# Patient Record
Sex: FEMALE | Race: WHITE | NOT HISPANIC OR LATINO | Employment: UNEMPLOYED | URBAN - METROPOLITAN AREA
[De-identification: names, ages, dates, MRNs, and addresses within clinical notes are randomized per-mention and may not be internally consistent; named-entity substitution may affect disease eponyms.]

---

## 2023-04-05 LAB
ALBUMIN (MG/L) IN URINE: <7 MG/L
ALBUMIN/CREATININE (UG/MG) IN URINE: ABNORMAL UG/MG CRT (ref 0–30)
ANION GAP IN SER/PLAS: 12 MMOL/L (ref 10–20)
CALCIUM (MG/DL) IN SER/PLAS: 9.5 MG/DL (ref 8.6–10.6)
CARBON DIOXIDE, TOTAL (MMOL/L) IN SER/PLAS: 26 MMOL/L (ref 21–32)
CHLORIDE (MMOL/L) IN SER/PLAS: 96 MMOL/L (ref 98–107)
CHOLESTEROL (MG/DL) IN SER/PLAS: 123 MG/DL (ref 0–199)
CHOLESTEROL IN HDL (MG/DL) IN SER/PLAS: 66 MG/DL
CHOLESTEROL/HDL RATIO: 1.9
CREATININE (MG/DL) IN SER/PLAS: 0.85 MG/DL (ref 0.5–1.05)
CREATININE (MG/DL) IN URINE: 15 MG/DL (ref 20–320)
ESTIMATED AVERAGE GLUCOSE FOR HBA1C: 341 MG/DL
GFR FEMALE: 81 ML/MIN/1.73M2
GLUCOSE (MG/DL) IN SER/PLAS: 768 MG/DL (ref 74–99)
HEMOGLOBIN A1C/HEMOGLOBIN TOTAL IN BLOOD: 13.5 %
LDL: 37 MG/DL (ref 0–99)
POTASSIUM (MMOL/L) IN SER/PLAS: 4.4 MMOL/L (ref 3.5–5.3)
SODIUM (MMOL/L) IN SER/PLAS: 130 MMOL/L (ref 136–145)
TRIGLYCERIDE (MG/DL) IN SER/PLAS: 100 MG/DL (ref 0–149)
UREA NITROGEN (MG/DL) IN SER/PLAS: 14 MG/DL (ref 6–23)
VLDL: 20 MG/DL (ref 0–40)

## 2023-09-15 LAB
ANION GAP IN SER/PLAS: 10 MMOL/L (ref 10–20)
CALCIUM (MG/DL) IN SER/PLAS: 9 MG/DL (ref 8.6–10.3)
CARBON DIOXIDE, TOTAL (MMOL/L) IN SER/PLAS: 34 MMOL/L (ref 21–32)
CHLORIDE (MMOL/L) IN SER/PLAS: 98 MMOL/L (ref 98–107)
CREATININE (MG/DL) IN SER/PLAS: 0.92 MG/DL (ref 0.5–1.05)
GFR FEMALE: 73 ML/MIN/1.73M2
GLUCOSE (MG/DL) IN SER/PLAS: 160 MG/DL (ref 74–99)
NATRIURETIC PEPTIDE B (PG/ML) IN SER/PLAS: 111 PG/ML (ref 0–99)
POTASSIUM (MMOL/L) IN SER/PLAS: 4 MMOL/L (ref 3.5–5.3)
SODIUM (MMOL/L) IN SER/PLAS: 138 MMOL/L (ref 136–145)
UREA NITROGEN (MG/DL) IN SER/PLAS: 15 MG/DL (ref 6–23)

## 2023-10-02 ENCOUNTER — TELEPHONE (OUTPATIENT)
Dept: ENDOCRINOLOGY | Facility: CLINIC | Age: 53
End: 2023-10-02
Payer: COMMERCIAL

## 2023-10-03 NOTE — TELEPHONE ENCOUNTER
Patient called.  She never made the pump changes at her last telephone call.  Her sugars have been in the 400mg/dL range.  Yesterday it ranged from 180-250mg/dL.  She changed her pump site this morning.    Recommendations  1. Change pump site  2.  Change correction factor to 85mg/dL  3.  Continue boluses

## 2023-10-11 ENCOUNTER — PHARMACY VISIT (OUTPATIENT)
Dept: PHARMACY | Facility: CLINIC | Age: 53
End: 2023-10-11
Payer: MEDICAID

## 2023-10-11 PROCEDURE — RXMED WILLOW AMBULATORY MEDICATION CHARGE

## 2023-10-11 RX ORDER — MORPHINE SULFATE 15 MG/1
15 TABLET, FILM COATED, EXTENDED RELEASE ORAL EVERY 12 HOURS
Qty: 60 TABLET | Refills: 0 | OUTPATIENT
Start: 2023-10-11 | End: 2024-05-10 | Stop reason: ALTCHOICE

## 2023-10-11 RX ORDER — OXYCODONE AND ACETAMINOPHEN 5; 325 MG/1; MG/1
TABLET ORAL
Qty: 90 TABLET | Refills: 0 | OUTPATIENT
Start: 2023-10-11 | End: 2024-05-10 | Stop reason: WASHOUT

## 2023-10-17 ENCOUNTER — HOSPITAL ENCOUNTER (EMERGENCY)
Facility: HOSPITAL | Age: 53
Discharge: LEFT BEFORE DISCHARGE | End: 2023-10-17
Attending: EMERGENCY MEDICINE
Payer: COMMERCIAL

## 2023-10-17 VITALS
BODY MASS INDEX: 17.07 KG/M2 | TEMPERATURE: 98 F | RESPIRATION RATE: 24 BRPM | SYSTOLIC BLOOD PRESSURE: 134 MMHG | HEIGHT: 64 IN | HEART RATE: 101 BPM | WEIGHT: 100 LBS | DIASTOLIC BLOOD PRESSURE: 79 MMHG | OXYGEN SATURATION: 95 %

## 2023-10-17 DIAGNOSIS — R07.9 CHEST PAIN, UNSPECIFIED TYPE: Primary | ICD-10-CM

## 2023-10-17 LAB
ALBUMIN SERPL BCP-MCNC: 3.6 G/DL (ref 3.4–5)
ALP SERPL-CCNC: 448 U/L (ref 33–110)
ALT SERPL W P-5'-P-CCNC: 27 U/L (ref 7–45)
ANION GAP SERPL CALC-SCNC: 13 MMOL/L (ref 10–20)
AST SERPL W P-5'-P-CCNC: 42 U/L (ref 9–39)
BASOPHILS # BLD AUTO: 0.05 X10*3/UL (ref 0–0.1)
BASOPHILS NFR BLD AUTO: 0.9 %
BILIRUB SERPL-MCNC: 0.5 MG/DL (ref 0–1.2)
BNP SERPL-MCNC: 178 PG/ML (ref 0–99)
BUN SERPL-MCNC: 8 MG/DL (ref 6–23)
CALCIUM SERPL-MCNC: 9.6 MG/DL (ref 8.6–10.3)
CARDIAC TROPONIN I PNL SERPL HS: 13 NG/L (ref 0–13)
CHLORIDE SERPL-SCNC: 104 MMOL/L (ref 98–107)
CO2 SERPL-SCNC: 24 MMOL/L (ref 21–32)
CREAT SERPL-MCNC: 0.69 MG/DL (ref 0.5–1.05)
EOSINOPHIL # BLD AUTO: 0.13 X10*3/UL (ref 0–0.7)
EOSINOPHIL NFR BLD AUTO: 2.4 %
ERYTHROCYTE [DISTWIDTH] IN BLOOD BY AUTOMATED COUNT: 16.6 % (ref 11.5–14.5)
GFR SERPL CREATININE-BSD FRML MDRD: >90 ML/MIN/1.73M*2
GLUCOSE SERPL-MCNC: 178 MG/DL (ref 74–99)
HCT VFR BLD AUTO: 40.5 % (ref 36–46)
HGB BLD-MCNC: 13.9 G/DL (ref 12–16)
IMM GRANULOCYTES # BLD AUTO: 0.03 X10*3/UL (ref 0–0.7)
IMM GRANULOCYTES NFR BLD AUTO: 0.6 % (ref 0–0.9)
LACTATE SERPL-SCNC: 0.8 MMOL/L (ref 0.4–2)
LIPASE SERPL-CCNC: 3 U/L (ref 9–82)
LYMPHOCYTES # BLD AUTO: 0.6 X10*3/UL (ref 1.2–4.8)
LYMPHOCYTES NFR BLD AUTO: 11.1 %
MCH RBC QN AUTO: 30.2 PG (ref 26–34)
MCHC RBC AUTO-ENTMCNC: 34.3 G/DL (ref 32–36)
MCV RBC AUTO: 88 FL (ref 80–100)
MONOCYTES # BLD AUTO: 0.42 X10*3/UL (ref 0.1–1)
MONOCYTES NFR BLD AUTO: 7.7 %
NEUTROPHILS # BLD AUTO: 4.19 X10*3/UL (ref 1.2–7.7)
NEUTROPHILS NFR BLD AUTO: 77.3 %
NRBC BLD-RTO: 0 /100 WBCS (ref 0–0)
PLATELET # BLD AUTO: 288 X10*3/UL (ref 150–450)
PMV BLD AUTO: 9.1 FL (ref 7.5–11.5)
POTASSIUM SERPL-SCNC: 3.7 MMOL/L (ref 3.5–5.3)
PROT SERPL-MCNC: 7.5 G/DL (ref 6.4–8.2)
RBC # BLD AUTO: 4.6 X10*6/UL (ref 4–5.2)
SODIUM SERPL-SCNC: 137 MMOL/L (ref 136–145)
WBC # BLD AUTO: 5.4 X10*3/UL (ref 4.4–11.3)

## 2023-10-17 PROCEDURE — 36415 COLL VENOUS BLD VENIPUNCTURE: CPT | Performed by: NURSE PRACTITIONER

## 2023-10-17 PROCEDURE — 93308 TTE F-UP OR LMTD: CPT | Performed by: EMERGENCY MEDICINE

## 2023-10-17 PROCEDURE — 96374 THER/PROPH/DIAG INJ IV PUSH: CPT

## 2023-10-17 PROCEDURE — 2500000001 HC RX 250 WO HCPCS SELF ADMINISTERED DRUGS (ALT 637 FOR MEDICARE OP): Performed by: EMERGENCY MEDICINE

## 2023-10-17 PROCEDURE — 83605 ASSAY OF LACTIC ACID: CPT | Performed by: NURSE PRACTITIONER

## 2023-10-17 PROCEDURE — 84484 ASSAY OF TROPONIN QUANT: CPT | Performed by: NURSE PRACTITIONER

## 2023-10-17 PROCEDURE — 80053 COMPREHEN METABOLIC PANEL: CPT | Performed by: NURSE PRACTITIONER

## 2023-10-17 PROCEDURE — 83880 ASSAY OF NATRIURETIC PEPTIDE: CPT | Performed by: EMERGENCY MEDICINE

## 2023-10-17 PROCEDURE — 36415 COLL VENOUS BLD VENIPUNCTURE: CPT | Performed by: EMERGENCY MEDICINE

## 2023-10-17 PROCEDURE — 83690 ASSAY OF LIPASE: CPT | Performed by: NURSE PRACTITIONER

## 2023-10-17 PROCEDURE — 2500000004 HC RX 250 GENERAL PHARMACY W/ HCPCS (ALT 636 FOR OP/ED): Performed by: EMERGENCY MEDICINE

## 2023-10-17 PROCEDURE — 85025 COMPLETE CBC W/AUTO DIFF WBC: CPT | Performed by: NURSE PRACTITIONER

## 2023-10-17 PROCEDURE — 99284 EMERGENCY DEPT VISIT MOD MDM: CPT | Mod: 25 | Performed by: EMERGENCY MEDICINE

## 2023-10-17 RX ORDER — FAMOTIDINE 10 MG/ML
20 INJECTION INTRAVENOUS ONCE
Status: DISCONTINUED | OUTPATIENT
Start: 2023-10-17 | End: 2023-10-17 | Stop reason: HOSPADM

## 2023-10-17 RX ORDER — DIPHENHYDRAMINE HYDROCHLORIDE 50 MG/ML
50 INJECTION INTRAMUSCULAR; INTRAVENOUS ONCE
Status: DISCONTINUED | OUTPATIENT
Start: 2023-10-17 | End: 2023-10-17 | Stop reason: HOSPADM

## 2023-10-17 RX ORDER — OXYCODONE AND ACETAMINOPHEN 5; 325 MG/1; MG/1
1 TABLET ORAL ONCE
Status: COMPLETED | OUTPATIENT
Start: 2023-10-17 | End: 2023-10-17

## 2023-10-17 RX ADMIN — OXYCODONE HYDROCHLORIDE AND ACETAMINOPHEN 1 TABLET: 5; 325 TABLET ORAL at 10:46

## 2023-10-17 RX ADMIN — METHYLPREDNISOLONE SODIUM SUCCINATE 125 MG: 125 INJECTION, POWDER, FOR SOLUTION INTRAMUSCULAR; INTRAVENOUS at 10:46

## 2023-10-17 ASSESSMENT — PAIN DESCRIPTION - PAIN TYPE: TYPE: ACUTE PAIN

## 2023-10-17 ASSESSMENT — HEART SCORE
HEART SCORE: 3
HISTORY: SLIGHTLY SUSPICIOUS
ECG: NORMAL
RISK FACTORS: >2 RISK FACTORS OR HX OF ATHEROSCLEROTIC DISEASE
AGE: 45-64
TROPONIN: LESS THAN OR EQUAL TO NORMAL LIMIT

## 2023-10-17 ASSESSMENT — LIFESTYLE VARIABLES
EVER FELT BAD OR GUILTY ABOUT YOUR DRINKING: NO
HAVE PEOPLE ANNOYED YOU BY CRITICIZING YOUR DRINKING: NO
EVER HAD A DRINK FIRST THING IN THE MORNING TO STEADY YOUR NERVES TO GET RID OF A HANGOVER: NO
HAVE YOU EVER FELT YOU SHOULD CUT DOWN ON YOUR DRINKING: NO

## 2023-10-17 ASSESSMENT — COLUMBIA-SUICIDE SEVERITY RATING SCALE - C-SSRS
6. HAVE YOU EVER DONE ANYTHING, STARTED TO DO ANYTHING, OR PREPARED TO DO ANYTHING TO END YOUR LIFE?: NO
1. IN THE PAST MONTH, HAVE YOU WISHED YOU WERE DEAD OR WISHED YOU COULD GO TO SLEEP AND NOT WAKE UP?: NO
2. HAVE YOU ACTUALLY HAD ANY THOUGHTS OF KILLING YOURSELF?: NO

## 2023-10-17 ASSESSMENT — PAIN SCALES - GENERAL
PAINLEVEL_OUTOF10: 10 - WORST POSSIBLE PAIN
PAINLEVEL_OUTOF10: 8

## 2023-10-17 ASSESSMENT — PAIN - FUNCTIONAL ASSESSMENT: PAIN_FUNCTIONAL_ASSESSMENT: 0-10

## 2023-10-17 ASSESSMENT — PAIN DESCRIPTION - LOCATION: LOCATION: CHEST

## 2023-10-17 ASSESSMENT — ENCOUNTER SYMPTOMS: TACHYCARDIA: 1

## 2023-10-17 NOTE — ED PROCEDURE NOTE
Procedure    Performed by: Hiro Barney DO  Authorized by: Hiro Barney DO  Cardiac Indications: tachycardia                Procedure: Cardiac Ultrasound    Findings:   Views: parasternal long, parasternal short, apical four and subxiphoid  The pericardial space was visualized and was NEGATIVE for a significant pericardial effusion.  Activity: Ventricular contractions were visualized.  LV: LV systolic function was NORMAL.  RV: RV size was NORMAL.    Impression:  Cardiac: The focused cardiac ultrasound exam was NORMAL.    Comments: Mitral valve replacement and aortic vavle replacement visualized               Hiro Barney DO  10/17/23 2813

## 2023-10-17 NOTE — ED PROVIDER NOTES
Chief Complaint   Patient presents with   • Chest Pain     Midsternal chest pain since Saturday. HX of CHF and DM2       HPI       53 year old female presents to the Emergency Department today complaining of a 1 week history of nasal congestion, postnasal drip, and nonproductive cough. Notes that she has had a gradual onset of shortness of breath that has gotten progressively worse. Notes that she has had midsternal chest pain that she describes as dull in nature, constant, non-radiating, and varies in intensity. Reports that she was walking on several occasions over the weekend and fell. Notes that she has had right rib pain since the fall. She is not clear as to what cause her to fall. Denies hitting their head, loss of consciousness, or seizure-like activity. Denies any other injuries. Denies her heart beating fast, slow, or irregular. Denies any associated fever, chills, headache, neck pain, abdominal pain, nausea, vomiting, diarrhea, constipation, or urinary symptoms.       History provided by:  Patient             Patient History   History reviewed. No pertinent past medical history.  Past Surgical History:   Procedure Laterality Date   • OTHER SURGICAL HISTORY  04/14/2020    Cholecystectomy   • OTHER SURGICAL HISTORY  04/14/2020    Exploratory laparoscopy   • OTHER SURGICAL HISTORY  04/14/2020    Heart surgery     No family history on file.  Social History     Tobacco Use   • Smoking status: Former     Types: Cigarettes   • Smokeless tobacco: Never   Vaping Use   • Vaping Use: Every day   Substance Use Topics   • Alcohol use: Never   • Drug use: Never           Physical Exam  Constitutional:       Appearance: Normal appearance.   HENT:      Head: Normocephalic.      Right Ear: Tympanic membrane, ear canal and external ear normal.      Left Ear: Tympanic membrane, ear canal and external ear normal.      Nose: Nose normal.      Mouth/Throat:      Mouth: Mucous membranes are moist.      Pharynx: Oropharynx is  clear. No oropharyngeal exudate or posterior oropharyngeal erythema.   Eyes:      Conjunctiva/sclera: Conjunctivae normal.      Pupils: Pupils are equal, round, and reactive to light.   Cardiovascular:      Rate and Rhythm: Normal rate and regular rhythm.      Pulses:           Radial pulses are 3+ on the right side and 3+ on the left side.        Dorsalis pedis pulses are 3+ on the right side and 3+ on the left side.      Heart sounds: Normal heart sounds. No murmur heard.     No friction rub. No gallop.   Pulmonary:      Effort: Pulmonary effort is normal. No respiratory distress.      Breath sounds: Normal breath sounds. No wheezing, rhonchi or rales.   Abdominal:      General: Abdomen is flat. Bowel sounds are normal.      Palpations: Abdomen is soft.      Tenderness: There is no abdominal tenderness. There is no right CVA tenderness, left CVA tenderness, guarding or rebound. Negative signs include Prado's sign and McBurney's sign.   Musculoskeletal:         General: No swelling or deformity.      Cervical back: Full passive range of motion without pain.      Right lower leg: No edema.      Left lower leg: No edema.   Lymphadenopathy:      Cervical: No cervical adenopathy.   Skin:     Capillary Refill: Capillary refill takes less than 2 seconds.      Coloration: Skin is not jaundiced.      Findings: No rash.   Neurological:      General: No focal deficit present.      Mental Status: She is alert and oriented to person, place, and time. Mental status is at baseline.      Gait: Gait is intact.   Psychiatric:         Mood and Affect: Mood normal.         Behavior: Behavior is cooperative.         Labs Reviewed   CBC WITH AUTO DIFFERENTIAL - Abnormal       Result Value    WBC 5.4      nRBC 0.0      RBC 4.60      Hemoglobin 13.9      Hematocrit 40.5      MCV 88      MCH 30.2      MCHC 34.3      RDW 16.6 (*)     Platelets 288      MPV 9.1      Neutrophils % 77.3      Immature Granulocytes %, Automated 0.6       Lymphocytes % 11.1      Monocytes % 7.7      Eosinophils % 2.4      Basophils % 0.9      Neutrophils Absolute 4.19      Immature Granulocytes Absolute, Automated 0.03      Lymphocytes Absolute 0.60 (*)     Monocytes Absolute 0.42      Eosinophils Absolute 0.13      Basophils Absolute 0.05     COMPREHENSIVE METABOLIC PANEL - Abnormal    Glucose 178 (*)     Sodium 137      Potassium 3.7      Chloride 104      Bicarbonate 24      Anion Gap 13      Urea Nitrogen 8      Creatinine 0.69      eGFR >90      Calcium 9.6      Albumin 3.6      Alkaline Phosphatase 448 (*)     Total Protein 7.5      AST 42 (*)     Bilirubin, Total 0.5      ALT 27     LIPASE - Abnormal    Lipase 3 (*)     Narrative:     Venipuncture immediately after or during the administration of Metamizole may lead to falsely low results. Testing should be performed immediately prior to Metamizole dosing.   B-TYPE NATRIURETIC PEPTIDE - Abnormal     (*)     Narrative:        <100 pg/mL - Heart failure unlikely  100-299 pg/mL - Intermediate probability of acute heart                  failure exacerbation. Correlate with clinical                  context and patient history.    >=300 pg/mL - Heart Failure likely. Correlate with clinical                  context and patient history.    BNP testing is performed using different testing methodology at [facility] than at other [facility]. Direct result comparisons should only be made within the same method.      LACTATE - Normal    Lactate 0.8      Narrative:     Venipuncture immediately after or during the administration of Metamizole may lead to falsely low results. Testing should be performed immediately  prior to Metamizole dosing.   TROPONIN I, HIGH SENSITIVITY - Normal    Troponin I, High Sensitivity 13      Narrative:     Less than 99th percentile of normal range cutoff-  Female and children under 18 years old <14 ng/L; Male <21 ng/L: Negative  Repeat testing should be performed if  clinically indicated.     Female and children under 18 years old 14-50 ng/L; Male 21-50 ng/L:  Consistent with possible cardiac damage and possible increased clinical   risk. Serial measurements may help to assess extent of myocardial damage.     >50 ng/L: Consistent with cardiac damage, increased clinical risk and  myocardial infarction. Serial measurements may help assess extent of   myocardial damage.      NOTE: Children less than 1 year old may have higher baseline troponin   levels and results should be interpreted in conjunction with the overall   clinical context.     NOTE: Troponin I testing is performed using a different   testing methodology at [facility] than at other   [facility]. Direct result comparisons should only   be made within the same method.   URINALYSIS WITH REFLEX MICROSCOPIC AND CULTURE    Narrative:     The following orders were created for panel order Urinalysis with Reflex Microscopic and Culture.  Procedure                               Abnormality         Status                     ---------                               -----------         ------                     Urinalysis with Reflex M...[[id number]]                                                 Extra Urine Gray Tube[[id number]]                                                         Please view results for these tests on the individual orders.   URINALYSIS WITH REFLEX MICROSCOPIC AND CULTURE   EXTRA URINE GRAY TUBE       Point of Care Ultrasound    (Results Pending)   CT head wo IV contrast    (Results Pending)   CT cervical spine wo IV contrast    (Results Pending)   CT angio chest for pulmonary embolism    (Results Pending)   CT abdomen pelvis w IV contrast    (Results Pending)            ED Course & MDM   ED Course as of 10/17/23 1215   Tue Oct 17, 2023   1152 Nursing staff alerted that patient was leaving against medical advice. I was unable to see the patient before she left due to trauma patient and  other patient care issues [WJ]      ED Course User Index  [WJ] Hiro Barney,          Diagnoses as of 10/17/23 1215   Chest pain, unspecified type           Medical Decision Making  Patient was seen and evaluated by Dr. Barney. Placed on a cardiac monitor which showed normal sinus rhythm without ectopy throughout ED stay. Rhythm strip obtained showed normal sinus rhythm. Impression: No acute pathology. Continuous pulse oximetry monitoring showed no signs of hypoxia. Saline lock was established with labs drawn and results as above. Blood counts, electrolytes, kidney function, and lactate were unremarkable. Heart Score- 3 with normal EKG and troponin. Minimal elevation in AST 42. Cardiac BNP was minimally elevated as well. CT scans of her head, cervical spine, chest, abdomen, and pelvis were all ordered to evaluate her symptoms further. She was premedicated for her dye allergy. Prior to receiving the above, she left prior to treatment being complete.     Diagnostic Impression:    1. Acute chest pain    2. IV meds in ED           Your medication list        ASK your doctor about these medications        Instructions Last Dose Given Next Dose Due   fluconazole 150 mg tablet  Commonly known as: Diflucan      TAKE ONE TABLET BY MOUTH ONCE       morphine CR 15 mg 12 hr tablet  Commonly known as: MS Contin      Take 1 tablet (15 mg) by mouth every 12 hours.       oxyCODONE-acetaminophen 5-325 mg tablet  Commonly known as: Percocet      TAKE 1 TABLET BY MOUTH EVERY 6 HOURS FOR 30 DAYS       oxyCODONE-acetaminophen 5-325 mg tablet  Commonly known as: Percocet      TAKE 1 TABLET BY MOUTH EVERY 6 HOURS FOR 30 DS.DO NOT FILL EARLY       oxyCODONE-acetaminophen 5-325 mg tablet  Commonly known as: Percocet      TAKE 1 TABLET BY MOUTH EVERY 8 HOURS AS NEEDED FOR PAIN       oxyCODONE-acetaminophen 5-325 mg tablet  Commonly known as: Percocet      TAKE 1 TABLET BY MOUTH EVERY 8 HOURS AS NEEDED FOR 30 DAYS       OxyCONTIN 10 mg 12  hr tablet  Generic drug: oxyCODONE ER      TAKE 1 TABLET BY MOUTH EVERY 12 HOURS       tinidazole 500 mg tablet  Commonly known as: Tindamax      TAKE 4 TABLETS BY MOUTH AT ONCE       tinidazole 500 mg tablet  Commonly known as: Tindamax      TAKE 4 TABLETS BY MOUTH ONCE DAILY                  Procedure  Procedures     Mikel Anderson, RALPH-CNP  10/17/23 1027

## 2023-10-23 ENCOUNTER — HOSPITAL ENCOUNTER (OUTPATIENT)
Dept: RADIOLOGY | Facility: HOSPITAL | Age: 53
Discharge: HOME | End: 2023-10-23
Payer: COMMERCIAL

## 2023-10-23 DIAGNOSIS — R59.0 LOCALIZED ENLARGED LYMPH NODES: ICD-10-CM

## 2023-10-23 PROCEDURE — 76536 US EXAM OF HEAD AND NECK: CPT

## 2023-10-23 PROCEDURE — 76536 US EXAM OF HEAD AND NECK: CPT | Performed by: RADIOLOGY

## 2023-10-26 ENCOUNTER — PHARMACY VISIT (OUTPATIENT)
Dept: PHARMACY | Facility: CLINIC | Age: 53
End: 2023-10-26
Payer: MEDICAID

## 2023-10-26 PROCEDURE — RXMED WILLOW AMBULATORY MEDICATION CHARGE

## 2023-11-25 ENCOUNTER — PHARMACY VISIT (OUTPATIENT)
Dept: PHARMACY | Facility: CLINIC | Age: 53
End: 2023-11-25
Payer: MEDICAID

## 2023-11-25 PROCEDURE — RXMED WILLOW AMBULATORY MEDICATION CHARGE

## 2023-12-06 ENCOUNTER — APPOINTMENT (OUTPATIENT)
Dept: OTOLARYNGOLOGY | Facility: CLINIC | Age: 53
End: 2023-12-06
Payer: COMMERCIAL

## 2023-12-08 ENCOUNTER — PHARMACY VISIT (OUTPATIENT)
Dept: PHARMACY | Facility: CLINIC | Age: 53
End: 2023-12-08
Payer: MEDICAID

## 2023-12-08 PROCEDURE — RXMED WILLOW AMBULATORY MEDICATION CHARGE

## 2023-12-13 ENCOUNTER — OFFICE VISIT (OUTPATIENT)
Dept: ENDOCRINOLOGY | Facility: CLINIC | Age: 53
End: 2023-12-13
Payer: COMMERCIAL

## 2023-12-13 VITALS
BODY MASS INDEX: 19.45 KG/M2 | HEART RATE: 97 BPM | DIASTOLIC BLOOD PRESSURE: 60 MMHG | HEIGHT: 63 IN | WEIGHT: 109.8 LBS | SYSTOLIC BLOOD PRESSURE: 106 MMHG

## 2023-12-13 DIAGNOSIS — E10.9 TYPE 1 DIABETES MELLITUS WITHOUT COMPLICATION (MULTI): Primary | ICD-10-CM

## 2023-12-13 LAB — POC HEMOGLOBIN A1C: 8.9 % (ref 4.2–6.5)

## 2023-12-13 PROCEDURE — 83036 HEMOGLOBIN GLYCOSYLATED A1C: CPT | Performed by: INTERNAL MEDICINE

## 2023-12-13 PROCEDURE — 3046F HEMOGLOBIN A1C LEVEL >9.0%: CPT | Performed by: INTERNAL MEDICINE

## 2023-12-13 PROCEDURE — 95251 CONT GLUC MNTR ANALYSIS I&R: CPT | Performed by: INTERNAL MEDICINE

## 2023-12-13 PROCEDURE — 3074F SYST BP LT 130 MM HG: CPT | Performed by: INTERNAL MEDICINE

## 2023-12-13 PROCEDURE — 1036F TOBACCO NON-USER: CPT | Performed by: INTERNAL MEDICINE

## 2023-12-13 PROCEDURE — 3008F BODY MASS INDEX DOCD: CPT | Performed by: INTERNAL MEDICINE

## 2023-12-13 PROCEDURE — 99214 OFFICE O/P EST MOD 30 MIN: CPT | Performed by: INTERNAL MEDICINE

## 2023-12-13 PROCEDURE — 3078F DIAST BP <80 MM HG: CPT | Performed by: INTERNAL MEDICINE

## 2023-12-13 RX ORDER — INSULIN ASPART 100 [IU]/ML
INJECTION, SOLUTION INTRAVENOUS; SUBCUTANEOUS
COMMUNITY
End: 2024-03-21 | Stop reason: SDUPTHER

## 2023-12-13 RX ORDER — ONDANSETRON 4 MG/1
4 TABLET, FILM COATED ORAL
COMMUNITY
Start: 2018-10-25 | End: 2024-05-10 | Stop reason: WASHOUT

## 2023-12-13 RX ORDER — NALOXONE HYDROCHLORIDE 4 MG/.1ML
SPRAY NASAL
COMMUNITY
Start: 2023-11-10

## 2023-12-13 RX ORDER — OMEPRAZOLE 40 MG/1
40 CAPSULE, DELAYED RELEASE ORAL
COMMUNITY
Start: 2021-09-28

## 2023-12-13 RX ORDER — ATORVASTATIN CALCIUM 40 MG/1
40 TABLET, FILM COATED ORAL DAILY
COMMUNITY

## 2023-12-13 RX ORDER — ESOMEPRAZOLE MAGNESIUM 40 MG/1
40 CAPSULE, DELAYED RELEASE ORAL DAILY PRN
COMMUNITY

## 2023-12-13 RX ORDER — SODIUM CHLORIDE 0.9 % (FLUSH) 0.9 %
10 SYRINGE (ML) INJECTION
COMMUNITY
Start: 2023-06-05 | End: 2024-05-24 | Stop reason: WASHOUT

## 2023-12-13 RX ORDER — HYDROCHLOROTHIAZIDE 25 MG/1
TABLET ORAL
COMMUNITY
Start: 2015-09-06 | End: 2024-05-24 | Stop reason: WASHOUT

## 2023-12-13 RX ORDER — ISOPROPYL ALCOHOL 0.75 G/1
SWAB TOPICAL
COMMUNITY
Start: 2023-11-07

## 2023-12-13 RX ORDER — PANCRELIPASE 36000; 180000; 114000 [USP'U]/1; [USP'U]/1; [USP'U]/1
CAPSULE, DELAYED RELEASE PELLETS ORAL
COMMUNITY
Start: 2022-05-20 | End: 2024-05-24 | Stop reason: WASHOUT

## 2023-12-13 RX ORDER — OLANZAPINE 10 MG/1
TABLET ORAL 2 TIMES DAILY PRN
COMMUNITY

## 2023-12-13 RX ORDER — ASPIRIN 81 MG/1
81 TABLET ORAL DAILY
COMMUNITY

## 2023-12-13 RX ORDER — TALC
2 POWDER (GRAM) TOPICAL NIGHTLY
COMMUNITY
Start: 2022-08-25

## 2023-12-13 RX ORDER — DULOXETIN HYDROCHLORIDE 60 MG/1
80 CAPSULE, DELAYED RELEASE ORAL DAILY
COMMUNITY

## 2023-12-13 RX ORDER — CYCLOBENZAPRINE HCL 10 MG
10 TABLET ORAL 3 TIMES DAILY PRN
COMMUNITY

## 2023-12-13 RX ORDER — SPIRONOLACTONE 25 MG/1
TABLET ORAL
COMMUNITY
Start: 2021-05-19

## 2023-12-13 RX ORDER — TIOTROPIUM BROMIDE 18 UG/1
1 CAPSULE ORAL; RESPIRATORY (INHALATION)
COMMUNITY
End: 2024-05-24 | Stop reason: WASHOUT

## 2023-12-13 RX ORDER — FLASH GLUCOSE SENSOR
KIT MISCELLANEOUS
COMMUNITY
Start: 2023-11-22 | End: 2024-01-31 | Stop reason: SDUPTHER

## 2023-12-13 RX ORDER — ALBUTEROL SULFATE 90 UG/1
2 AEROSOL, METERED RESPIRATORY (INHALATION) EVERY 6 HOURS
COMMUNITY
Start: 2015-08-16

## 2023-12-13 RX ORDER — ONDANSETRON 4 MG/1
4 TABLET, ORALLY DISINTEGRATING ORAL EVERY 8 HOURS PRN
COMMUNITY
Start: 2015-09-05 | End: 2024-05-10 | Stop reason: WASHOUT

## 2023-12-13 RX ORDER — METOPROLOL SUCCINATE 50 MG/1
TABLET, EXTENDED RELEASE ORAL
COMMUNITY
Start: 2020-08-20

## 2023-12-13 RX ORDER — MULTIVIT-MIN/IRON FUM/FOLIC AC 7.5 MG-4
1 TABLET ORAL
COMMUNITY
End: 2024-05-24 | Stop reason: WASHOUT

## 2023-12-13 RX ORDER — TORSEMIDE 20 MG/1
20 TABLET ORAL
COMMUNITY
Start: 2021-05-19

## 2023-12-13 RX ORDER — LEVOTHYROXINE SODIUM 125 UG/1
TABLET ORAL
COMMUNITY

## 2023-12-13 RX ORDER — INSULIN PUMP CONTROLLER
EACH MISCELLANEOUS
COMMUNITY
Start: 2023-11-27

## 2023-12-13 RX ORDER — INSULIN GLARGINE 100 [IU]/ML
INJECTION, SOLUTION SUBCUTANEOUS
COMMUNITY
Start: 2015-08-16 | End: 2024-01-09 | Stop reason: ALTCHOICE

## 2023-12-13 RX ORDER — POTASSIUM CHLORIDE 20 MEQ/1
1 TABLET, EXTENDED RELEASE ORAL
COMMUNITY
Start: 2023-06-05 | End: 2024-06-04

## 2023-12-13 RX ORDER — TRIAMCINOLONE ACETONIDE 5 MG/G
CREAM TOPICAL
COMMUNITY
Start: 2023-10-11 | End: 2024-05-24 | Stop reason: WASHOUT

## 2023-12-13 RX ORDER — PROMETHAZINE HYDROCHLORIDE 25 MG/1
TABLET ORAL
COMMUNITY
Start: 2015-12-14

## 2023-12-13 NOTE — PROGRESS NOTES
"Subjective   April D Abran is a 53 y.o. female who presents for a follow-up evaluation of Type 1 diabetes mellitus. The initial diagnosis of diabetes was made in 2006 .  Diabetes was diagnosed after having recurrent bouts of pancreatitis.    The patient has had no major changes to her health since her last appointment.  Her frequency of lows has improved.  She has only been putting glucose values in the pump.  She does not report any carb data.    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 She has a left upper left lymph node which has gotten bigger.  She has also been having night sweats.      Known complications due to diabetes included CAD s/p MI in 2018    Cardiovascular risk factors include diabetes mellitus. The patient is not on an ACE inhibitor or angiotensin II receptor blocker.  The patient has not been previously hospitalized due to diabetic ketoacidosis.     Current symptoms/problems include none. Her clinical course has been stable.     The patient is currently checking the blood glucose multiple times per day.    Patient is using: continuous glucose monitor  [employer] CGM DATA:  Average 243 mg/dL  1% of values below target range  29% of values within target range  70% of values above target range      Hypoglycemia frequency: 1%  Hypoglycemia awareness: Yes       Omnipod Pump Settings  Basal - 12 units/day   0.5 units/hr     I:C 18 grams     Sensitivity 85mg/dL      Target 150mg/dL     Correct above 180mg/dL      AIT 4 hours      58% of insulin is via basal at 11.5 units/day  42% of insulin is via bolus at 8.4 units/day     Tobacco use - 1 ppd     MEALS: does not put carbs in the       Objective   /60 (BP Location: Right arm, Patient Position: Sitting, BP Cuff Size: Child)   Pulse 97   Ht 1.6 m (5' 3\")   Wt 49.8 kg (109 lb 12.8 oz)   BMI 19.45 kg/m²   Physical Exam  Vitals and nursing note reviewed.   Constitutional:       General: She is not in acute " "distress.     Appearance: Normal appearance. She is normal weight.   HENT:      Head: Normocephalic and atraumatic.      Nose: Nose normal.      Mouth/Throat:      Mouth: Mucous membranes are moist.   Eyes:      Extraocular Movements: Extraocular movements intact.   Cardiovascular:      Rate and Rhythm: Normal rate and regular rhythm.   Pulmonary:      Effort: Pulmonary effort is normal.      Breath sounds: Normal breath sounds.   Musculoskeletal:         General: Normal range of motion.   Lymphadenopathy:      Cervical: Cervical adenopathy (Level IIIb; 1.5cm; firm but mobile) present.      Left cervical: Deep cervical adenopathy present.   Skin:     General: Skin is warm.   Neurological:      Mental Status: She is alert and oriented to person, place, and time.   Psychiatric:         Mood and Affect: Mood normal.         Lab Review  Lab Results   Component Value Date    HGBA1C 8.9 (A) 12/13/2023     Lab Results   Component Value Date    GLUCOSE 178 (H) 10/17/2023    CALCIUM 9.6 10/17/2023     10/17/2023    K 3.7 10/17/2023    CO2 24 10/17/2023     10/17/2023    BUN 8 10/17/2023    CREATININE 0.69 10/17/2023     Lab Results   Component Value Date    CHOL 123 04/04/2023    CHOL 84 08/05/2022    CHOL 106 04/06/2022     Lab Results   Component Value Date    HDL 66.0 04/04/2023    HDL 44.7 08/05/2022    HDL 62.1 04/06/2022     No results found for: \"LDLCALC\"  Lab Results   Component Value Date    TRIG 100 04/04/2023    TRIG 71 08/05/2022    TRIG 57 04/06/2022         Health Maintenance:   Foot Exam: July 2021  Eye Exam:  Urine Albumin: April 4, 2023 stool    Assessment/Plan   53-year-old female presents for follow up for type 3c diabetes mellitus. Her blood pressure is at goal today. Her A1c has improved to 8.9% as of today.    Type 1 diabetes mellitus without complication (CMS/[facility])  To use fixed carbs - 60g for average meal, 30 grams for small meal and 10g with coffee  All other pump settings remain the same "   Counseled that the goal A1C should be 7% or less  Counseled glycemic control is warranted to prevent microvascular complications  Counseled that the time in target should be at least 70%      Insulin pump in place  Change insulin pump sites every 3 days    Follow up in 3 months

## 2023-12-13 NOTE — PATIENT INSTRUCTIONS
Thank you for choosing [facility]  for your health care needs.  If you have any questions, concerns or medical needs, please feel free to contact our office at [phone].    Please ensure you complete your blood work one week before the next scheduled appointment.    To use fixed carbs - 60g for average meal, 30 grams to small meal and 10g with coffee  All other pump settings remain the same   Follow up in 3 months

## 2023-12-17 PROBLEM — Z96.41 INSULIN PUMP IN PLACE: Status: ACTIVE | Noted: 2023-12-17

## 2023-12-17 PROBLEM — E10.9 TYPE 1 DIABETES MELLITUS WITHOUT COMPLICATION (MULTI): Status: ACTIVE | Noted: 2023-12-17

## 2023-12-18 RX ORDER — TINIDAZOLE 500 MG/1
2000 TABLET ORAL DAILY
Qty: 4 TABLET | Refills: 0 | Status: CANCELLED | OUTPATIENT
Start: 2023-12-18 | End: 2024-12-17

## 2023-12-18 NOTE — TELEPHONE ENCOUNTER
Patient state she is still taking the inidazole 500 mg 4 times daily, notified patient she will have to call  for refills, but will have you replace medication back into patient chart, I was unable to cx discontinued med

## 2023-12-18 NOTE — TELEPHONE ENCOUNTER
Patient state she is still taking the tinidazole 500 mg 4 times daily, notified patient she will have to call  for refills, but will have you replace medication back into patient chart, I was unable to cx discontinued med.

## 2023-12-18 NOTE — TELEPHONE ENCOUNTER
Result Communication    No results found from the In Basket message.    1:30 PM      Results were not successfully communicated with the patient and they did not acknowledge their understanding.

## 2023-12-18 NOTE — ASSESSMENT & PLAN NOTE
To use fixed carbs - 60g for average meal, 30 grams for small meal and 10g with coffee  All other pump settings remain the same   Counseled that the goal A1C should be 7% or less  Counseled glycemic control is warranted to prevent microvascular complications  Counseled that the time in target should be at least 70%

## 2023-12-19 DIAGNOSIS — Z79.899 ENCOUNTER FOR LONG-TERM (CURRENT) USE OF MEDICATIONS: Primary | ICD-10-CM

## 2023-12-19 RX ORDER — TINIDAZOLE 500 MG/1
TABLET ORAL
Qty: 4 TABLET | Refills: 0 | OUTPATIENT
Start: 2023-12-19 | End: 2024-12-18

## 2023-12-19 RX ORDER — TINIDAZOLE 500 MG/1
2000 TABLET ORAL DAILY
Qty: 4 TABLET | Refills: 0 | Status: SHIPPED
Start: 2023-12-19 | End: 2024-05-24 | Stop reason: WASHOUT

## 2023-12-24 ENCOUNTER — PHARMACY VISIT (OUTPATIENT)
Dept: PHARMACY | Facility: CLINIC | Age: 53
End: 2023-12-24
Payer: MEDICAID

## 2023-12-24 PROCEDURE — RXMED WILLOW AMBULATORY MEDICATION CHARGE

## 2024-01-03 PROBLEM — I49.8 JUNCTIONAL RHYTHM: Status: ACTIVE | Noted: 2020-02-29

## 2024-01-03 PROBLEM — M54.2 CHRONIC NECK PAIN: Status: ACTIVE | Noted: 2024-01-03

## 2024-01-03 PROBLEM — R31.9 HEMATURIA: Status: ACTIVE | Noted: 2024-01-03

## 2024-01-03 PROBLEM — R10.9 ABDOMINAL PAIN: Status: ACTIVE | Noted: 2024-01-03

## 2024-01-03 PROBLEM — I35.1 AORTIC REGURGITATION: Status: ACTIVE | Noted: 2020-02-28

## 2024-01-03 PROBLEM — K92.2 UPPER GI BLEEDING: Status: ACTIVE | Noted: 2024-01-03

## 2024-01-03 PROBLEM — E43 SEVERE PROTEIN-CALORIE MALNUTRITION (MULTI): Status: ACTIVE | Noted: 2020-04-20

## 2024-01-03 PROBLEM — E11.69 DIABETES MELLITUS ASSOCIATED WITH PANCREATIC DISEASE (MULTI): Status: ACTIVE | Noted: 2024-01-03

## 2024-01-03 PROBLEM — I07.1 TRICUSPID INSUFFICIENCY: Status: ACTIVE | Noted: 2020-02-28

## 2024-01-03 PROBLEM — A59.9 TRICHOMONIASIS: Status: ACTIVE | Noted: 2024-01-03

## 2024-01-03 PROBLEM — K83.8 PNEUMOBILIA: Status: ACTIVE | Noted: 2020-04-22

## 2024-01-03 PROBLEM — G89.29 ENCOUNTER FOR CHRONIC PAIN MANAGEMENT: Status: ACTIVE | Noted: 2024-01-03

## 2024-01-03 PROBLEM — Z98.890 HISTORY OF ONE INDUCED TERMINATION OF PREGNANCY: Status: ACTIVE | Noted: 2024-01-03

## 2024-01-03 PROBLEM — K86.9 DIABETES MELLITUS ASSOCIATED WITH PANCREATIC DISEASE (MULTI): Status: ACTIVE | Noted: 2024-01-03

## 2024-01-03 PROBLEM — I31.39 PERICARDIAL EFFUSION (HHS-HCC): Status: ACTIVE | Noted: 2020-03-20

## 2024-01-03 PROBLEM — I34.0 MITRAL REGURGITATION: Status: ACTIVE | Noted: 2020-02-28

## 2024-01-03 PROBLEM — I50.42 CHRONIC COMBINED SYSTOLIC AND DIASTOLIC CONGESTIVE HEART FAILURE (MULTI): Status: ACTIVE | Noted: 2018-12-14

## 2024-01-03 PROBLEM — R87.629 ABNORMAL VAGINAL PAP SMEAR: Status: ACTIVE | Noted: 2024-01-03

## 2024-01-03 PROBLEM — N39.0 UTI (URINARY TRACT INFECTION): Status: ACTIVE | Noted: 2024-01-03

## 2024-01-03 PROBLEM — Z95.9 S/P LEFT PULMONARY ARTERY PRESSURE SENSOR IMPLANT PLACEMENT: Chronic | Status: ACTIVE | Noted: 2021-04-02

## 2024-01-03 PROBLEM — R10.2 SUPRAPUBIC PAIN: Status: ACTIVE | Noted: 2024-01-03

## 2024-01-03 PROBLEM — R10.2 PELVIC PAIN: Status: ACTIVE | Noted: 2024-01-03

## 2024-01-03 PROBLEM — K59.09 CHRONIC CONSTIPATION: Status: ACTIVE | Noted: 2024-01-03

## 2024-01-03 PROBLEM — J44.9 COPD (CHRONIC OBSTRUCTIVE PULMONARY DISEASE) (MULTI): Status: ACTIVE | Noted: 2020-02-28

## 2024-01-03 PROBLEM — G89.29 CHRONIC NECK PAIN: Status: ACTIVE | Noted: 2024-01-03

## 2024-01-03 PROBLEM — I42.8 VALVULAR CARDIOMYOPATHY (MULTI): Status: ACTIVE | Noted: 2019-01-18

## 2024-01-03 PROBLEM — R06.02 SHORTNESS OF BREATH: Status: ACTIVE | Noted: 2018-12-14

## 2024-01-03 PROBLEM — K86.89 SECONDARY PANCREATIC INSUFFICIENCY (HHS-HCC): Status: ACTIVE | Noted: 2024-01-03

## 2024-01-03 PROBLEM — R74.8 ABNORMAL LIVER ENZYMES: Status: ACTIVE | Noted: 2024-01-03

## 2024-01-03 PROBLEM — I25.10 CORONARY ARTERY DISEASE DUE TO CALCIFIED CORONARY LESION: Status: ACTIVE | Noted: 2018-12-14

## 2024-01-03 PROBLEM — E78.5 DYSLIPIDEMIA: Status: ACTIVE | Noted: 2018-12-14

## 2024-01-03 PROBLEM — R30.0 DYSURIA: Status: ACTIVE | Noted: 2024-01-03

## 2024-01-03 PROBLEM — I25.84 CORONARY ARTERY DISEASE DUE TO CALCIFIED CORONARY LESION: Status: ACTIVE | Noted: 2018-12-14

## 2024-01-03 PROBLEM — F32.9 MAJOR DEPRESSIVE DISORDER: Status: ACTIVE | Noted: 2020-02-28

## 2024-01-03 PROBLEM — G43.909 MIGRAINES: Status: ACTIVE | Noted: 2024-01-03

## 2024-01-03 PROBLEM — J90 PLEURAL EFFUSION: Status: ACTIVE | Noted: 2020-03-20

## 2024-01-03 PROBLEM — M54.50 LUMBAR PAIN: Status: ACTIVE | Noted: 2024-01-03

## 2024-01-03 PROBLEM — E03.9 ACQUIRED HYPOTHYROIDISM: Status: ACTIVE | Noted: 2020-02-28

## 2024-01-03 PROBLEM — G43.719 INTRACTABLE CHRONIC MIGRAINE WITHOUT AURA AND WITHOUT STATUS MIGRAINOSUS: Status: ACTIVE | Noted: 2017-06-07

## 2024-01-03 PROBLEM — N93.9 VAGINAL BLEEDING: Status: ACTIVE | Noted: 2024-01-03

## 2024-01-03 PROBLEM — R10.2 VULVAR PAIN: Status: ACTIVE | Noted: 2024-01-03

## 2024-01-03 PROBLEM — N90.3 VULVAR DYSPLASIA: Status: ACTIVE | Noted: 2024-01-03

## 2024-01-03 RX ORDER — BREXPIPRAZOLE 2 MG/1
2 TABLET ORAL DAILY
COMMUNITY
Start: 2023-01-11 | End: 2024-01-07 | Stop reason: WASHOUT

## 2024-01-03 RX ORDER — IBUPROFEN 200 MG
CAPSULE ORAL
COMMUNITY
Start: 2021-09-23 | End: 2024-01-09 | Stop reason: ALTCHOICE

## 2024-01-03 RX ORDER — DIPHENHYDRAMINE HCL 25 MG
4 CAPSULE ORAL AS NEEDED
COMMUNITY
Start: 2023-03-15 | End: 2024-01-07 | Stop reason: WASHOUT

## 2024-01-03 RX ORDER — MIRTAZAPINE 7.5 MG/1
7.5 TABLET, FILM COATED ORAL NIGHTLY
COMMUNITY
Start: 2023-11-21

## 2024-01-03 RX ORDER — PHENAZOPYRIDINE HYDROCHLORIDE 200 MG/1
1 TABLET, FILM COATED ORAL 3 TIMES DAILY
COMMUNITY
Start: 2017-11-13

## 2024-01-03 RX ORDER — ESTRADIOL 2 MG/1
2 TABLET ORAL DAILY
COMMUNITY
Start: 2016-08-31

## 2024-01-03 RX ORDER — LOPERAMIDE HCL 2 MG
2 TABLET ORAL AS NEEDED
COMMUNITY
End: 2024-05-24 | Stop reason: WASHOUT

## 2024-01-03 RX ORDER — ACETAMINOPHEN 325 MG/1
325 TABLET ORAL EVERY 6 HOURS PRN
COMMUNITY
End: 2024-05-10 | Stop reason: WASHOUT

## 2024-01-03 RX ORDER — PREDNISONE 50 MG/1
50 TABLET ORAL ONCE AS NEEDED
COMMUNITY
Start: 2021-03-08 | End: 2024-01-07 | Stop reason: WASHOUT

## 2024-01-03 RX ORDER — FERROUS SULFATE 325(65) MG
1 TABLET ORAL
COMMUNITY
Start: 2023-02-27 | End: 2024-01-09 | Stop reason: ALTCHOICE

## 2024-01-03 RX ORDER — NARATRIPTAN 2.5 MG/1
2.5 TABLET ORAL ONCE AS NEEDED
COMMUNITY
Start: 2015-10-06 | End: 2024-05-24 | Stop reason: WASHOUT

## 2024-01-03 RX ORDER — PEDIATRIC MULTIVITAMIN NO.238
1 TABLET,CHEWABLE ORAL DAILY
COMMUNITY
Start: 2019-02-04 | End: 2024-01-07 | Stop reason: WASHOUT

## 2024-01-03 RX ORDER — NEOMYCIN/POLYMYXIN B/PRAMOXINE 3.5-10K-1
CREAM (GRAM) TOPICAL DAILY
COMMUNITY
Start: 2016-10-06 | End: 2024-05-24 | Stop reason: WASHOUT

## 2024-01-03 RX ORDER — PROCHLORPERAZINE MALEATE 10 MG
10 TABLET ORAL EVERY 6 HOURS PRN
COMMUNITY
Start: 2015-10-02 | End: 2024-05-10 | Stop reason: WASHOUT

## 2024-01-03 RX ORDER — LORAZEPAM 0.5 MG/1
0.5 TABLET ORAL EVERY 6 HOURS PRN
COMMUNITY
Start: 2015-09-24 | End: 2024-01-07 | Stop reason: WASHOUT

## 2024-01-03 RX ORDER — BUTYROSPERMUM PARKII(SHEA BUTTER), SIMMONDSIA CHINENSIS (JOJOBA) SEED OIL, ALOE BARBADENSIS LEAF EXTRACT .01; 1; 3.5 G/100G; G/100G; G/100G
250 LIQUID TOPICAL 2 TIMES DAILY
COMMUNITY
Start: 2021-06-23 | End: 2024-05-24 | Stop reason: WASHOUT

## 2024-01-03 RX ORDER — BLOOD-GLUCOSE METER
KIT MISCELLANEOUS
COMMUNITY
Start: 2016-04-14 | End: 2024-01-09 | Stop reason: ALTCHOICE

## 2024-01-03 RX ORDER — LACTOSE-REDUCED FOOD 0.04G-1.05
LIQUID (ML) ORAL
COMMUNITY
Start: 2021-09-23 | End: 2024-01-07 | Stop reason: WASHOUT

## 2024-01-03 RX ORDER — POLYETHYLENE GLYCOL 3350 17 G/17G
17 POWDER, FOR SOLUTION ORAL
COMMUNITY
Start: 2020-05-07 | End: 2024-05-24 | Stop reason: WASHOUT

## 2024-01-03 RX ORDER — CETIRIZINE HYDROCHLORIDE 10 MG/1
10 TABLET ORAL DAILY
COMMUNITY
Start: 2023-08-03

## 2024-01-07 ENCOUNTER — PHARMACY VISIT (OUTPATIENT)
Dept: PHARMACY | Facility: CLINIC | Age: 54
End: 2024-01-07
Payer: MEDICAID

## 2024-01-07 PROCEDURE — RXMED WILLOW AMBULATORY MEDICATION CHARGE

## 2024-01-09 ENCOUNTER — LAB (OUTPATIENT)
Dept: LAB | Facility: LAB | Age: 54
End: 2024-01-09
Payer: COMMERCIAL

## 2024-01-09 ENCOUNTER — OFFICE VISIT (OUTPATIENT)
Dept: OTOLARYNGOLOGY | Facility: CLINIC | Age: 54
End: 2024-01-09
Payer: COMMERCIAL

## 2024-01-09 VITALS — WEIGHT: 114 LBS | BODY MASS INDEX: 19.46 KG/M2 | HEIGHT: 64 IN

## 2024-01-09 DIAGNOSIS — R59.0 CERVICAL LYMPHADENOPATHY: ICD-10-CM

## 2024-01-09 DIAGNOSIS — Z85.72 HISTORY OF LYMPHOMA: ICD-10-CM

## 2024-01-09 DIAGNOSIS — R59.0 CERVICAL LYMPHADENOPATHY: Primary | ICD-10-CM

## 2024-01-09 DIAGNOSIS — E04.1 THYROID NODULE: ICD-10-CM

## 2024-01-09 PROBLEM — K52.9 NONSPECIFIC COLITIS: Status: ACTIVE | Noted: 2024-01-09

## 2024-01-09 PROBLEM — U07.1 DISEASE DUE TO SEVERE ACUTE RESPIRATORY SYNDROME CORONAVIRUS 2 (SARS-COV-2): Status: ACTIVE | Noted: 2022-12-14

## 2024-01-09 PROBLEM — I38 HEART VALVE DISEASE: Status: ACTIVE | Noted: 2024-01-09

## 2024-01-09 PROBLEM — I10 ESSENTIAL (PRIMARY) HYPERTENSION: Status: ACTIVE | Noted: 2022-12-14

## 2024-01-09 PROBLEM — R05.9 COUGH, UNSPECIFIED: Status: ACTIVE | Noted: 2022-12-14

## 2024-01-09 PROBLEM — E11.9 TYPE 2 DIABETES MELLITUS (MULTI): Status: ACTIVE | Noted: 2022-08-05

## 2024-01-09 PROBLEM — R64 CACHEXIA (MULTI): Status: ACTIVE | Noted: 2023-04-05

## 2024-01-09 PROBLEM — Z20.822 CONTACT WITH AND (SUSPECTED) EXPOSURE TO COVID-19: Status: ACTIVE | Noted: 2023-03-28

## 2024-01-09 PROBLEM — A04.72 COLITIS DUE TO CLOSTRIDIOIDES DIFFICILE: Status: ACTIVE | Noted: 2023-03-26

## 2024-01-09 PROBLEM — R54 AGE-RELATED PHYSICAL DEBILITY: Status: ACTIVE | Noted: 2023-03-28

## 2024-01-09 PROBLEM — E83.42 HYPOMAGNESEMIA: Status: ACTIVE | Noted: 2024-01-09

## 2024-01-09 PROBLEM — R42 DIZZINESS: Status: ACTIVE | Noted: 2022-07-23

## 2024-01-09 PROBLEM — Z86.16 PERSONAL HISTORY OF COVID-19: Status: ACTIVE | Noted: 2022-09-21

## 2024-01-09 PROBLEM — J18.9 PNEUMONIA: Status: ACTIVE | Noted: 2024-01-09

## 2024-01-09 PROBLEM — R55 SYNCOPE: Status: ACTIVE | Noted: 2024-01-09

## 2024-01-09 PROBLEM — R07.89 CHEST WALL PAIN: Status: ACTIVE | Noted: 2022-09-30

## 2024-01-09 LAB
ALBUMIN SERPL BCP-MCNC: 3.7 G/DL (ref 3.4–5)
ANION GAP SERPL CALC-SCNC: 15 MMOL/L (ref 10–20)
BASOPHILS # BLD AUTO: 0.05 X10*3/UL (ref 0–0.1)
BASOPHILS NFR BLD AUTO: 0.8 %
BUN SERPL-MCNC: 14 MG/DL (ref 6–23)
CALCIUM SERPL-MCNC: 9 MG/DL (ref 8.6–10.3)
CHLORIDE SERPL-SCNC: 102 MMOL/L (ref 98–107)
CO2 SERPL-SCNC: 23 MMOL/L (ref 21–32)
CREAT SERPL-MCNC: 0.69 MG/DL (ref 0.5–1.05)
EGFRCR SERPLBLD CKD-EPI 2021: >90 ML/MIN/1.73M*2
EOSINOPHIL # BLD AUTO: 0.07 X10*3/UL (ref 0–0.7)
EOSINOPHIL NFR BLD AUTO: 1.2 %
ERYTHROCYTE [DISTWIDTH] IN BLOOD BY AUTOMATED COUNT: 17.2 % (ref 11.5–14.5)
GLUCOSE SERPL-MCNC: 148 MG/DL (ref 74–99)
HCT VFR BLD AUTO: 38.3 % (ref 36–46)
HGB BLD-MCNC: 12.3 G/DL (ref 12–16)
IMM GRANULOCYTES # BLD AUTO: 0.05 X10*3/UL (ref 0–0.7)
IMM GRANULOCYTES NFR BLD AUTO: 0.8 % (ref 0–0.9)
LYMPHOCYTES # BLD AUTO: 0.94 X10*3/UL (ref 1.2–4.8)
LYMPHOCYTES NFR BLD AUTO: 15.8 %
MCH RBC QN AUTO: 29.7 PG (ref 26–34)
MCHC RBC AUTO-ENTMCNC: 32.1 G/DL (ref 32–36)
MCV RBC AUTO: 93 FL (ref 80–100)
MONOCYTES # BLD AUTO: 0.64 X10*3/UL (ref 0.1–1)
MONOCYTES NFR BLD AUTO: 10.8 %
NEUTROPHILS # BLD AUTO: 4.19 X10*3/UL (ref 1.2–7.7)
NEUTROPHILS NFR BLD AUTO: 70.6 %
NRBC BLD-RTO: 0 /100 WBCS (ref 0–0)
PHOSPHATE SERPL-MCNC: 3.8 MG/DL (ref 2.5–4.9)
PLATELET # BLD AUTO: 264 X10*3/UL (ref 150–450)
POTASSIUM SERPL-SCNC: 4.7 MMOL/L (ref 3.5–5.3)
RBC # BLD AUTO: 4.14 X10*6/UL (ref 4–5.2)
SODIUM SERPL-SCNC: 135 MMOL/L (ref 136–145)
WBC # BLD AUTO: 5.9 X10*3/UL (ref 4.4–11.3)

## 2024-01-09 PROCEDURE — 31575 DIAGNOSTIC LARYNGOSCOPY: CPT | Performed by: STUDENT IN AN ORGANIZED HEALTH CARE EDUCATION/TRAINING PROGRAM

## 2024-01-09 PROCEDURE — 1036F TOBACCO NON-USER: CPT | Performed by: STUDENT IN AN ORGANIZED HEALTH CARE EDUCATION/TRAINING PROGRAM

## 2024-01-09 PROCEDURE — 85025 COMPLETE CBC W/AUTO DIFF WBC: CPT

## 2024-01-09 PROCEDURE — 80069 RENAL FUNCTION PANEL: CPT

## 2024-01-09 PROCEDURE — 99204 OFFICE O/P NEW MOD 45 MIN: CPT | Performed by: STUDENT IN AN ORGANIZED HEALTH CARE EDUCATION/TRAINING PROGRAM

## 2024-01-09 PROCEDURE — 36415 COLL VENOUS BLD VENIPUNCTURE: CPT

## 2024-01-09 PROCEDURE — 3008F BODY MASS INDEX DOCD: CPT | Performed by: STUDENT IN AN ORGANIZED HEALTH CARE EDUCATION/TRAINING PROGRAM

## 2024-01-09 RX ORDER — BISACODYL 5 MG
5 TABLET, DELAYED RELEASE (ENTERIC COATED) ORAL DAILY PRN
COMMUNITY
Start: 2020-10-16

## 2024-01-09 RX ORDER — CARVEDILOL 12.5 MG/1
12.5 TABLET ORAL EVERY 12 HOURS
COMMUNITY

## 2024-01-09 RX ORDER — PREDNISONE 50 MG/1
TABLET ORAL
Qty: 3 TABLET | Refills: 0 | Status: SHIPPED | OUTPATIENT
Start: 2024-01-09 | End: 2024-05-24 | Stop reason: WASHOUT

## 2024-01-09 RX ORDER — CLOPIDOGREL BISULFATE 75 MG/1
75 TABLET ORAL DAILY
COMMUNITY
End: 2024-05-24 | Stop reason: WASHOUT

## 2024-01-09 RX ORDER — METOLAZONE 2.5 MG/1
2.5 TABLET ORAL
COMMUNITY
Start: 2020-10-05 | End: 2024-05-24 | Stop reason: WASHOUT

## 2024-01-09 RX ORDER — IBUPROFEN 800 MG/1
800 TABLET ORAL
COMMUNITY
Start: 2016-02-22 | End: 2024-05-24 | Stop reason: WASHOUT

## 2024-01-09 RX ORDER — FUROSEMIDE 40 MG/1
40 TABLET ORAL DAILY
COMMUNITY
Start: 2016-03-17 | End: 2024-05-24 | Stop reason: WASHOUT

## 2024-01-09 NOTE — PROGRESS NOTES
HPI  Nelsy Osullivan is a 53 y.o. female presenting for initial evaluation of left neck lump.  The patient reports first noting a lump along her left neck ~9 months ago.  Denies growth, or fluctuation in size.  Has a history of smoking 1PPD > 25y.  Quit 1 year ago, currently vapes.  Has a history of non-Hodgkin's lymphoma > 30y ago treated with chemo and radiation therapy.    Ultrasound 10/12/2023 notable for abnormal left level II lymph node measuring 12 x 11 x 16 mm    Denies dysphagia, odynophagia, fevers/chills, oral bleeding/hemoptysis, voice changes, SOB.  Endorses weight fluctuation for the past year.     History reviewed. No pertinent past medical history.    Past Surgical History:   Procedure Laterality Date    OTHER SURGICAL HISTORY  04/14/2020    Cholecystectomy    OTHER SURGICAL HISTORY  04/14/2020    Exploratory laparoscopy    OTHER SURGICAL HISTORY  04/14/2020    Heart surgery         Current Outpatient Medications on File Prior to Visit   Medication Sig Dispense Refill    acetaminophen (Tylenol) 325 mg tablet Take 1 tablet (325 mg) by mouth every 6 hours if needed for moderate pain (4 - 6).      albuterol 90 mcg/actuation inhaler Inhale 2 puffs every 6 hours.      aspirin 81 mg EC tablet Take 1 tablet (81 mg) by mouth once daily.      atorvastatin (Lipitor) 40 mg tablet Take 1 tablet (40 mg) by mouth once daily.      BD Alcohol Swabs pads, medicated USE SIX TIMES DAILY      bisacodyl (Dulcolax, bisacodyl,) 5 mg EC tablet Take 1 tablet (5 mg) by mouth once daily as needed for constipation.      carvedilol (Coreg) 12.5 mg tablet Take 1 tablet (12.5 mg) by mouth every 12 hours.      cetirizine (ZyrTEC) 10 mg tablet Take 1 tablet (10 mg) by mouth once daily.      clopidogrel (Plavix) 75 mg tablet Take 1 tablet (75 mg) by mouth once daily.      Creon 36,000-114,000- 180,000 unit capsule,delayed release(DR/EC) capsule TAKE 2 CAPSULE BEFORE MEALS TAKE 1 CAPSULE WITH A SNACK      cyclobenzaprine (Flexeril) 10  mg tablet Take 1 tablet (10 mg) by mouth 3 times a day as needed.      DULoxetine (Cymbalta) 60 mg DR capsule Take 1 capsule (60 mg) by mouth once daily.      esomeprazole (NexIUM) 40 mg DR capsule Take 1 capsule (40 mg) by mouth once daily as needed.      estradiol (Estrace) 2 mg tablet Take 1 tablet (2 mg) by mouth once daily.      fluconazole (Diflucan) 150 mg tablet TAKE ONE TABLET BY MOUTH ONCE 1 tablet 0    FreeStyle Ashley 2 Sensor kit APPLY TO ARM AS DIRECTED EVERY 14 DAYS      furosemide (Lasix) 40 mg tablet Take 1 tablet (40 mg) by mouth once daily.      hydroCHLOROthiazide (HYDRODiuril) 25 mg tablet       ibuprofen 800 mg tablet Take 1 tablet (800 mg) by mouth once daily.      insulin aspart (NovoLOG) 100 unit/mL injection FOR USE WITH INSULIN PUMP MAX DAILY DOSE  UNITS      levothyroxine (Synthroid, Levoxyl) 125 mcg tablet TAKE 1 TABLET BY MOUTH IN THE MORNING ON EMPTY STOMACH      loperamide (Imodium A-D) 2 mg tablet Take 1 tablet (2 mg) by mouth if needed for diarrhea.      magnesium oxide capsule capsule 1 capsule (140 mg) once daily.      melatonin 3 mg tablet Take 2 tablets (6 mg) by mouth once daily at bedtime.      metOLazone (Zaroxolyn) 2.5 mg tablet Take 1 tablet (2.5 mg) by mouth once daily.      metoprolol succinate XL (Toprol-XL) 50 mg 24 hr tablet Take by mouth.      mirtazapine (Remeron) 7.5 mg tablet Take 1 tablet (7.5 mg) by mouth once daily at bedtime.      morphine CR (MS Contin) 15 mg 12 hr tablet Take 1 tablet (15 mg) by mouth every 12 hours. 60 tablet 0    morphine CR (MS Contin) 15 mg 12 hr tablet Take 1 tablet by mouth two times a day for 30 days. Do not start before December 8, 2023. 60 tablet 0    morphine CR (MS Contin) 15 mg 12 hr tablet Take 1 tablet by mouth two times a day for 30 days. Do not start before January 7, 2024. 60 tablet 0    morphine CR (MS Contin) 15 mg 12 hr tablet Take 1 tablet by mouth two times a day for 30 days. Do not start before January 7, 2024. 60  tablet 0    multivitamin with minerals tablet Take 1 tablet by mouth once daily.      mv-min-folic acid-lutein 200-137.5 mcg tablet,chewable Chew once daily.      naloxone (Narcan) 4 mg/0.1 mL nasal spray Use 1 spray in one nostril as needed for overdose. May repeat every 2 to 3 min in alternating nostrils until medical assistance is available      naratriptan (Amerge) 2.5 mg tablet Take 1 tablet (2.5 mg) by mouth 1 time if needed for migraine.      OLANZapine (ZyPREXA) 10 mg tablet Take by mouth 2 times a day as needed.      omeprazole (PriLOSEC) 40 mg DR capsule 1 capsule (40 mg).      Omnipod Dash Pods, Gen 4, cartridge       ondansetron (Zofran) 4 mg tablet 1 tablet (4 mg).      ondansetron ODT (Zofran-ODT) 4 mg disintegrating tablet Take 1 tablet (4 mg) by mouth every 8 hours if needed.      oxyCODONE-acetaminophen (Percocet)  mg tablet Take 1 tablet by mouth every 6 hours as needed for pain for up to 30 days. 120 tablet 0    oxyCODONE-acetaminophen (Percocet)  mg tablet Take 1 tablet by mouth every 6 hours as needed for pain for up to 30 days. Do not start before December 24, 2023. 120 tablet 0    oxyCODONE-acetaminophen (Percocet) 5-325 mg tablet TAKE 1 TABLET BY MOUTH EVERY 8 HOURS AS NEEDED FOR PAIN 90 tablet 0    oxyCODONE-acetaminophen (Percocet) 5-325 mg tablet TAKE 1 TABLET BY MOUTH EVERY 6 HOURS FOR 30 DS.DO NOT FILL EARLY 120 tablet 0    oxyCODONE-acetaminophen (Percocet) 5-325 mg tablet TAKE 1 TABLET BY MOUTH EVERY 6 HOURS FOR 30 DAYS 120 tablet 0    oxyCODONE-acetaminophen (Percocet) 5-325 mg tablet TAKE 1 TABLET BY MOUTH EVERY 8 HOURS AS NEEDED FOR 30 DAYS 90 tablet 0    phenazopyridine (Pyridium) 200 mg tablet Take 1 tablet (200 mg) by mouth 3 times a day.      polyethylene glycol (Glycolax, Miralax) 17 gram/dose powder Take 17 g by mouth once daily.      potassium chloride CR 20 mEq ER tablet Take 1 tablet (20 mEq) by mouth.      prochlorperazine (Compazine) 10 mg tablet Take 1 tablet  (10 mg) by mouth every 6 hours if needed for nausea.      promethazine (Phenergan) 25 mg tablet Take by mouth.      saccharomyces boulardii (Florastor) 250 mg capsule Take 1 capsule (250 mg) by mouth 2 times a day.      sodium chloride 0.9% (sodium chloride) flush Infuse 10 mL into a venous catheter.      spironolactone (Aldactone) 25 mg tablet Take by mouth once daily.      tinidazole (Tindamax) 500 mg tablet TAKE 4 TABLETS BY MOUTH ONCE DAILY 4 tablet 0    tiotropium (Spiriva) 18 mcg inhalation capsule Place 1 capsule (18 mcg) into inhaler and inhale once daily.      torsemide (Demadex) 20 mg tablet Take 1 tablet (20 mg) by mouth once daily.      triamcinolone (Kenalog) 0.5 % cream APPLY TOPICALLY TO THE AFFECTED AREA TWICE DAILY FOR 7 DAYS      oxyCODONE ER (OxyCONTIN) 10 mg 12 hr tablet TAKE 1 TABLET BY MOUTH EVERY 12 HOURS (Patient not taking: Reported on 1/9/2024) 60 tablet 0    [DISCONTINUED] blood sugar diagnostic (Blood Glucose Test) strip TEST 3 TIMES DAILY.      [DISCONTINUED] brexpiprazole 3 mg tablet Take 3 mg by mouth once daily.      [DISCONTINUED] ferrous sulfate, 325 mg ferrous sulfate, tablet Take 1 tablet by mouth once daily.      [DISCONTINUED] food supplemt, lactose-reduced (Boost Breeze NutritionaL) 0.04-1.05 gram-kcal/mL liquid Take by mouth.      [DISCONTINUED] FreeStyle Lite Strips strip check sugars 3-4 times daily      [DISCONTINUED] insulin glargine (Lantus Solostar U-100 Insulin) 100 unit/mL (3 mL) pen       [DISCONTINUED] L. acidophilus/Bifid. animalis 32 billion cell capsule Take by mouth once daily.      [DISCONTINUED] LORazepam (Ativan) 0.5 mg tablet Take 1 tablet (0.5 mg) by mouth every 6 hours if needed for anxiety.      [DISCONTINUED] multivit with min-folic acid 200 mcg tablet,chewable Chew 1 tablet once daily.      [DISCONTINUED] nicotine polacrilex (Nicorette) 4 mg gum Chew 1 each (4 mg) if needed for smoking cessation.      [DISCONTINUED] predniSONE (Deltasone) 50 mg tablet  Take 1 tablet (50 mg) by mouth 1 time if needed (Take one tablet by mouth13 hrs prior to catheterization, then 7 hrs prior to catheterization and then 1 hr prior to catheterization).      [DISCONTINUED] Rexulti 2 mg tablet Take 1 tablet (2 mg) by mouth once daily.       No current facility-administered medications on file prior to visit.        Allergies   Allergen Reactions    Acetaminophen-Codeine Unknown    Codeine Unknown    Fentanyl Unknown     Reaction from Community: Other(See Desc) Comments: MIGRAINES IF USED FOR PAIN, BUT OK FOR ANESTHESIA    Other reaction(s): Intolerance    Ketorolac Itching    Meperidine Unknown     Reaction from Community: Other(See Desc)    Metformin Unknown    Silver Hives and Other     Tegaderm    Adhesive Tape-Silicones Rash    Iodinated Contrast Media Rash    Latex Rash    Penicillins Rash    Wound Dressings Rash     tagaderm        Review of Systems  A detailed 12 point ROS was performed and is negative except as noted in the intake form, HPI and/or Past Medical History        Physical Exam   CONSTITUTIONAL: Well developed, well nourished.  VOICE: Normal voice quality  RESPIRATION: Breathing comfortably, no stridor.  CV: No clubbing/cyanosis/edema in hands.  EYES: EOM Intact, sclera normal.  NEURO: Alert and oriented times 3, Cranial nerves V,VII intact and symmetric bilaterally.  HEAD AND FACE: Symmetric facial features, no masses or lesions, sinuses nontender to palpation.  SALIVARY GLANDS: Parotid and submandibular glands normal bilaterally.  EARS: Normal external ears, external auditory canals, and TMs to otoscopy  NOSE: External nose midline, anterior rhinoscopy is normal with limited visualization to the anterior aspect of the interior turbinates. No lesions noted.  ORAL CAVITY/OROPHARYNX/LIPS: Normal mucous membranes, normal floor of mouth/tongue/OP, no masses or lesions are noted.  PHARYNGEAL WALLS AND NASOPHARYNX: No masses noted. Mucosa appears clean and  moist  NECK/LYMPH: No thyroid masses. Trachea palpably midline  + 2cm Left level II LAD  SKIN: Neck skin is without injury  PSYCH: Alert and oriented with appropriate mood and affect     PROCEDURE NOTE:  FLEXIBLE NASOLARYNGOSCOPY     The risks, benefits, and alternatives were explained.  The patient wished to proceed and provided verbal consent.       INDICATIONS: To visualize anatomy in specific detail; evaluation of symptomatic disorder involving the voice, swallowing, or upper aerodigestive tract, including CARLOS.      DESCRIPTION OF PROCEDURE: After adequate afrin and lidocaine spray, I advanced the endoscope into the nasal cavity.  The nasal cavity, nasopharynx, tongue base, vallecula, posterior/lateral pharyngeal walls, pyriform, epiglottis, post cricoid area, and larynx were within normal limits.  The following specific findings should be noted:  No lesions/masses  Mobile TVCs bilaterally  No pooling of secretions     The patient tolerated the procedure without difficulty.     Assessment  Left cervical lymphadenopathy  History of non-Hodgkin's lymphoma  History of radiation-induced heart disease  History of chronic pain, sees palliative medicine.  On anticoagulation: Plavix    Plan  53-year-old female presenting for evaluation of left cervical lymphadenopathy present for over 9 months.  Left level 2 LAD noted on PE.  Multiple treatment alternatives risk and benefits discussed.  The patient, given her history of lymphoma she would like to proceed with excisional biopsy of left cervical lymph node.  Discussed risks benefits and alternatives to surgery with the patient.  Bleeding, infection, cosmetic changes, scarring, anesthesia/medication complications (cva, mi, death), heart/lung/brain dysfunction, scaring formation, poor cosmetic result possibly requiring reconstructive surgery, nerve injury resulting in numbness, weakness or paralysis were discussed as possible risks.  Observation or alternative therapies  were discussed. No guarantees were implied and the possibility of recurrence was discussed and understood. The nature of the procedure and the pre and postoperative processes were discussed as well.     The patient understands, asked appropriate questions, and wishes to proceed with surgery.     CT scan ordered to further evaluate the region.  Has a history of rash associated with IV contrast, prednisone and Benadryl premedication prescribed.    Currently on Plavix, we will discuss with PCP preoperative anticoagulation management.    Addendum 1/24/24  CT neck notable for cervical lymphadenopathy.  In addition 1.6 cm right thyroid nodule noted, dedicated thyroid ultrasound ordered for further evaluation.

## 2024-01-11 DIAGNOSIS — R59.0 CERVICAL LYMPHADENOPATHY: ICD-10-CM

## 2024-01-12 RX ORDER — DIPHENHYDRAMINE HCL 50 MG
CAPSULE ORAL
Qty: 1 CAPSULE | Refills: 0 | Status: SHIPPED | OUTPATIENT
Start: 2024-01-12 | End: 2024-05-24 | Stop reason: WASHOUT

## 2024-01-23 ENCOUNTER — PHARMACY VISIT (OUTPATIENT)
Dept: PHARMACY | Facility: CLINIC | Age: 54
End: 2024-01-23
Payer: MEDICAID

## 2024-01-23 ENCOUNTER — HOSPITAL ENCOUNTER (OUTPATIENT)
Dept: RADIOLOGY | Facility: HOSPITAL | Age: 54
Discharge: HOME | End: 2024-01-23
Payer: COMMERCIAL

## 2024-01-23 DIAGNOSIS — R59.0 CERVICAL LYMPHADENOPATHY: ICD-10-CM

## 2024-01-23 PROCEDURE — 70491 CT SOFT TISSUE NECK W/DYE: CPT | Performed by: RADIOLOGY

## 2024-01-23 PROCEDURE — 2550000001 HC RX 255 CONTRASTS: Performed by: STUDENT IN AN ORGANIZED HEALTH CARE EDUCATION/TRAINING PROGRAM

## 2024-01-23 PROCEDURE — RXMED WILLOW AMBULATORY MEDICATION CHARGE

## 2024-01-23 PROCEDURE — 70491 CT SOFT TISSUE NECK W/DYE: CPT

## 2024-01-23 RX ADMIN — IOHEXOL 50 ML: 350 INJECTION, SOLUTION INTRAVENOUS at 12:30

## 2024-01-26 ENCOUNTER — OFFICE VISIT (OUTPATIENT)
Dept: OTOLARYNGOLOGY | Facility: CLINIC | Age: 54
End: 2024-01-26
Payer: COMMERCIAL

## 2024-01-26 ENCOUNTER — LAB (OUTPATIENT)
Dept: LAB | Facility: LAB | Age: 54
End: 2024-01-26
Payer: COMMERCIAL

## 2024-01-26 VITALS — WEIGHT: 120 LBS | HEIGHT: 64 IN | BODY MASS INDEX: 20.49 KG/M2

## 2024-01-26 DIAGNOSIS — C82.41 GRADE 3B FOLLICULAR LYMPHOMA OF LYMPH NODES OF NECK (MULTI): ICD-10-CM

## 2024-01-26 DIAGNOSIS — E04.1 THYROID NODULE: Primary | ICD-10-CM

## 2024-01-26 DIAGNOSIS — E04.1 THYROID NODULE: ICD-10-CM

## 2024-01-26 PROBLEM — Z85.72 HISTORY OF LYMPHOMA: Status: ACTIVE | Noted: 2023-04-04

## 2024-01-26 LAB — TSH SERPL-ACNC: 1.77 MIU/L (ref 0.44–3.98)

## 2024-01-26 PROCEDURE — 36415 COLL VENOUS BLD VENIPUNCTURE: CPT

## 2024-01-26 PROCEDURE — 3008F BODY MASS INDEX DOCD: CPT | Performed by: STUDENT IN AN ORGANIZED HEALTH CARE EDUCATION/TRAINING PROGRAM

## 2024-01-26 PROCEDURE — 84443 ASSAY THYROID STIM HORMONE: CPT

## 2024-01-26 PROCEDURE — 99214 OFFICE O/P EST MOD 30 MIN: CPT | Performed by: STUDENT IN AN ORGANIZED HEALTH CARE EDUCATION/TRAINING PROGRAM

## 2024-01-26 PROCEDURE — 1036F TOBACCO NON-USER: CPT | Performed by: STUDENT IN AN ORGANIZED HEALTH CARE EDUCATION/TRAINING PROGRAM

## 2024-01-26 NOTE — PROGRESS NOTES
HPI  1/26/24  The patient present for follow-up, she is here to discuss her upcoming surgery clarify questions.  CT neck reviewed and discussed with the patient notable for cervical lymphadenopathy.  Incidental 1.6 cm right thyroid nodule noted.    Recall   Nelsy Osullivan is a 53 y.o. female presenting for initial evaluation of left neck lump.  The patient reports first noting a lump along her left neck ~9 months ago.  Denies growth, or fluctuation in size.  Has a history of smoking 1PPD > 25y.  Quit 1 year ago, currently vapes.  Has a history of non-Hodgkin's lymphoma > 30y ago treated with chemo and radiation therapy.    Ultrasound 10/12/2023 notable for abnormal left level II lymph node measuring 12 x 11 x 16 mm    Denies dysphagia, odynophagia, fevers/chills, oral bleeding/hemoptysis, voice changes, SOB.  Endorses weight fluctuation for the past year.     History reviewed. No pertinent past medical history.    Past Surgical History:   Procedure Laterality Date    OTHER SURGICAL HISTORY  04/14/2020    Cholecystectomy    OTHER SURGICAL HISTORY  04/14/2020    Exploratory laparoscopy    OTHER SURGICAL HISTORY  04/14/2020    Heart surgery         Current Outpatient Medications on File Prior to Visit   Medication Sig Dispense Refill    acetaminophen (Tylenol) 325 mg tablet Take 1 tablet (325 mg) by mouth every 6 hours if needed for moderate pain (4 - 6).      albuterol 90 mcg/actuation inhaler Inhale 2 puffs every 6 hours.      aspirin 81 mg EC tablet Take 1 tablet (81 mg) by mouth once daily.      atorvastatin (Lipitor) 40 mg tablet Take 1 tablet (40 mg) by mouth once daily.      BD Alcohol Swabs pads, medicated USE SIX TIMES DAILY      bisacodyl (Dulcolax, bisacodyl,) 5 mg EC tablet Take 1 tablet (5 mg) by mouth once daily as needed for constipation.      carvedilol (Coreg) 12.5 mg tablet Take 1 tablet (12.5 mg) by mouth every 12 hours.      cetirizine (ZyrTEC) 10 mg tablet Take 1 tablet (10 mg) by mouth once daily.       clopidogrel (Plavix) 75 mg tablet Take 1 tablet (75 mg) by mouth once daily.      Creon 36,000-114,000- 180,000 unit capsule,delayed release(DR/EC) capsule TAKE 2 CAPSULE BEFORE MEALS TAKE 1 CAPSULE WITH A SNACK      cyclobenzaprine (Flexeril) 10 mg tablet Take 1 tablet (10 mg) by mouth 3 times a day as needed.      diphenhydrAMINE (Banophen) 50 mg capsule TAKE ONE CAPSULE BY MOUTH 1 HOUR BEFORE CONTRAST MEDIUM ADMINISTRATION 1 capsule 0    DULoxetine (Cymbalta) 60 mg DR capsule Take 1 capsule (60 mg) by mouth once daily.      esomeprazole (NexIUM) 40 mg DR capsule Take 1 capsule (40 mg) by mouth once daily as needed.      estradiol (Estrace) 2 mg tablet Take 1 tablet (2 mg) by mouth once daily.      fluconazole (Diflucan) 150 mg tablet TAKE ONE TABLET BY MOUTH ONCE 1 tablet 0    FreeStyle Ashley 2 Sensor kit APPLY TO ARM AS DIRECTED EVERY 14 DAYS      furosemide (Lasix) 40 mg tablet Take 1 tablet (40 mg) by mouth once daily.      hydroCHLOROthiazide (HYDRODiuril) 25 mg tablet       ibuprofen 800 mg tablet Take 1 tablet (800 mg) by mouth once daily.      insulin aspart (NovoLOG) 100 unit/mL injection FOR USE WITH INSULIN PUMP MAX DAILY DOSE  UNITS      levothyroxine (Synthroid, Levoxyl) 125 mcg tablet TAKE 1 TABLET BY MOUTH IN THE MORNING ON EMPTY STOMACH      loperamide (Imodium A-D) 2 mg tablet Take 1 tablet (2 mg) by mouth if needed for diarrhea.      magnesium oxide capsule capsule 1 capsule (140 mg) once daily.      melatonin 3 mg tablet Take 2 tablets (6 mg) by mouth once daily at bedtime.      metOLazone (Zaroxolyn) 2.5 mg tablet Take 1 tablet (2.5 mg) by mouth once daily.      metoprolol succinate XL (Toprol-XL) 50 mg 24 hr tablet Take by mouth.      mirtazapine (Remeron) 7.5 mg tablet Take 1 tablet (7.5 mg) by mouth once daily at bedtime.      morphine CR (MS Contin) 15 mg 12 hr tablet Take 1 tablet by mouth two times a day for 30 days. Do not start before January 7, 2024. 60 tablet 0     multivitamin with minerals tablet Take 1 tablet by mouth once daily.      mv-min-folic acid-lutein 200-137.5 mcg tablet,chewable Chew once daily.      naloxone (Narcan) 4 mg/0.1 mL nasal spray Use 1 spray in one nostril as needed for overdose. May repeat every 2 to 3 min in alternating nostrils until medical assistance is available      naratriptan (Amerge) 2.5 mg tablet Take 1 tablet (2.5 mg) by mouth 1 time if needed for migraine.      OLANZapine (ZyPREXA) 10 mg tablet Take by mouth 2 times a day as needed.      omeprazole (PriLOSEC) 40 mg DR capsule 1 capsule (40 mg).      Omnipod Dash Pods, Gen 4, cartridge       ondansetron (Zofran) 4 mg tablet 1 tablet (4 mg).      ondansetron ODT (Zofran-ODT) 4 mg disintegrating tablet Take 1 tablet (4 mg) by mouth every 8 hours if needed.      oxyCODONE-acetaminophen (Percocet)  mg tablet Take 1 tablet by mouth every 6 hours as needed for pain for up to 30 days. 120 tablet 0    oxyCODONE-acetaminophen (Percocet)  mg tablet Take 1 tablet by mouth every 6 hours as needed for pain for up to 30 days. Do not start before January 23, 2024. 120 tablet 0    phenazopyridine (Pyridium) 200 mg tablet Take 1 tablet (200 mg) by mouth 3 times a day.      polyethylene glycol (Glycolax, Miralax) 17 gram/dose powder Take 17 g by mouth once daily.      potassium chloride CR 20 mEq ER tablet Take 1 tablet (20 mEq) by mouth.      predniSONE (Deltasone) 50 mg tablet Take 13 hours, 7 hours and 1 hour before contrast medium administration 3 tablet 0    prochlorperazine (Compazine) 10 mg tablet Take 1 tablet (10 mg) by mouth every 6 hours if needed for nausea.      promethazine (Phenergan) 25 mg tablet Take by mouth.      saccharomyces boulardii (Florastor) 250 mg capsule Take 1 capsule (250 mg) by mouth 2 times a day.      sodium chloride 0.9% (sodium chloride) flush Infuse 10 mL into a venous catheter.      spironolactone (Aldactone) 25 mg tablet Take by mouth once daily.       tinidazole (Tindamax) 500 mg tablet TAKE 4 TABLETS BY MOUTH ONCE DAILY 4 tablet 0    tiotropium (Spiriva) 18 mcg inhalation capsule Place 1 capsule (18 mcg) into inhaler and inhale once daily.      torsemide (Demadex) 20 mg tablet Take 1 tablet (20 mg) by mouth once daily.      triamcinolone (Kenalog) 0.5 % cream APPLY TOPICALLY TO THE AFFECTED AREA TWICE DAILY FOR 7 DAYS      morphine CR (MS Contin) 15 mg 12 hr tablet Take 1 tablet (15 mg) by mouth every 12 hours. 60 tablet 0    morphine CR (MS Contin) 15 mg 12 hr tablet Take 1 tablet by mouth two times a day for 30 days. Do not start before December 8, 2023. 60 tablet 0    morphine CR (MS Contin) 15 mg 12 hr tablet Take 1 tablet by mouth two times a day for 30 days. Do not start before January 7, 2024. 60 tablet 0    oxyCODONE ER (OxyCONTIN) 10 mg 12 hr tablet TAKE 1 TABLET BY MOUTH EVERY 12 HOURS (Patient not taking: Reported on 1/9/2024) 60 tablet 0    oxyCODONE-acetaminophen (Percocet)  mg tablet Take 1 tablet by mouth every 6 hours as needed for pain for up to 30 days. Do not start before December 24, 2023. (Patient not taking: Reported on 1/9/2024) 120 tablet 0    oxyCODONE-acetaminophen (Percocet) 5-325 mg tablet TAKE 1 TABLET BY MOUTH EVERY 8 HOURS AS NEEDED FOR PAIN 90 tablet 0    oxyCODONE-acetaminophen (Percocet) 5-325 mg tablet TAKE 1 TABLET BY MOUTH EVERY 6 HOURS FOR 30 DS.DO NOT FILL EARLY (Patient not taking: Reported on 1/9/2024) 120 tablet 0    oxyCODONE-acetaminophen (Percocet) 5-325 mg tablet TAKE 1 TABLET BY MOUTH EVERY 8 HOURS AS NEEDED FOR 30 DAYS (Patient not taking: Reported on 1/9/2024) 90 tablet 0     No current facility-administered medications on file prior to visit.        Allergies   Allergen Reactions    Acetaminophen-Codeine Unknown    Codeine Unknown    Fentanyl Unknown     Reaction from Community: Other(See Desc) Comments: MIGRAINES IF USED FOR PAIN, BUT OK FOR ANESTHESIA    Other reaction(s): Intolerance    Ketorolac Itching     Meperidine Unknown     Reaction from Community: Other(See Desc)    Metformin Unknown    Silver Hives and Other     Tegaderm    Adhesive Tape-Silicones Rash    Iodinated Contrast Media Rash     Does well with 13hour premedication    Latex Rash    Penicillins Rash    Wound Dressings Rash     tagaderm        Review of Systems  A detailed 12 point ROS was performed and is negative except as noted in the intake form, HPI and/or Past Medical History        Physical Exam   CONSTITUTIONAL: Well developed, well nourished.  VOICE: Normal voice quality  RESPIRATION: Breathing comfortably, no stridor.  CV: No clubbing/cyanosis/edema in hands.  EYES: EOM Intact, sclera normal.  NEURO: Alert and oriented times 3, Cranial nerves V,VII intact and symmetric bilaterally.  HEAD AND FACE: Symmetric facial features, no masses or lesions, sinuses nontender to palpation.  SALIVARY GLANDS: Parotid and submandibular glands normal bilaterally.  EARS: Normal external ears, external auditory canals, and TMs to otoscopy  NOSE: External nose midline, anterior rhinoscopy is normal with limited visualization to the anterior aspect of the interior turbinates. No lesions noted.  ORAL CAVITY/OROPHARYNX/LIPS: Normal mucous membranes, normal floor of mouth/tongue/OP, no masses or lesions are noted.  PHARYNGEAL WALLS AND NASOPHARYNX: No masses noted. Mucosa appears clean and moist  NECK/LYMPH: No thyroid masses. Trachea palpably midline  + 2cm Left level II LAD  SKIN: Neck skin is without injury  PSYCH: Alert and oriented with appropriate mood and affect     PROCEDURE NOTE:  FLEXIBLE NASOLARYNGOSCOPY     The risks, benefits, and alternatives were explained.  The patient wished to proceed and provided verbal consent.       INDICATIONS: To visualize anatomy in specific detail; evaluation of symptomatic disorder involving the voice, swallowing, or upper aerodigestive tract, including CARLOS.      DESCRIPTION OF PROCEDURE: After adequate afrin and lidocaine  spray, I advanced the endoscope into the nasal cavity.  The nasal cavity, nasopharynx, tongue base, vallecula, posterior/lateral pharyngeal walls, pyriform, epiglottis, post cricoid area, and larynx were within normal limits.  The following specific findings should be noted:  No lesions/masses  Mobile TVCs bilaterally  No pooling of secretions     The patient tolerated the procedure without difficulty.     Assessment  Left cervical lymphadenopathy  History of non-Hodgkin's lymphoma  History of radiation-induced heart disease  History of chronic pain, sees palliative medicine.  On anticoagulation: Plavix    Plan  53-year-old female presenting for evaluation of left cervical lymphadenopathy present for over 9 months.  Left level 2 LAD noted on PE.  Multiple treatment alternatives risk and benefits discussed.  The patient, given her history of lymphoma she would like to proceed with excisional biopsy of left cervical lymph node.  Discussed risks benefits and alternatives to surgery with the patient.  Bleeding, infection, cosmetic changes, scarring, anesthesia/medication complications (cva, mi, death), heart/lung/brain dysfunction, scaring formation, poor cosmetic result possibly requiring reconstructive surgery, nerve injury resulting in numbness, weakness or paralysis were discussed as possible risks.  Observation or alternative therapies were discussed. No guarantees were implied and the possibility of recurrence was discussed and understood. The nature of the procedure and the pre and postoperative processes were discussed as well.     The patient understands, asked appropriate questions, and wishes to proceed with surgery.     Reports she is currently not taking Plavix.  Only on ASA 81 mg.      CT neck notable for cervical lymphadenopathy.  In addition 1.6 cm right thyroid nodule noted, TSH and dedicated thyroid ultrasound ordered for further evaluation.    Addendum 2/16/24  Pathology results and ultrasound thyroid  reviewed/discussed with the patient over the phone.  S/p excisional biopsy of left cervical lymph node.  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  She was referred to medical oncology for further evaluation.    Ultrasound thyroid notable for multiple TR3 nodules largest of which measures 18 x 15 x 15 mm on the right interpolar region,  diagnostic alternatives (FNA vs observation) discussed, she will like to proceed with FNA.  Normal TSH.

## 2024-01-29 ENCOUNTER — HOSPITAL ENCOUNTER (OUTPATIENT)
Dept: RADIOLOGY | Facility: HOSPITAL | Age: 54
Discharge: HOME | End: 2024-01-29
Payer: COMMERCIAL

## 2024-01-29 DIAGNOSIS — E04.1 THYROID NODULE: ICD-10-CM

## 2024-01-29 PROCEDURE — 76536 US EXAM OF HEAD AND NECK: CPT | Performed by: STUDENT IN AN ORGANIZED HEALTH CARE EDUCATION/TRAINING PROGRAM

## 2024-01-29 PROCEDURE — 76536 US EXAM OF HEAD AND NECK: CPT

## 2024-01-31 DIAGNOSIS — E10.9 TYPE 1 DIABETES MELLITUS WITHOUT COMPLICATION (MULTI): Primary | ICD-10-CM

## 2024-01-31 RX ORDER — FLASH GLUCOSE SENSOR
1 KIT MISCELLANEOUS
Qty: 2 EACH | Refills: 11 | Status: SHIPPED | OUTPATIENT
Start: 2024-01-31 | End: 2025-01-30

## 2024-02-07 ENCOUNTER — PHARMACY VISIT (OUTPATIENT)
Dept: PHARMACY | Facility: CLINIC | Age: 54
End: 2024-02-07
Payer: MEDICAID

## 2024-02-07 PROCEDURE — RXMED WILLOW AMBULATORY MEDICATION CHARGE

## 2024-02-07 RX ORDER — MORPHINE SULFATE 15 MG/1
15 TABLET, FILM COATED, EXTENDED RELEASE ORAL 2 TIMES DAILY
Qty: 60 TABLET | Refills: 0 | OUTPATIENT
Start: 2024-02-07 | End: 2024-05-10 | Stop reason: WASHOUT

## 2024-02-08 ENCOUNTER — LAB REQUISITION (OUTPATIENT)
Dept: LAB | Facility: HOSPITAL | Age: 54
End: 2024-02-08
Payer: COMMERCIAL

## 2024-02-08 DIAGNOSIS — R59.0 LOCALIZED ENLARGED LYMPH NODES: ICD-10-CM

## 2024-02-08 PROCEDURE — 88342 IMHCHEM/IMCYTCHM 1ST ANTB: CPT

## 2024-02-08 PROCEDURE — 88275 CYTOGENETICS 100-300: CPT

## 2024-02-08 PROCEDURE — 88271 CYTOGENETICS DNA PROBE: CPT

## 2024-02-08 PROCEDURE — 88305 TISSUE EXAM BY PATHOLOGIST: CPT | Performed by: PATHOLOGY

## 2024-02-08 PROCEDURE — 88341 IMHCHEM/IMCYTCHM EA ADD ANTB: CPT | Performed by: PATHOLOGY

## 2024-02-08 PROCEDURE — 88291 CYTO/MOLECULAR REPORT: CPT | Performed by: STUDENT IN AN ORGANIZED HEALTH CARE EDUCATION/TRAINING PROGRAM

## 2024-02-08 PROCEDURE — 88365 INSITU HYBRIDIZATION (FISH): CPT | Performed by: PATHOLOGY

## 2024-02-08 PROCEDURE — 88365 INSITU HYBRIDIZATION (FISH): CPT

## 2024-02-08 PROCEDURE — 88341 IMHCHEM/IMCYTCHM EA ADD ANTB: CPT

## 2024-02-08 PROCEDURE — 88305 TISSUE EXAM BY PATHOLOGIST: CPT

## 2024-02-08 PROCEDURE — 88342 IMHCHEM/IMCYTCHM 1ST ANTB: CPT | Performed by: PATHOLOGY

## 2024-02-09 ENCOUNTER — LAB REQUISITION (OUTPATIENT)
Dept: LAB | Facility: HOSPITAL | Age: 54
End: 2024-02-09
Payer: COMMERCIAL

## 2024-02-09 PROCEDURE — 88189 FLOWCYTOMETRY/READ 16 & >: CPT | Performed by: STUDENT IN AN ORGANIZED HEALTH CARE EDUCATION/TRAINING PROGRAM

## 2024-02-09 PROCEDURE — 88184 FLOWCYTOMETRY/ TC 1 MARKER: CPT

## 2024-02-09 PROCEDURE — 88185 FLOWCYTOMETRY/TC ADD-ON: CPT

## 2024-02-16 ENCOUNTER — TELEPHONE (OUTPATIENT)
Dept: OTOLARYNGOLOGY | Facility: CLINIC | Age: 54
End: 2024-02-16
Payer: COMMERCIAL

## 2024-02-16 NOTE — TELEPHONE ENCOUNTER
----- Message from Kenton Barroso MD sent at 2/16/2024  9:11 AM EST -----  Reza Santacruz, this patient underwent an excisional biopsy of a cervical lymph node, pathology consistent with lymphoma.  Please help her schedule a follow-up with hematology/oncology within the next 2 weeks.  In addition she has multiple thyroid nodules, she will like to proceed with FNA.  Thanks  ----- Message -----  From: Neeta Vernon  Sent: 2/15/2024  10:02 AM EST  To: Kenton Barroso MD

## 2024-02-18 LAB
CELL POPULATIONS: NORMAL
DIAGNOSIS: NORMAL
FLOW DIFFERENTIAL: NORMAL
FLOW TEST ORDERED: NORMAL
LAB TEST METHOD: NORMAL
NUMBER OF CELLS COLLECTED: NORMAL
PATH REPORT.TOTAL CANCER: NORMAL
SIGNATURE COMMENT: NORMAL
SPECIMEN VIABILITY: NORMAL

## 2024-02-19 ENCOUNTER — HOSPITAL ENCOUNTER (OUTPATIENT)
Dept: RADIOLOGY | Facility: HOSPITAL | Age: 54
Discharge: HOME | End: 2024-02-19
Payer: COMMERCIAL

## 2024-02-19 DIAGNOSIS — E04.1 THYROID NODULE: ICD-10-CM

## 2024-02-19 PROCEDURE — 10005 FNA BX W/US GDN 1ST LES: CPT

## 2024-02-19 PROCEDURE — 88173 CYTOPATH EVAL FNA REPORT: CPT | Performed by: PATHOLOGY

## 2024-02-19 PROCEDURE — 10005 FNA BX W/US GDN 1ST LES: CPT | Performed by: RADIOLOGY

## 2024-02-19 PROCEDURE — 88112 CYTOPATH CELL ENHANCE TECH: CPT | Mod: TC | Performed by: STUDENT IN AN ORGANIZED HEALTH CARE EDUCATION/TRAINING PROGRAM

## 2024-02-19 PROCEDURE — 76942 ECHO GUIDE FOR BIOPSY: CPT

## 2024-02-19 NOTE — POST-PROCEDURE NOTE
Interventional Radiology Brief Postprocedure Note    Attending: Aurelio Walker MD      Assistant: none    Diagnosis: thyroid nodule    Description of procedure: fna thyroid     Anesthesia:  Local    Complications: None    Estimated Blood Loss: minimal      specimens collected by on site cytology      See detailed result report with images in PACS.    The patient tolerated the procedure well without incident or complication and is in stable condition.

## 2024-02-20 LAB
LABORATORY COMMENT REPORT: NORMAL
LABORATORY COMMENT REPORT: NORMAL
PATH REPORT.FINAL DX SPEC: NORMAL
PATH REPORT.GROSS SPEC: NORMAL
PATH REPORT.RELEVANT HX SPEC: NORMAL
PATH REPORT.TOTAL CANCER: NORMAL

## 2024-02-22 ENCOUNTER — PHARMACY VISIT (OUTPATIENT)
Dept: PHARMACY | Facility: CLINIC | Age: 54
End: 2024-02-22
Payer: MEDICAID

## 2024-02-22 PROCEDURE — RXMED WILLOW AMBULATORY MEDICATION CHARGE

## 2024-02-23 ENCOUNTER — OFFICE VISIT (OUTPATIENT)
Dept: OTOLARYNGOLOGY | Facility: CLINIC | Age: 54
End: 2024-02-23
Payer: COMMERCIAL

## 2024-02-23 VITALS — BODY MASS INDEX: 20.66 KG/M2 | HEIGHT: 64 IN | WEIGHT: 121 LBS

## 2024-02-23 DIAGNOSIS — R59.0 CERVICAL LYMPHADENOPATHY: ICD-10-CM

## 2024-02-23 DIAGNOSIS — E04.1 THYROID NODULE: ICD-10-CM

## 2024-02-23 DIAGNOSIS — C82.41 GRADE 3B FOLLICULAR LYMPHOMA OF LYMPH NODES OF NECK (MULTI): Primary | ICD-10-CM

## 2024-02-23 PROBLEM — I47.10 SUPRAVENTRICULAR TACHYCARDIA, UNSPECIFIED (CMS-HCC): Status: ACTIVE | Noted: 2024-02-15

## 2024-02-23 PROBLEM — C85.90 NON-HODGKIN'S LYMPHOMA (MULTI): Status: ACTIVE | Noted: 2024-02-23

## 2024-02-23 PROCEDURE — 3008F BODY MASS INDEX DOCD: CPT | Performed by: STUDENT IN AN ORGANIZED HEALTH CARE EDUCATION/TRAINING PROGRAM

## 2024-02-23 PROCEDURE — 1036F TOBACCO NON-USER: CPT | Performed by: STUDENT IN AN ORGANIZED HEALTH CARE EDUCATION/TRAINING PROGRAM

## 2024-02-23 PROCEDURE — 99215 OFFICE O/P EST HI 40 MIN: CPT | Performed by: STUDENT IN AN ORGANIZED HEALTH CARE EDUCATION/TRAINING PROGRAM

## 2024-02-23 RX ORDER — CLINDAMYCIN HYDROCHLORIDE 300 MG/1
300 CAPSULE ORAL 3 TIMES DAILY
COMMUNITY
Start: 2024-02-08 | End: 2024-05-24 | Stop reason: WASHOUT

## 2024-02-23 RX ORDER — MULTIVITAMIN WITH FOLIC ACID 400 MCG
1 TABLET ORAL DAILY
COMMUNITY
End: 2024-05-24 | Stop reason: WASHOUT

## 2024-02-23 NOTE — PROGRESS NOTES
HPI  2/23/24  The patient present for follow-up, healing well from surgery.  Pathology results from excisional biopsy of left cervical lymph node reviewed and discussed with the patient, consistent with large B-cell lymphoma.   Thyroid FNA reviewed showing benign follicular nodule.  1/26/24  The patient present for follow-up, she is here to discuss her upcoming surgery clarify questions.  CT neck reviewed and discussed with the patient notable for cervical lymphadenopathy.  Incidental 1.6 cm right thyroid nodule noted.    Recall   Nelsy Osullivan is a 53 y.o. female presenting for initial evaluation of left neck lump.  The patient reports first noting a lump along her left neck ~9 months ago.  Denies growth, or fluctuation in size.  Has a history of smoking 1PPD > 25y.  Quit 1 year ago, currently vapes.  Has a history of non-Hodgkin's lymphoma > 30y ago treated with chemo and radiation therapy.    Ultrasound 10/12/2023 notable for abnormal left level II lymph node measuring 12 x 11 x 16 mm    Denies dysphagia, odynophagia, fevers/chills, oral bleeding/hemoptysis, voice changes, SOB.  Endorses weight fluctuation for the past year.     History reviewed. No pertinent past medical history.    Past Surgical History:   Procedure Laterality Date    OTHER SURGICAL HISTORY  04/14/2020    Cholecystectomy    OTHER SURGICAL HISTORY  04/14/2020    Exploratory laparoscopy    OTHER SURGICAL HISTORY  04/14/2020    Heart surgery         Current Outpatient Medications on File Prior to Visit   Medication Sig Dispense Refill    clindamycin (Cleocin) 300 mg capsule Take 1 capsule (300 mg) by mouth 3 times a day.      acetaminophen (Tylenol) 325 mg tablet Take 1 tablet (325 mg) by mouth every 6 hours if needed for moderate pain (4 - 6).      albuterol 90 mcg/actuation inhaler Inhale 2 puffs every 6 hours.      aspirin 81 mg EC tablet Take 1 tablet (81 mg) by mouth once daily.      atorvastatin (Lipitor) 40 mg tablet Take 1 tablet (40 mg)  by mouth once daily.      BD Alcohol Swabs pads, medicated USE SIX TIMES DAILY      bisacodyl (Dulcolax, bisacodyl,) 5 mg EC tablet Take 1 tablet (5 mg) by mouth once daily as needed for constipation.      carvedilol (Coreg) 12.5 mg tablet Take 1 tablet (12.5 mg) by mouth every 12 hours.      cetirizine (ZyrTEC) 10 mg tablet Take 1 tablet (10 mg) by mouth once daily.      clopidogrel (Plavix) 75 mg tablet Take 1 tablet (75 mg) by mouth once daily.      Creon 36,000-114,000- 180,000 unit capsule,delayed release(DR/EC) capsule TAKE 2 CAPSULE BEFORE MEALS TAKE 1 CAPSULE WITH A SNACK      cyclobenzaprine (Flexeril) 10 mg tablet Take 1 tablet (10 mg) by mouth 3 times a day as needed.      diphenhydrAMINE (Banophen) 50 mg capsule TAKE ONE CAPSULE BY MOUTH 1 HOUR BEFORE CONTRAST MEDIUM ADMINISTRATION 1 capsule 0    DULoxetine (Cymbalta) 60 mg DR capsule Take 1 capsule (60 mg) by mouth once daily.      esomeprazole (NexIUM) 40 mg DR capsule Take 1 capsule (40 mg) by mouth once daily as needed.      estradiol (Estrace) 2 mg tablet Take 1 tablet (2 mg) by mouth once daily.      fluconazole (Diflucan) 150 mg tablet TAKE ONE TABLET BY MOUTH ONCE 1 tablet 0    FreeStyle Ashley 2 Sensor kit Inject 1 each under the skin every 14 (fourteen) days. 2 each 11    furosemide (Lasix) 40 mg tablet Take 1 tablet (40 mg) by mouth once daily.      hydroCHLOROthiazide (HYDRODiuril) 25 mg tablet       ibuprofen 800 mg tablet Take 1 tablet (800 mg) by mouth once daily.      insulin aspart (NovoLOG) 100 unit/mL injection FOR USE WITH INSULIN PUMP MAX DAILY DOSE  UNITS      levothyroxine (Synthroid, Levoxyl) 125 mcg tablet TAKE 1 TABLET BY MOUTH IN THE MORNING ON EMPTY STOMACH      loperamide (Imodium A-D) 2 mg tablet Take 1 tablet (2 mg) by mouth if needed for diarrhea.      magnesium oxide capsule capsule 1 capsule (140 mg) once daily.      melatonin 3 mg tablet Take 2 tablets (6 mg) by mouth once daily at bedtime.      metOLazone  (Zaroxolyn) 2.5 mg tablet Take 1 tablet (2.5 mg) by mouth once daily.      metoprolol succinate XL (Toprol-XL) 50 mg 24 hr tablet Take by mouth.      mirtazapine (Remeron) 7.5 mg tablet Take 1 tablet (7.5 mg) by mouth once daily at bedtime.      morphine CR (MS Contin) 15 mg 12 hr tablet Take 1 tablet (15 mg) by mouth every 12 hours. 60 tablet 0    morphine CR (MS Contin) 15 mg 12 hr tablet Take 1 tablet by mouth two times a day for 30 days. Do not start before December 8, 2023. 60 tablet 0    morphine CR (MS Contin) 15 mg 12 hr tablet Take 1 tablet by mouth two times a day for 30 days. Do not start before January 7, 2024. 60 tablet 0    morphine CR (MS Contin) 15 mg 12 hr tablet Take 1 tablet by mouth two times a day for 30 days. Do not start before January 7, 2024. 60 tablet 0    morphine CR (MS Contin) 15 mg 12 hr tablet Take 1 tablet by mouth two times a day for 30 days. 60 tablet 0    multivitamin (Daily-Enrique, with folic acid,) tablet Take 1 tablet by mouth once daily.      multivitamin with minerals tablet Take 1 tablet by mouth once daily.      mv-min-folic acid-lutein 200-137.5 mcg tablet,chewable Chew once daily.      naloxone (Narcan) 4 mg/0.1 mL nasal spray Use 1 spray in one nostril as needed for overdose. May repeat every 2 to 3 min in alternating nostrils until medical assistance is available      naratriptan (Amerge) 2.5 mg tablet Take 1 tablet (2.5 mg) by mouth 1 time if needed for migraine.      OLANZapine (ZyPREXA) 10 mg tablet Take by mouth 2 times a day as needed.      omeprazole (PriLOSEC) 40 mg DR capsule 1 capsule (40 mg).      Omnipod Dash Pods, Gen 4, cartridge       ondansetron (Zofran) 4 mg tablet 1 tablet (4 mg).      ondansetron ODT (Zofran-ODT) 4 mg disintegrating tablet Take 1 tablet (4 mg) by mouth every 8 hours if needed.      oxyCODONE ER (OxyCONTIN) 10 mg 12 hr tablet TAKE 1 TABLET BY MOUTH EVERY 12 HOURS (Patient not taking: Reported on 1/9/2024) 60 tablet 0     oxyCODONE-acetaminophen (Percocet)  mg tablet Take 1 tablet by mouth every 6 hours as needed for pain for up to 30 days. 120 tablet 0    oxyCODONE-acetaminophen (Percocet)  mg tablet Take 1 tablet by mouth every 6 hours as needed for pain for up to 30 days. Do not start before 2023. (Patient not taking: Reported on 2024) 120 tablet 0    oxyCODONE-acetaminophen (Percocet)  mg tablet Take 1 tablet by mouth every 6 hours as needed for pain for up to 30 days. Do not start before 2024. 120 tablet 0    oxyCODONE-acetaminophen (Percocet)  mg tablet Take 1 tablet by mouth every 6 hours as needed for pain for up to 30 days. Do not start before 2024. 120 tablet 0    oxyCODONE-acetaminophen (Percocet) 5-325 mg tablet TAKE 1 TABLET BY MOUTH EVERY 8 HOURS AS NEEDED FOR PAIN 90 tablet 0    [] oxyCODONE-acetaminophen (Percocet) 5-325 mg tablet TAKE 1 TABLET BY MOUTH EVERY 6 HOURS FOR 30 DS.DO NOT FILL EARLY (Patient not taking: Reported on 2024) 120 tablet 0    oxyCODONE-acetaminophen (Percocet) 5-325 mg tablet TAKE 1 TABLET BY MOUTH EVERY 8 HOURS AS NEEDED FOR 30 DAYS (Patient not taking: Reported on 2024) 90 tablet 0    phenazopyridine (Pyridium) 200 mg tablet Take 1 tablet (200 mg) by mouth 3 times a day.      polyethylene glycol (Glycolax, Miralax) 17 gram/dose powder Take 17 g by mouth once daily.      potassium chloride CR 20 mEq ER tablet Take 1 tablet (20 mEq) by mouth.      predniSONE (Deltasone) 50 mg tablet Take 13 hours, 7 hours and 1 hour before contrast medium administration 3 tablet 0    prochlorperazine (Compazine) 10 mg tablet Take 1 tablet (10 mg) by mouth every 6 hours if needed for nausea.      promethazine (Phenergan) 25 mg tablet Take by mouth.      saccharomyces boulardii (Florastor) 250 mg capsule Take 1 capsule (250 mg) by mouth 2 times a day.      sodium chloride 0.9% (sodium chloride) flush Infuse 10 mL into a venous  catheter.      spironolactone (Aldactone) 25 mg tablet Take by mouth once daily.      tinidazole (Tindamax) 500 mg tablet TAKE 4 TABLETS BY MOUTH ONCE DAILY 4 tablet 0    tiotropium (Spiriva) 18 mcg inhalation capsule Place 1 capsule (18 mcg) into inhaler and inhale once daily.      torsemide (Demadex) 20 mg tablet Take 1 tablet (20 mg) by mouth once daily.      triamcinolone (Kenalog) 0.5 % cream APPLY TOPICALLY TO THE AFFECTED AREA TWICE DAILY FOR 7 DAYS       No current facility-administered medications on file prior to visit.        Allergies   Allergen Reactions    Acetaminophen-Codeine Unknown    Adhesive Unknown    Codeine Unknown    Fentanyl Unknown     Reaction from Community: Other(See Desc) Comments: MIGRAINES IF USED FOR PAIN, BUT OK FOR ANESTHESIA    Other reaction(s): Intolerance    Fetrin Unknown    Ketorolac Itching    Meperidine Unknown     Reaction from Community: Other(See Desc)    Metformin Unknown    Propoxyphene N-Acetaminophen GI Upset    Silver Hives and Other     Tegaderm    Adhesive Tape-Silicones Rash    Iodinated Contrast Media Rash     Does well with 13hour premedication    Latex Rash    Penicillins Rash    Wound Dressings Rash     tagaderm        Review of Systems  A detailed 12 point ROS was performed and is negative except as noted in the intake form, HPI and/or Past Medical History        Physical Exam   CONSTITUTIONAL: Well developed, well nourished.  VOICE: Normal voice quality  RESPIRATION: Breathing comfortably, no stridor.  CV: No clubbing/cyanosis/edema in hands.  EYES: EOM Intact, sclera normal.  NEURO: Alert and oriented times 3, Cranial nerves V,VII intact and symmetric bilaterally.  HEAD AND FACE: Symmetric facial features, no masses or lesions, sinuses nontender to palpation.  SALIVARY GLANDS: Parotid and submandibular glands normal bilaterally.  EARS: Normal external ears, external auditory canals, and TMs to otoscopy  NOSE: External nose midline, anterior rhinoscopy is  normal with limited visualization to the anterior aspect of the interior turbinates. No lesions noted.  ORAL CAVITY/OROPHARYNX/LIPS: Normal mucous membranes, normal floor of mouth/tongue/OP, no masses or lesions are noted.  PHARYNGEAL WALLS AND NASOPHARYNX: No masses noted. Mucosa appears clean and moist  NECK/LYMPH: No thyroid masses. Trachea palpably midline  + incisional site along the left level 2 healing well.  SKIN: Neck skin is without injury  PSYCH: Alert and oriented with appropriate mood and affect       Assessment  Follicular large B cell lymphoma.  Left cervical lymphadenopathy  Thyroid nodules  History of non-Hodgkin's lymphoma  History of radiation-induced heart disease  History of chronic pain, sees palliative medicine.      Plan  - Cervical LAD   53-year-old female presenting for evaluation of left cervical lymphadenopathy present for over 9 months.    Underwent excisional biopsy of left level 2 lymph node, pathology consistent with follicular large B cell lymphoma.  Scheduled for medical oncology evaluation.  Incisional site healing, the patient will start massaging the area with Vaseline.    -Thyroid nodules  TSH 1/26/24: Normal  Ultrasound thyroid 1/30/24 notable for multiple TR3 nodules largest of which measures 18 x 15 x 15 mm on the right interpolar region.  FNA thyroid 2/19/2024: Benign follicular nodule.  We will repeat ultrasound in 1 year     RTC 2m

## 2024-03-01 LAB
CHROM ANALY OVERALL INTERP-IMP: NORMAL
ELECTRONICALLY COSIGNED BY CYTOGENETICS: NORMAL
ELECTRONICALLY SIGNED BY CYTOGENETICS: NORMAL
STRUCT VAR ISCN NAME: NORMAL

## 2024-03-03 LAB
LAB AP ASR DISCLAIMER: NORMAL
LABORATORY COMMENT REPORT: NORMAL
PATH REPORT.ADDENDUM SPEC: NORMAL
PATH REPORT.FINAL DX SPEC: NORMAL
PATH REPORT.GROSS SPEC: NORMAL
PATH REPORT.TOTAL CANCER: NORMAL
RESIDENT REVIEW: NORMAL

## 2024-03-07 ENCOUNTER — PHARMACY VISIT (OUTPATIENT)
Dept: PHARMACY | Facility: CLINIC | Age: 54
End: 2024-03-07
Payer: MEDICAID

## 2024-03-07 PROCEDURE — RXMED WILLOW AMBULATORY MEDICATION CHARGE

## 2024-03-08 ENCOUNTER — TELEPHONE (OUTPATIENT)
Dept: GASTROENTEROLOGY | Facility: CLINIC | Age: 54
End: 2024-03-08
Payer: COMMERCIAL

## 2024-03-14 ENCOUNTER — SOCIAL WORK (OUTPATIENT)
Dept: HEMATOLOGY/ONCOLOGY | Facility: CLINIC | Age: 54
End: 2024-03-14

## 2024-03-14 ENCOUNTER — OFFICE VISIT (OUTPATIENT)
Dept: HEMATOLOGY/ONCOLOGY | Facility: CLINIC | Age: 54
End: 2024-03-14
Payer: COMMERCIAL

## 2024-03-14 ENCOUNTER — SOCIAL WORK (OUTPATIENT)
Dept: CASE MANAGEMENT | Facility: HOSPITAL | Age: 54
End: 2024-03-14

## 2024-03-14 VITALS
TEMPERATURE: 97.9 F | RESPIRATION RATE: 16 BRPM | OXYGEN SATURATION: 99 % | WEIGHT: 115.08 LBS | DIASTOLIC BLOOD PRESSURE: 67 MMHG | BODY MASS INDEX: 21.18 KG/M2 | SYSTOLIC BLOOD PRESSURE: 96 MMHG | HEART RATE: 115 BPM | HEIGHT: 62 IN

## 2024-03-14 DIAGNOSIS — C82.40 FOLLICULAR LYMPHOMA GRADE IIIB, UNSPECIFIED BODY REGION (MULTI): Primary | ICD-10-CM

## 2024-03-14 LAB
ALBUMIN SERPL BCP-MCNC: 3.5 G/DL (ref 3.4–5)
ALP SERPL-CCNC: 266 U/L (ref 33–110)
ALT SERPL W P-5'-P-CCNC: 38 U/L (ref 7–45)
ANION GAP SERPL CALC-SCNC: 10 MMOL/L (ref 10–20)
AST SERPL W P-5'-P-CCNC: 33 U/L (ref 9–39)
BASOPHILS # BLD AUTO: 0.04 X10*3/UL (ref 0–0.1)
BASOPHILS NFR BLD AUTO: 0.4 %
BILIRUB SERPL-MCNC: 0.3 MG/DL (ref 0–1.2)
BUN SERPL-MCNC: 12 MG/DL (ref 6–23)
CALCIUM SERPL-MCNC: 8.7 MG/DL (ref 8.6–10.3)
CHLORIDE SERPL-SCNC: 106 MMOL/L (ref 98–107)
CO2 SERPL-SCNC: 24 MMOL/L (ref 21–32)
CREAT SERPL-MCNC: 0.78 MG/DL (ref 0.5–1.05)
EGFRCR SERPLBLD CKD-EPI 2021: 90 ML/MIN/1.73M*2
EOSINOPHIL # BLD AUTO: 0.15 X10*3/UL (ref 0–0.7)
EOSINOPHIL NFR BLD AUTO: 1.5 %
ERYTHROCYTE [DISTWIDTH] IN BLOOD BY AUTOMATED COUNT: 15.8 % (ref 11.5–14.5)
GLUCOSE SERPL-MCNC: 111 MG/DL (ref 74–99)
HCT VFR BLD AUTO: 37.3 % (ref 36–46)
HGB BLD-MCNC: 12.3 G/DL (ref 12–16)
IMM GRANULOCYTES # BLD AUTO: 0.03 X10*3/UL (ref 0–0.7)
IMM GRANULOCYTES NFR BLD AUTO: 0.3 % (ref 0–0.9)
LDH SERPL L TO P-CCNC: 278 U/L (ref 84–246)
LYMPHOCYTES # BLD AUTO: 1.12 X10*3/UL (ref 1.2–4.8)
LYMPHOCYTES NFR BLD AUTO: 11.2 %
MCH RBC QN AUTO: 29.6 PG (ref 26–34)
MCHC RBC AUTO-ENTMCNC: 33 G/DL (ref 32–36)
MCV RBC AUTO: 90 FL (ref 80–100)
MONOCYTES # BLD AUTO: 0.75 X10*3/UL (ref 0.1–1)
MONOCYTES NFR BLD AUTO: 7.5 %
NEUTROPHILS # BLD AUTO: 7.88 X10*3/UL (ref 1.2–7.7)
NEUTROPHILS NFR BLD AUTO: 79.1 %
PLATELET # BLD AUTO: 253 X10*3/UL (ref 150–450)
POTASSIUM SERPL-SCNC: 4.2 MMOL/L (ref 3.5–5.3)
PROT SERPL-MCNC: 6.7 G/DL (ref 6.4–8.2)
RBC # BLD AUTO: 4.16 X10*6/UL (ref 4–5.2)
SODIUM SERPL-SCNC: 136 MMOL/L (ref 136–145)
WBC # BLD AUTO: 10 X10*3/UL (ref 4.4–11.3)

## 2024-03-14 PROCEDURE — 83615 LACTATE (LD) (LDH) ENZYME: CPT | Performed by: INTERNAL MEDICINE

## 2024-03-14 PROCEDURE — 36415 COLL VENOUS BLD VENIPUNCTURE: CPT | Performed by: INTERNAL MEDICINE

## 2024-03-14 PROCEDURE — 99215 OFFICE O/P EST HI 40 MIN: CPT | Performed by: INTERNAL MEDICINE

## 2024-03-14 PROCEDURE — 85025 COMPLETE CBC W/AUTO DIFF WBC: CPT | Performed by: INTERNAL MEDICINE

## 2024-03-14 PROCEDURE — 80053 COMPREHEN METABOLIC PANEL: CPT | Performed by: INTERNAL MEDICINE

## 2024-03-14 ASSESSMENT — ENCOUNTER SYMPTOMS
PSYCHIATRIC NEGATIVE: 1
RESPIRATORY NEGATIVE: 1
FATIGUE: 1
NEUROLOGICAL NEGATIVE: 1
CARDIOVASCULAR NEGATIVE: 1
ADENOPATHY: 1
EYES NEGATIVE: 1
ABDOMINAL PAIN: 1
UNEXPECTED WEIGHT CHANGE: 1

## 2024-03-14 ASSESSMENT — PAIN SCALES - GENERAL: PAINLEVEL: 8

## 2024-03-14 ASSESSMENT — NCCN CANCER DISTRESS MANAGEMENT
NCCN PHYSICAL CONCERNS: 2
NCCN EMOTIONAL CONCERNS: 5
NCCN EMOTIONAL CONCERNS: 8
NCCN PHYSICAL CONCERNS: 3
NCCN EMOTIONAL CONCERNS: 1
NCCN PHYSICAL CONCERNS: 8
NCCN SOCIAL CONCERNS: 2
NCCN PRACTICAL CONCERNS: 10
NCCN SOCIAL CONCERNS: 3
NCCN EMOTIONAL CONCERNS: 2
NCCN EMOTIONAL CONCERNS: 6
NCCN PHYSICAL CONCERNS: 6
NCCN PRACTICAL CONCERNS: 5
NCCN PHYSICAL CONCERNS: 1
NCCN PRACTICAL CONCERNS: 1

## 2024-03-14 ASSESSMENT — PATIENT HEALTH QUESTIONNAIRE - PHQ9
1. LITTLE INTEREST OR PLEASURE IN DOING THINGS: SEVERAL DAYS
SUM OF ALL RESPONSES TO PHQ9 QUESTIONS 1 AND 2: 2
2. FEELING DOWN, DEPRESSED OR HOPELESS: SEVERAL DAYS
10. IF YOU CHECKED OFF ANY PROBLEMS, HOW DIFFICULT HAVE THESE PROBLEMS MADE IT FOR YOU TO DO YOUR WORK, TAKE CARE OF THINGS AT HOME, OR GET ALONG WITH OTHER PEOPLE: SOMEWHAT DIFFICULT

## 2024-03-14 NOTE — PROGRESS NOTES
Patient had an appointment with Dr. Pickering for her Lymphoma.   (CASIMIRO) reviewed chart and met with patient, daughter and her grand daughter in the library today for introduction, to assess needs, and offer support.  Patient was A&Ox3 with appropriate and congruent mood and affect.  Patient was pleasant and easily engaged.  Patient stated that she was very scared for today's appointment.  Patient talked about her relapse and now going through this again.  Patient discussed having her blood work today and getting a PET Scan.  SW received permission to review her distress screen in front of her daughter and 4 year old granddaughter.  Patient received a 7 on her distress screen.  Patient identified physical concerns- pain, sleep, fatigue, memory or concentration and changes in eating.  Emotional concerns-worry or anxiety, sadness or depression, fear, loneliness and feelings of worthlessness or being a burden, social concerns-relationship with her children and family members.  Practical concerns-taking care of myself, housing, and having enough food.  Patient stated that she is diagnosed with depression and anxiety.  Patient stated that she has a psychiatrist with [facility] and is on medication.  Patient stated that she used to be in counseling but is no longer.  Patient reported that she does not smoke but vapes.  Patient stated that she does not drink or use any drugs.  Patient has [facility] for insurance.  CASIMIRO asked patient if she receives food stamps.  Patient stated that she does but the money only covers about 2 weeks of food.  Patient is aware of the local food elizabeth and will go to them if needed.  Patient resides by herself and did not say too much about her relationship with her children and family.  Patient's daughter stated that she is raising two children and helps out as much as she can.  Patient has transportation.  Patient does not have her Advanced Directives and was interested in them.  CASIMIRO  provided a packet and encouraged SW to reach out if she needed help completing them.  CASIMIRO is going to apply for $100.00 patient aid through [facility].  Patient thanked CASIMIRO.  CASIMIRO provided a list of SW services and business card.  CASIMIRO provided articles on distress and cancer along with depression and anxiety.  CASIMIRO will continue to follow patient.      CASIMIRO applied for the Patient Aid Fund with [facility].  It is under review at this time.       Louis Velazquez MSW, LSW

## 2024-03-14 NOTE — PATIENT INSTRUCTIONS
Npv with Dr. Pickering for  Large Bcell Lymphoma. April will have lab work today and a PET/CT to be scheduled for 1-2 weeks. April's return to office will depend on when PET is scheduled.  April to return the week after PET to see dr. Pickering and review results of lab and PET scan  Please schedule for 4/11

## 2024-03-14 NOTE — PROGRESS NOTES
Patient had an appointment with Dr. Pickering for her Lymphoma.   (CASIMIRO) reviewed chart and met with patient, daughter and her grand daughter in the library today for introduction, to assess needs, and offer support.  Patient was A&Ox3 with appropriate and congruent mood and affect.  Patient was pleasant and easily engaged.  Patient stated that she was very scared for today's appointment.  Patient talked about her relapse and now going through this again.  Patient discussed having her blood work today and getting a PET Scan.  SW received permission to review her distress screen in front of her daughter and 4 year old granddaughter.  Patient received a 7 on her distress screen.  Patient identified physical concerns- pain, sleep, fatigue, memory or concentration and changes in eating.  Emotional concerns-worry or anxiety, sadness or depression, fear, loneliness and feelings of worthlessness or being a burden, social concerns-relationship with her children and family members.  Practical concerns-taking care of myself, housing, and having enough food.  Patient stated that she is diagnosed with depression and anxiety.  Patient stated that she has a psychiatrist with [facility] and is on medication.  Patient stated that she used to be in counseling but is no longer.  Patient reported that she does not smoke but vapes.  Patient stated that she does not drink or use any drugs.  Patient has [facility] for insurance.  CASIMIRO asked patient if she receives food stamps.  Patient stated that she does but the money only covers about 2 weeks of food.  Patient is aware of the local food elizabeth and will go to them if needed.  Patient resides by herself and did not say too much about her relationship with her children and family.  Patient's daughter stated that she is raising two children and helps out as much as she can.  Patient has transportation.  Patient does not have her Advanced Directives and was interested in them.  CASIMIRO  provided a packet and encouraged SW to reach out if she needed help completing them.  CASIMIRO is going to apply for $100.00 patient aid through [facility].  Patient thanked CASIMIRO.  CASIMIRO provided a list of SW services and business card.  SW provided articles on distress and cancer along with depression and anxiety.  CASIMIRO will continue to follow patient.      Louis Velazquez MSW, LSW

## 2024-03-21 ENCOUNTER — APPOINTMENT (OUTPATIENT)
Dept: ENDOCRINOLOGY | Facility: CLINIC | Age: 54
End: 2024-03-21
Payer: COMMERCIAL

## 2024-03-21 ENCOUNTER — HOSPITAL ENCOUNTER (EMERGENCY)
Facility: HOSPITAL | Age: 54
Discharge: HOME | End: 2024-03-21
Attending: EMERGENCY MEDICINE
Payer: COMMERCIAL

## 2024-03-21 ENCOUNTER — APPOINTMENT (OUTPATIENT)
Dept: RADIOLOGY | Facility: HOSPITAL | Age: 54
End: 2024-03-21
Payer: COMMERCIAL

## 2024-03-21 ENCOUNTER — APPOINTMENT (OUTPATIENT)
Dept: CARDIOLOGY | Facility: HOSPITAL | Age: 54
End: 2024-03-21
Payer: COMMERCIAL

## 2024-03-21 VITALS
WEIGHT: 123.9 LBS | DIASTOLIC BLOOD PRESSURE: 70 MMHG | TEMPERATURE: 98.6 F | BODY MASS INDEX: 21.15 KG/M2 | OXYGEN SATURATION: 100 % | RESPIRATION RATE: 20 BRPM | SYSTOLIC BLOOD PRESSURE: 128 MMHG | HEART RATE: 90 BPM | HEIGHT: 64 IN

## 2024-03-21 DIAGNOSIS — S81.812A LACERATION OF LEFT LOWER EXTREMITY, INITIAL ENCOUNTER: ICD-10-CM

## 2024-03-21 DIAGNOSIS — R42 DIZZINESS: Primary | ICD-10-CM

## 2024-03-21 DIAGNOSIS — E10.9 TYPE 1 DIABETES MELLITUS WITHOUT COMPLICATION (MULTI): Primary | ICD-10-CM

## 2024-03-21 LAB
ALBUMIN SERPL BCP-MCNC: 3.6 G/DL (ref 3.4–5)
ALP SERPL-CCNC: 290 U/L (ref 33–110)
ALT SERPL W P-5'-P-CCNC: 23 U/L (ref 7–45)
ANION GAP SERPL CALC-SCNC: 10 MMOL/L (ref 10–20)
APPEARANCE UR: ABNORMAL
AST SERPL W P-5'-P-CCNC: 29 U/L (ref 9–39)
ATRIAL RATE: 113 BPM
BASOPHILS # BLD AUTO: 0.06 X10*3/UL (ref 0–0.1)
BASOPHILS NFR BLD AUTO: 0.9 %
BILIRUB SERPL-MCNC: 0.3 MG/DL (ref 0–1.2)
BILIRUB UR STRIP.AUTO-MCNC: NEGATIVE MG/DL
BUN SERPL-MCNC: 10 MG/DL (ref 6–23)
CALCIUM SERPL-MCNC: 9.3 MG/DL (ref 8.6–10.3)
CARDIAC TROPONIN I PNL SERPL HS: 5 NG/L (ref 0–13)
CARDIAC TROPONIN I PNL SERPL HS: 6 NG/L (ref 0–13)
CHLORIDE SERPL-SCNC: 102 MMOL/L (ref 98–107)
CO2 SERPL-SCNC: 28 MMOL/L (ref 21–32)
COLOR UR: ABNORMAL
CREAT SERPL-MCNC: 0.94 MG/DL (ref 0.5–1.05)
D DIMER PPP FEU-MCNC: 1217 NG/ML FEU
EGFRCR SERPLBLD CKD-EPI 2021: 72 ML/MIN/1.73M*2
EOSINOPHIL # BLD AUTO: 0.06 X10*3/UL (ref 0–0.7)
EOSINOPHIL NFR BLD AUTO: 0.9 %
ERYTHROCYTE [DISTWIDTH] IN BLOOD BY AUTOMATED COUNT: 16.3 % (ref 11.5–14.5)
GLUCOSE BLD MANUAL STRIP-MCNC: 211 MG/DL (ref 74–99)
GLUCOSE SERPL-MCNC: 253 MG/DL (ref 74–99)
GLUCOSE UR STRIP.AUTO-MCNC: NEGATIVE MG/DL
HCT VFR BLD AUTO: 35.8 % (ref 36–46)
HGB BLD-MCNC: 11.9 G/DL (ref 12–16)
HOLD SPECIMEN: 293
IMM GRANULOCYTES # BLD AUTO: 0.04 X10*3/UL (ref 0–0.7)
IMM GRANULOCYTES NFR BLD AUTO: 0.6 % (ref 0–0.9)
KETONES UR STRIP.AUTO-MCNC: ABNORMAL MG/DL
LEUKOCYTE ESTERASE UR QL STRIP.AUTO: NEGATIVE
LYMPHOCYTES # BLD AUTO: 0.78 X10*3/UL (ref 1.2–4.8)
LYMPHOCYTES NFR BLD AUTO: 11.6 %
MCH RBC QN AUTO: 29.5 PG (ref 26–34)
MCHC RBC AUTO-ENTMCNC: 33.2 G/DL (ref 32–36)
MCV RBC AUTO: 89 FL (ref 80–100)
MONOCYTES # BLD AUTO: 0.46 X10*3/UL (ref 0.1–1)
MONOCYTES NFR BLD AUTO: 6.8 %
NEUTROPHILS # BLD AUTO: 5.32 X10*3/UL (ref 1.2–7.7)
NEUTROPHILS NFR BLD AUTO: 79.2 %
NITRITE UR QL STRIP.AUTO: NEGATIVE
NRBC BLD-RTO: 0 /100 WBCS (ref 0–0)
P AXIS: 43 DEGREES
PH UR STRIP.AUTO: 5 [PH]
PLATELET # BLD AUTO: 298 X10*3/UL (ref 150–450)
POTASSIUM SERPL-SCNC: 4.7 MMOL/L (ref 3.5–5.3)
PR INTERVAL: 138 MS
PROT SERPL-MCNC: 7.1 G/DL (ref 6.4–8.2)
PROT UR STRIP.AUTO-MCNC: NEGATIVE MG/DL
Q ONSET: 249 MS
QRS COUNT: 18 BEATS
QRS DURATION: 147 MS
QT INTERVAL: 384 MS
QTC CALCULATION(BAZETT): 527 MS
QTC FREDERICIA: 474 MS
R AXIS: 254 DEGREES
RBC # BLD AUTO: 4.04 X10*6/UL (ref 4–5.2)
RBC # UR STRIP.AUTO: NEGATIVE /UL
SODIUM SERPL-SCNC: 135 MMOL/L (ref 136–145)
SP GR UR STRIP.AUTO: 1.02
T AXIS: 29 DEGREES
T OFFSET: 441 MS
UROBILINOGEN UR STRIP.AUTO-MCNC: 2 MG/DL
VENTRICULAR RATE: 113 BPM
WBC # BLD AUTO: 6.7 X10*3/UL (ref 4.4–11.3)

## 2024-03-21 PROCEDURE — 2500000005 HC RX 250 GENERAL PHARMACY W/O HCPCS: Performed by: NURSE PRACTITIONER

## 2024-03-21 PROCEDURE — 85379 FIBRIN DEGRADATION QUANT: CPT | Performed by: NURSE PRACTITIONER

## 2024-03-21 PROCEDURE — 78803 RP LOCLZJ TUM SPECT 1 AREA: CPT | Performed by: NUCLEAR MEDICINE

## 2024-03-21 PROCEDURE — 96374 THER/PROPH/DIAG INJ IV PUSH: CPT | Mod: 59

## 2024-03-21 PROCEDURE — 81003 URINALYSIS AUTO W/O SCOPE: CPT | Performed by: NURSE PRACTITIONER

## 2024-03-21 PROCEDURE — 78803 RP LOCLZJ TUM SPECT 1 AREA: CPT

## 2024-03-21 PROCEDURE — 84484 ASSAY OF TROPONIN QUANT: CPT | Performed by: NURSE PRACTITIONER

## 2024-03-21 PROCEDURE — 36415 COLL VENOUS BLD VENIPUNCTURE: CPT | Performed by: NURSE PRACTITIONER

## 2024-03-21 PROCEDURE — 2500000001 HC RX 250 WO HCPCS SELF ADMINISTERED DRUGS (ALT 637 FOR MEDICARE OP): Performed by: NURSE PRACTITIONER

## 2024-03-21 PROCEDURE — 80053 COMPREHEN METABOLIC PANEL: CPT | Performed by: NURSE PRACTITIONER

## 2024-03-21 PROCEDURE — 96361 HYDRATE IV INFUSION ADD-ON: CPT

## 2024-03-21 PROCEDURE — 2500000004 HC RX 250 GENERAL PHARMACY W/ HCPCS (ALT 636 FOR OP/ED): Performed by: NURSE PRACTITIONER

## 2024-03-21 PROCEDURE — 12002 RPR S/N/AX/GEN/TRNK2.6-7.5CM: CPT

## 2024-03-21 PROCEDURE — 82947 ASSAY GLUCOSE BLOOD QUANT: CPT

## 2024-03-21 PROCEDURE — 82947 ASSAY GLUCOSE BLOOD QUANT: CPT | Mod: 59

## 2024-03-21 PROCEDURE — 12002 RPR S/N/AX/GEN/TRNK2.6-7.5CM: CPT | Performed by: EMERGENCY MEDICINE

## 2024-03-21 PROCEDURE — 2500000004 HC RX 250 GENERAL PHARMACY W/ HCPCS (ALT 636 FOR OP/ED)

## 2024-03-21 PROCEDURE — A9540 TC99M MAA: HCPCS | Performed by: EMERGENCY MEDICINE

## 2024-03-21 PROCEDURE — 85025 COMPLETE CBC W/AUTO DIFF WBC: CPT | Performed by: NURSE PRACTITIONER

## 2024-03-21 PROCEDURE — 93005 ELECTROCARDIOGRAM TRACING: CPT | Mod: 59

## 2024-03-21 PROCEDURE — 99285 EMERGENCY DEPT VISIT HI MDM: CPT | Mod: 25

## 2024-03-21 PROCEDURE — 71046 X-RAY EXAM CHEST 2 VIEWS: CPT

## 2024-03-21 PROCEDURE — 71046 X-RAY EXAM CHEST 2 VIEWS: CPT | Performed by: RADIOLOGY

## 2024-03-21 PROCEDURE — 3430000001 HC RX 343 DIAGNOSTIC RADIOPHARMACEUTICALS: Performed by: EMERGENCY MEDICINE

## 2024-03-21 RX ORDER — LIDOCAINE HYDROCHLORIDE 10 MG/ML
5 INJECTION INFILTRATION; PERINEURAL ONCE
Status: COMPLETED | OUTPATIENT
Start: 2024-03-21 | End: 2024-03-21

## 2024-03-21 RX ORDER — BACITRACIN ZINC 500 UNIT/G
OINTMENT IN PACKET (EA) TOPICAL 3 TIMES DAILY
Status: DISCONTINUED | OUTPATIENT
Start: 2024-03-21 | End: 2024-03-21 | Stop reason: HOSPADM

## 2024-03-21 RX ORDER — OXYCODONE AND ACETAMINOPHEN 10; 325 MG/1; MG/1
1 TABLET ORAL EVERY 6 HOURS PRN
Status: DISCONTINUED | OUTPATIENT
Start: 2024-03-21 | End: 2024-03-21 | Stop reason: HOSPADM

## 2024-03-21 RX ORDER — ONDANSETRON HYDROCHLORIDE 2 MG/ML
4 INJECTION, SOLUTION INTRAVENOUS ONCE
Status: COMPLETED | OUTPATIENT
Start: 2024-03-21 | End: 2024-03-21

## 2024-03-21 RX ORDER — OXYCODONE HYDROCHLORIDE 5 MG/1
5 TABLET ORAL ONCE
Status: COMPLETED | OUTPATIENT
Start: 2024-03-21 | End: 2024-03-21

## 2024-03-21 RX ORDER — ONDANSETRON HYDROCHLORIDE 2 MG/ML
INJECTION, SOLUTION INTRAVENOUS
Status: COMPLETED
Start: 2024-03-21 | End: 2024-03-21

## 2024-03-21 RX ORDER — MORPHINE SULFATE 4 MG/ML
4 INJECTION, SOLUTION INTRAMUSCULAR; INTRAVENOUS ONCE
Status: DISCONTINUED | OUTPATIENT
Start: 2024-03-21 | End: 2024-03-21 | Stop reason: HOSPADM

## 2024-03-21 RX ORDER — BLOOD-GLUCOSE,RECEIVER,CONT
EACH MISCELLANEOUS
Qty: 1 EACH | Refills: 0 | Status: SHIPPED | OUTPATIENT
Start: 2024-03-21

## 2024-03-21 RX ORDER — BLOOD-GLUCOSE SENSOR
1 EACH MISCELLANEOUS CONTINUOUS
Qty: 2 EACH | Refills: 11 | Status: SHIPPED | OUTPATIENT
Start: 2024-03-21 | End: 2025-03-21

## 2024-03-21 RX ORDER — OXYCODONE AND ACETAMINOPHEN 5; 325 MG/1; MG/1
1 TABLET ORAL ONCE
Status: COMPLETED | OUTPATIENT
Start: 2024-03-21 | End: 2024-03-21

## 2024-03-21 RX ORDER — INSULIN ASPART 100 [IU]/ML
INJECTION, SOLUTION INTRAVENOUS; SUBCUTANEOUS
Qty: 30 ML | Refills: 5 | Status: SHIPPED | OUTPATIENT
Start: 2024-03-21

## 2024-03-21 RX ORDER — MORPHINE SULFATE 4 MG/ML
INJECTION INTRAVENOUS
Status: COMPLETED
Start: 2024-03-21 | End: 2024-03-21

## 2024-03-21 RX ADMIN — ONDANSETRON 4 MG: 2 INJECTION INTRAMUSCULAR; INTRAVENOUS at 14:54

## 2024-03-21 RX ADMIN — OXYCODONE HYDROCHLORIDE AND ACETAMINOPHEN 1 TABLET: 5; 325 TABLET ORAL at 12:52

## 2024-03-21 RX ADMIN — OXYCODONE HYDROCHLORIDE 5 MG: 5 TABLET ORAL at 12:52

## 2024-03-21 RX ADMIN — MORPHINE SULFATE 4 MG: 4 INJECTION INTRAVENOUS at 14:55

## 2024-03-21 RX ADMIN — LIDOCAINE HYDROCHLORIDE 50 MG: 10 INJECTION, SOLUTION INFILTRATION; PERINEURAL at 11:21

## 2024-03-21 RX ADMIN — SODIUM CHLORIDE 500 ML: 9 INJECTION, SOLUTION INTRAVENOUS at 11:28

## 2024-03-21 RX ADMIN — BACITRACIN 1 APPLICATION: 500 OINTMENT TOPICAL at 11:22

## 2024-03-21 RX ADMIN — ONDANSETRON HYDROCHLORIDE 4 MG: 2 INJECTION, SOLUTION INTRAVENOUS at 14:54

## 2024-03-21 RX ADMIN — KIT FOR THE PREPARATION OF TECHNETIUM TC 99M ALBUMIN AGGREGATED 4 MILLICURIE: 2.5 INJECTION, POWDER, FOR SOLUTION INTRAVENOUS at 15:52

## 2024-03-21 ASSESSMENT — LIFESTYLE VARIABLES
EVER FELT BAD OR GUILTY ABOUT YOUR DRINKING: NO
EVER HAD A DRINK FIRST THING IN THE MORNING TO STEADY YOUR NERVES TO GET RID OF A HANGOVER: NO
HAVE YOU EVER FELT YOU SHOULD CUT DOWN ON YOUR DRINKING: NO
HAVE PEOPLE ANNOYED YOU BY CRITICIZING YOUR DRINKING: NO

## 2024-03-21 ASSESSMENT — PAIN SCALES - GENERAL
PAINLEVEL_OUTOF10: 3
PAINLEVEL_OUTOF10: 8
PAINLEVEL_OUTOF10: 7
PAINLEVEL_OUTOF10: 4

## 2024-03-21 ASSESSMENT — PAIN DESCRIPTION - ORIENTATION
ORIENTATION: LEFT
ORIENTATION: LEFT

## 2024-03-21 ASSESSMENT — PAIN DESCRIPTION - PAIN TYPE
TYPE: ACUTE PAIN
TYPE: ACUTE PAIN

## 2024-03-21 ASSESSMENT — PAIN DESCRIPTION - LOCATION
LOCATION: LEG
LOCATION: LEG
LOCATION: ABDOMEN

## 2024-03-21 ASSESSMENT — PAIN - FUNCTIONAL ASSESSMENT
PAIN_FUNCTIONAL_ASSESSMENT: 0-10

## 2024-03-21 ASSESSMENT — PAIN DESCRIPTION - DESCRIPTORS: DESCRIPTORS: ACHING

## 2024-03-21 NOTE — TELEPHONE ENCOUNTER
Patient was scheduled today but had a fall at home and is at ED. She is rescheduled 5/8/24. She request renewal for novolog. Patient also needs a renewal for jairon but has requested to switch from the 2 to jairon 3. [facility]

## 2024-03-21 NOTE — ED TRIAGE NOTES
"Pt to ER via ambulance after becoming dizzy and falling in the shower. Pt has large laceration to left leg after cutting it on shower. Pt denies neck and head pain. Pt also c/o abdominal pain, \"I have chronic pancreatitis\". Pt recently diagnosed with Non Hodgkins lymphoma last month.  Pt alert and oriented x 4, follows commands appropriately.   "

## 2024-03-21 NOTE — ED PROVIDER NOTES
HPI   Chief Complaint   Patient presents with    Fall     Fall at home in the shower       This is a 53-year-old  female, with a past medical history of CAD status post MI in 2018, diabetes, chronic pancreatitis, and newly diagnosed non-Hodgkin's lymphoma, that is presenting to the emergency room status post fall.  The patient reports that she was getting out of the shower and she felt suddenly dizzy.  The patient lost her balance and hit her left lower extremity on the edge of the tub.  She did not hit her head.  She is only having pain where she lacerated her left shin.  She was able to get up without assistance.  She denied any chest pain, shortness of breath, palpitations, paresthesias, focal weakness.  Reports that she has chronic abdominal pain, that is not new or worse.  She denies any alteration in her urine or bowel function.  She is not having any fevers or cold-like symptoms.  Denies any history of PE, DVT, recent travel, recent hospitalization,      History provided by:  Patient   used: No                        Yulia Coma Scale Score: 15         NIH Stroke Scale: 0             Patient History   No past medical history on file.  Past Surgical History:   Procedure Laterality Date    OTHER SURGICAL HISTORY  2020    Cholecystectomy    OTHER SURGICAL HISTORY  2020    Exploratory laparoscopy    OTHER SURGICAL HISTORY  2020    Heart surgery     No family history on file.  Social History     Tobacco Use    Smoking status: Former     Types: Cigarettes     Quit date:      Years since quittin.2     Passive exposure: Past    Smokeless tobacco: Never   Vaping Use    Vaping Use: Every day   Substance Use Topics    Alcohol use: Not Currently    Drug use: Never       Physical Exam   ED Triage Vitals [24 1056]   Temperature Heart Rate Respirations BP   36.7 °C (98.1 °F) (!) 116 20 117/75      Pulse Ox Temp Source Heart Rate Source Patient Position   100 %  Tympanic -- Sitting      BP Location FiO2 (%)     Right arm --       Physical Exam  Constitutional:       Appearance: She is underweight.   HENT:      Head: Normocephalic.      Right Ear: Tympanic membrane and external ear normal.      Left Ear: Tympanic membrane and external ear normal.      Nose: Nose normal.      Mouth/Throat:      Mouth: Mucous membranes are moist.   Eyes:      Conjunctiva/sclera: Conjunctivae normal.      Pupils: Pupils are equal, round, and reactive to light.   Cardiovascular:      Rate and Rhythm: Tachycardia present.      Pulses: Normal pulses.      Heart sounds: Normal heart sounds.   Pulmonary:      Effort: Pulmonary effort is normal.      Breath sounds: Normal breath sounds.   Abdominal:      Palpations: Abdomen is soft.      Tenderness: There is abdominal tenderness in the left upper quadrant.   Musculoskeletal:         General: Normal range of motion.      Cervical back: Normal range of motion.   Skin:     Comments: The patient has a 3 cm partial-thickness vertical laceration noted on the anterior surface of the left shin.  The wound is superficial in nature.  Mild persistent bleeding.  No obvious foreign bodies.  No bony involvement.   Neurological:      General: No focal deficit present.      Mental Status: She is alert.   Psychiatric:         Mood and Affect: Mood normal.         ED Course & MDM   ED Course as of 03/21/24 1946   Thu Mar 21, 2024   1115 ECG 12 lead  Twelve-lead EKG interpreted by me.  Sinus tachycardia at a rate of 113.  SC interval is 138, QRS is 147, QT is 384, QTc is 527.  Right bundle branch block with right axis deviation.  Prolonged QTc interval.  No acute ischemia or injury pattern. [CT]      ED Course User Index  [CT] RALPH Zavala-CNP         Diagnoses as of 03/21/24 1946   Dizziness   Laceration of left lower extremity, initial encounter       Medical Decision Making  Patient was seen and evaluated with the attending physician, Dr. Lejeune.  Patient  is presenting to the emergency room with complaints of a leg injury status post fall.  The patient experienced dizziness.  She states that she experiences dizziness frequently.  Differential diagnosis includes dehydration, arrhythmia, viral illness, pulmonary embolism, ACS, or other acute process.  The patient was placed on cardiac monitor and continuous pulse oximetry.  Orthostatic blood pressures were obtained and unremarkable.  A saline lock was established and laboratory studies were drawn with results as noted.  The patient's D-dimer was elevated.  The patient has a elevated risk for pulmonary embolism due to her history of non-Hodgkin's lymphoma.  The patient was given a 500 mL normal saline bolus.  The patient reports that her tetanus was updated.  The patient's leg laceration was prepared for wound closure.  The patient was covered in sterile drape.  The wound was cleaned 3 times with Shur-Clens.  Local anesthesia was achieved using 10 mL of 1% lidocaine without epinephrine.  The wound was irrigated with copious amounts of sterile saline and explored.  The wound base was visualized.  No foreign bodies noted.  No tendon injury noted with full range of motion of the extremity.  No bony involvement.  The wound edges were approximated and secured with seven 4-0 interrupted Ethilon sutures .  Bacitracin and a dry sterile dressing were applied.  The patient tolerated the procedure well.  The patient was educated on wound care and signs and symptoms of infection.  The patient is to follow-up with their primary care physician in 10-14 days for wound evaluation and suture removal.  The patient was medicated with 1 Percocet tab p.o. and morphine 4 mg IVP with Zofran 4 mg IVP for pain.  She takes Percocet 10's and morphine at home chronically for her chronic pancreatitis.  Due to the patient's IV dye allergy, a VQ scan was obtained which was negative for pulmonary embolism.  Twelve-lead EKG shows sinus tachycardia with  no acute ischemia or injury pattern noted initial and delta troponins were negative.  Patient does not have any acute leukocytosis or any signs of viral illness.  The patient is requesting discharge home.  The patient was advised to increase oral fluid intake.  She is to follow-up with her primary care physician next 2 to 3 days.  She is to return if she has any worsening symptomatology.  The patient was discharged in stable condition with computer discharge instructions given.        Procedure  Procedures     SISI Zavala  03/21/24 1951       SISI Zavala  03/21/24 1951

## 2024-03-23 ENCOUNTER — PHARMACY VISIT (OUTPATIENT)
Dept: PHARMACY | Facility: CLINIC | Age: 54
End: 2024-03-23
Payer: MEDICAID

## 2024-03-23 PROCEDURE — RXMED WILLOW AMBULATORY MEDICATION CHARGE

## 2024-04-03 ENCOUNTER — OFFICE VISIT (OUTPATIENT)
Dept: OTOLARYNGOLOGY | Facility: CLINIC | Age: 54
End: 2024-04-03
Payer: COMMERCIAL

## 2024-04-03 VITALS — WEIGHT: 115 LBS | HEIGHT: 64 IN | BODY MASS INDEX: 19.63 KG/M2

## 2024-04-03 DIAGNOSIS — C82.41 GRADE 3B FOLLICULAR LYMPHOMA OF LYMPH NODES OF NECK (MULTI): Primary | ICD-10-CM

## 2024-04-03 DIAGNOSIS — E04.1 THYROID NODULE: ICD-10-CM

## 2024-04-03 DIAGNOSIS — R59.0 CERVICAL LYMPHADENOPATHY: ICD-10-CM

## 2024-04-03 PROBLEM — S81.819A LACERATION OF LOWER EXTREMITY: Status: ACTIVE | Noted: 2024-04-03

## 2024-04-03 PROBLEM — C82.40: Status: ACTIVE | Noted: 2022-06-07

## 2024-04-03 PROCEDURE — 99213 OFFICE O/P EST LOW 20 MIN: CPT | Performed by: STUDENT IN AN ORGANIZED HEALTH CARE EDUCATION/TRAINING PROGRAM

## 2024-04-03 PROCEDURE — 3008F BODY MASS INDEX DOCD: CPT | Performed by: STUDENT IN AN ORGANIZED HEALTH CARE EDUCATION/TRAINING PROGRAM

## 2024-04-03 PROCEDURE — 1036F TOBACCO NON-USER: CPT | Performed by: STUDENT IN AN ORGANIZED HEALTH CARE EDUCATION/TRAINING PROGRAM

## 2024-04-03 NOTE — PROGRESS NOTES
HPI  4/3/24  The patient present for follow-up, states her incisional site is healing well.  No complaints.  Undergoing evaluation by hematology & oncology.   2/23/24  The patient present for follow-up, healing well from surgery.  Pathology results from excisional biopsy of left cervical lymph node reviewed and discussed with the patient, consistent with large B-cell lymphoma.   Thyroid FNA reviewed showing benign follicular nodule.  1/26/24  The patient present for follow-up, she is here to discuss her upcoming surgery clarify questions.  CT neck reviewed and discussed with the patient notable for cervical lymphadenopathy.  Incidental 1.6 cm right thyroid nodule noted.    Recall   Nelsy Osullivan is a 54 y.o. female presenting for initial evaluation of left neck lump.  The patient reports first noting a lump along her left neck ~9 months ago.  Denies growth, or fluctuation in size.  Has a history of smoking 1PPD > 25y.  Quit 1 year ago, currently vapes.  Has a history of non-Hodgkin's lymphoma > 30y ago treated with chemo and radiation therapy.    Ultrasound 10/12/2023 notable for abnormal left level II lymph node measuring 12 x 11 x 16 mm    Denies dysphagia, odynophagia, fevers/chills, oral bleeding/hemoptysis, voice changes, SOB.  Endorses weight fluctuation for the past year.     History reviewed. No pertinent past medical history.    Past Surgical History:   Procedure Laterality Date    OTHER SURGICAL HISTORY  04/14/2020    Cholecystectomy    OTHER SURGICAL HISTORY  04/14/2020    Exploratory laparoscopy    OTHER SURGICAL HISTORY  04/14/2020    Heart surgery         Current Outpatient Medications on File Prior to Visit   Medication Sig Dispense Refill    acetaminophen (Tylenol) 325 mg tablet Take 1 tablet (325 mg) by mouth every 6 hours if needed for moderate pain (4 - 6).      albuterol 90 mcg/actuation inhaler Inhale 2 puffs every 6 hours.      aspirin 81 mg EC tablet Take 1 tablet (81 mg) by mouth once daily.       atorvastatin (Lipitor) 40 mg tablet Take 1 tablet (40 mg) by mouth once daily.      BD Alcohol Swabs pads, medicated USE SIX TIMES DAILY      bisacodyl (Dulcolax, bisacodyl,) 5 mg EC tablet Take 1 tablet (5 mg) by mouth once daily as needed for constipation.      blood-glucose sensor (FreeStyle Ashley 3 Sensor) device 1 each continuously. Use to check glucose, change every 14 days 2 each 11    carvedilol (Coreg) 12.5 mg tablet Take 1 tablet (12.5 mg) by mouth every 12 hours.      cetirizine (ZyrTEC) 10 mg tablet Take 1 tablet (10 mg) by mouth once daily.      clindamycin (Cleocin) 300 mg capsule Take 1 capsule (300 mg) by mouth 3 times a day.      clopidogrel (Plavix) 75 mg tablet Take 1 tablet (75 mg) by mouth once daily.      Creon 36,000-114,000- 180,000 unit capsule,delayed release(DR/EC) capsule TAKE 2 CAPSULE BEFORE MEALS TAKE 1 CAPSULE WITH A SNACK      cyclobenzaprine (Flexeril) 10 mg tablet Take 1 tablet (10 mg) by mouth 3 times a day as needed.      diphenhydrAMINE (Banophen) 50 mg capsule TAKE ONE CAPSULE BY MOUTH 1 HOUR BEFORE CONTRAST MEDIUM ADMINISTRATION 1 capsule 0    DULoxetine (Cymbalta) 60 mg DR capsule Take 1 capsule (60 mg) by mouth once daily.      esomeprazole (NexIUM) 40 mg DR capsule Take 1 capsule (40 mg) by mouth once daily as needed.      estradiol (Estrace) 2 mg tablet Take 1 tablet (2 mg) by mouth once daily.      fluconazole (Diflucan) 150 mg tablet TAKE ONE TABLET BY MOUTH ONCE 1 tablet 0    FreeStyle Ashley 2 Sensor kit Inject 1 each under the skin every 14 (fourteen) days. 2 each 11    FreeStyle Ashley 3 [place] misc Use as instructed 1 each 0    furosemide (Lasix) 40 mg tablet Take 1 tablet (40 mg) by mouth once daily.      hydroCHLOROthiazide (HYDRODiuril) 25 mg tablet       ibuprofen 800 mg tablet Take 1 tablet (800 mg) by mouth once daily.      insulin aspart (NovoLOG) 100 unit/mL injection FOR USE WITH INSULIN PUMP MAX DAILY DOSE  UNITS 30 mL 5    levothyroxine  (Synthroid, Levoxyl) 125 mcg tablet TAKE 1 TABLET BY MOUTH IN THE MORNING ON EMPTY STOMACH      loperamide (Imodium A-D) 2 mg tablet Take 1 tablet (2 mg) by mouth if needed for diarrhea.      magnesium oxide capsule capsule 1 capsule (140 mg) once daily.      melatonin 3 mg tablet Take 2 tablets (6 mg) by mouth once daily at bedtime.      metOLazone (Zaroxolyn) 2.5 mg tablet Take 1 tablet (2.5 mg) by mouth once daily.      metoprolol succinate XL (Toprol-XL) 50 mg 24 hr tablet Take by mouth.      mirtazapine (Remeron) 7.5 mg tablet Take 1 tablet (7.5 mg) by mouth once daily at bedtime.      morphine CR (MS Contin) 15 mg 12 hr tablet Take 1 tablet (15 mg) by mouth every 12 hours. 60 tablet 0    morphine CR (MS Contin) 15 mg 12 hr tablet Take 1 tablet by mouth two times a day for 30 days. Do not start before December 8, 2023. 60 tablet 0    morphine CR (MS Contin) 15 mg 12 hr tablet Take 1 tablet by mouth two times a day for 30 days. Do not start before January 7, 2024. 60 tablet 0    morphine CR (MS Contin) 15 mg 12 hr tablet Take 1 tablet by mouth two times a day for 30 days. Do not start before January 7, 2024. 60 tablet 0    morphine CR (MS Contin) 15 mg 12 hr tablet Take 1 tablet by mouth two times a day for 30 days. 60 tablet 0    morphine CR (MS Contin) 15 mg 12 hr tablet Take 1 tablet by mouth two times a day for 30 days. Do not start before March 8, 2024. 60 tablet 0    multivitamin (Daily-Enrique, with folic acid,) tablet Take 1 tablet by mouth once daily.      multivitamin with minerals tablet Take 1 tablet by mouth once daily.      mv-min-folic acid-lutein 200-137.5 mcg tablet,chewable Chew once daily.      naloxone (Narcan) 4 mg/0.1 mL nasal spray Use 1 spray in one nostril as needed for overdose. May repeat every 2 to 3 min in alternating nostrils until medical assistance is available      naratriptan (Amerge) 2.5 mg tablet Take 1 tablet (2.5 mg) by mouth 1 time if needed for migraine.      OLANZapine  (ZyPREXA) 10 mg tablet Take by mouth 2 times a day as needed.      omeprazole (PriLOSEC) 40 mg DR capsule 1 capsule (40 mg).      Omnipod Dash Pods, Gen 4, cartridge       ondansetron (Zofran) 4 mg tablet 1 tablet (4 mg).      ondansetron ODT (Zofran-ODT) 4 mg disintegrating tablet Take 1 tablet (4 mg) by mouth every 8 hours if needed.      oxyCODONE-acetaminophen (Percocet)  mg tablet Take 1 tablet by mouth every 6 hours as needed for pain for up to 30 days. 120 tablet 0    oxyCODONE-acetaminophen (Percocet)  mg tablet Take 1 tablet by mouth every 6 hours as needed for pain for up to 30 days. Do not start before December 24, 2023. (Patient not taking: Reported on 1/9/2024) 120 tablet 0    oxyCODONE-acetaminophen (Percocet)  mg tablet Take 1 tablet by mouth every 6 hours as needed for pain for up to 30 days. Do not start before January 23, 2024. 120 tablet 0    oxyCODONE-acetaminophen (Percocet)  mg tablet Take 1 tablet by mouth every 6 hours as needed for pain for up to 30 days. Do not start before March 23, 2024. 120 tablet 0    oxyCODONE-acetaminophen (Percocet) 5-325 mg tablet TAKE 1 TABLET BY MOUTH EVERY 8 HOURS AS NEEDED FOR 30 DAYS (Patient not taking: Reported on 1/9/2024) 90 tablet 0    phenazopyridine (Pyridium) 200 mg tablet Take 1 tablet (200 mg) by mouth 3 times a day.      polyethylene glycol (Glycolax, Miralax) 17 gram/dose powder Take 17 g by mouth once daily.      potassium chloride CR 20 mEq ER tablet Take 1 tablet (20 mEq) by mouth.      predniSONE (Deltasone) 50 mg tablet Take 13 hours, 7 hours and 1 hour before contrast medium administration 3 tablet 0    prochlorperazine (Compazine) 10 mg tablet Take 1 tablet (10 mg) by mouth every 6 hours if needed for nausea.      promethazine (Phenergan) 25 mg tablet Take by mouth.      saccharomyces boulardii (Florastor) 250 mg capsule Take 1 capsule (250 mg) by mouth 2 times a day.      sodium chloride 0.9% (sodium chloride) flush  Infuse 10 mL into a venous catheter.      spironolactone (Aldactone) 25 mg tablet Take by mouth once daily.      tinidazole (Tindamax) 500 mg tablet TAKE 4 TABLETS BY MOUTH ONCE DAILY 4 tablet 0    tiotropium (Spiriva) 18 mcg inhalation capsule Place 1 capsule (18 mcg) into inhaler and inhale once daily.      torsemide (Demadex) 20 mg tablet Take 1 tablet (20 mg) by mouth once daily.      triamcinolone (Kenalog) 0.5 % cream APPLY TOPICALLY TO THE AFFECTED AREA TWICE DAILY FOR 7 DAYS       No current facility-administered medications on file prior to visit.        Allergies   Allergen Reactions    Acetaminophen-Codeine Unknown    Adhesive Unknown    Codeine Unknown    Fentanyl Unknown     Reaction from Community: Other(See Desc) Comments: MIGRAINES IF USED FOR PAIN, BUT OK FOR ANESTHESIA    Other reaction(s): Intolerance    Fetrin Unknown    Ketorolac Itching    Meperidine Unknown     Reaction from Community: Other(See Desc)    Metformin Unknown    Propoxyphene N-Acetaminophen GI Upset    Silver Hives and Other     Tegaderm    Adhesive Tape-Silicones Rash    Iodinated Contrast Media Rash     Does well with 13hour premedication    Latex Rash    Penicillins Rash    Wound Dressings Rash     tagaderm        Review of Systems  A detailed 12 point ROS was performed and is negative except as noted in the intake form, HPI and/or Past Medical History        Physical Exam   CONSTITUTIONAL: Well developed, NAD  VOICE: Normal voice quality  RESPIRATION: Breathing comfortably, no stridor.  CV: No clubbing/cyanosis/edema in hands.  EYES: EOM Intact, sclera normal.  NEURO: Alert and oriented times 3, Cranial nerves V,VII intact and symmetric bilaterally.  HEAD AND FACE: Symmetric facial features, no masses or lesions, sinuses nontender to palpation.  SALIVARY GLANDS: Parotid and submandibular glands normal bilaterally.  EARS: Normal external ears, external auditory canals, and TMs to otoscopy  NOSE: External nose midline, anterior  rhinoscopy is normal with limited visualization to the anterior aspect of the interior turbinates. No lesions noted.  ORAL CAVITY/OROPHARYNX/LIPS: Normal mucous membranes, normal floor of mouth/tongue/OP, no masses or lesions are noted.  PHARYNGEAL WALLS AND NASOPHARYNX: No masses noted. Mucosa appears clean and moist  NECK/LYMPH: No thyroid masses. Trachea palpably midline  + incisional site along left level 2 well-healed.  SKIN: Neck skin is without injury  PSYCH: Alert and oriented with appropriate mood and affect       Assessment  Follicular large B cell lymphoma.  Left cervical lymphadenopathy  Thyroid nodules  History of non-Hodgkin's lymphoma  History of radiation-induced heart disease  History of chronic pain, sees palliative medicine.      Plan  - Cervical LAD   53-year-old female presenting for evaluation of left cervical lymphadenopathy present for over 9 months.    Underwent excisional biopsy of left level 2 lymph node, pathology consistent with follicular large B cell lymphoma.  Undergoing evaluation by hematology/oncology.  Incisional site well-healed.     -Thyroid nodules  TSH 1/26/24: Normal  Ultrasound thyroid 1/30/24 notable for multiple TR3 nodules largest of which measures 18 x 15 x 15 mm on the right interpolar region.  FNA thyroid 2/19/2024: Benign follicular nodule.  We will repeat the ultrasound in 1 year     RTC 1y

## 2024-04-03 NOTE — H&P (VIEW-ONLY)
HPI  4/3/24  The patient present for follow-up, states her incisional site is healing well.  No complaints.  Undergoing evaluation by hematology & oncology.   2/23/24  The patient present for follow-up, healing well from surgery.  Pathology results from excisional biopsy of left cervical lymph node reviewed and discussed with the patient, consistent with large B-cell lymphoma.   Thyroid FNA reviewed showing benign follicular nodule.  1/26/24  The patient present for follow-up, she is here to discuss her upcoming surgery clarify questions.  CT neck reviewed and discussed with the patient notable for cervical lymphadenopathy.  Incidental 1.6 cm right thyroid nodule noted.    Recall   Nelsy Osullivan is a 54 y.o. female presenting for initial evaluation of left neck lump.  The patient reports first noting a lump along her left neck ~9 months ago.  Denies growth, or fluctuation in size.  Has a history of smoking 1PPD > 25y.  Quit 1 year ago, currently vapes.  Has a history of non-Hodgkin's lymphoma > 30y ago treated with chemo and radiation therapy.    Ultrasound 10/12/2023 notable for abnormal left level II lymph node measuring 12 x 11 x 16 mm    Denies dysphagia, odynophagia, fevers/chills, oral bleeding/hemoptysis, voice changes, SOB.  Endorses weight fluctuation for the past year.     History reviewed. No pertinent past medical history.    Past Surgical History:   Procedure Laterality Date    OTHER SURGICAL HISTORY  04/14/2020    Cholecystectomy    OTHER SURGICAL HISTORY  04/14/2020    Exploratory laparoscopy    OTHER SURGICAL HISTORY  04/14/2020    Heart surgery         Current Outpatient Medications on File Prior to Visit   Medication Sig Dispense Refill    acetaminophen (Tylenol) 325 mg tablet Take 1 tablet (325 mg) by mouth every 6 hours if needed for moderate pain (4 - 6).      albuterol 90 mcg/actuation inhaler Inhale 2 puffs every 6 hours.      aspirin 81 mg EC tablet Take 1 tablet (81 mg) by mouth once daily.       atorvastatin (Lipitor) 40 mg tablet Take 1 tablet (40 mg) by mouth once daily.      BD Alcohol Swabs pads, medicated USE SIX TIMES DAILY      bisacodyl (Dulcolax, bisacodyl,) 5 mg EC tablet Take 1 tablet (5 mg) by mouth once daily as needed for constipation.      blood-glucose sensor (FreeStyle Ashley 3 Sensor) device 1 each continuously. Use to check glucose, change every 14 days 2 each 11    carvedilol (Coreg) 12.5 mg tablet Take 1 tablet (12.5 mg) by mouth every 12 hours.      cetirizine (ZyrTEC) 10 mg tablet Take 1 tablet (10 mg) by mouth once daily.      clindamycin (Cleocin) 300 mg capsule Take 1 capsule (300 mg) by mouth 3 times a day.      clopidogrel (Plavix) 75 mg tablet Take 1 tablet (75 mg) by mouth once daily.      Creon 36,000-114,000- 180,000 unit capsule,delayed release(DR/EC) capsule TAKE 2 CAPSULE BEFORE MEALS TAKE 1 CAPSULE WITH A SNACK      cyclobenzaprine (Flexeril) 10 mg tablet Take 1 tablet (10 mg) by mouth 3 times a day as needed.      diphenhydrAMINE (Banophen) 50 mg capsule TAKE ONE CAPSULE BY MOUTH 1 HOUR BEFORE CONTRAST MEDIUM ADMINISTRATION 1 capsule 0    DULoxetine (Cymbalta) 60 mg DR capsule Take 1 capsule (60 mg) by mouth once daily.      esomeprazole (NexIUM) 40 mg DR capsule Take 1 capsule (40 mg) by mouth once daily as needed.      estradiol (Estrace) 2 mg tablet Take 1 tablet (2 mg) by mouth once daily.      fluconazole (Diflucan) 150 mg tablet TAKE ONE TABLET BY MOUTH ONCE 1 tablet 0    FreeStyle Ashley 2 Sensor kit Inject 1 each under the skin every 14 (fourteen) days. 2 each 11    FreeStyle Ashley 3 [place] misc Use as instructed 1 each 0    furosemide (Lasix) 40 mg tablet Take 1 tablet (40 mg) by mouth once daily.      hydroCHLOROthiazide (HYDRODiuril) 25 mg tablet       ibuprofen 800 mg tablet Take 1 tablet (800 mg) by mouth once daily.      insulin aspart (NovoLOG) 100 unit/mL injection FOR USE WITH INSULIN PUMP MAX DAILY DOSE  UNITS 30 mL 5    levothyroxine  (Synthroid, Levoxyl) 125 mcg tablet TAKE 1 TABLET BY MOUTH IN THE MORNING ON EMPTY STOMACH      loperamide (Imodium A-D) 2 mg tablet Take 1 tablet (2 mg) by mouth if needed for diarrhea.      magnesium oxide capsule capsule 1 capsule (140 mg) once daily.      melatonin 3 mg tablet Take 2 tablets (6 mg) by mouth once daily at bedtime.      metOLazone (Zaroxolyn) 2.5 mg tablet Take 1 tablet (2.5 mg) by mouth once daily.      metoprolol succinate XL (Toprol-XL) 50 mg 24 hr tablet Take by mouth.      mirtazapine (Remeron) 7.5 mg tablet Take 1 tablet (7.5 mg) by mouth once daily at bedtime.      morphine CR (MS Contin) 15 mg 12 hr tablet Take 1 tablet (15 mg) by mouth every 12 hours. 60 tablet 0    morphine CR (MS Contin) 15 mg 12 hr tablet Take 1 tablet by mouth two times a day for 30 days. Do not start before December 8, 2023. 60 tablet 0    morphine CR (MS Contin) 15 mg 12 hr tablet Take 1 tablet by mouth two times a day for 30 days. Do not start before January 7, 2024. 60 tablet 0    morphine CR (MS Contin) 15 mg 12 hr tablet Take 1 tablet by mouth two times a day for 30 days. Do not start before January 7, 2024. 60 tablet 0    morphine CR (MS Contin) 15 mg 12 hr tablet Take 1 tablet by mouth two times a day for 30 days. 60 tablet 0    morphine CR (MS Contin) 15 mg 12 hr tablet Take 1 tablet by mouth two times a day for 30 days. Do not start before March 8, 2024. 60 tablet 0    multivitamin (Daily-Enrique, with folic acid,) tablet Take 1 tablet by mouth once daily.      multivitamin with minerals tablet Take 1 tablet by mouth once daily.      mv-min-folic acid-lutein 200-137.5 mcg tablet,chewable Chew once daily.      naloxone (Narcan) 4 mg/0.1 mL nasal spray Use 1 spray in one nostril as needed for overdose. May repeat every 2 to 3 min in alternating nostrils until medical assistance is available      naratriptan (Amerge) 2.5 mg tablet Take 1 tablet (2.5 mg) by mouth 1 time if needed for migraine.      OLANZapine  (ZyPREXA) 10 mg tablet Take by mouth 2 times a day as needed.      omeprazole (PriLOSEC) 40 mg DR capsule 1 capsule (40 mg).      Omnipod Dash Pods, Gen 4, cartridge       ondansetron (Zofran) 4 mg tablet 1 tablet (4 mg).      ondansetron ODT (Zofran-ODT) 4 mg disintegrating tablet Take 1 tablet (4 mg) by mouth every 8 hours if needed.      oxyCODONE-acetaminophen (Percocet)  mg tablet Take 1 tablet by mouth every 6 hours as needed for pain for up to 30 days. 120 tablet 0    oxyCODONE-acetaminophen (Percocet)  mg tablet Take 1 tablet by mouth every 6 hours as needed for pain for up to 30 days. Do not start before December 24, 2023. (Patient not taking: Reported on 1/9/2024) 120 tablet 0    oxyCODONE-acetaminophen (Percocet)  mg tablet Take 1 tablet by mouth every 6 hours as needed for pain for up to 30 days. Do not start before January 23, 2024. 120 tablet 0    oxyCODONE-acetaminophen (Percocet)  mg tablet Take 1 tablet by mouth every 6 hours as needed for pain for up to 30 days. Do not start before March 23, 2024. 120 tablet 0    oxyCODONE-acetaminophen (Percocet) 5-325 mg tablet TAKE 1 TABLET BY MOUTH EVERY 8 HOURS AS NEEDED FOR 30 DAYS (Patient not taking: Reported on 1/9/2024) 90 tablet 0    phenazopyridine (Pyridium) 200 mg tablet Take 1 tablet (200 mg) by mouth 3 times a day.      polyethylene glycol (Glycolax, Miralax) 17 gram/dose powder Take 17 g by mouth once daily.      potassium chloride CR 20 mEq ER tablet Take 1 tablet (20 mEq) by mouth.      predniSONE (Deltasone) 50 mg tablet Take 13 hours, 7 hours and 1 hour before contrast medium administration 3 tablet 0    prochlorperazine (Compazine) 10 mg tablet Take 1 tablet (10 mg) by mouth every 6 hours if needed for nausea.      promethazine (Phenergan) 25 mg tablet Take by mouth.      saccharomyces boulardii (Florastor) 250 mg capsule Take 1 capsule (250 mg) by mouth 2 times a day.      sodium chloride 0.9% (sodium chloride) flush  Infuse 10 mL into a venous catheter.      spironolactone (Aldactone) 25 mg tablet Take by mouth once daily.      tinidazole (Tindamax) 500 mg tablet TAKE 4 TABLETS BY MOUTH ONCE DAILY 4 tablet 0    tiotropium (Spiriva) 18 mcg inhalation capsule Place 1 capsule (18 mcg) into inhaler and inhale once daily.      torsemide (Demadex) 20 mg tablet Take 1 tablet (20 mg) by mouth once daily.      triamcinolone (Kenalog) 0.5 % cream APPLY TOPICALLY TO THE AFFECTED AREA TWICE DAILY FOR 7 DAYS       No current facility-administered medications on file prior to visit.        Allergies   Allergen Reactions    Acetaminophen-Codeine Unknown    Adhesive Unknown    Codeine Unknown    Fentanyl Unknown     Reaction from Community: Other(See Desc) Comments: MIGRAINES IF USED FOR PAIN, BUT OK FOR ANESTHESIA    Other reaction(s): Intolerance    Fetrin Unknown    Ketorolac Itching    Meperidine Unknown     Reaction from Community: Other(See Desc)    Metformin Unknown    Propoxyphene N-Acetaminophen GI Upset    Silver Hives and Other     Tegaderm    Adhesive Tape-Silicones Rash    Iodinated Contrast Media Rash     Does well with 13hour premedication    Latex Rash    Penicillins Rash    Wound Dressings Rash     tagaderm        Review of Systems  A detailed 12 point ROS was performed and is negative except as noted in the intake form, HPI and/or Past Medical History        Physical Exam   CONSTITUTIONAL: Well developed, NAD  VOICE: Normal voice quality  RESPIRATION: Breathing comfortably, no stridor.  CV: No clubbing/cyanosis/edema in hands.  EYES: EOM Intact, sclera normal.  NEURO: Alert and oriented times 3, Cranial nerves V,VII intact and symmetric bilaterally.  HEAD AND FACE: Symmetric facial features, no masses or lesions, sinuses nontender to palpation.  SALIVARY GLANDS: Parotid and submandibular glands normal bilaterally.  EARS: Normal external ears, external auditory canals, and TMs to otoscopy  NOSE: External nose midline, anterior  rhinoscopy is normal with limited visualization to the anterior aspect of the interior turbinates. No lesions noted.  ORAL CAVITY/OROPHARYNX/LIPS: Normal mucous membranes, normal floor of mouth/tongue/OP, no masses or lesions are noted.  PHARYNGEAL WALLS AND NASOPHARYNX: No masses noted. Mucosa appears clean and moist  NECK/LYMPH: No thyroid masses. Trachea palpably midline  + incisional site along left level 2 well-healed.  SKIN: Neck skin is without injury  PSYCH: Alert and oriented with appropriate mood and affect       Assessment  Follicular large B cell lymphoma.  Left cervical lymphadenopathy  Thyroid nodules  History of non-Hodgkin's lymphoma  History of radiation-induced heart disease  History of chronic pain, sees palliative medicine.      Plan  - Cervical LAD   53-year-old female presenting for evaluation of left cervical lymphadenopathy present for over 9 months.    Underwent excisional biopsy of left level 2 lymph node, pathology consistent with follicular large B cell lymphoma.  Undergoing evaluation by hematology/oncology.  Incisional site well-healed.     -Thyroid nodules  TSH 1/26/24: Normal  Ultrasound thyroid 1/30/24 notable for multiple TR3 nodules largest of which measures 18 x 15 x 15 mm on the right interpolar region.  FNA thyroid 2/19/2024: Benign follicular nodule.  We will repeat the ultrasound in 1 year     RTC 1y

## 2024-04-04 ENCOUNTER — HOSPITAL ENCOUNTER (OUTPATIENT)
Dept: RADIOLOGY | Facility: HOSPITAL | Age: 54
End: 2024-04-04
Payer: COMMERCIAL

## 2024-04-04 ENCOUNTER — APPOINTMENT (OUTPATIENT)
Dept: RADIOLOGY | Facility: HOSPITAL | Age: 54
End: 2024-04-04
Payer: COMMERCIAL

## 2024-04-05 ENCOUNTER — HOSPITAL ENCOUNTER (OUTPATIENT)
Dept: RADIOLOGY | Facility: HOSPITAL | Age: 54
Discharge: HOME | End: 2024-04-05
Payer: COMMERCIAL

## 2024-04-05 DIAGNOSIS — C82.40 FOLLICULAR LYMPHOMA GRADE IIIB, UNSPECIFIED BODY REGION (MULTI): ICD-10-CM

## 2024-04-05 PROCEDURE — 78816 PET IMAGE W/CT FULL BODY: CPT | Mod: PET TUMOR SUBSQ TX STRATEGY | Performed by: NUCLEAR MEDICINE

## 2024-04-05 PROCEDURE — 3430000001 HC RX 343 DIAGNOSTIC RADIOPHARMACEUTICALS: Performed by: INTERNAL MEDICINE

## 2024-04-05 PROCEDURE — A9552 F18 FDG: HCPCS | Performed by: INTERNAL MEDICINE

## 2024-04-05 PROCEDURE — 78815 PET IMAGE W/CT SKULL-THIGH: CPT | Mod: PS

## 2024-04-05 RX ORDER — FLUDEOXYGLUCOSE F 18 200 MCI/ML
11 INJECTION, SOLUTION INTRAVENOUS
Status: COMPLETED | OUTPATIENT
Start: 2024-04-05 | End: 2024-04-05

## 2024-04-05 RX ADMIN — FLUDEOXYGLUCOSE F 18 11 MILLICURIE: 200 INJECTION, SOLUTION INTRAVENOUS at 15:17

## 2024-04-10 ENCOUNTER — PHARMACY VISIT (OUTPATIENT)
Dept: PHARMACY | Facility: CLINIC | Age: 54
End: 2024-04-10
Payer: MEDICAID

## 2024-04-10 PROCEDURE — RXMED WILLOW AMBULATORY MEDICATION CHARGE

## 2024-04-10 RX ORDER — MORPHINE SULFATE 15 MG/1
15 TABLET, FILM COATED, EXTENDED RELEASE ORAL 2 TIMES DAILY
Qty: 60 TABLET | Refills: 0 | OUTPATIENT
Start: 2024-04-10 | End: 2024-05-03 | Stop reason: SDUPTHER

## 2024-04-11 ENCOUNTER — ONCOLOGY MEDICATION OUTREACH (OUTPATIENT)
Dept: HEMATOLOGY/ONCOLOGY | Facility: CLINIC | Age: 54
End: 2024-04-11
Payer: COMMERCIAL

## 2024-04-11 ENCOUNTER — OFFICE VISIT (OUTPATIENT)
Dept: HEMATOLOGY/ONCOLOGY | Facility: CLINIC | Age: 54
End: 2024-04-11
Payer: COMMERCIAL

## 2024-04-11 VITALS
RESPIRATION RATE: 16 BRPM | DIASTOLIC BLOOD PRESSURE: 70 MMHG | SYSTOLIC BLOOD PRESSURE: 115 MMHG | HEART RATE: 103 BPM | TEMPERATURE: 97.9 F | HEIGHT: 62 IN | OXYGEN SATURATION: 98 % | WEIGHT: 118.17 LBS | BODY MASS INDEX: 21.75 KG/M2

## 2024-04-11 DIAGNOSIS — C82.41 GRADE 3B FOLLICULAR LYMPHOMA OF LYMPH NODES OF NECK (MULTI): ICD-10-CM

## 2024-04-11 DIAGNOSIS — K86.1 CHRONIC PANCREATITIS, UNSPECIFIED PANCREATITIS TYPE (MULTI): ICD-10-CM

## 2024-04-11 DIAGNOSIS — C82.40 FOLLICULAR LYMPHOMA GRADE IIIB, UNSPECIFIED BODY REGION (MULTI): Primary | ICD-10-CM

## 2024-04-11 DIAGNOSIS — I87.8 POOR VENOUS ACCESS: ICD-10-CM

## 2024-04-11 PROCEDURE — 99215 OFFICE O/P EST HI 40 MIN: CPT | Performed by: INTERNAL MEDICINE

## 2024-04-11 ASSESSMENT — PAIN SCALES - GENERAL: PAINLEVEL: 0-NO PAIN

## 2024-04-11 NOTE — PROGRESS NOTES
"Patient ID: Nelsy Osullivan is a 54 y.o. female.    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  FISH is negative for Bcl-2, BCL6 and C-MYC.  PET/CT 4/4/24 showed malignant uptake in a left supraclavicular LN only.  Baseline LDH was elevated at 238 but the patient has chronically elevated LDH. She has a prior history of lymphoma (question Hodgkin's versus non-Hodgkin's) at age 16 with relapse at age 19.  When she was 16, she was treated with radiation therapy alone.  When she relapsed at age 19, she had disease on both sides of the diaphragm.  Her spleen was removed.  She was treated with several cycles of chemotherapy including Adriamycin and mustard gas underwent a second course of radiation therapy to her neck and chest.  She was treated by Dr. Jean Bapitste and Dr. Acosta at that time.  Mitral valve and aortic valve stenosis secondary to prior radiation therapy.  She underwent animal aortic valve and mitral valve replacements in 2019.  Chronic pancreatitis (question etiology).  She is unable to tolerate pancreatic enzyme replacement therapy.     18 mm right interpolar thyroid nodule with February 19, 2024 pathology showing 9 follicular cells.  DM  The patient presents today for follow-up and to review the results of her PET/CT.  Unfortunately, she is having increased abdominal pain today related to her pancreatitis.  She just got her pain medication refilled from her palliative care physician in [place] she has been without it for 3 days.  She is requesting referral to [facility] palliative care for improved pain management.  Her diarrhea is stable (up to 20 loose stools per day).  She has not noticed any new adenopathy.  Objective    BSA: 1.54 meters squared  /70   Pulse 103   Temp 36.6 °C (97.9 °F)   Resp 16   Ht 1.586 m (5' 2.44\")   Wt 53.6 kg (118 lb 2.7 oz)   SpO2 98%   BMI 21.31 kg/m²    "   Physical Exam  Constitutional:       Comments: The patient is frail and chronically ill-appearing   HENT:      Head: Normocephalic.      Mouth/Throat:      Mouth: Mucous membranes are dry.   Eyes:      Conjunctiva/sclera: Conjunctivae normal.      Pupils: Pupils are equal, round, and reactive to light.   Cardiovascular:      Rate and Rhythm: Normal rate and regular rhythm.      Pulses: Normal pulses.   Pulmonary:      Effort: Pulmonary effort is normal.   Abdominal:      Tenderness: There is abdominal tenderness.   Musculoskeletal:         General: Normal range of motion.      Cervical back: Normal range of motion.   Skin:     General: Skin is warm and dry.   Neurological:      General: No focal deficit present.      Mental Status: She is alert.   Psychiatric:         Mood and Affect: Mood normal.         Behavior: Behavior normal.         Thought Content: Thought content normal.       PET/CT April 5, 2024: Hypermetabolic left lateral supraclavicular lymph node ballotable with viable disease (SUV 3.1).  Nonenlarged right inguinal lymph node demonstrates mild hypermetabolic activity which is nonspecific.  Performance Status:      ECOG 1  Assessment/Plan   54-year-old female with stage II grade 3B follicular lymphoma of a left cervical and left supraclavicular lymph node.  The left cervical lymph node has been excised.  She has minimal disease burden yet is high risk given stage II disease and the grade of the tumor.  Her LDH is elevated and is actually lower than it was 3 years ago.  I suspect her LDH is chronically elevated due to her chronic pancreatitis and other health issues.  May not be a true indication of disease burden for her.  She is medically frail and has been treated with Adriamycin based chemotherapy in the past has had extensive radiation therapy.  Ideally, R-CHOP would be first-line treatment recommendation; however, the patient could have substantial toxicity from this therapy based on her  treatment from several years ago.  I have recommended a course of Bendamustine and Rituxan therapy instead.  Though she has low volume tumor burden, I would recommend 6 cycles of treatment in an attempt to avoid radiation therapy.  We did review that Bendamustine is administered intravenously every 28 days Bendamustine being delivered on days 1 and day 2 and Rituxan on day 1.  Would recommend Neulasta support given her prior radiation therapy and aggressive chemotherapy years ago.  Is unclear how well her bone marrow will recover in between cycles.  It is possible that the patient may have significant toxicity from the Bendamustine given her frail status overall.  However, she is willing to try a course of it as she knows that there is not very many other treatment options for her high-grade follicular lymphoma.  We did review potential toxicities of Bendamustine and Rituxan including myelosuppression with increased risk of infection, worsening nausea, vomiting or abdominal pain, rash, infusion related reactions, etc.  The patient will require placement of a Mediport due to poor venous access.She understood all of the information presented and was agreeable to proceed.  Plan:   Mediport placement  Bendamustine 90 mg /m2 IV d1,2 and rituxan 375 mg /m2 IV d1 q28 days with neulasta subcutaneous day 3 every 28 days x 6 cycles  Refer to palliative care  Refer to nutrionist/dietician  Reassess after first cycle of chemotherapy    Cancer Staging   No matching staging information was found for the patient.      Oncology History   Follicular lymphoma grade IIIb (CMS/HCC)   6/7/2022 Initial Diagnosis    Follicular lymphoma grade IIIb (CMS/HCC)     4/30/2024 -  Chemotherapy    Bendamustine + RiTUXimab, 28 Day Cycles                   Candida Pickering MD

## 2024-04-11 NOTE — PATIENT INSTRUCTIONS
Office visit with DR. Pickering to discuss PET scan results and treatment options. Dr. Pickering offered treatment with Bendamustine and Rituxan. This treatment is 6 cycles, each are over 2 day and repeat every 28 days.  Lexicomp education sheets given to patient and education completed by maite Moreno.  April will be scheduled for Mediport placement done by Interventional Radiologist in Radiology Dept  Treatment to start on 4/30, time to be given by

## 2024-04-11 NOTE — PROGRESS NOTES
Reviewed bendamustine and rituxinab, dose, frequency, administration, treatment cycle, duration of therapy, and missed doses. Counseled on potential side effects including but not limited to chemotherapy side effects: neutropenia, infection risk, anemia, fatigue, weakness, low energy, thrombocytopenia, bleeding/bruising, n/v, diarrhea, muscle or joint pain, mucositis, and skin rash. Discussed techniques to mitigate severity of side effects such as blood count checks, temperature checks, electrolyte monitoring, antiemetic use, loperamide use with max dose of 8 tabs per 24 hours, and staying hydrated if having diarrhea.  Provided medication/regimen handout. PaAll questions answered and contact information was given to patient.

## 2024-04-16 DIAGNOSIS — C82.41 GRADE 3B FOLLICULAR LYMPHOMA OF LYMPH NODES OF NECK (MULTI): Primary | ICD-10-CM

## 2024-04-16 RX ORDER — DIPHENHYDRAMINE HYDROCHLORIDE 50 MG/ML
50 INJECTION INTRAMUSCULAR; INTRAVENOUS AS NEEDED
Status: CANCELLED | OUTPATIENT
Start: 2024-05-06

## 2024-04-16 RX ORDER — ALBUTEROL SULFATE 0.83 MG/ML
3 SOLUTION RESPIRATORY (INHALATION) AS NEEDED
Status: CANCELLED | OUTPATIENT
Start: 2024-05-02

## 2024-04-16 RX ORDER — DIPHENHYDRAMINE HYDROCHLORIDE 50 MG/ML
50 INJECTION INTRAMUSCULAR; INTRAVENOUS AS NEEDED
Status: CANCELLED | OUTPATIENT
Start: 2024-05-03

## 2024-04-16 RX ORDER — DIPHENHYDRAMINE HCL 50 MG
50 CAPSULE ORAL ONCE
Status: CANCELLED | OUTPATIENT
Start: 2024-05-02

## 2024-04-16 RX ORDER — EPINEPHRINE 0.3 MG/.3ML
0.3 INJECTION SUBCUTANEOUS EVERY 5 MIN PRN
Status: CANCELLED | OUTPATIENT
Start: 2024-05-06

## 2024-04-16 RX ORDER — FAMOTIDINE 10 MG/ML
20 INJECTION INTRAVENOUS ONCE AS NEEDED
Status: CANCELLED | OUTPATIENT
Start: 2024-05-03

## 2024-04-16 RX ORDER — ACETAMINOPHEN 325 MG/1
650 TABLET ORAL ONCE
Status: CANCELLED | OUTPATIENT
Start: 2024-05-02

## 2024-04-16 RX ORDER — DIPHENHYDRAMINE HYDROCHLORIDE 50 MG/ML
50 INJECTION INTRAMUSCULAR; INTRAVENOUS AS NEEDED
Status: CANCELLED | OUTPATIENT
Start: 2024-05-02

## 2024-04-16 RX ORDER — PROCHLORPERAZINE MALEATE 10 MG
10 TABLET ORAL EVERY 6 HOURS PRN
Qty: 30 TABLET | Refills: 5 | Status: SHIPPED | OUTPATIENT
Start: 2024-04-16

## 2024-04-16 RX ORDER — PROCHLORPERAZINE EDISYLATE 5 MG/ML
10 INJECTION INTRAMUSCULAR; INTRAVENOUS EVERY 6 HOURS PRN
Status: CANCELLED | OUTPATIENT
Start: 2024-05-02

## 2024-04-16 RX ORDER — ALBUTEROL SULFATE 0.83 MG/ML
3 SOLUTION RESPIRATORY (INHALATION) AS NEEDED
Status: CANCELLED | OUTPATIENT
Start: 2024-05-06

## 2024-04-16 RX ORDER — PROCHLORPERAZINE MALEATE 10 MG
10 TABLET ORAL EVERY 6 HOURS PRN
Status: CANCELLED | OUTPATIENT
Start: 2024-05-02

## 2024-04-16 RX ORDER — EPINEPHRINE 0.3 MG/.3ML
0.3 INJECTION SUBCUTANEOUS EVERY 5 MIN PRN
Status: CANCELLED | OUTPATIENT
Start: 2024-05-03

## 2024-04-16 RX ORDER — PALONOSETRON 0.05 MG/ML
0.25 INJECTION, SOLUTION INTRAVENOUS ONCE
Status: CANCELLED | OUTPATIENT
Start: 2024-05-02

## 2024-04-16 RX ORDER — ALBUTEROL SULFATE 0.83 MG/ML
3 SOLUTION RESPIRATORY (INHALATION) AS NEEDED
Status: CANCELLED | OUTPATIENT
Start: 2024-05-03

## 2024-04-16 RX ORDER — FAMOTIDINE 10 MG/ML
20 INJECTION INTRAVENOUS ONCE AS NEEDED
Status: CANCELLED | OUTPATIENT
Start: 2024-05-06

## 2024-04-16 RX ORDER — EPINEPHRINE 0.3 MG/.3ML
0.3 INJECTION SUBCUTANEOUS EVERY 5 MIN PRN
Status: CANCELLED | OUTPATIENT
Start: 2024-05-02

## 2024-04-16 RX ORDER — PROCHLORPERAZINE EDISYLATE 5 MG/ML
10 INJECTION INTRAMUSCULAR; INTRAVENOUS EVERY 6 HOURS PRN
Status: CANCELLED | OUTPATIENT
Start: 2024-05-03

## 2024-04-16 RX ORDER — ONDANSETRON HYDROCHLORIDE 8 MG/1
8 TABLET, FILM COATED ORAL EVERY 8 HOURS PRN
Qty: 30 TABLET | Refills: 5 | Status: SHIPPED | OUTPATIENT
Start: 2024-04-16

## 2024-04-16 RX ORDER — PROCHLORPERAZINE MALEATE 10 MG
10 TABLET ORAL EVERY 6 HOURS PRN
Status: CANCELLED | OUTPATIENT
Start: 2024-05-03

## 2024-04-16 RX ORDER — FAMOTIDINE 10 MG/ML
20 INJECTION INTRAVENOUS ONCE AS NEEDED
Status: CANCELLED | OUTPATIENT
Start: 2024-05-02

## 2024-04-18 RX ORDER — FAMOTIDINE 10 MG/ML
20 INJECTION INTRAVENOUS ONCE AS NEEDED
Status: CANCELLED | OUTPATIENT
Start: 2024-06-01

## 2024-04-18 RX ORDER — FAMOTIDINE 10 MG/ML
20 INJECTION INTRAVENOUS ONCE AS NEEDED
OUTPATIENT
Start: 2024-06-27

## 2024-04-18 RX ORDER — PALONOSETRON 0.05 MG/ML
0.25 INJECTION, SOLUTION INTRAVENOUS ONCE
Status: CANCELLED | OUTPATIENT
Start: 2024-05-30

## 2024-04-18 RX ORDER — DIPHENHYDRAMINE HYDROCHLORIDE 50 MG/ML
50 INJECTION INTRAMUSCULAR; INTRAVENOUS AS NEEDED
OUTPATIENT
Start: 2024-07-01

## 2024-04-18 RX ORDER — PALONOSETRON 0.05 MG/ML
0.25 INJECTION, SOLUTION INTRAVENOUS ONCE
OUTPATIENT
Start: 2024-06-27

## 2024-04-18 RX ORDER — ACETAMINOPHEN 325 MG/1
650 TABLET ORAL ONCE
OUTPATIENT
Start: 2024-06-27

## 2024-04-18 RX ORDER — EPINEPHRINE 0.3 MG/.3ML
0.3 INJECTION SUBCUTANEOUS EVERY 5 MIN PRN
Status: CANCELLED | OUTPATIENT
Start: 2024-06-01

## 2024-04-18 RX ORDER — DIPHENHYDRAMINE HYDROCHLORIDE 50 MG/ML
50 INJECTION INTRAMUSCULAR; INTRAVENOUS AS NEEDED
Status: CANCELLED | OUTPATIENT
Start: 2024-05-30

## 2024-04-18 RX ORDER — PROCHLORPERAZINE EDISYLATE 5 MG/ML
10 INJECTION INTRAMUSCULAR; INTRAVENOUS EVERY 6 HOURS PRN
Status: CANCELLED | OUTPATIENT
Start: 2024-05-31

## 2024-04-18 RX ORDER — FAMOTIDINE 10 MG/ML
20 INJECTION INTRAVENOUS ONCE AS NEEDED
OUTPATIENT
Start: 2024-06-28

## 2024-04-18 RX ORDER — PROCHLORPERAZINE MALEATE 10 MG
10 TABLET ORAL EVERY 6 HOURS PRN
Status: CANCELLED | OUTPATIENT
Start: 2024-05-31

## 2024-04-18 RX ORDER — DIPHENHYDRAMINE HCL 25 MG
50 CAPSULE ORAL ONCE
Status: CANCELLED | OUTPATIENT
Start: 2024-05-30

## 2024-04-18 RX ORDER — PROCHLORPERAZINE EDISYLATE 5 MG/ML
10 INJECTION INTRAMUSCULAR; INTRAVENOUS EVERY 6 HOURS PRN
Status: CANCELLED | OUTPATIENT
Start: 2024-05-30

## 2024-04-18 RX ORDER — ALBUTEROL SULFATE 0.83 MG/ML
3 SOLUTION RESPIRATORY (INHALATION) AS NEEDED
OUTPATIENT
Start: 2024-06-28

## 2024-04-18 RX ORDER — EPINEPHRINE 0.3 MG/.3ML
0.3 INJECTION SUBCUTANEOUS EVERY 5 MIN PRN
OUTPATIENT
Start: 2024-06-27

## 2024-04-18 RX ORDER — FAMOTIDINE 10 MG/ML
20 INJECTION INTRAVENOUS ONCE AS NEEDED
Status: CANCELLED | OUTPATIENT
Start: 2024-05-31

## 2024-04-18 RX ORDER — PROCHLORPERAZINE MALEATE 10 MG
10 TABLET ORAL EVERY 6 HOURS PRN
OUTPATIENT
Start: 2024-06-27

## 2024-04-18 RX ORDER — ALBUTEROL SULFATE 0.83 MG/ML
3 SOLUTION RESPIRATORY (INHALATION) AS NEEDED
Status: CANCELLED | OUTPATIENT
Start: 2024-05-31

## 2024-04-18 RX ORDER — ALBUTEROL SULFATE 0.83 MG/ML
3 SOLUTION RESPIRATORY (INHALATION) AS NEEDED
OUTPATIENT
Start: 2024-06-27

## 2024-04-18 RX ORDER — DIPHENHYDRAMINE HYDROCHLORIDE 50 MG/ML
50 INJECTION INTRAMUSCULAR; INTRAVENOUS AS NEEDED
Status: CANCELLED | OUTPATIENT
Start: 2024-06-01

## 2024-04-18 RX ORDER — DIPHENHYDRAMINE HYDROCHLORIDE 50 MG/ML
50 INJECTION INTRAMUSCULAR; INTRAVENOUS AS NEEDED
Status: CANCELLED | OUTPATIENT
Start: 2024-05-31

## 2024-04-18 RX ORDER — PROCHLORPERAZINE EDISYLATE 5 MG/ML
10 INJECTION INTRAMUSCULAR; INTRAVENOUS EVERY 6 HOURS PRN
OUTPATIENT
Start: 2024-06-28

## 2024-04-18 RX ORDER — FAMOTIDINE 10 MG/ML
20 INJECTION INTRAVENOUS ONCE AS NEEDED
Status: CANCELLED | OUTPATIENT
Start: 2024-05-30

## 2024-04-18 RX ORDER — ACETAMINOPHEN 325 MG/1
650 TABLET ORAL ONCE
Status: CANCELLED | OUTPATIENT
Start: 2024-05-30

## 2024-04-18 RX ORDER — DIPHENHYDRAMINE HYDROCHLORIDE 50 MG/ML
50 INJECTION INTRAMUSCULAR; INTRAVENOUS AS NEEDED
OUTPATIENT
Start: 2024-06-27

## 2024-04-18 RX ORDER — DIPHENHYDRAMINE HYDROCHLORIDE 50 MG/ML
50 INJECTION INTRAMUSCULAR; INTRAVENOUS AS NEEDED
OUTPATIENT
Start: 2024-06-28

## 2024-04-18 RX ORDER — EPINEPHRINE 0.3 MG/.3ML
0.3 INJECTION SUBCUTANEOUS EVERY 5 MIN PRN
OUTPATIENT
Start: 2024-06-28

## 2024-04-18 RX ORDER — DIPHENHYDRAMINE HCL 25 MG
50 CAPSULE ORAL ONCE
OUTPATIENT
Start: 2024-06-27

## 2024-04-18 RX ORDER — EPINEPHRINE 0.3 MG/.3ML
0.3 INJECTION SUBCUTANEOUS EVERY 5 MIN PRN
Status: CANCELLED | OUTPATIENT
Start: 2024-05-30

## 2024-04-18 RX ORDER — PROCHLORPERAZINE EDISYLATE 5 MG/ML
10 INJECTION INTRAMUSCULAR; INTRAVENOUS EVERY 6 HOURS PRN
OUTPATIENT
Start: 2024-06-27

## 2024-04-18 RX ORDER — EPINEPHRINE 0.3 MG/.3ML
0.3 INJECTION SUBCUTANEOUS EVERY 5 MIN PRN
Status: CANCELLED | OUTPATIENT
Start: 2024-05-31

## 2024-04-18 RX ORDER — ALBUTEROL SULFATE 0.83 MG/ML
3 SOLUTION RESPIRATORY (INHALATION) AS NEEDED
OUTPATIENT
Start: 2024-07-01

## 2024-04-18 RX ORDER — PROCHLORPERAZINE MALEATE 10 MG
10 TABLET ORAL EVERY 6 HOURS PRN
OUTPATIENT
Start: 2024-06-28

## 2024-04-18 RX ORDER — ALBUTEROL SULFATE 0.83 MG/ML
3 SOLUTION RESPIRATORY (INHALATION) AS NEEDED
Status: CANCELLED | OUTPATIENT
Start: 2024-06-01

## 2024-04-18 RX ORDER — EPINEPHRINE 0.3 MG/.3ML
0.3 INJECTION SUBCUTANEOUS EVERY 5 MIN PRN
OUTPATIENT
Start: 2024-07-01

## 2024-04-18 RX ORDER — FAMOTIDINE 10 MG/ML
20 INJECTION INTRAVENOUS ONCE AS NEEDED
OUTPATIENT
Start: 2024-07-01

## 2024-04-18 RX ORDER — ALBUTEROL SULFATE 0.83 MG/ML
3 SOLUTION RESPIRATORY (INHALATION) AS NEEDED
Status: CANCELLED | OUTPATIENT
Start: 2024-05-30

## 2024-04-18 RX ORDER — PROCHLORPERAZINE MALEATE 10 MG
10 TABLET ORAL EVERY 6 HOURS PRN
Status: CANCELLED | OUTPATIENT
Start: 2024-05-30

## 2024-04-22 ENCOUNTER — HOSPITAL ENCOUNTER (OUTPATIENT)
Dept: CARDIOLOGY | Facility: HOSPITAL | Age: 54
Discharge: HOME | End: 2024-04-22
Payer: COMMERCIAL

## 2024-04-22 ENCOUNTER — PHARMACY VISIT (OUTPATIENT)
Dept: PHARMACY | Facility: CLINIC | Age: 54
End: 2024-04-22
Payer: MEDICAID

## 2024-04-22 VITALS
SYSTOLIC BLOOD PRESSURE: 128 MMHG | WEIGHT: 118.17 LBS | TEMPERATURE: 99.7 F | OXYGEN SATURATION: 99 % | HEIGHT: 62 IN | BODY MASS INDEX: 21.75 KG/M2 | HEART RATE: 110 BPM | RESPIRATION RATE: 18 BRPM | DIASTOLIC BLOOD PRESSURE: 82 MMHG

## 2024-04-22 DIAGNOSIS — C82.40 FOLLICULAR LYMPHOMA GRADE IIIB, UNSPECIFIED BODY REGION (MULTI): ICD-10-CM

## 2024-04-22 LAB
GLUCOSE BLD MANUAL STRIP-MCNC: 132 MG/DL (ref 74–99)
GLUCOSE BLD MANUAL STRIP-MCNC: 171 MG/DL (ref 74–99)
GLUCOSE BLD MANUAL STRIP-MCNC: 47 MG/DL (ref 74–99)

## 2024-04-22 PROCEDURE — 36561 INSERT TUNNELED CV CATH: CPT | Performed by: RADIOLOGY

## 2024-04-22 PROCEDURE — 2500000005 HC RX 250 GENERAL PHARMACY W/O HCPCS: Performed by: RADIOLOGY

## 2024-04-22 PROCEDURE — 2500000004 HC RX 250 GENERAL PHARMACY W/ HCPCS (ALT 636 FOR OP/ED): Performed by: RADIOLOGY

## 2024-04-22 PROCEDURE — 77001 FLUOROGUIDE FOR VEIN DEVICE: CPT | Performed by: RADIOLOGY

## 2024-04-22 PROCEDURE — 82947 ASSAY GLUCOSE BLOOD QUANT: CPT

## 2024-04-22 PROCEDURE — 99152 MOD SED SAME PHYS/QHP 5/>YRS: CPT

## 2024-04-22 PROCEDURE — 76937 US GUIDE VASCULAR ACCESS: CPT | Performed by: RADIOLOGY

## 2024-04-22 PROCEDURE — 7100000010 HC PHASE TWO TIME - EACH INCREMENTAL 1 MINUTE

## 2024-04-22 PROCEDURE — C1894 INTRO/SHEATH, NON-LASER: HCPCS

## 2024-04-22 PROCEDURE — 2500000005 HC RX 250 GENERAL PHARMACY W/O HCPCS: Performed by: NURSE PRACTITIONER

## 2024-04-22 PROCEDURE — 99152 MOD SED SAME PHYS/QHP 5/>YRS: CPT | Performed by: RADIOLOGY

## 2024-04-22 PROCEDURE — 2780000003 HC OR 278 NO HCPCS

## 2024-04-22 PROCEDURE — 7100000009 HC PHASE TWO TIME - INITIAL BASE CHARGE

## 2024-04-22 PROCEDURE — 2720000007 HC OR 272 NO HCPCS

## 2024-04-22 PROCEDURE — C1788 PORT, INDWELLING, IMP: HCPCS

## 2024-04-22 PROCEDURE — RXMED WILLOW AMBULATORY MEDICATION CHARGE

## 2024-04-22 PROCEDURE — 2500000004 HC RX 250 GENERAL PHARMACY W/ HCPCS (ALT 636 FOR OP/ED): Performed by: NURSE PRACTITIONER

## 2024-04-22 PROCEDURE — C1769 GUIDE WIRE: HCPCS

## 2024-04-22 RX ORDER — MIDAZOLAM HYDROCHLORIDE 1 MG/ML
INJECTION, SOLUTION INTRAMUSCULAR; INTRAVENOUS
Status: COMPLETED | OUTPATIENT
Start: 2024-04-22 | End: 2024-04-22

## 2024-04-22 RX ORDER — DEXTROSE MONOHYDRATE 50 MG/ML
75 INJECTION, SOLUTION INTRAVENOUS CONTINUOUS
Status: DISCONTINUED | OUTPATIENT
Start: 2024-04-22 | End: 2024-04-23 | Stop reason: HOSPADM

## 2024-04-22 RX ORDER — LIDOCAINE HYDROCHLORIDE 20 MG/ML
INJECTION, SOLUTION EPIDURAL; INFILTRATION; INTRACAUDAL; PERINEURAL
Status: COMPLETED | OUTPATIENT
Start: 2024-04-22 | End: 2024-04-22

## 2024-04-22 RX ORDER — DEXTROSE 50 % IN WATER (D50W) INTRAVENOUS SYRINGE
12.5
Status: DISCONTINUED | OUTPATIENT
Start: 2024-04-22 | End: 2024-04-23 | Stop reason: HOSPADM

## 2024-04-22 RX ORDER — DEXTROSE 50 % IN WATER (D50W) INTRAVENOUS SYRINGE
25
Status: DISCONTINUED | OUTPATIENT
Start: 2024-04-22 | End: 2024-04-23 | Stop reason: HOSPADM

## 2024-04-22 RX ORDER — HYDROMORPHONE HYDROCHLORIDE 2 MG/ML
INJECTION, SOLUTION INTRAMUSCULAR; INTRAVENOUS; SUBCUTANEOUS
Status: COMPLETED | OUTPATIENT
Start: 2024-04-22 | End: 2024-04-22

## 2024-04-22 RX ADMIN — DEXTROSE MONOHYDRATE 25 G: 25 INJECTION, SOLUTION INTRAVENOUS at 09:38

## 2024-04-22 RX ADMIN — HYDROMORPHONE HYDROCHLORIDE 1 MG: 2 INJECTION, SOLUTION INTRAMUSCULAR; INTRAVENOUS; SUBCUTANEOUS at 10:12

## 2024-04-22 RX ADMIN — LIDOCAINE HYDROCHLORIDE 5 ML: 20 INJECTION, SOLUTION EPIDURAL; INFILTRATION; INTRACAUDAL; PERINEURAL at 10:12

## 2024-04-22 RX ADMIN — MIDAZOLAM 1 MG: 1 INJECTION INTRAMUSCULAR; INTRAVENOUS at 10:12

## 2024-04-22 RX ADMIN — DEXTROSE MONOHYDRATE 75 ML/HR: 50 INJECTION, SOLUTION INTRAVENOUS at 10:01

## 2024-04-22 ASSESSMENT — PAIN SCALES - GENERAL
PAINLEVEL_OUTOF10: 0 - NO PAIN

## 2024-04-22 ASSESSMENT — PAIN - FUNCTIONAL ASSESSMENT
PAIN_FUNCTIONAL_ASSESSMENT: 0-10

## 2024-04-22 NOTE — DISCHARGE INSTRUCTIONS
Instructions after Your Port Placement   Today, you had your port placed in Interventional Radiology/Specials department. Your port will be used for blood draws, imaging studies like CT-scans or MRIs and used for infusions and/ or chemotherapy etc.     Activity  For the first day, rest with light walking as tolerated. You might feel more tired than usual.   Do not lift anything heavier than 10 lbs. (around the weight of 1 gallon milk jug) for 24 hours. You can lift weights heavier than 10 lbs. on _4/23/24______      .  Do not drive for the first 24 hours. You can drive on ___4/23//24_____________.   The following day after the procedure, you can get back to your normal activities.    Port incision and neck area care   Keep your dressing over your chest area clean, dry and in place for 5-7 days. If you have a scheduled infusion before your dressing removal date, it is okay to let the staff remove your dressing and use your port.   You may remove chest dressing on ____4/26/24_________ and leave the area uncovered.   You may remove your clear neck dressing 2 days after your procedure.   You may remove neck dressing on ______4/24/24______ and leave the area uncovered.   Do not remove the strips of tape on your chest or neck area. Let theses fall off on their own. Do not peel off any glue that might be over your incision.  The stitches used to close the skin around the port will dissolve on their own.   You may shower 3 days after your procedure. Continue to protect the dressing from direct water.   Do not submerge your port area in bath tub, swimming pool or hot tub until it is fully healed.   Once you remove a dressing, gently clean the area with soap and water and pat it dry with clean towel.   Check your port area every day for any signs of infection   Redness   Drainage  Increased warmth around the port site   Pus or foul odor   Fever or chills or increased pain at the port site  If you have any of these signs  or symptoms of infection: Please call and leave a message for our IR/Specials Nurse Practitioner Mariposa Nicolas at [phone]. She will return your call the same business day. If unable to reach Mariposa, please call [phone] and ask to speak with a specials nurse. We are here M-F 7-4 PM.    Pain  After your procedure, when the numbing medicine wears off you might experience mild to moderate pain in the neck and chest area.   To help with pain you may apply ice pack 3 to 4 times a day for 20 minutes at a time to the chest and neck area. Be careful to not get the dressing area wet.      Medications for pain  Please refer to the medications on your discharge paperwork   If you are having pain, please take what you normally would to help relieve your pain.   If you have a prescription medication for chronic pain, continue taking your current regiment and that will help reduce or alleviate pain at the site of the incision as well.   Pain at the incision typically lasts 3-7 days and continues to lessen each day      Bleeding  If you have oozing from the port site that extends to the edges of the dressing. Hold pressure over the neck and chest area. If oozing does not stop in 5 minutes, call 911 or come straight to the emergency room to be evaluated.

## 2024-04-22 NOTE — PRE-SEDATION DOCUMENTATION
"Interventional Radiology Preprocedure Note    Nelsy Osullivan   Indication for procedure: The encounter diagnosis was Follicular lymphoma grade IIIb, unspecified body region (Multi). Patient here and glucose level is low at 43. Patient has an insulin pump present. IV Dextrose 50% given to patient. We will start D5W at 75 mL/hr to maintain glucose level during procedure.     Relevant review of systems:  tired, low energy      /88   Pulse (!) 117   Temp 37.6 °C (99.7 °F) (Temporal)   Resp 18   Ht 1.586 m (5' 2.44\")   Wt 53.6 kg (118 lb 2.7 oz)   SpO2 99%   BMI 21.31 kg/m²    Relevant Labs:   Lab Results   Component Value Date    CREATININE 0.94 03/21/2024    EGFR 72 03/21/2024    INR 1.0 06/05/2022    PROTIME 11.2 06/05/2022       Planned Sedation/Anesthesia: Moderate    Airway assessment: normal    Directed physical examination:    Lungs: CTAB, Heart: RRR    Mallampati: III (soft and hard palate and base of uvula visible)    ASA Score: ASA 3 - Patient with moderate systemic disease with functional limitations    Benefits, risks and alternatives of procedure and planned sedation have been discussed with the patient and/or their representative. All questions answered and they agree to proceed.     Samina Valenzuela, APRN-CNP   "

## 2024-04-22 NOTE — POST-PROCEDURE NOTE
Interventional Radiology Brief Postprocedure Note    Attending: Aurelio Walker MD      Assistant: none    Diagnosis: lymphoma    Description of procedure: port placement     Anesthesia:  Local, mod sed    Complications: None    Estimated Blood Loss: none          See detailed result report with images in PACS.    The patient tolerated the procedure well without incident or complication and is in stable condition.

## 2024-04-24 DIAGNOSIS — C82.40 FOLLICULAR LYMPHOMA GRADE IIIB, UNSPECIFIED BODY REGION (MULTI): ICD-10-CM

## 2024-04-25 RX ORDER — HEPARIN 100 UNIT/ML
500 SYRINGE INTRAVENOUS AS NEEDED
Status: CANCELLED | OUTPATIENT
Start: 2024-04-30

## 2024-04-25 RX ORDER — HEPARIN SODIUM,PORCINE/PF 10 UNIT/ML
50 SYRINGE (ML) INTRAVENOUS AS NEEDED
Status: CANCELLED | OUTPATIENT
Start: 2024-04-30

## 2024-04-30 ENCOUNTER — INFUSION (OUTPATIENT)
Dept: HEMATOLOGY/ONCOLOGY | Facility: CLINIC | Age: 54
End: 2024-04-30
Payer: COMMERCIAL

## 2024-04-30 ENCOUNTER — SOCIAL WORK (OUTPATIENT)
Dept: CASE MANAGEMENT | Facility: HOSPITAL | Age: 54
End: 2024-04-30
Payer: COMMERCIAL

## 2024-04-30 ENCOUNTER — APPOINTMENT (OUTPATIENT)
Dept: HEMATOLOGY/ONCOLOGY | Facility: CLINIC | Age: 54
End: 2024-04-30
Payer: COMMERCIAL

## 2024-04-30 VITALS
TEMPERATURE: 98.1 F | HEIGHT: 62 IN | HEART RATE: 115 BPM | DIASTOLIC BLOOD PRESSURE: 83 MMHG | WEIGHT: 120.5 LBS | RESPIRATION RATE: 16 BRPM | BODY MASS INDEX: 22.18 KG/M2 | SYSTOLIC BLOOD PRESSURE: 134 MMHG | OXYGEN SATURATION: 96 %

## 2024-04-30 DIAGNOSIS — C82.41 GRADE 3B FOLLICULAR LYMPHOMA OF LYMPH NODES OF NECK (MULTI): ICD-10-CM

## 2024-04-30 LAB
ALBUMIN SERPL BCP-MCNC: 3.1 G/DL (ref 3.4–5)
ALP SERPL-CCNC: 304 U/L (ref 33–110)
ALT SERPL W P-5'-P-CCNC: 37 U/L (ref 7–45)
ANION GAP SERPL CALC-SCNC: 8 MMOL/L (ref 10–20)
AST SERPL W P-5'-P-CCNC: 56 U/L (ref 9–39)
BASOPHILS # BLD AUTO: 0.03 X10*3/UL (ref 0–0.1)
BASOPHILS NFR BLD AUTO: 0.5 %
BILIRUB SERPL-MCNC: 0.3 MG/DL (ref 0–1.2)
BUN SERPL-MCNC: 9 MG/DL (ref 6–23)
CALCIUM SERPL-MCNC: 8.6 MG/DL (ref 8.6–10.3)
CHLORIDE SERPL-SCNC: 103 MMOL/L (ref 98–107)
CO2 SERPL-SCNC: 27 MMOL/L (ref 21–32)
CREAT SERPL-MCNC: 0.84 MG/DL (ref 0.5–1.05)
EGFRCR SERPLBLD CKD-EPI 2021: 82 ML/MIN/1.73M*2
EOSINOPHIL # BLD AUTO: 0.11 X10*3/UL (ref 0–0.7)
EOSINOPHIL NFR BLD AUTO: 1.7 %
ERYTHROCYTE [DISTWIDTH] IN BLOOD BY AUTOMATED COUNT: 16.6 % (ref 11.5–14.5)
GLUCOSE SERPL-MCNC: 289 MG/DL (ref 74–99)
HBV CORE AB SER QL: NONREACTIVE
HBV SURFACE AB SER-ACNC: 38.1 MIU/ML
HBV SURFACE AG SERPL QL IA: NONREACTIVE
HCT VFR BLD AUTO: 33.5 % (ref 36–46)
HGB BLD-MCNC: 11.2 G/DL (ref 12–16)
IMM GRANULOCYTES # BLD AUTO: 0.02 X10*3/UL (ref 0–0.7)
IMM GRANULOCYTES NFR BLD AUTO: 0.3 % (ref 0–0.9)
LDH SERPL L TO P-CCNC: 276 U/L (ref 84–246)
LYMPHOCYTES # BLD AUTO: 0.93 X10*3/UL (ref 1.2–4.8)
LYMPHOCYTES NFR BLD AUTO: 14 %
MCH RBC QN AUTO: 29.6 PG (ref 26–34)
MCHC RBC AUTO-ENTMCNC: 33.4 G/DL (ref 32–36)
MCV RBC AUTO: 89 FL (ref 80–100)
MONOCYTES # BLD AUTO: 0.65 X10*3/UL (ref 0.1–1)
MONOCYTES NFR BLD AUTO: 9.8 %
NEUTROPHILS # BLD AUTO: 4.92 X10*3/UL (ref 1.2–7.7)
NEUTROPHILS NFR BLD AUTO: 73.7 %
PLATELET # BLD AUTO: 247 X10*3/UL (ref 150–450)
POTASSIUM SERPL-SCNC: 4.3 MMOL/L (ref 3.5–5.3)
PROT SERPL-MCNC: 6.3 G/DL (ref 6.4–8.2)
RBC # BLD AUTO: 3.78 X10*6/UL (ref 4–5.2)
SODIUM SERPL-SCNC: 134 MMOL/L (ref 136–145)
URATE SERPL-MCNC: 4.5 MG/DL (ref 2.3–6.7)
WBC # BLD AUTO: 6.7 X10*3/UL (ref 4.4–11.3)

## 2024-04-30 PROCEDURE — 87340 HEPATITIS B SURFACE AG IA: CPT | Mod: PORLAB

## 2024-04-30 PROCEDURE — 85025 COMPLETE CBC W/AUTO DIFF WBC: CPT

## 2024-04-30 PROCEDURE — 83615 LACTATE (LD) (LDH) ENZYME: CPT

## 2024-04-30 PROCEDURE — 86706 HEP B SURFACE ANTIBODY: CPT | Mod: PORLAB

## 2024-04-30 PROCEDURE — 36591 DRAW BLOOD OFF VENOUS DEVICE: CPT

## 2024-04-30 PROCEDURE — 86704 HEP B CORE ANTIBODY TOTAL: CPT | Mod: PORLAB

## 2024-04-30 PROCEDURE — 80053 COMPREHEN METABOLIC PANEL: CPT

## 2024-04-30 PROCEDURE — 2500000004 HC RX 250 GENERAL PHARMACY W/ HCPCS (ALT 636 FOR OP/ED): Mod: SE | Performed by: INTERNAL MEDICINE

## 2024-04-30 PROCEDURE — 84550 ASSAY OF BLOOD/URIC ACID: CPT

## 2024-04-30 RX ORDER — HEPARIN 100 UNIT/ML
500 SYRINGE INTRAVENOUS AS NEEDED
Status: CANCELLED | OUTPATIENT
Start: 2024-04-30

## 2024-04-30 RX ORDER — HEPARIN SODIUM,PORCINE/PF 10 UNIT/ML
50 SYRINGE (ML) INTRAVENOUS AS NEEDED
Status: CANCELLED | OUTPATIENT
Start: 2024-04-30

## 2024-04-30 RX ORDER — HEPARIN 100 UNIT/ML
500 SYRINGE INTRAVENOUS AS NEEDED
Status: DISCONTINUED | OUTPATIENT
Start: 2024-04-30 | End: 2024-04-30 | Stop reason: HOSPADM

## 2024-04-30 RX ADMIN — Medication 500 UNITS: at 10:31

## 2024-04-30 ASSESSMENT — PAIN SCALES - GENERAL: PAINLEVEL: 8

## 2024-04-30 NOTE — PROGRESS NOTES
Pt here for port labs for labs prior to treatment. Tolerated procedure well. Pt rescheduled to start treatment 5/2 for Day1, day 2 5/3, confirmed with estela Davis for patient to have growth factor the following Monday 5/6. Pt and sister aware of plan of care, will call with further questions or concerns. Pt signed declination of pregnancy.

## 2024-04-30 NOTE — PROGRESS NOTES
Patient was supposed to have treatment today but she did not get her lab work completed.  SW was notified that she was upset.   (SW) met with patient and her sister today to assess needs and offer support.  Patient was A&Ox3 with appropriate and congruent mood and affect with SW.   Patient stated that she is upset because her sister took a week off from work and was from [place] to be with her this week.  Patient's sister stated that they were praying that something could be worked out. Patient stated that she really does not want to use her insurance for transportation.  SW discussed other options if needed.  Patient's sister stated that she and her other sister are both going to be available to help out when patient gets her treatment so this will not be a worry for patient.  nino Henry nurse came in and discussed that they are working out other options for patient.  Patient and sister were happy to hear that patient would be able to get treatment.  Patient stated that she now understands that she has to come in for lab work before her treatment.  Patient asked SW for another business card and stated that she was going to call SW.  SW will wait for her call.  SW asked if she needed anything else.  Patient stated that she did not.  SW will continue to follow patient.       Louis Velazquez MSW, LSW

## 2024-05-01 ENCOUNTER — APPOINTMENT (OUTPATIENT)
Dept: HEMATOLOGY/ONCOLOGY | Facility: CLINIC | Age: 54
End: 2024-05-01
Payer: COMMERCIAL

## 2024-05-02 ENCOUNTER — APPOINTMENT (OUTPATIENT)
Dept: HEMATOLOGY/ONCOLOGY | Facility: CLINIC | Age: 54
End: 2024-05-02
Payer: COMMERCIAL

## 2024-05-02 ENCOUNTER — INFUSION (OUTPATIENT)
Dept: HEMATOLOGY/ONCOLOGY | Facility: CLINIC | Age: 54
End: 2024-05-02
Payer: COMMERCIAL

## 2024-05-02 VITALS
TEMPERATURE: 98.1 F | HEIGHT: 62 IN | SYSTOLIC BLOOD PRESSURE: 125 MMHG | RESPIRATION RATE: 16 BRPM | DIASTOLIC BLOOD PRESSURE: 79 MMHG | WEIGHT: 119.5 LBS | HEART RATE: 97 BPM | BODY MASS INDEX: 21.99 KG/M2 | OXYGEN SATURATION: 97 %

## 2024-05-02 DIAGNOSIS — C82.40 FOLLICULAR LYMPHOMA GRADE IIIB, UNSPECIFIED BODY REGION (MULTI): ICD-10-CM

## 2024-05-02 DIAGNOSIS — C82.41 GRADE 3B FOLLICULAR LYMPHOMA OF LYMPH NODES OF NECK (MULTI): ICD-10-CM

## 2024-05-02 PROCEDURE — 2500000004 HC RX 250 GENERAL PHARMACY W/ HCPCS (ALT 636 FOR OP/ED): Mod: SE | Performed by: INTERNAL MEDICINE

## 2024-05-02 PROCEDURE — 96375 TX/PRO/DX INJ NEW DRUG ADDON: CPT | Mod: INF

## 2024-05-02 PROCEDURE — 96415 CHEMO IV INFUSION ADDL HR: CPT

## 2024-05-02 PROCEDURE — 2500000001 HC RX 250 WO HCPCS SELF ADMINISTERED DRUGS (ALT 637 FOR MEDICARE OP): Mod: SE | Performed by: INTERNAL MEDICINE

## 2024-05-02 PROCEDURE — 96413 CHEMO IV INFUSION 1 HR: CPT

## 2024-05-02 PROCEDURE — 96411 CHEMO IV PUSH ADDL DRUG: CPT

## 2024-05-02 RX ORDER — ACETAMINOPHEN 325 MG/1
650 TABLET ORAL ONCE
Status: COMPLETED | OUTPATIENT
Start: 2024-05-02 | End: 2024-05-02

## 2024-05-02 RX ORDER — DIPHENHYDRAMINE HYDROCHLORIDE 50 MG/ML
50 INJECTION INTRAMUSCULAR; INTRAVENOUS AS NEEDED
Status: DISCONTINUED | OUTPATIENT
Start: 2024-05-02 | End: 2024-05-02 | Stop reason: HOSPADM

## 2024-05-02 RX ORDER — FAMOTIDINE 10 MG/ML
20 INJECTION INTRAVENOUS ONCE AS NEEDED
Status: DISCONTINUED | OUTPATIENT
Start: 2024-05-02 | End: 2024-05-02 | Stop reason: HOSPADM

## 2024-05-02 RX ORDER — PALONOSETRON 0.05 MG/ML
0.25 INJECTION, SOLUTION INTRAVENOUS ONCE
Status: COMPLETED | OUTPATIENT
Start: 2024-05-02 | End: 2024-05-02

## 2024-05-02 RX ORDER — ALBUTEROL SULFATE 0.83 MG/ML
3 SOLUTION RESPIRATORY (INHALATION) AS NEEDED
Status: DISCONTINUED | OUTPATIENT
Start: 2024-05-02 | End: 2024-05-02 | Stop reason: HOSPADM

## 2024-05-02 RX ORDER — DIPHENHYDRAMINE HCL 25 MG
50 CAPSULE ORAL ONCE
Status: COMPLETED | OUTPATIENT
Start: 2024-05-02 | End: 2024-05-02

## 2024-05-02 RX ORDER — PROCHLORPERAZINE MALEATE 10 MG
10 TABLET ORAL EVERY 6 HOURS PRN
Status: DISCONTINUED | OUTPATIENT
Start: 2024-05-02 | End: 2024-05-02 | Stop reason: HOSPADM

## 2024-05-02 RX ORDER — HEPARIN SODIUM,PORCINE/PF 10 UNIT/ML
50 SYRINGE (ML) INTRAVENOUS AS NEEDED
Status: CANCELLED | OUTPATIENT
Start: 2024-05-02

## 2024-05-02 RX ORDER — HEPARIN 100 UNIT/ML
500 SYRINGE INTRAVENOUS AS NEEDED
Status: CANCELLED | OUTPATIENT
Start: 2024-05-02

## 2024-05-02 RX ORDER — HEPARIN 100 UNIT/ML
500 SYRINGE INTRAVENOUS AS NEEDED
Status: DISCONTINUED | OUTPATIENT
Start: 2024-05-02 | End: 2024-05-02 | Stop reason: HOSPADM

## 2024-05-02 RX ORDER — PROCHLORPERAZINE EDISYLATE 5 MG/ML
10 INJECTION INTRAMUSCULAR; INTRAVENOUS EVERY 6 HOURS PRN
Status: DISCONTINUED | OUTPATIENT
Start: 2024-05-02 | End: 2024-05-02 | Stop reason: HOSPADM

## 2024-05-02 RX ORDER — EPINEPHRINE 0.3 MG/.3ML
0.3 INJECTION SUBCUTANEOUS EVERY 5 MIN PRN
Status: DISCONTINUED | OUTPATIENT
Start: 2024-05-02 | End: 2024-05-02 | Stop reason: HOSPADM

## 2024-05-02 RX ADMIN — DEXAMETHASONE SODIUM PHOSPHATE 12 MG: 4 INJECTION, SOLUTION INTRA-ARTICULAR; INTRALESIONAL; INTRAMUSCULAR; INTRAVENOUS; SOFT TISSUE at 09:24

## 2024-05-02 RX ADMIN — SODIUM CHLORIDE 600 MG: 0.9 INJECTION, SOLUTION INTRAVENOUS at 10:21

## 2024-05-02 RX ADMIN — Medication 500 UNITS: at 14:20

## 2024-05-02 RX ADMIN — BENDAMUSTINE HYDROCHLORIDE 137.5 MG: 25 INJECTION, SOLUTION INTRAVENOUS at 10:01

## 2024-05-02 RX ADMIN — PALONOSETRON HYDROCHLORIDE 250 MCG: 0.25 INJECTION INTRAVENOUS at 09:25

## 2024-05-02 RX ADMIN — DIPHENHYDRAMINE HYDROCHLORIDE 50 MG: 25 CAPSULE ORAL at 09:24

## 2024-05-02 RX ADMIN — ACETAMINOPHEN 650 MG: 325 TABLET ORAL at 09:23

## 2024-05-02 ASSESSMENT — PAIN SCALES - GENERAL: PAINLEVEL: 6

## 2024-05-02 NOTE — PROGRESS NOTES
Pt here for C1D1 of td/rituxin. First dose education given. Tolerated infusion well. She is aware of plan of care, will call with further questions or concersn. Will return tomorrow for D2.

## 2024-05-02 NOTE — SIGNIFICANT EVENT
05/02/24 0908   Prechemo Checklist   Has the patient been in the hospital, ED, or urgent care since last date of service Yes   Chemo/Immuno Consent Completed and Signed Yes   Protocol/Indications Verified Yes   Confirmed to previous date/time of medication Yes  (First dose)   Compared to previous dose Yes  (First dose)   All medications are dated accurately Yes   Pregnancy Test Negative Yes  (Declination of pregnacy signed)   Parameters Met Yes  (Hep B surface +, ok to treat per Dr Pickering, order placed)   BSA/Weight-Height Verified Yes   Dose Calculations Verified (current, total, cumulative) Yes

## 2024-05-03 ENCOUNTER — TELEPHONE (OUTPATIENT)
Dept: ENDOCRINOLOGY | Facility: CLINIC | Age: 54
End: 2024-05-03

## 2024-05-03 ENCOUNTER — TELEPHONE (OUTPATIENT)
Dept: HEMATOLOGY/ONCOLOGY | Facility: CLINIC | Age: 54
End: 2024-05-03

## 2024-05-03 ENCOUNTER — INFUSION (OUTPATIENT)
Dept: HEMATOLOGY/ONCOLOGY | Facility: CLINIC | Age: 54
End: 2024-05-03
Payer: COMMERCIAL

## 2024-05-03 VITALS
HEIGHT: 62 IN | SYSTOLIC BLOOD PRESSURE: 122 MMHG | WEIGHT: 122.2 LBS | DIASTOLIC BLOOD PRESSURE: 75 MMHG | RESPIRATION RATE: 16 BRPM | TEMPERATURE: 98.2 F | BODY MASS INDEX: 22.49 KG/M2

## 2024-05-03 DIAGNOSIS — C82.41 GRADE 3B FOLLICULAR LYMPHOMA OF LYMPH NODES OF NECK (MULTI): ICD-10-CM

## 2024-05-03 PROCEDURE — 2500000004 HC RX 250 GENERAL PHARMACY W/ HCPCS (ALT 636 FOR OP/ED): Mod: SE | Performed by: INTERNAL MEDICINE

## 2024-05-03 PROCEDURE — 96409 CHEMO IV PUSH SNGL DRUG: CPT

## 2024-05-03 RX ORDER — EPINEPHRINE 0.3 MG/.3ML
0.3 INJECTION SUBCUTANEOUS EVERY 5 MIN PRN
Status: DISCONTINUED | OUTPATIENT
Start: 2024-05-03 | End: 2024-05-03 | Stop reason: HOSPADM

## 2024-05-03 RX ORDER — FAMOTIDINE 10 MG/ML
20 INJECTION INTRAVENOUS ONCE AS NEEDED
Status: DISCONTINUED | OUTPATIENT
Start: 2024-05-03 | End: 2024-05-03 | Stop reason: HOSPADM

## 2024-05-03 RX ORDER — HEPARIN 100 UNIT/ML
500 SYRINGE INTRAVENOUS AS NEEDED
Status: CANCELLED | OUTPATIENT
Start: 2024-05-03

## 2024-05-03 RX ORDER — DIPHENHYDRAMINE HYDROCHLORIDE 50 MG/ML
50 INJECTION INTRAMUSCULAR; INTRAVENOUS AS NEEDED
Status: DISCONTINUED | OUTPATIENT
Start: 2024-05-03 | End: 2024-05-03 | Stop reason: HOSPADM

## 2024-05-03 RX ORDER — HEPARIN SODIUM,PORCINE/PF 10 UNIT/ML
50 SYRINGE (ML) INTRAVENOUS AS NEEDED
Status: CANCELLED | OUTPATIENT
Start: 2024-05-03

## 2024-05-03 RX ORDER — HEPARIN 100 UNIT/ML
500 SYRINGE INTRAVENOUS AS NEEDED
Status: DISCONTINUED | OUTPATIENT
Start: 2024-05-03 | End: 2024-05-03 | Stop reason: HOSPADM

## 2024-05-03 RX ORDER — PROCHLORPERAZINE EDISYLATE 5 MG/ML
10 INJECTION INTRAMUSCULAR; INTRAVENOUS EVERY 6 HOURS PRN
Status: DISCONTINUED | OUTPATIENT
Start: 2024-05-03 | End: 2024-05-03 | Stop reason: HOSPADM

## 2024-05-03 RX ORDER — ALBUTEROL SULFATE 0.83 MG/ML
3 SOLUTION RESPIRATORY (INHALATION) AS NEEDED
Status: DISCONTINUED | OUTPATIENT
Start: 2024-05-03 | End: 2024-05-03 | Stop reason: HOSPADM

## 2024-05-03 RX ORDER — PROCHLORPERAZINE MALEATE 10 MG
10 TABLET ORAL EVERY 6 HOURS PRN
Status: DISCONTINUED | OUTPATIENT
Start: 2024-05-03 | End: 2024-05-03 | Stop reason: HOSPADM

## 2024-05-03 RX ADMIN — Medication 500 UNITS: at 10:54

## 2024-05-03 RX ADMIN — BENDAMUSTINE HYDROCHLORIDE 137.5 MG: 25 INJECTION, SOLUTION INTRAVENOUS at 10:21

## 2024-05-03 ASSESSMENT — PAIN SCALES - GENERAL: PAINLEVEL: 8

## 2024-05-03 NOTE — TELEPHONE ENCOUNTER
Patient state she started chemo therapy and has been on a steriod, sugars has been in the 500's, please see jairon download

## 2024-05-03 NOTE — TELEPHONE ENCOUNTER
Patient called again to check on the message.  She was advised that Dr. De Jesus has been with patients all morning and she will receive a call back once the message is addressed.  She states that she needs to know soon because she does not have a ride to the ER all day.

## 2024-05-03 NOTE — TELEPHONE ENCOUNTER
Patient state [facility] is trying to admit patient into ER due to elevated sugars

## 2024-05-03 NOTE — TELEPHONE ENCOUNTER
Please change carb ratio to 14 and change sensitivity factor to 70mg/dL.  Put 75g of carb in the pump for meals if eating.

## 2024-05-03 NOTE — TELEPHONE ENCOUNTER
April arrived in our office with shaking hands. When asked why, she stated her glucose this am was >500.   April was assisted to her room, glucose repeated at 439. Dr. Pickering notified, orders received to hold decadron today, continue with chemotherapy for today. Have April reach out to her Endocrinologist. Repeat Glucose after treatment, if her glucose is still elevated and she has not heard back from her endocrinologist then she needs to go to ER for evaluation. This information was relayed to patient, infusion staff, KAUR Olvera.

## 2024-05-03 NOTE — PROGRESS NOTES
Pt here for D2 bendeka. Pt come in with shakey hands and flushed. Pt states her blood glucose was 500 when she checked it this morning. Rechecked on office glucometer. 439. Dr Picekring notified, will hold decadron today but ok to proceed with treatment. Will recheck glucose after treatment, if still high suggest being evaluated in ER unless pt is able to contact endocrinologist for direction. Pt tolerated infusion well without issue. Glucose was still above 400 prior to leaving. Pt declined to go to ER, pt is calling endo office for direction. Pt educated on signs of hyperglycemia and to go to ER if new or worsening symptoms to develop and unable to get ahold of endo. Pt is aware of plan of care, will call with further questions or concerns. Will return Monday for growth factor injection.

## 2024-05-06 ENCOUNTER — APPOINTMENT (OUTPATIENT)
Dept: HEMATOLOGY/ONCOLOGY | Facility: CLINIC | Age: 54
End: 2024-05-06
Payer: COMMERCIAL

## 2024-05-06 ENCOUNTER — INFUSION (OUTPATIENT)
Dept: HEMATOLOGY/ONCOLOGY | Facility: CLINIC | Age: 54
End: 2024-05-06
Payer: COMMERCIAL

## 2024-05-06 VITALS
WEIGHT: 122 LBS | TEMPERATURE: 97.2 F | OXYGEN SATURATION: 99 % | RESPIRATION RATE: 16 BRPM | BODY MASS INDEX: 22.45 KG/M2 | HEART RATE: 100 BPM | HEIGHT: 62 IN | SYSTOLIC BLOOD PRESSURE: 138 MMHG | DIASTOLIC BLOOD PRESSURE: 86 MMHG

## 2024-05-06 DIAGNOSIS — C82.41 GRADE 3B FOLLICULAR LYMPHOMA OF LYMPH NODES OF NECK (MULTI): ICD-10-CM

## 2024-05-06 PROCEDURE — 2500000004 HC RX 250 GENERAL PHARMACY W/ HCPCS (ALT 636 FOR OP/ED): Mod: JZ,SE | Performed by: INTERNAL MEDICINE

## 2024-05-06 PROCEDURE — 96372 THER/PROPH/DIAG INJ SC/IM: CPT

## 2024-05-06 RX ORDER — EPINEPHRINE 0.3 MG/.3ML
0.3 INJECTION SUBCUTANEOUS EVERY 5 MIN PRN
Status: DISCONTINUED | OUTPATIENT
Start: 2024-05-06 | End: 2024-05-06 | Stop reason: HOSPADM

## 2024-05-06 RX ORDER — DIPHENHYDRAMINE HYDROCHLORIDE 50 MG/ML
50 INJECTION INTRAMUSCULAR; INTRAVENOUS AS NEEDED
Status: DISCONTINUED | OUTPATIENT
Start: 2024-05-06 | End: 2024-05-06 | Stop reason: HOSPADM

## 2024-05-06 RX ORDER — ALBUTEROL SULFATE 0.83 MG/ML
3 SOLUTION RESPIRATORY (INHALATION) AS NEEDED
Status: DISCONTINUED | OUTPATIENT
Start: 2024-05-06 | End: 2024-05-06 | Stop reason: HOSPADM

## 2024-05-06 RX ORDER — FAMOTIDINE 10 MG/ML
20 INJECTION INTRAVENOUS ONCE AS NEEDED
Status: DISCONTINUED | OUTPATIENT
Start: 2024-05-06 | End: 2024-05-06 | Stop reason: HOSPADM

## 2024-05-06 RX ADMIN — PEGFILGRASTIM-CBQV 6 MG: 6 INJECTION, SOLUTION SUBCUTANEOUS at 14:33

## 2024-05-06 ASSESSMENT — PAIN SCALES - GENERAL: PAINLEVEL: 0-NO PAIN

## 2024-05-06 NOTE — TELEPHONE ENCOUNTER
Lvm with patient to Follow up with patient that she spoke with ling from [employer] regarding pump settings 5/6/2024

## 2024-05-06 NOTE — PROGRESS NOTES
Pt here for GF injection. Pt states weekend went well no further issues with blood glucose, insulin was adjusted by endocrinologst. Pt tolerated injection well. She is aware of plan of care, will call with further questions or concerns. Will return in 4 weeks for next infusion

## 2024-05-07 ENCOUNTER — TELEPHONE (OUTPATIENT)
Dept: HEMATOLOGY/ONCOLOGY | Facility: CLINIC | Age: 54
End: 2024-05-07
Payer: COMMERCIAL

## 2024-05-07 ENCOUNTER — APPOINTMENT (OUTPATIENT)
Dept: HEMATOLOGY/ONCOLOGY | Facility: CLINIC | Age: 54
End: 2024-05-07
Payer: COMMERCIAL

## 2024-05-07 NOTE — TELEPHONE ENCOUNTER
Spoke with April who stated she was constipated for a couple days after treatment but now she is having so much liquid stool, it looks like yellow bile. Instructed her to take a dose of Imodium, increase her fluid intake with water, gator aid zero, propel zero, she should take in at least 40oz fluid. If she continues to have diarrhea she can take another Imodium before bed.  She asked if she should be feeling so tired, explained that yes she will feel fatigued, have flu like symptoms. Encouraged her to eat small meals, call our office back if diarrhea doesn't slow down. April verbalized understanding with teach back

## 2024-05-07 NOTE — PATIENT INSTRUCTIONS
Thank you for choosing [facility]  for your health care needs.  If you have any questions, concerns or medical needs, please feel free to contact our office at [phone].    Please ensure you complete your blood work one week before the next scheduled appointment.    To use fixed carbs - 60g for average meal, 30 grams to small meal and 10g with coffee  To obtain blood tests at the next blood draw    PUMP SETTINGS FOR THURSDAY AND FRIDAY WHEN GETTING CHEMO  Menu (3 horizontal lines at the top left)  Temp basal rate presents  Steroids  Activate (twice)  Renew after 12 hours    Follow up in 3 months

## 2024-05-07 NOTE — PROGRESS NOTES
"Subjective   Nelsy Osullivan is a 54 y.o. female who presents for a follow-up evaluation of Type 1 diabetes mellitus. The initial diagnosis of diabetes was made in 2006 .  Diabetes was diagnosed after having recurrent bouts of pancreatitis.    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 She was found to have a third occurrence of lymphoma and now will be on 6 months of chemotherapy.  She is stage II. She gets chemotherapy every 28 days which involves steroids.  She had marked hyperglycemia for two days.      Known complications due to diabetes included CAD s/p MI in 2018    Cardiovascular risk factors include diabetes mellitus. The patient is not on an ACE inhibitor or angiotensin II receptor blocker.  The patient has not been previously hospitalized due to diabetic ketoacidosis.     Current symptoms/problems include none. Her clinical course has been stable.     The patient is currently checking the blood glucose multiple times per day.    Patient is using: continuous glucose monitor                                                                                                      Hypoglycemia frequency: 5%  Hypoglycemia awareness: Yes     Tobacco use - 1 ppd     Review of Systems   Constitutional:  Positive for diaphoresis (Night sweats), fatigue and unexpected weight change (Weight gain and loss).   HENT:  Positive for trouble swallowing.         Dry  mouth    Eyes:         Dry eye    Gastrointestinal:  Positive for abdominal pain, constipation, diarrhea and nausea.   Skin:         Dry skin    Psychiatric/Behavioral:  Positive for confusion. The patient is nervous/anxious.         Depression   Memory loss   All other systems reviewed and are negative.    Objective   /64 (BP Location: Left arm, Patient Position: Sitting, BP Cuff Size: Adult)   Pulse (!) 114   Ht 1.626 m (5' 4\")   Wt 53.1 kg (117 lb)   BMI 20.08 kg/m²   Physical Exam  Vitals and nursing note reviewed. " "  Constitutional:       General: She is not in acute distress.     Appearance: Normal appearance. She is normal weight.   HENT:      Head: Normocephalic and atraumatic.      Nose: Nose normal.      Mouth/Throat:      Mouth: Mucous membranes are moist.   Eyes:      Extraocular Movements: Extraocular movements intact.   Pulmonary:      Effort: Pulmonary effort is normal.   Musculoskeletal:         General: Normal range of motion.   Lymphadenopathy:      Cervical: Cervical adenopathy (Level IIIb; 1.5cm; firm but mobile) present.      Left cervical: Deep cervical adenopathy present.   Neurological:      Mental Status: She is alert and oriented to person, place, and time.   Psychiatric:         Mood and Affect: Mood normal.         Lab Review  Lab Results   Component Value Date    HGBA1C 8.9 (A) 12/13/2023     Lab Results   Component Value Date    GLUCOSE 289 (H) 04/30/2024    CALCIUM 8.6 04/30/2024     (L) 04/30/2024    K 4.3 04/30/2024    CO2 27 04/30/2024     04/30/2024    BUN 9 04/30/2024    CREATININE 0.84 04/30/2024     Lab Results   Component Value Date    CHOL 123 04/04/2023    CHOL 84 08/05/2022    CHOL 106 04/06/2022     Lab Results   Component Value Date    HDL 66.0 04/04/2023    HDL 44.7 08/05/2022    HDL 62.1 04/06/2022     No results found for: \"LDLCALC\"  Lab Results   Component Value Date    TRIG 100 04/04/2023    TRIG 71 08/05/2022    TRIG 57 04/06/2022         Health Maintenance:   Foot Exam: July 2021  Eye Exam:  Urine Albumin: April 4, 2023     CGM Interpretation/Plan   14 day CGM download was reviewed in detail as documented above and will be attached to chart.  A minimum of 72 hours of glucose data was used to inform the management plan outlined below.  55% of values is within target range.  The amount of hypoglycemia has increased compared to her last download.       Assessment/Plan   54-year-old female presents for follow up for type 3c diabetes mellitus. Her blood pressure is at goal " today.    Type 1 diabetes mellitus without complication (Multi)  To use fixed carbs - 60g for average meal, 30 grams to small meal and 10g with coffee  To obtain blood tests at the next blood draw    PUMP SETTINGS FOR THURSDAY AND FRIDAY WHEN GETTING CHEMOTHERAPY (click on the following)  Menu (3 horizontal lines at the top left)  Temp basal rate presents (set at a 10% increase)  Steroids  Activate (twice)  Renew after 12 hours        Insulin pump titration  Adjustments as noted above  To change pump sites every three days    Follow up in 3 months

## 2024-05-08 ENCOUNTER — OFFICE VISIT (OUTPATIENT)
Dept: ENDOCRINOLOGY | Facility: CLINIC | Age: 54
End: 2024-05-08
Payer: COMMERCIAL

## 2024-05-08 ENCOUNTER — APPOINTMENT (OUTPATIENT)
Dept: HEMATOLOGY/ONCOLOGY | Facility: CLINIC | Age: 54
End: 2024-05-08
Payer: COMMERCIAL

## 2024-05-08 VITALS
BODY MASS INDEX: 19.97 KG/M2 | SYSTOLIC BLOOD PRESSURE: 120 MMHG | HEART RATE: 114 BPM | WEIGHT: 117 LBS | HEIGHT: 64 IN | DIASTOLIC BLOOD PRESSURE: 64 MMHG

## 2024-05-08 DIAGNOSIS — E10.9 TYPE 1 DIABETES MELLITUS WITHOUT COMPLICATION (MULTI): Primary | ICD-10-CM

## 2024-05-08 DIAGNOSIS — Z46.81 INSULIN PUMP TITRATION: ICD-10-CM

## 2024-05-08 PROCEDURE — 3074F SYST BP LT 130 MM HG: CPT | Performed by: INTERNAL MEDICINE

## 2024-05-08 PROCEDURE — 95251 CONT GLUC MNTR ANALYSIS I&R: CPT | Performed by: INTERNAL MEDICINE

## 2024-05-08 PROCEDURE — 99214 OFFICE O/P EST MOD 30 MIN: CPT | Performed by: INTERNAL MEDICINE

## 2024-05-08 PROCEDURE — 3078F DIAST BP <80 MM HG: CPT | Performed by: INTERNAL MEDICINE

## 2024-05-09 ENCOUNTER — TELEPHONE (OUTPATIENT)
Dept: PALLIATIVE MEDICINE | Facility: CLINIC | Age: 54
End: 2024-05-09
Payer: COMMERCIAL

## 2024-05-09 ENCOUNTER — TELEPHONE (OUTPATIENT)
Dept: HEMATOLOGY/ONCOLOGY | Facility: HOSPITAL | Age: 54
End: 2024-05-09

## 2024-05-09 RX ORDER — LIDOCAINE AND PRILOCAINE 25; 25 MG/G; MG/G
CREAM TOPICAL ONCE
Qty: 30 G | Refills: 1 | Status: SHIPPED | OUTPATIENT
Start: 2024-05-09 | End: 2024-05-09

## 2024-05-09 NOTE — PROGRESS NOTES
Made her office appt.   Patient ID: Nelsy Osullivan is a 53 y.o. female.  Referring Physician: No referring provider defined for this encounter.  Primary Care Provider: Colleen De Jesus MD    Reason for visit  Problem List Items Addressed This Visit          Hematology and Neoplasia    Non-Hodgkin's lymphoma (CMS/HCC) - Primary    Relevant Orders    Lactate dehydrogenase    CBC and Auto Differential (Completed)    Comprehensive Metabolic Panel    NM PET CT lymphoma staging        Diagnosis and Problem List  Grade 3B follicular large cell lymphoma status post cervical lymph node excision February 8, 2024.  FISH is negative for Bcl-2, BCL6 and C-MYC.  The patient has a prior history of lymphoma (question Hodgkin's versus non-Hodgkin's) at age 16 with relapse at age 19.  When she was 16, she was treated with radiation therapy alone.  When she relapsed at age 19, she had disease on both sides of the diaphragm.  Her spleen was removed.  She was treated with several cycles of chemotherapy including Adriamycin and mustard gas underwent a second course of radiation therapy to her neck and chest.  She was treated by Dr. Jean Baptiste and Dr. Acosta at that time.  Mitral valve and aortic valve stenosis secondary to prior radiation therapy.  She underwent an normal aortic valve and my aortic valve replacement in 2019.  Chronic pancreatitis (question etiology).  She is unable to tolerate pancreatic enzyme replacement therapy.     18 mm right interpolar thyroid nodule with February 19, 2024 pathology showing 9 follicular cells.  DM   Subjective  Subjective    HPI  The patient presents today for evaluation of problem #1.  She states that she noticed a palpable nodule in her left neck sometime in August or September.  She did have an ultrasound of her left neck in October 2023 which showed an abnormal level 7 enlarged lymph node.  She had a CT scan of her neck on January 23, 2024 which showed multiple bilateral cervical lymph nodes and an  incidental 1.6 cm right thyroid nodule.  She had a thyroid ultrasound on January 29, 2024 which showed multiple bilateral TI-RADS 3 nodules.  Underwent cervical lymph node excision on February 8 which showed follicular large B cell lymphoma grade 3B.  She underwent an FNA of the right thyroid nodule on February 19 which was benign.  She did have a CT scan of her abdomen and pelvis in May 2023 which showed diffuse pancolonic wall thickening and edema consistent with colitis.  She has not had oncology follow-up in several years.  She has lost 15 pounds over the last 3 to 6 months.  She has recently started having drenching night sweats.  There is no other family history of malignancy.  The patient currently lives alone but she does have 4 children.    ROS  Review of Systems   Constitutional:  Positive for fatigue and unexpected weight change.   HENT:  Negative.     Eyes: Negative.    Respiratory: Negative.     Cardiovascular: Negative.    Gastrointestinal:  Positive for abdominal pain.   Skin: Negative.    Neurological: Negative.    Hematological:  Positive for adenopathy.   Psychiatric/Behavioral: Negative.          Physical Exam  Physical Exam  Constitutional:       Comments: Patient is frail-appearing   HENT:      Head: Normocephalic.      Nose: Nose normal.      Mouth/Throat:      Mouth: Mucous membranes are moist.      Pharynx: Oropharynx is clear.   Eyes:      Conjunctiva/sclera: Conjunctivae normal.      Pupils: Pupils are equal, round, and reactive to light.   Cardiovascular:      Rate and Rhythm: Normal rate and regular rhythm.      Pulses: Normal pulses.   Pulmonary:      Effort: Pulmonary effort is normal.      Breath sounds: Normal breath sounds.   Abdominal:      General: Bowel sounds are normal.      Tenderness: There is abdominal tenderness.      Comments: Tenderness in the mid-epigastric and LUQ   Musculoskeletal:         General: Normal range of motion.      Cervical back: Normal range of motion.  "  Skin:     General: Skin is warm and dry.   Neurological:      General: No focal deficit present.      Mental Status: She is alert and oriented to person, place, and time.   Psychiatric:         Mood and Affect: Mood normal.         Behavior: Behavior normal.         Judgment: Judgment normal.           Objective  Objective   BSA: 1.52 meters squared  BP 96/67   Pulse (!) 115   Temp 36.6 °C (97.9 °F)   Resp 16   Ht (S) 1.586 m (5' 2.44\")   Wt 52.2 kg (115 lb 1.3 oz)   SpO2 99%   BMI 20.75 kg/m²     Labs  CBC January 9, 2024: White blood cell count 5900, hemoglobin 12.3, platelet count 264  Radiology  Ultrasound neck October 24, 2023:  IMPRESSION:  Palpable abnormality in the left neck in level 2 corresponds to a  soft tissue lesion as described measuring 20 x 11 x 16 mm. Suspect an  abnormal lymph node, most likely neoplastic but perhaps  infectious/inflammatory. Soft tissue tumor is not excluded. Soft  tissue diagnosis is needed.      Several small sonographically benign-appearing bilateral cervical  lymph nodes, most likely reactive.  CT neck January 9, 2024  IMPRESSION:  There are prominent level 1 B and left level 2A lymph nodes which may  be reactive in the setting of infection and or inflammation or may  represent a lymphoproliferative process.      There is a heterogeneous nodule within the right thyroid lobe  measuring 1.6 cm. Consider ultrasound to further evaluate if not  already performed.    Pathology:  FINAL DIAGNOSIS      A. LYMPH NODE, CERVICAL, EXCISION:      -- FOLLICULAR LARGE B CELL LYMPHOMA (WHO 2022 CLASSIFICATION), SEE NOTE   --FOLLICULAR LYMPHOMA, GRADE 3B (ICC 2022 CLASSIFICATION)     NOTE: Sections reveal a neoplastic population of large atypical lymphoid cells present mostly in a nodular groups although the cells are not packed tightly together. Follicular dendritic cell meshworks are mostly intact. A diffuse sheet of large cells was not seen. The cells are CD19+, CD20+, CD10+, " BCL-6+, BCL-2- and kappa+ by flow cytometry and immunohistochemistry. By flow cytometry, the clonal, CD10+ B cell population was detected in a polyclonal B cell background and the cells were enlarged by light scatter characteristics. The findings are most consistent with a follicular lymphoma, grade 3B (ICC 2022 Classification) or follicular large B cell lymphoma (WHO 2022 classification). C-MYC expression is roughly 20% in the lymphoma cells. FISH studies for BCL-2, BCL-6 and c-MYC are pending.      MICROSCOPIC DESCRIPTION: Sections reveal lymph node with architectural effacement by a vaguely nodular proliferation of cells. Higher power evaluation reveals predominantly large lymphoid cells with some pleomorphism in the nodular areas forming loose collections but often not packed tightly with intermingled small round lymphoid cells. Diffuse areas are not clearly appreciated. Many mitoses are noted.     IMMUNOHISTOCHEMISTRY: Performed on block A1 and demonstrated the lymphoma cells are:       CD3: Negative. Stains many background T cells.         CD5: Similar to CD3.        CD10: Positive (weak).        CD20: Positive       CD21: Negative to weakly positive. Highlights intact to somewhat disrupted follicular dendritic cell meshworks.        CD23: Negative.  Similar to CD21       Bcl-2: Negative. Stains background T cells and presumed reactive B cells positive.       Bcl-6: Positive       MUM1: Negative. Background plasmacytoid cells positive       Ki-67: Positive in more than 50% of lymphoma cells.        Cyclin D1: Negative.        CD30: Negative.  Scattered cells positive, likely reactive immunoblasts.       c-Myc: Approximately 20% of lymphoma cells positive       SUMAYA: Negative.      FLOW CYTOMETRY: Performed, see separate report. A population of CD10+, CD19+, CD20+, CD79b- B cells with weak expression of Kappa was detected.     GENETICS:  FISH studies for BCL-2, BCL-6 and c-MYC gene rearrangements are pending.         FISH BCL-2, BCL-6 and C-MYC:  Negative    History  No family history on file.  Oncology History    No history exists.     Past Surgical History:   Procedure Laterality Date    OTHER SURGICAL HISTORY  2020    Cholecystectomy    OTHER SURGICAL HISTORY  2020    Exploratory laparoscopy    OTHER SURGICAL HISTORY  2020    Heart surgery        Nelsy Osullivan  reports that she quit smoking about 2 years ago. Her smoking use included cigarettes. She has been exposed to tobacco smoke. She has never used smokeless tobacco.  She  reports that she does not currently use alcohol.  She  reports no history of drug use.        Performance Status:  ECO-2    Assessment/Plan    53-year-old female with multiple medical problems including a history of what sounds most consistent with relapsed Hodgkin's lymphoma approximately 35 years ago.  It sounds as though she has had extensive radiation therapy to her neck and chest.  She has been treated in the past with Adriamycin and mustard gas chemotherapy.  She now has new grade 3B follicular lymphoma.  We reviewed the pathophysiology of non-Hodgkin's lymphoma as opposed to Hodgkin's lymphoma.  We reviewed follicular lymphoma and the differences between grades 1 2 and 3 and in particular grade 3B which tends to behave more similarly to diffuse large B-cell lymphoma, although, patients with follicular grade 3B disease will sometimes have a more indolent course and a better overall prognosis compared to DLBCL.  We reviewed that she will require additional staging and prognostic testing including baseline PET/CT and LDH.  She is a very complicated patient with multiple medical problems which I suspect are related to her prior chemo and radiation therapy.  She did have an echocardiogram in  that showed an acceptable ejection fraction of 75%.  However, I am uncertain that she would tolerate adriamycin (CHOP) based chemotherapy given her frail medical status and prior  exposure to this agent with higher likelihood of developing reduced ejection fraction and symptomatic heart failure.  I would recommend a combination of Bendamustine and Rituxan which I think would be effective therapy and likely better tolerated with fewer cardiac side effects compared to R-CHOP.  We reviewed the mechanism of action of rituximab as well as potential toxicities of this agent.  She does have very poor venous access so she will likely need a Mediport (patient has had 1 in the past) no matter what chemotherapeutic agent is selected.  We will further discuss her prognosis and therapeutic plan when her staging evaluation is completed.  The patient and her daughter did understand all of the information that was presented today.  They are agreeable to baseline pet imaging and blood work today.  I will see her back when her Pap result is available and likely discussed Bendamustine and Rituxan chemotherapy.  Plan:   PET/CT  LDH, CMP, CBC today  Reassess in 3-4 weeks (when PET result available) and determine final therapy plan which will likely be BR and mediport placement  Psychosocial. There are no issues with medical compliance, medical financial, depression, anxiety, or stressors.               Candida Pickering MD

## 2024-05-09 NOTE — TELEPHONE ENCOUNTER
Patient returned my call and says she is transitioning her palliative care from [facility] to  and will keep appointment with Ian Carvajal CNP tomorrow.

## 2024-05-09 NOTE — PROGRESS NOTES
SUPPORTIVE AND PALLIATIVE ONCOLOGY CONSULT - OUTPATIENT      SERVICE DATE: 5/10/2024    Referred by:  Dr. 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  She is newly diagnosed and is planned to get bendamustine and rituxan every 28 days. She has been following with Jonathan Worrell with [facility]     Information was collected from chart review, discussion with patient/family, and discussion with care team     SUBJECTIVE:  She states that she is having terrible pain. She reports that the pain is located in her epigastric region, She states that it is cramping, constant, and radiates to back. She states that sometimes the pain is so bad that it causes her to double over in pain. She states that she has been taking MS CR 15mg BID and Percocet but has been out of some of her medication as she has not been able to get in touch with Dr. Worrell about needing refills on her meds. She states that her pain meds were working okay, but does feel that they need adjusted some. We discussed needing to stop her percocet due to elevated liver function and she was very upset at this as percocet is the only thing that works for her. We gave her other options of meds and she eventually decided on increasing the frequency of the oxycodone. She does report some end dose failure of the MS CR, so we discussed increasing the frequency of the MS CR to Q8.     She reports that she has been struggling with constipation with her chemotherapy and also struggles with diarrhea at times. She states that most recently it has been constiaption and that she does not have anything for this.     She  states that she has depression and anxiety and has been sick since she has turned 16. She states that she has over 44 surgeries, has chronic pancreatitis, has 8 different cancers, has diabetes, and struggles with being sick. We discussed the possibility of a celiac plexus block and how this may provide her with additional pain relief, but she is hesitant to consider this procedure.       Pain Assessment:  Pain Score:   7  Location: Abdomen  Education:        Symptom Assessment:  ROS otherwise negative, pertinent positives documented in the HPI       Information obtained from: chart review and interview of patient  ______________________________________________________________________     Oncology History   Follicular lymphoma grade IIIb (Multi)   6/7/2022 Initial Diagnosis    Follicular lymphoma grade IIIb (CMS/HCC)     5/2/2024 -  Chemotherapy    Bendamustine + RiTUXimab, 28 Day Cycles         No past medical history on file.  Past Surgical History:   Procedure Laterality Date    OTHER SURGICAL HISTORY  04/14/2020    Cholecystectomy    OTHER SURGICAL HISTORY  04/14/2020    Exploratory laparoscopy    OTHER SURGICAL HISTORY  04/14/2020    Heart surgery     No family history on file.     SOCIAL HISTORY  Children 4 children and 5 grandchildren    Social History:  reports that she quit smoking about 2 years ago. Her smoking use included cigarettes. She has been exposed to tobacco smoke. She has never used smokeless tobacco. She reports that she does not currently use alcohol. She reports that she does not use drugs.    She has food insecurities and has bed bugs   REVIEW OF SYSTEMS  Review of systems negative unless noted in HPI.       Objective     Palliative Performance Scale % (PPS) 90     Labs:  Results for orders placed or performed in visit on 05/10/24 (from the past 96 hour(s))   Drug Screen, Urine With Reflex to Confirmation   Result Value Ref Range    Amphetamine Screen, Urine Presumptive Negative Presumptive  Negative    Barbiturate Screen, Urine Presumptive Negative Presumptive Negative    Benzodiazepines Screen, Urine Presumptive Negative Presumptive Negative    Cannabinoid Screen, Urine Presumptive Negative Presumptive Negative    Cocaine Metabolite Screen, Urine Presumptive Negative Presumptive Negative    Fentanyl Screen, Urine Presumptive Negative Presumptive Negative    Opiate Screen, Urine Presumptive Positive (A) Presumptive Negative    Oxycodone Screen, Urine Presumptive Negative Presumptive Negative    PCP Screen, Urine Presumptive Negative Presumptive Negative    Methadone Screen, Urine Presumptive Negative Presumptive Negative             Medications:   Current Outpatient Medications   Medication Instructions    albuterol 90 mcg/actuation inhaler 2 puffs, inhalation, Every 6 hours    aspirin 81 mg, oral, Daily    atorvastatin (LIPITOR) 40 mg, oral, Daily    BD Alcohol Swabs pads, medicated USE SIX TIMES DAILY    bisacodyl (DULCOLAX (BISACODYL)) 5 mg, oral, Daily PRN    blood-glucose sensor (FreeStyle Ashley 3 Sensor) device 1 each, miscellaneous, Continuous, Use to check glucose, change every 14 days    carvedilol (COREG) 12.5 mg, oral, Every 12 hours    cetirizine (ZYRTEC) 10 mg, oral, Daily    clindamycin (CLEOCIN) 300 mg, oral, 3 times daily    clopidogrel (PLAVIX) 75 mg, oral, Daily    Creon 36,000-114,000- 180,000 unit capsule,delayed release(DR/EC) capsule TAKE 2 CAPSULE BEFORE MEALS TAKE 1 CAPSULE WITH A SNACK    cyclobenzaprine (FLEXERIL) 10 mg, oral, 3 times daily PRN    diphenhydrAMINE (Banophen) 50 mg capsule TAKE ONE CAPSULE BY MOUTH 1 HOUR BEFORE CONTRAST MEDIUM ADMINISTRATION    DULoxetine (CYMBALTA) 80 mg, oral, Daily    esomeprazole (NEXIUM) 40 mg, oral, Daily PRN    estradiol (ESTRACE) 2 mg, oral, Daily    fluconazole (Diflucan) 150 mg tablet TAKE ONE TABLET BY MOUTH ONCE    FreeStyle Ashley 2 Sensor kit 1 each, subcutaneous, Every 14 days    FreeStyle Ashley 3 [place] misc Use as instructed     furosemide (LASIX) 40 mg, oral, Daily    hydroCHLOROthiazide (HYDRODiuril) 25 mg tablet     ibuprofen 800 mg, oral, Daily RT    insulin aspart (NovoLOG) 100 unit/mL injection FOR USE WITH INSULIN PUMP MAX DAILY DOSE  UNITS    levothyroxine (Synthroid, Levoxyl) 125 mcg tablet TAKE 1 TABLET BY MOUTH IN THE MORNING ON EMPTY STOMACH    loperamide (IMODIUM A-D) 2 mg, oral, As needed    magnesium oxide 140 mg, Daily RT    melatonin 3 mg tablet 2 tablets, oral, Nightly    metOLazone (ZAROXOLYN) 2.5 mg, oral, Daily RT    metoprolol succinate XL (Toprol-XL) 50 mg 24 hr tablet oral    mirtazapine (REMERON) 7.5 mg, oral, Nightly    morphine CR (MS CONTIN) 15 mg, oral, Every 8 hours, Do not crush, chew, or split. Cancer related pain G89.3    multivitamin (Daily-Enrique, with folic acid,) tablet 1 tablet, oral, Daily    multivitamin with minerals tablet 1 tablet, oral, Daily RT    mv-min-folic acid-lutein 200-137.5 mcg tablet,chewable oral, Daily    naloxone (Narcan) 4 mg/0.1 mL nasal spray Use 1 spray in one nostril as needed for overdose. May repeat every 2 to 3 min in alternating nostrils until medical assistance is available    naratriptan (AMERGE) 2.5 mg, oral, Once as needed    OLANZapine (ZyPREXA) 10 mg tablet oral, 2 times daily PRN    omeprazole (PRILOSEC) 40 mg    Omnipod Dash Pods, Gen 4, cartridge     ondansetron (ZOFRAN) 8 mg, oral, Every 8 hours PRN    oxyCODONE (ROXICODONE) 10 mg, oral, Every 3 hours PRN, Take 1 tablet every 3 hours as needed for cancer related pain G89.3    phenazopyridine (Pyridium) 200 mg tablet 1 tablet, oral, 3 times daily    polyethylene glycol (GLYCOLAX, MIRALAX) 17 g, oral, Daily RT    potassium chloride CR 20 mEq ER tablet 1 tablet, oral    predniSONE (Deltasone) 50 mg tablet Take 13 hours, 7 hours and 1 hour before contrast medium administration    prochlorperazine (COMPAZINE) 10 mg, oral, Every 6 hours PRN    promethazine (Phenergan) 25 mg tablet oral    saccharomyces boulardii  (FLORASTOR) 250 mg, oral, 2 times daily    sennosides-docusate sodium (Senna-S) 8.6-50 mg tablet 1 tablet, oral, 2 times daily    sodium chloride 0.9% (sodium chloride) flush 10 mL, intravenous    spironolactone (Aldactone) 25 mg tablet oral, Daily RT    tinidazole (Tindamax) 500 mg tablet TAKE 4 TABLETS BY MOUTH ONCE DAILY    tiotropium (Spiriva) 18 mcg inhalation capsule 1 capsule, inhalation, Daily RT    torsemide (DEMADEX) 20 mg, oral, Daily RT    triamcinolone (Kenalog) 0.5 % cream APPLY TOPICALLY TO THE AFFECTED AREA TWICE DAILY FOR 7 DAYS       Allergies:   Allergies   Allergen Reactions    Acetaminophen-Codeine Unknown    Adhesive Unknown    Codeine Unknown    Fentanyl Unknown     Reaction from Community: Other(See Desc) Comments: MIGRAINES IF USED FOR PAIN, BUT OK FOR ANESTHESIA    Other reaction(s): Intolerance    Fetrin Unknown    Ketorolac Itching    Meperidine Unknown     Reaction from Community: Other(See Desc)    Metformin Unknown    Propoxyphene N-Acetaminophen GI Upset    Silver Hives and Other     Tegaderm    Adhesive Tape-Silicones Rash    Iodinated Contrast Media Rash     Does well with 13hour premedication    Latex Rash    Penicillins Rash    Wound Dressings Rash     tagaderm       PHYSICAL EXAMINATION  Vital Signs:   Vital signs reviewed  Vitals:    05/10/24 0845   BP: 126/79   Pulse: 107   Resp: 16   Temp: 36.9 °C (98.4 °F)   SpO2: 100%     Pain Score:   7         Physical Exam  Constitutional:       Appearance: Normal appearance. She is normal weight.   HENT:      Head: Normocephalic and atraumatic.      Mouth/Throat:      Mouth: Mucous membranes are dry.   Eyes:      Extraocular Movements: Extraocular movements intact.   Cardiovascular:      Comments: No signs of cardiac distress  Pulmonary:      Effort: Pulmonary effort is normal.      Comments: No signs of respiratory distress  Abdominal:      General: Abdomen is flat.      Palpations: Abdomen is soft.   Musculoskeletal:         General:  Normal range of motion.   Skin:     General: Skin is warm and dry.   Neurological:      Mental Status: She is alert and oriented to person, place, and time. Mental status is at baseline.   Psychiatric:         Mood and Affect: Mood normal.         Behavior: Behavior normal.         Thought Content: Thought content normal.         Judgment: Judgment normal.         ASSESSMENT/PLAN    Pain  Pain is: cancer related pain and chronic  Type: visceral  Pain control: sub-optimally controlled    Pain Plan:  Increase MS continue 15mg to Q8 due to end dose failure   Stop Percocet due to impaired liver function   Start Oxycodone 10mg Q3 PRN   Continue Cymbalta 80mg daily   Continue Flexeril 10mg TID PRN     Opioid Use  Medication Management:   - OARRS report reviewed with no aberrant behavior; consistent with  prescriptions/records and patient history  - MED 90.  Overdose Risk Score 370.   This has been discussed with patient.   - We will continue to closely monitor the patient for signs of prescription misuse including UDS, OARRS review and subjective reports at each visit.  - No concurrent benzodiazepine use   - I am a provider who is certified in Hospice and Palliative Medicine and have conducted a face-face visit and examination for this patient.  - Routine Urine Drug Screen is needed and was completed on 5/10/24 appropriately positive for opioids and negative for illicit substances  - Controlled Substance Agreement: is needed. Completed on: 5/10/24  - Specifically discussed that controlled substance prescriptions will only be provided by our group as outlined in the completed agreement  - Naloxone is needed  - Red Flags: None at this time, but UDS pending      Nausea   Intermittent nausea without vomiting related to chemotherapy  Nausea Plan:  Zyprexa 10mg BID PRN   Compazine 10mg PRN     Diarrhea   Related to chemotherapy  Diarrhea Plan:   Imodium prn      Altered Mood  Chronic anxiety and depression related to health  concerns   Mood Plan:  Continue Remeron 7.5mg nightly   Continue Cymbalta 60mg daily       Decreased appetite  Related to malignancy and chemotherapy    Anorexia Plan:   Zyprexa     Introduction to Supportive and Palliative Oncology:  Introduced the role and philosophy of Supportive and Palliative oncology in the evaluation and management of symptoms during cancer treatment  Palliative care was introduced as a service for patients with serious illness to help with symptoms, assist with goals of care conversations, navigate complex decision making, improve quality of life for patients, and provide support both patients and families.  Patient seemed to appreciate the extra layer of support.      Advance Directives  Existence of Advance Directives: Reports that she has completed them   Decision maker: OSMEL is reportedly her sister   Code Status: Full code    Next Follow-Up Visit:  Return to clinic in 2 weeks     Signature and billing  Thank you for allowing us to participate in the care of this patient. Recommendations will be communicated back to the consulting service by way of shared electronic medical record or face-to-face.    Medical complexity was high level due to due to complexity of problems, extensive data review, and high risk of management/treatment.  Time was spent on the following: Prep Time, Time Directly with Patient/Family/Caregiver, Documentation Time. Total time spent: 80      DATA   Diagnostic tests and information reviewed for today's visit:  Most recent labs, Most recent imaging, Medications           SIGNATURE: RALPH Eugene-GAMA    Contact information:  [facility] Pall[facility]cology  Monday-Friday 8 AM-5 PM  Phone:  [phone], press option #5, then option #1.   Or Epic Secure Chat

## 2024-05-09 NOTE — TELEPHONE ENCOUNTER
Phone call to patient to confirm appointment tomorrow May 10 with Ian Carvajal CNP at [place] as it appears patient is following with [facility] Jonathan Worrell (patient filled Percocet on 4/22/24 last and MSER on 4/10/24 last per [place]ARRS). I left VM asking for a call back to clarify. If no return call, I will try calling patient later again.

## 2024-05-10 ENCOUNTER — OFFICE VISIT (OUTPATIENT)
Dept: PALLIATIVE MEDICINE | Facility: CLINIC | Age: 54
End: 2024-05-10
Payer: COMMERCIAL

## 2024-05-10 ENCOUNTER — PHARMACY VISIT (OUTPATIENT)
Dept: PHARMACY | Facility: CLINIC | Age: 54
End: 2024-05-10
Payer: MEDICAID

## 2024-05-10 ENCOUNTER — SOCIAL WORK (OUTPATIENT)
Dept: CASE MANAGEMENT | Facility: HOSPITAL | Age: 54
End: 2024-05-10

## 2024-05-10 VITALS
OXYGEN SATURATION: 100 % | HEART RATE: 107 BPM | TEMPERATURE: 98.4 F | DIASTOLIC BLOOD PRESSURE: 79 MMHG | RESPIRATION RATE: 16 BRPM | HEIGHT: 64 IN | BODY MASS INDEX: 20.32 KG/M2 | SYSTOLIC BLOOD PRESSURE: 126 MMHG | WEIGHT: 119.05 LBS

## 2024-05-10 DIAGNOSIS — K86.1 CHRONIC PANCREATITIS, UNSPECIFIED PANCREATITIS TYPE (MULTI): ICD-10-CM

## 2024-05-10 DIAGNOSIS — Z59.41 FOOD INSECURITY: ICD-10-CM

## 2024-05-10 DIAGNOSIS — G89.3 NEOPLASM RELATED PAIN: Primary | ICD-10-CM

## 2024-05-10 PROBLEM — E11.9 TYPE 2 DIABETES MELLITUS (MULTI): Status: RESOLVED | Noted: 2022-08-05 | Resolved: 2024-05-10

## 2024-05-10 PROBLEM — Z46.81 INSULIN PUMP TITRATION: Status: ACTIVE | Noted: 2023-12-17

## 2024-05-10 LAB
AMPHETAMINES UR QL SCN: ABNORMAL
BARBITURATES UR QL SCN: ABNORMAL
BENZODIAZ UR QL SCN: ABNORMAL
BZE UR QL SCN: ABNORMAL
CANNABINOIDS UR QL SCN: ABNORMAL
FENTANYL+NORFENTANYL UR QL SCN: ABNORMAL
METHADONE UR QL SCN: ABNORMAL
OPIATES UR QL SCN: ABNORMAL
OXYCODONE+OXYMORPHONE UR QL SCN: ABNORMAL
PCP UR QL SCN: ABNORMAL

## 2024-05-10 PROCEDURE — 80365 DRUG SCREENING OXYCODONE: CPT | Mod: PORLAB | Performed by: CLINICAL NURSE SPECIALIST

## 2024-05-10 PROCEDURE — RXMED WILLOW AMBULATORY MEDICATION CHARGE

## 2024-05-10 PROCEDURE — 99215 OFFICE O/P EST HI 40 MIN: CPT | Performed by: CLINICAL NURSE SPECIALIST

## 2024-05-10 PROCEDURE — 80307 DRUG TEST PRSMV CHEM ANLYZR: CPT | Performed by: CLINICAL NURSE SPECIALIST

## 2024-05-10 RX ORDER — AMOXICILLIN 250 MG
1 CAPSULE ORAL 2 TIMES DAILY
Qty: 60 TABLET | Refills: 11 | Status: SHIPPED | OUTPATIENT
Start: 2024-05-10

## 2024-05-10 RX ORDER — OXYCODONE HYDROCHLORIDE 10 MG/1
10 TABLET ORAL
Qty: 112 TABLET | Refills: 0 | Status: SHIPPED | OUTPATIENT
Start: 2024-05-10 | End: 2024-05-24 | Stop reason: WASHOUT

## 2024-05-10 RX ORDER — MORPHINE SULFATE 15 MG/1
15 TABLET, FILM COATED, EXTENDED RELEASE ORAL EVERY 8 HOURS
Qty: 21 TABLET | Refills: 0 | Status: SHIPPED | OUTPATIENT
Start: 2024-05-10 | End: 2024-05-15 | Stop reason: SDUPTHER

## 2024-05-10 SDOH — ECONOMIC STABILITY - FOOD INSECURITY: FOOD INSECURITY: Z59.41

## 2024-05-10 ASSESSMENT — ENCOUNTER SYMPTOMS
CONSTIPATION: 1
DIARRHEA: 1
ABDOMINAL PAIN: 1
TROUBLE SWALLOWING: 1
ROS SKIN COMMENTS: DRY SKIN
UNEXPECTED WEIGHT CHANGE: 1
NERVOUS/ANXIOUS: 1
FATIGUE: 1
DIAPHORESIS: 1
NAUSEA: 1
CONFUSION: 1

## 2024-05-10 ASSESSMENT — PAIN SCALES - GENERAL: PAINLEVEL: 7

## 2024-05-10 NOTE — ASSESSMENT & PLAN NOTE
To use fixed carbs - 60g for average meal, 30 grams to small meal and 10g with coffee  To obtain blood tests at the next blood draw    PUMP SETTINGS FOR THURSDAY AND FRIDAY WHEN GETTING CHEMOTHERAPY (click on the following)  Menu (3 horizontal lines at the top left)  Temp basal rate presents (set at a 10% increase)  Steroids  Activate (twice)  Renew after 12 hours

## 2024-05-10 NOTE — PATIENT INSTRUCTIONS
NPV with Ian for supportive oncology. Substance Contract signed and will be scanned to  [facility]  April will return in 2 weeks

## 2024-05-10 NOTE — PROGRESS NOTES
Patient had her supportive oncology appointment today.   (SW) was asked about the Food for Life program here at [facility].  SW discussed with patient on how this works.  Ian Carvajal was going to put in a referral.   Patient stated that she did receive her $100.00 from [facility].  Patient stated that if she turned in her I.D. they would send money each month.  SW was able to make a copy of her I.D. for her.  During patient's last treatment she stated that she was going to call SW.  SW did not here from patient.  SW asked if she still had anything to discuss with SW since she did not call.  Patient stated that she was embarrassed to say but that she has bed bugs at her apartment.  Patient wanted to know if there was any assistance with them.  SW discussed that [facility] does not have any assistance.  SW encouraged patient to reach out to her landlord about it.  Patient stated that she was scared that she would lose her housing.  Patient stated that she needs her shower faucet replaced so she may discuss the bed bugs.  SW will continue to see if a resource for bed bugs comes in and will keep patient posted.  SW will continue to follow patient.    Louis Velazquez MSW, LSW

## 2024-05-11 ENCOUNTER — PHARMACY VISIT (OUTPATIENT)
Dept: PHARMACY | Facility: CLINIC | Age: 54
End: 2024-05-11
Payer: COMMERCIAL

## 2024-05-11 PROCEDURE — RXMED WILLOW AMBULATORY MEDICATION CHARGE

## 2024-05-13 ENCOUNTER — TELEPHONE (OUTPATIENT)
Dept: HEMATOLOGY/ONCOLOGY | Facility: HOSPITAL | Age: 54
End: 2024-05-13
Payer: COMMERCIAL

## 2024-05-13 DIAGNOSIS — G89.3 NEOPLASM RELATED PAIN: ICD-10-CM

## 2024-05-13 NOTE — TELEPHONE ENCOUNTER
I returned April's call. She is wondering why she was only given a week supply of her Morphine. RALPH Carvajal is not in today, but I will send her a message and we will call patient back when we receive a response.

## 2024-05-14 ENCOUNTER — TELEPHONE (OUTPATIENT)
Dept: HEMATOLOGY/ONCOLOGY | Facility: CLINIC | Age: 54
End: 2024-05-14
Payer: COMMERCIAL

## 2024-05-14 NOTE — TELEPHONE ENCOUNTER
Hello,  I received another call from April stating she did not hear back from S.O yesterday, she was concerned about 2 things, [1] she only received 7 day supply of morphine, [2] [facility] told her the morphine required a PA, she paid out of pocket because a PA was not done. Our office did not receive any documentation for denial. Can you look into this?   This message was forward to Supportive oncology.  April also stated she had 1 episode of bleeding from rectum/vagina when she wiped she denies any additional bleeding. April c/o a migraine for 4 days after receiving chemo stating she never experienced these issues when she had chemo before. Will discuss with dr. Pickering  April was also concerned because she received a letter stating Neulasta was not covered. This office staff did not receive a denial letter. Neulasta was denied but Udenyca injection was completed

## 2024-05-15 ENCOUNTER — DOCUMENTATION (OUTPATIENT)
Dept: PALLIATIVE MEDICINE | Facility: HOSPITAL | Age: 54
End: 2024-05-15
Payer: COMMERCIAL

## 2024-05-15 DIAGNOSIS — G89.3 NEOPLASM RELATED PAIN: ICD-10-CM

## 2024-05-15 LAB
6MAM UR CFM-MCNC: <25 NG/ML
CODEINE UR CFM-MCNC: <50 NG/ML
HYDROCODONE CTO UR CFM-MCNC: <25 NG/ML
HYDROMORPHONE UR CFM-MCNC: 68 NG/ML
MORPHINE UR CFM-MCNC: >2500 NG/ML
NORHYDROCODONE UR CFM-MCNC: <25 NG/ML
NOROXYCODONE UR CFM-MCNC: <25 NG/ML
OXYCODONE UR CFM-MCNC: <25 NG/ML
OXYMORPHONE UR CFM-MCNC: <25 NG/ML

## 2024-05-15 RX ORDER — MORPHINE SULFATE 15 MG/1
15 TABLET, FILM COATED, EXTENDED RELEASE ORAL EVERY 8 HOURS
Qty: 24 TABLET | Refills: 0 | Status: SHIPPED | OUTPATIENT
Start: 2024-05-15 | End: 2024-05-24 | Stop reason: ALTCHOICE

## 2024-05-15 NOTE — PROGRESS NOTES
A PA has been submitted to [facility] for MSER 15mg. Awaiting determination.

## 2024-05-16 ENCOUNTER — TELEPHONE (OUTPATIENT)
Dept: PALLIATIVE MEDICINE | Facility: HOSPITAL | Age: 54
End: 2024-05-16
Payer: COMMERCIAL

## 2024-05-16 NOTE — TELEPHONE ENCOUNTER
Received approval from  Specialty for MS Contin 15 mg Q2 60 tabs for 30 days. Called [facility] and they had a script for 15 mg Q8 24 tabs and unable to process. Submitted a PA request for current prescription, verified this from Ian's note.

## 2024-05-17 ENCOUNTER — TELEPHONE (OUTPATIENT)
Dept: HEMATOLOGY/ONCOLOGY | Facility: CLINIC | Age: 54
End: 2024-05-17
Payer: COMMERCIAL

## 2024-05-17 NOTE — TELEPHONE ENCOUNTER
Spoke with April to inform her that Supportive Oncology did the PA for morphine and Ian sent another 8 day prescription to the pharmacy. April stated she does NOT want the Morphine prescriptions going to [facility] pharmacy, she only wants them to go to [facility] pharmacy. Will let Ian know  Also let April know that her insurance did not approve Neulasta but they did approve Udenyca and that is what she received after her first treatment.   She verbalized understanding with teach back

## 2024-05-20 ENCOUNTER — TELEPHONE (OUTPATIENT)
Dept: PALLIATIVE MEDICINE | Facility: HOSPITAL | Age: 54
End: 2024-05-20
Payer: COMMERCIAL

## 2024-05-20 NOTE — TELEPHONE ENCOUNTER
Per [facility], PA obtained for MS. Contin 15 mg. Pharmacy states that patient has already picked up this medication.

## 2024-05-23 ENCOUNTER — OFFICE VISIT (OUTPATIENT)
Dept: HEMATOLOGY/ONCOLOGY | Facility: CLINIC | Age: 54
End: 2024-05-23
Payer: COMMERCIAL

## 2024-05-23 ENCOUNTER — SOCIAL WORK (OUTPATIENT)
Dept: HEMATOLOGY/ONCOLOGY | Facility: CLINIC | Age: 54
End: 2024-05-23

## 2024-05-23 VITALS
DIASTOLIC BLOOD PRESSURE: 77 MMHG | SYSTOLIC BLOOD PRESSURE: 122 MMHG | TEMPERATURE: 97.9 F | HEART RATE: 85 BPM | BODY MASS INDEX: 20.05 KG/M2 | RESPIRATION RATE: 16 BRPM | OXYGEN SATURATION: 100 % | WEIGHT: 116.84 LBS

## 2024-05-23 DIAGNOSIS — K86.1 CHRONIC PANCREATITIS, UNSPECIFIED PANCREATITIS TYPE (MULTI): ICD-10-CM

## 2024-05-23 DIAGNOSIS — C82.40 FOLLICULAR LYMPHOMA GRADE IIIB, UNSPECIFIED BODY REGION (MULTI): ICD-10-CM

## 2024-05-23 DIAGNOSIS — C82.41 GRADE 3B FOLLICULAR LYMPHOMA OF LYMPH NODES OF NECK (MULTI): ICD-10-CM

## 2024-05-23 DIAGNOSIS — I87.8 POOR VENOUS ACCESS: ICD-10-CM

## 2024-05-23 PROCEDURE — 99215 OFFICE O/P EST HI 40 MIN: CPT | Performed by: INTERNAL MEDICINE

## 2024-05-23 NOTE — PROGRESS NOTES
Patient had a follow up appointment with Dr. Pickering.  Patient saw  (CASIMIRO) and showed her arms.  Patient stated that she was getting eaten up by the bed bugs.  Patient has not told her landlord as she is nervous she will get kicked out.  SW will call the health department and see if a landlord can evict you for this.  Patient stated that she is also set up with a Food for Life appointment.  SW will call patient once SW learns more from the Health Department.      Louis Velazquez MSW, LSW

## 2024-05-23 NOTE — PROGRESS NOTES
SUPPORTIVE AND PALLIATIVE ONCOLOGY FOLLOW UP - OUTPATIENT      SERVICE DATE: 5/24/2024    Referred by:  Dr. 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  She is newly diagnosed and is planned to get bendamustine and rituxan every 28 days. She has been following with Jonathan Worrell with [facility] palliative care, but has since requested to switch to palliative care closer to where she lives.      Information was collected from chart review, discussion with patient/family, and discussion with care team     SUBJECTIVE:  Patient reports that her pain is still not controlled. She states that she has epigastric burning pain. She reports that it is better, but not great. We discussed increasing her long acting morphine. She states that she does not feel that she is getting much benefit from her PO oxycodone, so we discussed switching it over to PO dilaudid vs IR morphine. She states that she would like to try the PO dilaudid.     We discussed that she has also been having some constipation with her chemotherapy. She states that she will only be taking her Senna before and after her chemotherapy as she is only constipated with her treatments.     She states that she has been having some itching related to her bedbug bites. Discussed starting something for the itching and she is agreeable with this.       Pain Assessment:  Pain Score:    Location:    Education:        Symptom Assessment:  ROS otherwise negative, pertinent positives documented in the HPI       Information obtained from: chart review and interview of  patient  ______________________________________________________________________     Oncology History   Follicular lymphoma grade IIIb (Multi)   6/7/2022 Initial Diagnosis    Follicular lymphoma grade IIIb (CMS/HCC)     5/2/2024 -  Chemotherapy    Bendamustine + RiTUXimab, 28 Day Cycles         No past medical history on file.  Past Surgical History:   Procedure Laterality Date    OTHER SURGICAL HISTORY  04/14/2020    Cholecystectomy    OTHER SURGICAL HISTORY  04/14/2020    Exploratory laparoscopy    OTHER SURGICAL HISTORY  04/14/2020    Heart surgery     No family history on file.     SOCIAL HISTORY  Children 4 children and 5 grandchildren    Social History:  reports that she quit smoking about 2 years ago. Her smoking use included cigarettes. She has been exposed to tobacco smoke. She has never used smokeless tobacco. She reports that she does not currently use alcohol. She reports that she does not use drugs.    She has food insecurities and has bed bugs   REVIEW OF SYSTEMS  Review of systems negative unless noted in HPI.       Objective     Palliative Performance Scale % (PPS) 90     Labs:  Results for orders placed or performed in visit on 05/24/24 (from the past 96 hour(s))   Drug Screen, Urine With Reflex to Confirmation   Result Value Ref Range    Amphetamine Screen, Urine Presumptive Negative Presumptive Negative    Barbiturate Screen, Urine Presumptive Negative Presumptive Negative    Benzodiazepines Screen, Urine Presumptive Negative Presumptive Negative    Cannabinoid Screen, Urine Presumptive Negative Presumptive Negative    Cocaine Metabolite Screen, Urine Presumptive Negative Presumptive Negative    Fentanyl Screen, Urine Presumptive Negative Presumptive Negative    Opiate Screen, Urine Presumptive Positive (A) Presumptive Negative    Oxycodone Screen, Urine Presumptive Positive (A) Presumptive Negative    PCP Screen, Urine Presumptive Negative Presumptive Negative    Methadone Screen, Urine  Presumptive Negative Presumptive Negative               Medications:   Current Outpatient Medications   Medication Instructions    albuterol 90 mcg/actuation inhaler 2 puffs, inhalation, Every 6 hours    aspirin 81 mg, oral, Daily    atorvastatin (LIPITOR) 40 mg, oral, Daily    BD Alcohol Swabs pads, medicated USE SIX TIMES DAILY    bisacodyl (DULCOLAX (BISACODYL)) 5 mg, oral, Daily PRN    blood-glucose sensor (FreeStyle Ashley 3 Sensor) device 1 each, miscellaneous, Continuous, Use to check glucose, change every 14 days    carvedilol (COREG) 12.5 mg, oral, Every 12 hours    cetirizine (ZYRTEC) 10 mg, oral, Daily    clindamycin (CLEOCIN) 300 mg, oral, 3 times daily    clopidogrel (PLAVIX) 75 mg, oral, Daily    Creon 36,000-114,000- 180,000 unit capsule,delayed release(DR/EC) capsule TAKE 2 CAPSULE BEFORE MEALS TAKE 1 CAPSULE WITH A SNACK    cyclobenzaprine (FLEXERIL) 10 mg, oral, 3 times daily PRN    diphenhydrAMINE (Banophen) 50 mg capsule TAKE ONE CAPSULE BY MOUTH 1 HOUR BEFORE CONTRAST MEDIUM ADMINISTRATION    DULoxetine (CYMBALTA) 80 mg, oral, Daily    esomeprazole (NEXIUM) 40 mg, oral, Daily PRN    estradiol (ESTRACE) 2 mg, oral, Daily    fluconazole (Diflucan) 150 mg tablet TAKE ONE TABLET BY MOUTH ONCE    FreeStyle Ashley 2 Sensor kit 1 each, subcutaneous, Every 14 days    FreeStyle Ashley 3 Dresser misc Use as instructed    furosemide (LASIX) 40 mg, oral, Daily    hydroCHLOROthiazide (HYDRODiuril) 25 mg tablet     HYDROmorphone (DILAUDID) 2 mg, oral, Every 4 hours PRN, Take one tablet as needed every 4 hours for cancer related pain G89.3    hydrOXYzine HCL (ATARAX) 25 mg, oral, Every 8 hours PRN    ibuprofen 800 mg, oral, Daily RT    insulin aspart (NovoLOG) 100 unit/mL injection FOR USE WITH INSULIN PUMP MAX DAILY DOSE  UNITS    levothyroxine (Synthroid, Levoxyl) 125 mcg tablet TAKE 1 TABLET BY MOUTH IN THE MORNING ON EMPTY STOMACH    loperamide (IMODIUM A-D) 2 mg, oral, As needed    magnesium oxide 140  mg, Daily RT    melatonin 3 mg tablet 2 tablets, oral, Nightly    metOLazone (ZAROXOLYN) 2.5 mg, oral, Daily RT    metoprolol succinate XL (Toprol-XL) 50 mg 24 hr tablet oral    mirtazapine (REMERON) 7.5 mg, oral, Nightly    morphine CR (MS CONTIN) 30 mg, oral, 3 times daily, Do not crush, chew, or split.    multivitamin (Daily-Enrique, with folic acid,) tablet 1 tablet, oral, Daily    multivitamin with minerals tablet 1 tablet, oral, Daily RT    mv-min-folic acid-lutein 200-137.5 mcg tablet,chewable oral, Daily    naloxone (Narcan) 4 mg/0.1 mL nasal spray Use 1 spray in one nostril as needed for overdose. May repeat every 2 to 3 min in alternating nostrils until medical assistance is available    naratriptan (AMERGE) 2.5 mg, oral, Once as needed    naratriptan (AMERGE) 2.5 mg, oral, Once as needed    OLANZapine (ZyPREXA) 10 mg tablet oral, 2 times daily PRN    omeprazole (PRILOSEC) 40 mg    Omnipod Dash Pods, Gen 4, cartridge     ondansetron (ZOFRAN) 8 mg, oral, Every 8 hours PRN    phenazopyridine (Pyridium) 200 mg tablet 1 tablet, oral, 3 times daily    polyethylene glycol (GLYCOLAX, MIRALAX) 17 g, oral, Daily RT    potassium chloride CR 20 mEq ER tablet 1 tablet, oral    predniSONE (Deltasone) 50 mg tablet Take 13 hours, 7 hours and 1 hour before contrast medium administration    prochlorperazine (COMPAZINE) 10 mg, oral, Every 6 hours PRN    promethazine (Phenergan) 25 mg tablet oral    saccharomyces boulardii (FLORASTOR) 250 mg, oral, 2 times daily    sennosides-docusate sodium (Senna-S) 8.6-50 mg tablet 1 tablet, oral, 2 times daily    sodium chloride 0.9% (sodium chloride) flush 10 mL, intravenous    spironolactone (Aldactone) 25 mg tablet oral, Daily RT    tinidazole (Tindamax) 500 mg tablet TAKE 4 TABLETS BY MOUTH ONCE DAILY    tiotropium (Spiriva) 18 mcg inhalation capsule 1 capsule, inhalation, Daily RT    torsemide (DEMADEX) 20 mg, oral, Daily RT    triamcinolone (Kenalog) 0.5 % cream APPLY TOPICALLY TO THE  AFFECTED AREA TWICE DAILY FOR 7 DAYS       Allergies:   Allergies   Allergen Reactions    Acetaminophen-Codeine Unknown    Adhesive Unknown    Codeine Unknown    Fentanyl Unknown     Reaction from Community: Other(See Desc) Comments: MIGRAINES IF USED FOR PAIN, BUT OK FOR ANESTHESIA    Other reaction(s): Intolerance    Fetrin Unknown    Ketorolac Itching    Meperidine Unknown     Reaction from Community: Other(See Desc)    Metformin Unknown    Propoxyphene N-Acetaminophen GI Upset    Silver Hives and Other     Tegaderm    Adhesive Tape-Silicones Rash    Iodinated Contrast Media Rash     Does well with 13hour premedication    Latex Rash    Penicillins Rash    Wound Dressings Rash     tagaderm       PHYSICAL EXAMINATION  Vital Signs:   Vital signs reviewed  Vitals:    05/24/24 1328   BP: 123/72   Pulse: 107   Resp: 16   Temp: 36.7 °C (98.1 °F)   SpO2: 98%       Pain Score:           Physical Exam  Constitutional:       Appearance: Normal appearance. She is normal weight.   HENT:      Head: Normocephalic and atraumatic.      Mouth/Throat:      Mouth: Mucous membranes are dry.   Eyes:      Extraocular Movements: Extraocular movements intact.   Cardiovascular:      Comments: No signs of cardiac distress  Pulmonary:      Effort: Pulmonary effort is normal.      Comments: No signs of respiratory distress  Abdominal:      General: Abdomen is flat.      Palpations: Abdomen is soft.   Musculoskeletal:         General: Normal range of motion.   Skin:     General: Skin is warm and dry.   Neurological:      Mental Status: She is alert and oriented to person, place, and time. Mental status is at baseline.   Psychiatric:         Mood and Affect: Mood normal.         Behavior: Behavior normal.         Thought Content: Thought content normal.         Judgment: Judgment normal.         ASSESSMENT/PLAN    Pain  Pain is: cancer related pain and chronic  Type: visceral  Pain control: sub-optimally controlled    Pain Plan:  Increase MS  continue to 30 mg to Q8 due to end dose failure  Stop Percocet due to impaired liver function  Stop Oxycodone 10mg Q3 PRN taking 5 doses a day  Start Dilaudid 2mg Q4 PRN   Continue Cymbalta 80mg daily   Continue Flexeril 10mg TID PRN     Opioid Use  Medication Management:   OARRS report reviewed with no aberrant behavior; consistent with  prescriptions/records and patient history  .  Overdose Risk Score 410.   This has been discussed with patient.   We will continue to closely monitor the patient for signs of prescription misuse including UDS, OARRS review and subjective reports at each visit.  No concurrent benzodiazepine use   I am a provider who is certified in Hospice and Palliative Medicine and have conducted a face-face visit and examination for this patient.  Routine Urine Drug Screen is needed and was completed on 5/10/24 appropriately positive for opioids and negative for illicit substances  Controlled Substance Agreement: is needed. Completed on: 5/10/24  Specifically discussed that controlled substance prescriptions will only be provided by our group as outlined in the completed agreement  Naloxone is needed  Red Flags: None at this time, but UDS pending      Nausea   Intermittent nausea without vomiting related to chemotherapy  Nausea Plan:  Zyprexa 10mg BID PRN   Compazine 10mg PRN     Diarrhea   Related to chemotherapy  Diarrhea Plan:  Imodium prn      Altered Mood  Chronic anxiety and depression related to health concerns   Mood Plan:  Continue Remeron 7.5mg nightly   Continue Cymbalta 60mg daily       Decreased appetite  Related to malignancy and chemotherapy  Anorexia Plan:   Zyprexa 10mg BID PRN     Itching related to bedbug infestation   Start Atarax 25mg TID PRN        Advance Directives  Existence of Advance Directives: Reports that she has completed them   Decision maker: OSMEL is reportedly her sister   Code Status: Full code    Next Follow-Up Visit:  Return to clinic in 2 weeks      Signature and billing  Thank you for allowing us to participate in the care of this patient. Recommendations will be communicated back to the consulting service by way of shared electronic medical record or face-to-face.    Medical complexity was high level due to due to complexity of problems, extensive data review, and high risk of management/treatment.  Time was spent on the following: Prep Time, Time Directly with Patient/Family/Caregiver, Documentation Time. Total time spent: 80      DATA   Diagnostic tests and information reviewed for today's visit:  Most recent labs, Most recent imaging, Medications           SIGNATURE: RALPH Eugene-CNS    Contact information:  [facility]ive [facility] Pall[facility]  Monday-Friday 8 AM-5 PM  Phone:  [phone], press option #5, then option #1.   Or Epic Secure Chat

## 2024-05-23 NOTE — PATIENT INSTRUCTIONS
Office follow up with dr. Pickering after 1st dose of chemotherapy    C2D1 is scheduled for 5/30  Lab via port same day at 0830, this adds 1.5 hours onto scheduled time    Follow up with dr. Pickering with cycle 3

## 2024-05-23 NOTE — PROGRESS NOTES
Patient ID: Nelsy Osullivan is a 54 y.o. female.    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  FISH is negative for Bcl-2, BCL6 and C-MYC.  PET/CT 4/4/24 showed malignant uptake in a left supraclavicular LN only.  Baseline LDH was elevated at 238 but the patient has chronically elevated LDH. She started BR chemotherapy 5/2/24.  She has a prior history of lymphoma (question Hodgkin's versus non-Hodgkin's) at age 16 with relapse at age 19.  When she was 16, she was treated with radiation therapy alone.  When she relapsed at age 19, she had disease on both sides of the diaphragm.  Her spleen was removed.  She was treated with several cycles of chemotherapy including Adriamycin and mustard gas underwent a second course of radiation therapy to her neck and chest.  She was treated by Dr. Carbajal and Dr. Acosta at that time.  Mitral valve and aortic valve stenosis secondary to prior radiation therapy.  She underwent animal aortic valve and mitral valve replacements in 2019.  Chronic pancreatitis (question etiology).  She is unable to tolerate pancreatic enzyme replacement therapy.     18 mm right interpolar thyroid nodule with February 19, 2024 pathology showing 9 follicular cells.  DM  The patient presents today for follow-up after completing her first cycle of chemotherapy on May 2 and 3.  She did receive Neulasta on May 6.  Overall, she thinks she tolerated it fairly well.  She does have chronic abdominal pain secondary to her pancreatitis.  She was evaluated by palliative care and was started on MS Contin 15 mg twice daily.  Will follow-up with palliative care again tomorrow.  He did have a migraine headache for a few days following the infusion but she is not sure if this was related.  In any case, the headaches have resolved and have not recurred.  More concerning, she has had a pruritic diffuse erythematous papular rash over all  of her extremities which she is sure is related to bedbugs and though she has had bedbugs in her apartment for quite some time, she states that only this past week have they bitten her.  She otherwise has not started any new medications and apparently had bedbugs before she started the MS Contin.    Objective    BSA: 1.55 meters squared  /77 (BP Location: Left arm, Patient Position: Sitting, BP Cuff Size: Adult)   Pulse 85   Temp 36.6 °C (97.9 °F) (Temporal)   Resp 16   Wt 53 kg (116 lb 13.5 oz)   SpO2 100%   BMI 20.05 kg/m²      Physical Exam  Constitutional:       Appearance: Normal appearance.   HENT:      Head: Normocephalic and atraumatic.      Mouth/Throat:      Mouth: Mucous membranes are moist.      Pharynx: Oropharynx is clear.   Eyes:      Extraocular Movements: Extraocular movements intact.      Conjunctiva/sclera: Conjunctivae normal.      Pupils: Pupils are equal, round, and reactive to light.   Cardiovascular:      Rate and Rhythm: Normal rate and regular rhythm.      Pulses: Normal pulses.   Pulmonary:      Effort: Pulmonary effort is normal.      Breath sounds: Normal breath sounds.   Abdominal:      General: Bowel sounds are normal.      Palpations: Abdomen is soft.   Musculoskeletal:         General: Normal range of motion.      Cervical back: Normal range of motion.   Skin:     Findings: Rash present.   Neurological:      General: No focal deficit present.      Mental Status: She is alert and oriented to person, place, and time.   Psychiatric:         Mood and Affect: Mood normal.         Behavior: Behavior normal.         Performance Status:  ECOG 1      Assessment/Plan   54-year-old female with stage II grade 3B follicular lymphoma involving left cervical lymph nodes.  She has low-volume disease because she has been treated with Adriamycin in the past and has radiation-induced cardiac issues, I opted to treat her with Bendamustine and Rituxan.  She has tolerated her first cycle  reasonably well.  The etiology of her rash is uncertain to me.  It could be due to her chemotherapy or even to her morphine.  However, the patient is convinced that it is related to the bedbugs in her apartment.  Hopefully if this can be managed, the rash will resolve.  If the rash significantly worsens, we could consider a Medrol Dosepak or referral to dermatology.  We will see what palliative care thinks when they evaluate her tomorrow.  Otherwise, we will proceed with her second cycle of chemotherapy on May 30 and May 31 and I will see her afterwards.  Plan:  Supportive care  Follow up with palliative care  Cycle 2 BR with neulasta 5/30/24  Reassess in 4 weeks    Cancer Staging   No matching staging information was found for the patient.      Oncology History   Follicular lymphoma grade IIIb (Multi)   6/7/2022 Initial Diagnosis    Follicular lymphoma grade IIIb (CMS/HCC)     5/2/2024 -  Chemotherapy    Bendamustine + RiTUXimab, 28 Day Cycles                   Candida Pickering MD

## 2024-05-24 ENCOUNTER — PHARMACY VISIT (OUTPATIENT)
Dept: PHARMACY | Facility: CLINIC | Age: 54
End: 2024-05-24
Payer: MEDICAID

## 2024-05-24 ENCOUNTER — OFFICE VISIT (OUTPATIENT)
Dept: PALLIATIVE MEDICINE | Facility: CLINIC | Age: 54
End: 2024-05-24
Payer: COMMERCIAL

## 2024-05-24 ENCOUNTER — SOCIAL WORK (OUTPATIENT)
Dept: HEMATOLOGY/ONCOLOGY | Facility: CLINIC | Age: 54
End: 2024-05-24

## 2024-05-24 VITALS
SYSTOLIC BLOOD PRESSURE: 123 MMHG | HEART RATE: 107 BPM | WEIGHT: 117.06 LBS | DIASTOLIC BLOOD PRESSURE: 72 MMHG | RESPIRATION RATE: 16 BRPM | TEMPERATURE: 98.1 F | BODY MASS INDEX: 20.08 KG/M2 | OXYGEN SATURATION: 98 %

## 2024-05-24 DIAGNOSIS — L29.9 ITCHING: Primary | ICD-10-CM

## 2024-05-24 DIAGNOSIS — G89.3 NEOPLASM RELATED PAIN: ICD-10-CM

## 2024-05-24 DIAGNOSIS — K86.1 CHRONIC PANCREATITIS, UNSPECIFIED PANCREATITIS TYPE (MULTI): ICD-10-CM

## 2024-05-24 PROCEDURE — 99215 OFFICE O/P EST HI 40 MIN: CPT | Performed by: CLINICAL NURSE SPECIALIST

## 2024-05-24 PROCEDURE — 80365 DRUG SCREENING OXYCODONE: CPT | Mod: PORLAB | Performed by: CLINICAL NURSE SPECIALIST

## 2024-05-24 PROCEDURE — RXMED WILLOW AMBULATORY MEDICATION CHARGE

## 2024-05-24 PROCEDURE — 80307 DRUG TEST PRSMV CHEM ANLYZR: CPT | Performed by: CLINICAL NURSE SPECIALIST

## 2024-05-24 RX ORDER — HYDROMORPHONE HYDROCHLORIDE 2 MG/1
2 TABLET ORAL EVERY 4 HOURS PRN
Qty: 126 TABLET | Refills: 0 | Status: SHIPPED | OUTPATIENT
Start: 2024-05-24

## 2024-05-24 RX ORDER — MORPHINE SULFATE 30 MG/1
30 TABLET, FILM COATED, EXTENDED RELEASE ORAL 3 TIMES DAILY
Qty: 63 TABLET | Refills: 0 | Status: SHIPPED | OUTPATIENT
Start: 2024-05-24 | End: 2024-06-23

## 2024-05-24 RX ORDER — HYDROXYZINE HYDROCHLORIDE 25 MG/1
25 TABLET, FILM COATED ORAL EVERY 8 HOURS PRN
Qty: 90 TABLET | Refills: 0 | Status: SHIPPED | OUTPATIENT
Start: 2024-05-24 | End: 2024-06-23

## 2024-05-24 RX ORDER — NARATRIPTAN 2.5 MG/1
2.5 TABLET ORAL ONCE AS NEEDED
Qty: 9 TABLET | Refills: 3 | Status: SHIPPED | OUTPATIENT
Start: 2024-05-24 | End: 2024-06-23

## 2024-05-24 NOTE — PATIENT INSTRUCTIONS
Office follow up with Ian in Supportive Oncology    Medication and benefits reviewed    Follow up in 2 weeks

## 2024-05-24 NOTE — PROGRESS NOTES
Patient had a follow up with Ian Sauceda CNP.   (CASIMIRO) provided patient with Anali Correa's, dietitian's phone number.  SW also discussed that the [facility] opened their Urgent Need Fund for $500.00.  SW will apply but explained that she will have to send them her income verification.  Patient thanked CASIMIRO.  SW will provide an update to her once SW talks to the Health Department.      SW called the Health Department to discuss patient's bed bug situation.  It is unknown if patient could be evicted.  It is encouraged that patient tells her landlord.  [facility] still does not have any financial assistance to help.      SW called patient to provide the update with patient's call to the Health Department.  SW encouraged to tell her landlord.  Patient asked about a shower bench.  Patient stated that she got one recently but it is too big.  Since insurance covered it recently she is not able to get another one.  SW will continue to follow patient.    Louis Velazquez MSW, LSW

## 2024-05-25 ENCOUNTER — PHARMACY VISIT (OUTPATIENT)
Dept: PHARMACY | Facility: CLINIC | Age: 54
End: 2024-05-25

## 2024-05-28 ENCOUNTER — PHARMACY VISIT (OUTPATIENT)
Dept: PHARMACY | Facility: CLINIC | Age: 54
End: 2024-05-28

## 2024-05-28 ENCOUNTER — APPOINTMENT (OUTPATIENT)
Dept: HEMATOLOGY/ONCOLOGY | Facility: CLINIC | Age: 54
End: 2024-05-28
Payer: COMMERCIAL

## 2024-05-29 ENCOUNTER — APPOINTMENT (OUTPATIENT)
Dept: HEMATOLOGY/ONCOLOGY | Facility: CLINIC | Age: 54
End: 2024-05-29
Payer: COMMERCIAL

## 2024-05-30 ENCOUNTER — INFUSION (OUTPATIENT)
Dept: HEMATOLOGY/ONCOLOGY | Facility: CLINIC | Age: 54
End: 2024-05-30
Payer: COMMERCIAL

## 2024-05-30 ENCOUNTER — SOCIAL WORK (OUTPATIENT)
Dept: HEMATOLOGY/ONCOLOGY | Facility: CLINIC | Age: 54
End: 2024-05-30
Payer: COMMERCIAL

## 2024-05-30 ENCOUNTER — NUTRITION (OUTPATIENT)
Dept: HEMATOLOGY/ONCOLOGY | Facility: CLINIC | Age: 54
End: 2024-05-30

## 2024-05-30 ENCOUNTER — APPOINTMENT (OUTPATIENT)
Dept: HEMATOLOGY/ONCOLOGY | Facility: CLINIC | Age: 54
End: 2024-05-30
Payer: COMMERCIAL

## 2024-05-30 VITALS
HEIGHT: 62 IN | SYSTOLIC BLOOD PRESSURE: 118 MMHG | HEART RATE: 105 BPM | WEIGHT: 125.3 LBS | BODY MASS INDEX: 23.06 KG/M2 | DIASTOLIC BLOOD PRESSURE: 79 MMHG | TEMPERATURE: 97.5 F | RESPIRATION RATE: 16 BRPM

## 2024-05-30 VITALS — BODY MASS INDEX: 23.06 KG/M2 | HEIGHT: 62 IN | WEIGHT: 125.3 LBS

## 2024-05-30 DIAGNOSIS — C82.40 FOLLICULAR LYMPHOMA GRADE IIIB, UNSPECIFIED BODY REGION (MULTI): ICD-10-CM

## 2024-05-30 DIAGNOSIS — C82.41 GRADE 3B FOLLICULAR LYMPHOMA OF LYMPH NODES OF NECK (MULTI): ICD-10-CM

## 2024-05-30 PROBLEM — I87.8 POOR VENOUS ACCESS: Status: ACTIVE | Noted: 2024-04-11

## 2024-05-30 LAB
ALBUMIN SERPL BCP-MCNC: 3.1 G/DL (ref 3.4–5)
ALP SERPL-CCNC: 276 U/L (ref 33–110)
ALT SERPL W P-5'-P-CCNC: 38 U/L (ref 7–45)
ANION GAP SERPL CALC-SCNC: 9 MMOL/L (ref 10–20)
AST SERPL W P-5'-P-CCNC: 36 U/L (ref 9–39)
BASOPHILS # BLD AUTO: 0.03 X10*3/UL (ref 0–0.1)
BASOPHILS NFR BLD AUTO: 0.6 %
BILIRUB SERPL-MCNC: 0.3 MG/DL (ref 0–1.2)
BUN SERPL-MCNC: 9 MG/DL (ref 6–23)
CALCIUM SERPL-MCNC: 8.6 MG/DL (ref 8.6–10.3)
CHLORIDE SERPL-SCNC: 110 MMOL/L (ref 98–107)
CO2 SERPL-SCNC: 24 MMOL/L (ref 21–32)
CREAT SERPL-MCNC: 0.68 MG/DL (ref 0.5–1.05)
EGFRCR SERPLBLD CKD-EPI 2021: >90 ML/MIN/1.73M*2
EOSINOPHIL # BLD AUTO: 0.75 X10*3/UL (ref 0–0.7)
EOSINOPHIL NFR BLD AUTO: 15.6 %
ERYTHROCYTE [DISTWIDTH] IN BLOOD BY AUTOMATED COUNT: 17.2 % (ref 11.5–14.5)
GLUCOSE SERPL-MCNC: 52 MG/DL (ref 74–99)
HCT VFR BLD AUTO: 31.3 % (ref 36–46)
HGB BLD-MCNC: 10.7 G/DL (ref 12–16)
IMM GRANULOCYTES # BLD AUTO: 0.03 X10*3/UL (ref 0–0.7)
IMM GRANULOCYTES NFR BLD AUTO: 0.6 % (ref 0–0.9)
LDH SERPL L TO P-CCNC: 301 U/L (ref 84–246)
LYMPHOCYTES # BLD AUTO: 0.43 X10*3/UL (ref 1.2–4.8)
LYMPHOCYTES NFR BLD AUTO: 8.9 %
MCH RBC QN AUTO: 29.3 PG (ref 26–34)
MCHC RBC AUTO-ENTMCNC: 34.2 G/DL (ref 32–36)
MCV RBC AUTO: 86 FL (ref 80–100)
MONOCYTES # BLD AUTO: 0.81 X10*3/UL (ref 0.1–1)
MONOCYTES NFR BLD AUTO: 16.8 %
NEUTROPHILS # BLD AUTO: 2.76 X10*3/UL (ref 1.2–7.7)
NEUTROPHILS NFR BLD AUTO: 57.5 %
PLATELET # BLD AUTO: 259 X10*3/UL (ref 150–450)
POTASSIUM SERPL-SCNC: 3.7 MMOL/L (ref 3.5–5.3)
PROT SERPL-MCNC: 6.1 G/DL (ref 6.4–8.2)
RBC # BLD AUTO: 3.65 X10*6/UL (ref 4–5.2)
SODIUM SERPL-SCNC: 139 MMOL/L (ref 136–145)
URATE SERPL-MCNC: 4.5 MG/DL (ref 2.3–6.7)
WBC # BLD AUTO: 4.8 X10*3/UL (ref 4.4–11.3)

## 2024-05-30 PROCEDURE — 2500000004 HC RX 250 GENERAL PHARMACY W/ HCPCS (ALT 636 FOR OP/ED): Mod: JZ,SE | Performed by: INTERNAL MEDICINE

## 2024-05-30 PROCEDURE — 96375 TX/PRO/DX INJ NEW DRUG ADDON: CPT | Mod: INF

## 2024-05-30 PROCEDURE — 84075 ASSAY ALKALINE PHOSPHATASE: CPT

## 2024-05-30 PROCEDURE — 2500000004 HC RX 250 GENERAL PHARMACY W/ HCPCS (ALT 636 FOR OP/ED): Mod: SE | Performed by: INTERNAL MEDICINE

## 2024-05-30 PROCEDURE — 2500000001 HC RX 250 WO HCPCS SELF ADMINISTERED DRUGS (ALT 637 FOR MEDICARE OP): Mod: SE | Performed by: INTERNAL MEDICINE

## 2024-05-30 PROCEDURE — 83615 LACTATE (LD) (LDH) ENZYME: CPT

## 2024-05-30 PROCEDURE — 96365 THER/PROPH/DIAG IV INF INIT: CPT | Mod: INF

## 2024-05-30 PROCEDURE — 96376 TX/PRO/DX INJ SAME DRUG ADON: CPT

## 2024-05-30 PROCEDURE — 84550 ASSAY OF BLOOD/URIC ACID: CPT

## 2024-05-30 PROCEDURE — 96409 CHEMO IV PUSH SNGL DRUG: CPT

## 2024-05-30 PROCEDURE — 85025 COMPLETE CBC W/AUTO DIFF WBC: CPT

## 2024-05-30 RX ORDER — HEPARIN 100 UNIT/ML
500 SYRINGE INTRAVENOUS AS NEEDED
Status: CANCELLED | OUTPATIENT
Start: 2024-05-30

## 2024-05-30 RX ORDER — DIPHENHYDRAMINE HCL 25 MG
50 CAPSULE ORAL ONCE
Status: COMPLETED | OUTPATIENT
Start: 2024-05-30 | End: 2024-05-30

## 2024-05-30 RX ORDER — HEPARIN 100 UNIT/ML
500 SYRINGE INTRAVENOUS AS NEEDED
Status: DISCONTINUED | OUTPATIENT
Start: 2024-05-30 | End: 2024-05-30 | Stop reason: HOSPADM

## 2024-05-30 RX ORDER — PALONOSETRON 0.05 MG/ML
0.25 INJECTION, SOLUTION INTRAVENOUS ONCE
Status: COMPLETED | OUTPATIENT
Start: 2024-05-30 | End: 2024-05-30

## 2024-05-30 RX ORDER — LIDOCAINE AND PRILOCAINE 25; 25 MG/G; MG/G
CREAM TOPICAL
COMMUNITY
Start: 2024-05-10

## 2024-05-30 RX ORDER — HEPARIN SODIUM,PORCINE/PF 10 UNIT/ML
50 SYRINGE (ML) INTRAVENOUS AS NEEDED
Status: CANCELLED | OUTPATIENT
Start: 2024-05-30

## 2024-05-30 RX ORDER — PROCHLORPERAZINE EDISYLATE 5 MG/ML
10 INJECTION INTRAMUSCULAR; INTRAVENOUS EVERY 6 HOURS PRN
Status: DISCONTINUED | OUTPATIENT
Start: 2024-05-30 | End: 2024-05-30 | Stop reason: HOSPADM

## 2024-05-30 RX ORDER — ACETAMINOPHEN 325 MG/1
650 TABLET ORAL ONCE
Status: COMPLETED | OUTPATIENT
Start: 2024-05-30 | End: 2024-05-30

## 2024-05-30 RX ADMIN — SODIUM CHLORIDE 600 MG: 0.9 INJECTION, SOLUTION INTRAVENOUS at 12:31

## 2024-05-30 RX ADMIN — PALONOSETRON HYDROCHLORIDE 250 MCG: 0.25 INJECTION INTRAVENOUS at 10:57

## 2024-05-30 RX ADMIN — ACETAMINOPHEN 650 MG: 325 TABLET ORAL at 10:57

## 2024-05-30 RX ADMIN — PROCHLORPERAZINE EDISYLATE 10 MG: 5 INJECTION INTRAMUSCULAR; INTRAVENOUS at 12:10

## 2024-05-30 RX ADMIN — DIPHENHYDRAMINE HYDROCHLORIDE 50 MG: 25 CAPSULE ORAL at 10:57

## 2024-05-30 RX ADMIN — DEXAMETHASONE SODIUM PHOSPHATE 12 MG: 4 INJECTION, SOLUTION INTRA-ARTICULAR; INTRALESIONAL; INTRAMUSCULAR; INTRAVENOUS; SOFT TISSUE at 10:57

## 2024-05-30 RX ADMIN — BENDAMUSTINE HYDROCHLORIDE 137.5 MG: 25 INJECTION, SOLUTION INTRAVENOUS at 11:35

## 2024-05-30 RX ADMIN — Medication 500 UNITS: at 15:14

## 2024-05-30 ASSESSMENT — PAIN SCALES - GENERAL: PAINLEVEL: 10-WORST PAIN EVER

## 2024-05-30 NOTE — PROGRESS NOTES
(CASIMIRO) was notified that patient was upset this morning.  SW met with patient today to assess needs and offer support.  Patient was A&Ox3 with appropriate and congruent mood and affect.  Patient stated that her and her son butt heads.  She discussed that her son has been home for 4 days due to having an injury to his thumb.  Patient stated that her son found God and wanted her to put a Restorationism sticker on her phone.  Patient stated that she didn't want to do this so he got upset.  Patient stated that she was able to make new transportation arrangements for today and the rest of the week.  Patient stated that she wished her son did stay with her because she gets a little anxious with having treatment.  Patient discussed the first time she was diagnosed with cancer her son was 18 months old.  SW provided support and active listening.  Patient discussed her bed bug situation.  Patient stated that she still has not told her landlord.  Patient's arms have a lot of bites on them.  Patient stated that it was very itchy.  Patient is going to bring in her income to verify for the [employer].  SW will meet with patient tomorrow.  Patient stated that she was in a lot of pain today.  Patient stated that when she gets stressed her pain hurts more.  Patient's nurse came into the room and patient was going to discuss her pain.  SW will continue to follow patient.      Louis Velazquez MSW, LSW

## 2024-05-30 NOTE — PROGRESS NOTES
Patient presents for treatment,  has no complaints alert and oriented x 4. CVAD accessed per orders. Patient meets parameters for treatment.      Patient tolerated treatment well, no reaction noted. Vitals taken as ordered and all WNL. After treatment, .Mediport flushed with 10 ml NS followed by 5 ml of 100mg/ml Heparin Flush, Reyes Needle removed per practice policy and procedure, site care given with gauze and hypafix. Patient verbalized understanding of all teaching and education. Patient confirms she will return tomorrow 5/31/2024 for D2.

## 2024-05-30 NOTE — SIGNIFICANT EVENT
05/30/24 1007   Prechemo Checklist   Has the patient been in the hospital, ED, or urgent care since last date of service No   Chemo/Immuno Consent Completed and Signed Yes   Protocol/Indications Verified Yes   Confirmed to previous date/time of medication Yes   Compared to previous dose Yes   Pregnancy Test Negative Yes   Parameters Met Yes   BSA/Weight-Height Verified Yes   Dose Calculations Verified (current, total, cumulative) Yes

## 2024-05-30 NOTE — PROGRESS NOTES
NUTRITION Assessment NOTE    Nutrition Assessment     Reason for Visit:  Nelsy Osullivan is a 54 y.o. 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   She is status post cervical lymph node excision February 8, 2024.     She is receiving Bendamustine and Rituxan- 28 Day Cycles.     Referred to this service per screen and assessed today during infusion. Note RDN went to see pt on 3 different occasions, as she was nauseous on the first; eating her lunch during the second, and then falling asleep at the third visit.     Note pt with a h/o Chronic pancreatitis (r/t DM) with chronic abdominal pain. Per MD, she is unable to tolerate pancreatic enzyme replacement therapy.     Followed by pal care and initiated on narcotic regimen.  CNP noted pt with food insecurities.  Pt was referred to [facility] and has an appointment on 6/25/24.    Pt reports bedbugs in her apartment for quite some time, she states that only recently have they bitten her.     PMH noted: DM I, hypothyroidism, CHF, COPD, CAD, Dyslipidemia, Major depressive disorder, UGB, malnutrition, Cholecystectomy, C-dif (5/2023)    [facility].    Comprehensive metabolic panel              Component  Ref Range & Units 2 wk ago 1 mo ago 2 mo ago 3 mo ago 5 mo ago 8 mo ago 9 mo ago   Glucose  74 - 99 mg/dL 52 Low  289 High  253 High  111 High  148 High  178 High  160 High    Sodium  136 - 145 mmol/L 139 134 Low  135 Low  136 135 Low  137 138   Potassium  3.5 - 5.3 mmol/L 3.7 4.3 4.7 4.2 4.7 3.7 4.0   Chloride  98 - 107 mmol/L 110 High  103 102 106 102 104 98   Bicarbonate  21 - 32 mmol/L 24 27 28 24 23 24 34 High    Anion Gap  10 - 20 mmol/L 9 Low  8 Low  10 10 15 13 10   Urea Nitrogen  6 - 23 mg/dL 9 9 10 12 14 8 15   Creatinine  0.50 - 1.05 mg/dL 0.68 0.84 0.94 0.78 0.69 0.69 0.92   eGFR  >60 mL/min/1.73m*2 >90 82 CM 72 CM 90 CM >90 CM >90 CM    Comment: Calculations of estimated GFR are performed using the 2021  CKD-EPI Study Refit equation without the race variable for the IDMS-Traceable creatinine methods.  https://jasn.asnjournals.org/content/early/2021/09/22/ASN.1044722651   Calcium  8.6 - 10.3 mg/dL 8.6 8.6 9.3 8.7 9.0 9.6 9.0   Albumin  3.4 - 5.0 g/dL 3.1 Low  3.1 Low  3.6 3.5 3.7 3.6    Alkaline Phosphatase  33 - 110 U/L 276 High  304 High  290 High  266 High   448 High     Total Protein  6.4 - 8.2 g/dL 6.1 Low  6.3 Low  7.1 6.7  7.5    AST  9 - 39 U/L 36 56 High  29 33  42 High     Bilirubin, Total  0.0 - 1.2 mg/dL 0.3 0.3 0.3 0.3  0.5    ALT  7 - 45 U/L 38 37 CM 23 CM 38 CM  27 CM       This morning pt had 2 slices of PB toast on whole wheat bread and Sylvia Cherries.  Also Regular Coffee with creamer.  Her BS was still low at 52, however, when she was in the clinic.      Hemoglobin A1C- (Current Active Order)  No current results            Component  Ref Range & Units 1 yr ago  (4/4/23) 1 yr ago  (8/5/22) 2 yr ago  (4/6/22) 2 yr ago  (1/14/22) 2 yr ago  (7/20/21) 3 yr ago  (4/12/21) 3 yr ago  (8/25/20)   Hemoglobin A1C  % 13.5 Abnormal  10.3 Abnormal  CM 7.9 Abnormal  CM 7.7 Abnormal  CM 7.7 CM 8.5 CM 9.0 CM   Comment:      Diagnosis of Diabetes-Adults   Non-Diabetic: < or = 5.6%   Increased risk for developing diabetes: 5.7-6.4%   Diagnostic of diabetes: > or = 6.5%  .       Monitoring of Diabetes                Age (y)     Therapeutic Goal (%)   Adults:          >18           <7.0   Pediatrics:    13-18           <7.5                   7-12           <8.0                   0- 6            7.5-8.5   American Diabetes Association. Diabetes Care 33(S1), Jan 2010.   Estimated Average Glucose  MG/ 249 180 174 174 197 212   [facility]        Lipase              Component  Ref Range & Units 7 mo ago  (10/17/23) 1 yr ago  (5/20/23) 1 yr ago  (4/5/23) 1 yr ago  (2/2/23)  "1 yr ago  (9/29/22) 2 yr ago  (4/6/22) 2 yr ago  (12/21/21)   Lipase  9 - 82 U/L 3 Low  3 Low  CM 3 Low  CM 5 Low  CM 6 Low  CM <3 Abnormal  CM <3 Abnormal  CM   Resulting Agency [facility]          Anthropometrics:  Anthropometrics  Height: 158.6 cm (5' 2.44\")  Weight: 56.8 kg (125 lb 4.8 oz)  BMI (Calculated): 22.6  IBW/kg (Dietitian Calculated): 50.9 kg  Percent of IBW: 111.7 %  Weight Change  Significant Weight Loss: Yes (h/o noted in addition)    Historically wt has fluctuated greatly with periods of significant losses.  Pt states this trend is normal for her.     Wt Readings    05/30/24 56.8 kg (125 lb 4.8 oz)- question validity of this wt, as it reflects a 3.7 kg gain in 6 days???   05/24/24 53.1 kg (117 lb 1 oz)- loss of 2.3 kg (4.2%) in 3 weeks- SIGNIFICANT   05/10/24 54 kg (119 lb 0.8 oz)   05/03/24 55.4 kg (122 lb 3.2 oz)- gain of 3.2 kg in 1 month   04/30/24 54.7 kg (120 lb 8 oz)   04/22/24 53.6 kg (118 lb 2.7 oz)   04/11/24 53.6 kg (118 lb 2.7 oz)   04/03/24 52.2 kg (115 lb)- loss of 4 kg (7.1%) in 2 weeks- SIGNIFICANT   03/21/24 56.2 kg (123 lb 14.4 oz)- gain of 14 kg in < 12 months   03/14/24 52.2 kg (115 lb 1.3 oz)   02/23/24 54.9 kg (121 lb)   01/26/24 54.4 kg (120 lb)   01/09/24 51.7 kg (114 lb)     12/13/23 49.8 kg (109 lb 12.8 oz)   10/17/23 45.4 kg (100 lb)   09/11/23 47.2 kg (104 lb)   08/30/23 (!) 40.8 kg (90 lb)   04/04/23 (!) 42.2 kg (93 lb)- loss of 6.3 kg (13%) in < 6 months  SIGNIFICANT   10/11/22 48.5 kg (107 lb)- gain of 4 kg in < 5 months   05/20/22 44.5 kg (98 lb)   04/06/22 44 kg (97 lb)            Food And Nutrient Intake:  Food and Nutrient History  Food and Nutrient History: Pt was initially eating chips on exam- RN states she had been snacking all morning on PB crax and Dorota Doones.  Had a cranberry juice at the bedside and c/o nausea.  She was " "offered a Diet Ginger ale but preferred a regular one, stating that her BS was only 54 per her continuous monitor.  Recall she reports she ate breakfast as above prior to having her labs drawn. She stated that her insulin was increased by 10% while on the steroids for tx.  Stated she did not take her insulin bolus today until 10:30 a.m.  Daughter reports she has a h/o \"going low\" (her BS). (RN reports steroid was given at 10:57 a.m..  RN gave pt compazine to assist with N.)  Energy Intake: Fair 50-75 %  Fluid Intake: Appears adequate  GI Symptoms: constipation, diarrhea (constipation with her chemotherapy; epigastric burning pain)   Taking her Senna before and after her chemotherapy per NP.  Has a PPI on Medication list and reports she takes it.    Pt reports she has tried every pancreatic enzyme and they all make her abd pain worse. Reports that this was 20 years ago. She reports feeling nauseous just thinking about it.   She is not able to give any more details about her EPI today.     At second visit, pt was eating her [employer] food brought in by her daughter- a burger with all the fixings, fries, chili and a frosty.  She was hurried while eating and this RDN did not want her to eat quickly d/t prior c/o N.  This RDN stepped out and returned later at the end of tx.      On the third visit, pt was taking another Zofran, stating the last one she took was 3 hours ago.  She actually took 2 each 4 mg Zofran tabs at the time.  She seemed very anxious.  Pt was sleepy.  She was falling asleep during our conversation.  She had some slurred speech in addition and both tx RN and RN partner were informed.                                                 Food Supplement Intake  Oral Nutrition Supplements:  (Has tried Glucerna  and others in the past and has not really liked them.  She states she does not have money to buy them.  She is willing to try the Ensure Enlive with HMB, as many pts prefer this taste to other ONS.  She " "prefers Strawberry flavor.)           Nutrition Focused Physical Exam Findings:      Subcutaneous Fat Loss  Orbital Fat Pads: Mild-Moderate (slight dark circles and slight hollowing)  Buccal Fat Pads: Well nourished (full, rounded cheeks)  Triceps: Defer  Ribs: Defer    Muscle Wasting  Temporalis: Mild-Moderate (slight depression)  Pectoralis (Clavicular Region): Defer  Deltoid/Trapezius: Defer  Interosseous: Defer  Trapezius/Infraspinatus/Supraspinatus (Scapular Region): Defer  Quadriceps: Defer  Gastrocnemius: Defer    Edema  Edema:  (unknown)         Energy Needs  Calculated Energy Needs Using Equations  Height: 158.6 cm (5' 2.44\")  Estimated Energy Needs  Total Energy Estimated Needs (kCal): 1760 kCal  Total Estimated Energy Need per Day (kCal/kg): 31 kCal/kg  Estimated Fluid Needs  Total Fluid Estimated Needs (mL): 1705 mL  Total Fluid Estimated Needs (mL/kg): 30 mL/kg  Estimated Protein Needs  Total Protein Estimated Needs (g): 74 g  Total Protein Estimated Needs (g/kg): 1.3 g/kg        Nutrition Diagnosis   Malnutrition Diagnosis  Patient has Malnutrition Diagnosis: Yes  Diagnosis Status: New  Malnutrition Diagnosis: Moderate malnutrition related to chronic disease or condition  As Evidenced by: significant wt loss with mild to moderate losses of both adipose and muscle stores per NFPE  Additional Assessment Information: Pt likely with micronutrient deficiencies d/t chronic malabsorption, jose eduardo the fat soluble vitamins A, D, E and K    Nutrition Diagnosis  Patient has Nutrition Diagnosis: Yes  Diagnosis Status (1): New  Nutrition Diagnosis 1: Altered GI function  Related to (1): chronic pancreatitis without PERT, side effects from tx (as pt is intolerant to PERT)  As Evidenced by (1): clinical hx and pt report  Additional Assessment Information (1): Pt would benefit from PERT and may tolerate therapy at this point in her clinical hx (She was encouraged to consider a re-trial.)  Additional Nutrition Diagnosis: " Diagnosis 2  Diagnosis Status (2): New  Nutrition Diagnosis 2: Altered nutrition related to laboratory values (BS)  Related to (2): Questionable excessive administration of insulin (Pt administered bolus with low BS, as she was fearful of the steroid administration with chemo)  As Evidenced by (2): BS of 52 per lab draw and 54 per pt report 2.5 hours later (During RDN visit)    Nutrition Interventions/Recommendations   Nutrition Prescription  Individualized Nutrition Prescription Provided for : Low fat, low-moderate concentrated sugar JOON    Food and Nutrition Delivery  Food and Nutrition Delivery  Meals & Snacks: Diets modified for specific foods or ingredients, Modify schedule of foods/fluids  Goals: 5-6 small frequent meals/snacks daily  Medical Food Supplement: Ensure Enlive  Goals: Twice daily to provide 700 calories and 40 g of protein  Vitamin Supplement Therapy: A, D, E, K  Goals: f/u at next visit    Nutrition Education       Coordination of Care  Coordination of Nutrition Care by a Nutrition Professional  Collaboration and referral of nutrition care: Collaboration by nutrition professional with other providers  Goals: Prescribe ONS to supplement intake    There are no Patient Instructions on file for this visit.    Nutrition Monitoring and Evaluation   Food/Nutrient Related History Monitoring  Monitoring and Evaluation Plan: Fluid intake, Amount of food, Meal/snack pattern (concnetrated sugar intake)  Fluid Intake: Estimated fluid intake  Criteria: Meet daily hydration needs  Amount of Food: Estimated amout of food  Criteria: Meet energy and protein needs  Meal/Snack Pattern: Estimated meal and snack pattern  Criteria: as above  Body Composition/Growth/Weight History  Monitoring and Evaluation Plan: Weight  Weight: Weight change  Criteria: Maintenance  Biochemical Data, Medical Tests and Procedures  Monitoring and Evaluation Plan: Electrolyte/renal panel, Glucose/endocrine profile, Vitamin  profile  Criteria: WNL  Glucose/Endocrine Profile: Glucose, casual, Hemoglobin A1c (HgbA1c)  Criteria: < 250; < 7.0 respectively  Vitamin Profile: Vitamin D, 25 hydroxy  Criteria: 30-80  Nutrition Focused Physical Findings  Monitoring and Evaluation Plan: Adipose, Digestive System, Muscles  Adipose: Loss of subcutaneous fat  Criteria: No further loss  Digestive System: Constipation, Diarrhea, Nausea (epigastric pain)  Criteria: Better manage symptoms with increased compliance with medications as prescribed, including PPI, anti nausea and laxative regimen as needed (Strongly encourage trial of PERT)  Muscles: Muscle atrophy  Criteria: No further loss

## 2024-05-31 ENCOUNTER — SOCIAL WORK (OUTPATIENT)
Dept: HEMATOLOGY/ONCOLOGY | Facility: CLINIC | Age: 54
End: 2024-05-31

## 2024-05-31 ENCOUNTER — LAB (OUTPATIENT)
Dept: LAB | Facility: LAB | Age: 54
End: 2024-05-31
Payer: COMMERCIAL

## 2024-05-31 ENCOUNTER — INFUSION (OUTPATIENT)
Dept: HEMATOLOGY/ONCOLOGY | Facility: CLINIC | Age: 54
End: 2024-05-31
Payer: COMMERCIAL

## 2024-05-31 VITALS
OXYGEN SATURATION: 98 % | WEIGHT: 132 LBS | BODY MASS INDEX: 23.8 KG/M2 | SYSTOLIC BLOOD PRESSURE: 127 MMHG | TEMPERATURE: 98.1 F | RESPIRATION RATE: 18 BRPM | HEART RATE: 109 BPM | DIASTOLIC BLOOD PRESSURE: 78 MMHG

## 2024-05-31 DIAGNOSIS — C82.41 GRADE 3B FOLLICULAR LYMPHOMA OF LYMPH NODES OF NECK (MULTI): ICD-10-CM

## 2024-05-31 DIAGNOSIS — E10.9 TYPE 1 DIABETES MELLITUS WITHOUT COMPLICATION (MULTI): ICD-10-CM

## 2024-05-31 LAB
6MAM UR CFM-MCNC: <25 NG/ML
ANION GAP SERPL CALC-SCNC: 12 MMOL/L (ref 10–20)
BUN SERPL-MCNC: 12 MG/DL (ref 6–23)
CALCIUM SERPL-MCNC: 8.7 MG/DL (ref 8.6–10.3)
CHLORIDE SERPL-SCNC: 105 MMOL/L (ref 98–107)
CHOLEST SERPL-MCNC: 87 MG/DL (ref 0–199)
CHOLESTEROL/HDL RATIO: 1.9
CO2 SERPL-SCNC: 22 MMOL/L (ref 21–32)
CODEINE UR CFM-MCNC: <50 NG/ML
CREAT SERPL-MCNC: 0.88 MG/DL (ref 0.5–1.05)
CREAT UR-MCNC: 73.4 MG/DL (ref 20–320)
EGFRCR SERPLBLD CKD-EPI 2021: 77 ML/MIN/1.73M*2
GLUCOSE SERPL-MCNC: 164 MG/DL (ref 74–99)
HDLC SERPL-MCNC: 45 MG/DL
HYDROCODONE CTO UR CFM-MCNC: <25 NG/ML
HYDROMORPHONE UR CFM-MCNC: 101 NG/ML
LDLC SERPL CALC-MCNC: 31 MG/DL
MICROALBUMIN UR-MCNC: 37 MG/L
MICROALBUMIN/CREAT UR: 50.4 UG/MG CREAT
MORPHINE UR CFM-MCNC: >2500 NG/ML
NON HDL CHOLESTEROL: 42 MG/DL (ref 0–149)
NORHYDROCODONE UR CFM-MCNC: <25 NG/ML
NOROXYCODONE UR CFM-MCNC: >1000 NG/ML
OXYCODONE UR CFM-MCNC: >2500 NG/ML
OXYMORPHONE UR CFM-MCNC: 780 NG/ML
POTASSIUM SERPL-SCNC: 4.4 MMOL/L (ref 3.5–5.3)
SODIUM SERPL-SCNC: 135 MMOL/L (ref 136–145)
TRIGL SERPL-MCNC: 56 MG/DL (ref 0–149)
VLDL: 11 MG/DL (ref 0–40)

## 2024-05-31 PROCEDURE — 80061 LIPID PANEL: CPT

## 2024-05-31 PROCEDURE — 83036 HEMOGLOBIN GLYCOSYLATED A1C: CPT

## 2024-05-31 PROCEDURE — 2500000004 HC RX 250 GENERAL PHARMACY W/ HCPCS (ALT 636 FOR OP/ED): Mod: SE | Performed by: INTERNAL MEDICINE

## 2024-05-31 PROCEDURE — 96375 TX/PRO/DX INJ NEW DRUG ADDON: CPT | Mod: INF

## 2024-05-31 PROCEDURE — 80048 BASIC METABOLIC PNL TOTAL CA: CPT

## 2024-05-31 PROCEDURE — 82570 ASSAY OF URINE CREATININE: CPT

## 2024-05-31 PROCEDURE — 96409 CHEMO IV PUSH SNGL DRUG: CPT

## 2024-05-31 PROCEDURE — 82043 UR ALBUMIN QUANTITATIVE: CPT

## 2024-05-31 RX ORDER — ALBUTEROL SULFATE 0.83 MG/ML
3 SOLUTION RESPIRATORY (INHALATION) AS NEEDED
Status: DISCONTINUED | OUTPATIENT
Start: 2024-05-31 | End: 2024-05-31 | Stop reason: HOSPADM

## 2024-05-31 RX ORDER — HEPARIN 100 UNIT/ML
500 SYRINGE INTRAVENOUS AS NEEDED
OUTPATIENT
Start: 2024-05-31

## 2024-05-31 RX ORDER — PROCHLORPERAZINE MALEATE 10 MG
10 TABLET ORAL EVERY 6 HOURS PRN
Status: DISCONTINUED | OUTPATIENT
Start: 2024-05-31 | End: 2024-05-31 | Stop reason: HOSPADM

## 2024-05-31 RX ORDER — HEPARIN SODIUM,PORCINE/PF 10 UNIT/ML
50 SYRINGE (ML) INTRAVENOUS AS NEEDED
OUTPATIENT
Start: 2024-05-31

## 2024-05-31 RX ORDER — DIPHENHYDRAMINE HYDROCHLORIDE 50 MG/ML
50 INJECTION INTRAMUSCULAR; INTRAVENOUS AS NEEDED
Status: DISCONTINUED | OUTPATIENT
Start: 2024-05-31 | End: 2024-05-31 | Stop reason: HOSPADM

## 2024-05-31 RX ORDER — FAMOTIDINE 10 MG/ML
20 INJECTION INTRAVENOUS ONCE AS NEEDED
Status: DISCONTINUED | OUTPATIENT
Start: 2024-05-31 | End: 2024-05-31 | Stop reason: HOSPADM

## 2024-05-31 RX ORDER — HEPARIN 100 UNIT/ML
500 SYRINGE INTRAVENOUS AS NEEDED
Status: DISCONTINUED | OUTPATIENT
Start: 2024-05-31 | End: 2024-05-31 | Stop reason: HOSPADM

## 2024-05-31 RX ORDER — PROCHLORPERAZINE EDISYLATE 5 MG/ML
10 INJECTION INTRAMUSCULAR; INTRAVENOUS EVERY 6 HOURS PRN
Status: DISCONTINUED | OUTPATIENT
Start: 2024-05-31 | End: 2024-05-31 | Stop reason: HOSPADM

## 2024-05-31 RX ORDER — EPINEPHRINE 0.3 MG/.3ML
0.3 INJECTION SUBCUTANEOUS EVERY 5 MIN PRN
Status: DISCONTINUED | OUTPATIENT
Start: 2024-05-31 | End: 2024-05-31 | Stop reason: HOSPADM

## 2024-05-31 RX ADMIN — DEXAMETHASONE SODIUM PHOSPHATE 12 MG: 4 INJECTION, SOLUTION INTRA-ARTICULAR; INTRALESIONAL; INTRAMUSCULAR; INTRAVENOUS; SOFT TISSUE at 11:06

## 2024-05-31 RX ADMIN — Medication 500 UNITS: at 11:48

## 2024-05-31 RX ADMIN — BENDAMUSTINE HYDROCHLORIDE 137.5 MG: 25 INJECTION, SOLUTION INTRAVENOUS at 11:31

## 2024-05-31 NOTE — PROGRESS NOTES
SW spoke with [employer] to see how many shower grab bars insurance would cover.  Medicaid will only cover 1 in 5 years.       (CASIMIRO) wanted to see if patient brought her income verification for the S juan manuel.  Patient forgot to bring and stated that she will bring it on Monday.  SW provided an update that Medicaid only covers 1 shower grab bar every 5 years.  Patient thanked CASIMIRO for the update.  SW will check in with patient on Monday.    Louis Velazquez MSW, LSW

## 2024-05-31 NOTE — PROGRESS NOTES
Pt here for D2 bendeka infusion. Tolerated well. Pt followed endo's instructions for insulin bolus so she can have dex. She is aware of plan of care, will call with further questions or concerns. Will return Monday for GF injection

## 2024-06-01 LAB
EST. AVERAGE GLUCOSE BLD GHB EST-MCNC: 177 MG/DL
HBA1C MFR BLD: 7.8 %

## 2024-06-02 NOTE — RESULT ENCOUNTER NOTE
Labs have been reviewed.  The A1C has nicely improved to 7.8%.  The kidney and liver functions are normal.  There is spillage of protein in the urine.  For follow up as scheduled.

## 2024-06-02 NOTE — PROGRESS NOTES
SUPPORTIVE AND PALLIATIVE ONCOLOGY FOLLOW UP - OUTPATIENT      SERVICE DATE: 6/14/2024    Referred by:  Dr. Pickering   Medical Oncologist: MD Kathleen Kulkarni MD Saif U Rehman, MD   Radiation Oncologist: No care team member to display  Primary Physician: Catherine Plata  [phone]    REASON FOR CONSULT/CHIEF CONSULT COMPLAINT: pain management    Subjective   HISTORY OF PRESENT ILLNESS: 54-year-old female with stage II grade 3B follicular lymphoma of a left cervical and left supraclavicular lymph node.  She is newly diagnosed and is planned to get bendamustine and rituxan every 28 days. She has been following with Jonathan Worrell with [facility] palliative care, but has since requested to switch to palliative care closer to where she lives.      Information was collected from chart review, discussion with patient/family, and discussion with care team     SUBJECTIVE:  She reports that her pelvic pain is much better controlled with the dilaudid and the morphine CR. She states that she feels that she is at a point where her medications are actually working. She is complaining of rectal pain. She is asking for something that she can take rectally to help with her rectal pain. She states that it feels like hot poker pain that is sharp and in her rectum.     She reports that she had a day on Monday where she cried all day. She states that she was really struggling with her treatment and has been struggling with fatigue related to her treatment. She states that she has been struggling with her mental health. We discussed cancer support groups and getting connected with them. Discussed having lynn come in and work with her and she states that she is agreeable with this. Also discussed reaching out to her psychiatrist for an earlier appointment and she states that she will give her a call.     Pain Assessment:  Pain Score:    Location:    Education:        Symptom Assessment:  ROS otherwise negative, pertinent  positives documented in the HPI       Information obtained from: chart review and interview of patient  ______________________________________________________________________     Oncology History   Follicular lymphoma grade IIIb (Multi)   6/7/2022 Initial Diagnosis    Follicular lymphoma grade IIIb (CMS/HCC)     5/2/2024 -  Chemotherapy    Bendamustine + RiTUXimab, 28 Day Cycles         No past medical history on file.  Past Surgical History:   Procedure Laterality Date    OTHER SURGICAL HISTORY  04/14/2020    Cholecystectomy    OTHER SURGICAL HISTORY  04/14/2020    Exploratory laparoscopy    OTHER SURGICAL HISTORY  04/14/2020    Heart surgery     No family history on file.     SOCIAL HISTORY  Children 4 children and 5 grandchildren    Social History:  reports that she quit smoking about 2 years ago. Her smoking use included cigarettes. She has been exposed to tobacco smoke. She has never used smokeless tobacco. She reports that she does not currently use alcohol. She reports that she does not use drugs.    She has food insecurities and has bed bugs   REVIEW OF SYSTEMS  Review of systems negative unless noted in HPI.       Objective     Palliative Performance Scale % (PPS) 90     Labs:  No results found for this or any previous visit (from the past 96 hour(s)).              Medications:   Current Outpatient Medications   Medication Instructions    albuterol 90 mcg/actuation inhaler 2 puffs, inhalation, Every 6 hours    aspirin 81 mg, oral, Daily    atorvastatin (LIPITOR) 40 mg, oral, Daily    BD Alcohol Swabs pads, medicated USE SIX TIMES DAILY    belladonna alkaloids-opium (B&O Supprettes) 16.2-60 mg suppository 60 mg of opioid, rectal, Every 8 hours PRN    bisacodyl (DULCOLAX (BISACODYL)) 5 mg, oral, Daily PRN    blood-glucose sensor (FreeStyle Ashley 3 Sensor) device 1 each, miscellaneous, Continuous, Use to check glucose, change every 14 days    carvedilol (COREG) 12.5 mg, oral, Every 12 hours    cetirizine  (ZYRTEC) 10 mg, oral, Daily    cyclobenzaprine (FLEXERIL) 10 mg, oral, 3 times daily PRN    DULoxetine (CYMBALTA) 80 mg, oral, Daily    esomeprazole (NEXIUM) 40 mg, oral, Daily PRN    estradiol (ESTRACE) 2 mg, oral, Daily    FreeStyle Ashley 2 Sensor kit 1 each, subcutaneous, Every 14 days    FreeStyle Ashley 3 Carrington misc Use as instructed    HYDROmorphone (DILAUDID) 2-4 mg, oral, Every 4 hours PRN, G89.3    insulin aspart (NovoLOG) 100 unit/mL injection FOR USE WITH INSULIN PUMP MAX DAILY DOSE  UNITS    levothyroxine (Synthroid, Levoxyl) 125 mcg tablet TAKE 1 TABLET BY MOUTH IN THE MORNING ON EMPTY STOMACH    lidocaine-prilocaine (Emla) 2.5-2.5 % cream APPLY AT INSERTION SITE 30-45 MINUTES BEFORE YOU ARRIVE FOR BLOOD WORK OR CHEMOTHERAPY    melatonin 3 mg tablet 2 tablets, oral, Nightly    metoprolol succinate XL (Toprol-XL) 50 mg 24 hr tablet oral    mirtazapine (REMERON) 7.5 mg, oral, Nightly    morphine CR (MS CONTIN) 30 mg, oral, 3 times daily, Do not crush, chew, or split.    naloxone (Narcan) 4 mg/0.1 mL nasal spray Use 1 spray in one nostril as needed for overdose. May repeat every 2 to 3 min in alternating nostrils until medical assistance is available    naratriptan (AMERGE) 2.5 mg, oral, Once as needed    OLANZapine (ZyPREXA) 10 mg tablet oral, 2 times daily PRN    omeprazole (PRILOSEC) 40 mg    Omnipod Dash Pods, Gen 4, cartridge     ondansetron (ZOFRAN) 8 mg, oral, Every 8 hours PRN    phenazopyridine (Pyridium) 200 mg tablet 1 tablet, oral, 3 times daily    prochlorperazine (COMPAZINE) 10 mg, oral, Every 6 hours PRN    promethazine (Phenergan) 25 mg tablet oral    sennosides-docusate sodium (Senna-S) 8.6-50 mg tablet 1 tablet, oral, 2 times daily    spironolactone (Aldactone) 25 mg tablet oral, Daily RT    torsemide (DEMADEX) 20 mg, oral, Daily RT       Allergies:   Allergies   Allergen Reactions    Acetaminophen-Codeine Unknown    Adhesive Unknown    Codeine Unknown    Fentanyl Unknown      Reaction from Community: Other(See Desc) Comments: MIGRAINES IF USED FOR PAIN, BUT OK FOR ANESTHESIA    Other reaction(s): Intolerance    Fetrin Unknown    Ketorolac Itching    Meperidine Unknown     Reaction from Community: Other(See Desc)    Metformin Unknown    Propoxyphene N-Acetaminophen GI Upset    Silver Hives and Other     Tegaderm    Adhesive Tape-Silicones Rash    Iodinated Contrast Media Rash     Does well with 13hour premedication    Latex Rash    Penicillins Rash    Wound Dressings Rash     tagaderm       PHYSICAL EXAMINATION  Vital Signs:   Vital signs reviewed  Vitals:    06/14/24 1425   BP: 122/78   Pulse: 100   Resp: 16   Temp: 36.2 °C (97.2 °F)   SpO2: 98%         Pain Score:           Physical Exam  Constitutional:       Appearance: Normal appearance. She is normal weight.   HENT:      Head: Normocephalic and atraumatic.      Mouth/Throat:      Mouth: Mucous membranes are dry.   Eyes:      Extraocular Movements: Extraocular movements intact.   Cardiovascular:      Comments: No signs of cardiac distress  Pulmonary:      Effort: Pulmonary effort is normal.      Comments: No signs of respiratory distress  Abdominal:      General: Abdomen is flat.      Palpations: Abdomen is soft.   Musculoskeletal:         General: Normal range of motion.   Skin:     General: Skin is warm and dry.   Neurological:      Mental Status: She is alert and oriented to person, place, and time. Mental status is at baseline.   Psychiatric:         Mood and Affect: Mood normal.         Behavior: Behavior normal.         Thought Content: Thought content normal.         Judgment: Judgment normal.         ASSESSMENT/PLAN    Pain  Pain is: cancer related pain and chronic  Type: visceral  Pain control: sub-optimally controlled  Previously tried/intolerances: Did not find oxycodone effective, avoid Tylenol due to liver impairment      Pain Plan:  Continue MS continue 30 mg Q8  Continue Dilaudid 2mg Q4 PRN   Continue Cymbalta 80mg  daily   Continue Flexeril 10mg TID PRN   Will start B&O suppositories Q8 PRN, if not able to find these will have [facility] compound the belladonna     Opioid Use  Medication Management:   OARRS report reviewed with no aberrant behavior; consistent with  prescriptions/records and patient history  .  Overdose Risk Score 420.   This has been discussed with patient.   We will continue to closely monitor the patient for signs of prescription misuse including UDS, OARRS review and subjective reports at each visit.  No concurrent benzodiazepine use   I am a provider who is certified in Hospice and Palliative Medicine and have conducted a face-face visit and examination for this patient.  Routine Urine Drug Screen is needed and was completed on 5/10/24 appropriately positive for opioids and negative for illicit substances  Controlled Substance Agreement: is needed. Completed on: 5/10/24  Specifically discussed that controlled substance prescriptions will only be provided by our group as outlined in the completed agreement  Naloxone is needed  Red Flags: None at this time, but UDS pending      Nausea   Intermittent nausea without vomiting related to chemotherapy  Nausea Plan:  Zyprexa 10mg BID PRN   Compazine 10mg PRN     Diarrhea   Related to chemotherapy  Diarrhea Plan:  Imodium prn      Altered Mood  Chronic anxiety and depression related to health concerns   Mood Plan:  Continue Remeron 7.5mg nightly   Continue Cymbalta 60mg daily       Decreased appetite  Related to malignancy and chemotherapy  Anorexia Plan:   Zyprexa 10mg BID PRN     Itching related to bedbug infestation   Continue Atarax 25mg TID PRN        Advance Directives  Existence of Advance Directives: Reports that she has completed them   Decision maker: OSMEL is reportedly her sister   Code Status: Full code    Next Follow-Up Visit:  Return to clinic in 4 weeks     Signature and billing  Thank you for allowing us to participate in the care  of this patient. Recommendations will be communicated back to the consulting service by way of shared electronic medical record or face-to-face.    Medical complexity was high level due to due to complexity of problems, extensive data review, and high risk of management/treatment.  Time was spent on the following: Prep Time, Time Directly with Patient/Family/Caregiver, Documentation Time. Total time spent: 80      DATA   Diagnostic tests and information reviewed for today's visit:  Most recent labs, Most recent imaging, Medications           SIGNATURE: ALEJA Eugene    Contact information:  Supportive and Palliative Oncology  Monday-Friday 8 AM-5 PM  Phone:  [phone], press option #5, then option #1.   Or Epic Secure Chat

## 2024-06-03 ENCOUNTER — INFUSION (OUTPATIENT)
Dept: HEMATOLOGY/ONCOLOGY | Facility: CLINIC | Age: 54
End: 2024-06-03
Payer: COMMERCIAL

## 2024-06-03 ENCOUNTER — TELEPHONE (OUTPATIENT)
Dept: ENDOCRINOLOGY | Facility: CLINIC | Age: 54
End: 2024-06-03
Payer: COMMERCIAL

## 2024-06-03 VITALS
RESPIRATION RATE: 18 BRPM | TEMPERATURE: 97.7 F | HEART RATE: 108 BPM | HEIGHT: 62 IN | OXYGEN SATURATION: 99 % | SYSTOLIC BLOOD PRESSURE: 144 MMHG | BODY MASS INDEX: 23.37 KG/M2 | DIASTOLIC BLOOD PRESSURE: 94 MMHG | WEIGHT: 126.98 LBS

## 2024-06-03 DIAGNOSIS — C82.41 GRADE 3B FOLLICULAR LYMPHOMA OF LYMPH NODES OF NECK (MULTI): ICD-10-CM

## 2024-06-03 PROCEDURE — 96372 THER/PROPH/DIAG INJ SC/IM: CPT

## 2024-06-03 PROCEDURE — 2500000004 HC RX 250 GENERAL PHARMACY W/ HCPCS (ALT 636 FOR OP/ED): Mod: JZ,SE | Performed by: INTERNAL MEDICINE

## 2024-06-03 RX ORDER — DIPHENHYDRAMINE HYDROCHLORIDE 50 MG/ML
50 INJECTION INTRAMUSCULAR; INTRAVENOUS AS NEEDED
Status: DISCONTINUED | OUTPATIENT
Start: 2024-06-03 | End: 2024-06-03 | Stop reason: HOSPADM

## 2024-06-03 RX ORDER — ALBUTEROL SULFATE 0.83 MG/ML
3 SOLUTION RESPIRATORY (INHALATION) AS NEEDED
Status: DISCONTINUED | OUTPATIENT
Start: 2024-06-03 | End: 2024-06-03 | Stop reason: HOSPADM

## 2024-06-03 RX ORDER — FAMOTIDINE 10 MG/ML
20 INJECTION INTRAVENOUS ONCE AS NEEDED
Status: DISCONTINUED | OUTPATIENT
Start: 2024-06-03 | End: 2024-06-03 | Stop reason: HOSPADM

## 2024-06-03 RX ORDER — EPINEPHRINE 0.3 MG/.3ML
0.3 INJECTION SUBCUTANEOUS EVERY 5 MIN PRN
Status: DISCONTINUED | OUTPATIENT
Start: 2024-06-03 | End: 2024-06-03 | Stop reason: HOSPADM

## 2024-06-03 RX ADMIN — PEGFILGRASTIM-CBQV 6 MG: 6 INJECTION, SOLUTION SUBCUTANEOUS at 13:24

## 2024-06-03 ASSESSMENT — PAIN SCALES - GENERAL: PAINLEVEL_OUTOF10: 7

## 2024-06-03 NOTE — PROGRESS NOTES
Pt arrived awake, alert, oriented , no apparent distress with unlabored breaths. Pt reports feeling well today without s/sx of illness. Pt here for pegfilgrastim injection in which she received and tolerated well. Pt with next appointment on 6/14/24 with palliative care, and then 6/27/24 next infusion. Pt discharged in stable condition.

## 2024-06-03 NOTE — TELEPHONE ENCOUNTER
----- Message from Colleen De Jesus MD sent at 6/2/2024  2:47 PM EDT -----  Labs have been reviewed.  The A1C has nicely improved to 7.8%.  The kidney and liver functions are normal.  There is spillage of protein in the urine.  For follow up as scheduled.

## 2024-06-13 ENCOUNTER — TELEPHONE (OUTPATIENT)
Dept: HEMATOLOGY/ONCOLOGY | Facility: CLINIC | Age: 54
End: 2024-06-13
Payer: COMMERCIAL

## 2024-06-13 NOTE — TELEPHONE ENCOUNTER
Patient called  (SW).  She stated that she has not heard from LLS yet.  Patient has an appointment tomorrow.  SW can upload her financial information if she is able to bring it in.  Patient stated that she knew someone that lived in her apartment complex that had bed begs and it was professionally cleaned.  Patient stated that at the time no one was living there.  Patient has not discussed this with her landlord yet.  SW will meet with patient tomorrow.      Louis Velazquez MSW, LSW

## 2024-06-14 ENCOUNTER — TELEPHONE (OUTPATIENT)
Dept: PALLIATIVE MEDICINE | Facility: CLINIC | Age: 54
End: 2024-06-14

## 2024-06-14 ENCOUNTER — PHARMACY VISIT (OUTPATIENT)
Dept: PHARMACY | Facility: CLINIC | Age: 54
End: 2024-06-14
Payer: MEDICAID

## 2024-06-14 ENCOUNTER — OFFICE VISIT (OUTPATIENT)
Dept: PALLIATIVE MEDICINE | Facility: CLINIC | Age: 54
End: 2024-06-14
Payer: COMMERCIAL

## 2024-06-14 ENCOUNTER — SOCIAL WORK (OUTPATIENT)
Dept: HEMATOLOGY/ONCOLOGY | Facility: CLINIC | Age: 54
End: 2024-06-14
Payer: COMMERCIAL

## 2024-06-14 VITALS
HEART RATE: 100 BPM | DIASTOLIC BLOOD PRESSURE: 78 MMHG | BODY MASS INDEX: 21.47 KG/M2 | TEMPERATURE: 97.2 F | RESPIRATION RATE: 16 BRPM | WEIGHT: 119.05 LBS | OXYGEN SATURATION: 98 % | SYSTOLIC BLOOD PRESSURE: 122 MMHG

## 2024-06-14 DIAGNOSIS — G89.3 NEOPLASM RELATED PAIN: ICD-10-CM

## 2024-06-14 DIAGNOSIS — K86.1 CHRONIC PANCREATITIS, UNSPECIFIED PANCREATITIS TYPE (MULTI): ICD-10-CM

## 2024-06-14 DIAGNOSIS — K62.89 RECTAL PAIN: Primary | ICD-10-CM

## 2024-06-14 PROCEDURE — RXMED WILLOW AMBULATORY MEDICATION CHARGE

## 2024-06-14 RX ORDER — HYDROMORPHONE HYDROCHLORIDE 2 MG/1
2-4 TABLET ORAL EVERY 4 HOURS PRN
Qty: 210 TABLET | Refills: 0 | Status: SHIPPED | OUTPATIENT
Start: 2024-06-14 | End: 2024-07-05

## 2024-06-14 RX ORDER — MORPHINE SULFATE 30 MG/1
30 TABLET, FILM COATED, EXTENDED RELEASE ORAL 3 TIMES DAILY
Qty: 90 TABLET | Refills: 0 | Status: SHIPPED | OUTPATIENT
Start: 2024-06-14 | End: 2024-07-14

## 2024-06-14 RX ORDER — ATROPA BELLADONNA AND OPIUM 16.2; 6 MG/1; MG/1
60 SUPPOSITORY RECTAL EVERY 8 HOURS PRN
Qty: 30 SUPPOSITORY | Refills: 0 | Status: SHIPPED | OUTPATIENT
Start: 2024-06-14

## 2024-06-14 NOTE — PROGRESS NOTES
Patient had her follow up appointment with Ian Carvajal, Supportive Oncology today.  SW met with patient to get her income verification.  Patient is having a hard time and stated that she has been crying more.  Patient has been following with her psychiatrist.  She stated that her doctor did prescribe her a new medication to help with her mood but forgets the name of it.  SW discussed that she has a lot going on with going through cancer again and her bed bug situation at home.  Patient stated that her neighbor next door had bed bugs so she is not sure if the bed bugs came through there.  SW offered to help her make the phone call to her landlord.  Patient stated that she had to think about it.   SW provided information on support groups.  Patient is interested.  SW is going to reach out to [employer] [facility].  SW will provide an update to patient.  SW encouraged patient to reach out to SW if she needed anything.  SW will continue to follow patient.      SW uploaded her income to the [employer] portal.      Louis Velazquez MSW, LSW

## 2024-06-14 NOTE — TELEPHONE ENCOUNTER
Phone call to [facility] to see if they can compound Belladonna suppository 16.2mg rectally Q8 PRN for rectal pain. The lab was gone for the day but someone else was asked and said they do not. Provider updated.

## 2024-06-17 ENCOUNTER — PHARMACY VISIT (OUTPATIENT)
Dept: PHARMACY | Facility: CLINIC | Age: 54
End: 2024-06-17

## 2024-06-17 ENCOUNTER — SOCIAL WORK (OUTPATIENT)
Dept: HEMATOLOGY/ONCOLOGY | Facility: CLINIC | Age: 54
End: 2024-06-17
Payer: COMMERCIAL

## 2024-06-17 NOTE — PROGRESS NOTES
(SW) completed referral to [facility] for patient to get involved in an online support group.    Louis Velazquez MSW, LSW

## 2024-06-20 ENCOUNTER — TELEPHONE (OUTPATIENT)
Dept: HEMATOLOGY/ONCOLOGY | Facility: CLINIC | Age: 54
End: 2024-06-20
Payer: COMMERCIAL

## 2024-06-20 NOTE — TELEPHONE ENCOUNTER
(CASIMIRO) received a call from patient asking if SW has heard back from [facility] on the urgent needs juan manuel.  SW was able to look at the [facility] portal and it stated that additional information was needed.  SW sent S an email to see what information they are needing.  SW called patient back and had to leave her a message.  SW will provide patient an update once SW hears back.      SW received a call back from patient.  Patient stated that she was hoping to get the check quickly so she can talk to her landlord about the bed bugs.  SW let patient know that SW sent and email to [facility] and will hopefully hear back today.  SW will continue to let her know what she may need to bring for more verification.  Patient thanked SW for the update.      Louis Velazquez MSW, LSW

## 2024-06-21 ENCOUNTER — TELEPHONE (OUTPATIENT)
Dept: HEMATOLOGY/ONCOLOGY | Facility: CLINIC | Age: 54
End: 2024-06-21
Payer: COMMERCIAL

## 2024-06-21 NOTE — TELEPHONE ENCOUNTER
Patient called CASIMIRO asking if SW heard from [employer].  SW did not and will call today.  Patient thanked CASIMIRO.    CASIMIRO spoke with [employer] [phone].  They stated that they need the bank statement to show the elizabeth name and her mailing address.  [facility] also needs to talk with patient to verify her address and her social security number.  SW also let patient know that Kandace from [employer] [facility] stated that they do not have transportation but they are able to do some support over the phone.  CASIMIRO provided Kandace's phone number as well.  CASIMIRO let patient know once she get the new bank statement SW can upload in the S portal.  April thanked CASIMIRO for the update.      Louis Velazquez MSW, LSW

## 2024-06-25 ENCOUNTER — APPOINTMENT (OUTPATIENT)
Dept: HEMATOLOGY/ONCOLOGY | Facility: CLINIC | Age: 54
End: 2024-06-25
Payer: COMMERCIAL

## 2024-06-25 ENCOUNTER — SOCIAL WORK (OUTPATIENT)
Dept: HEMATOLOGY/ONCOLOGY | Facility: CLINIC | Age: 54
End: 2024-06-25

## 2024-06-25 NOTE — PROGRESS NOTES
(SW) had to resubmit the [facility] juan manuel urgent needs fund.  SW was also able to upload patient's bank information for patient's income verification.    Louis Velazquez MSW, LSW

## 2024-06-26 ENCOUNTER — TELEPHONE (OUTPATIENT)
Dept: HEMATOLOGY/ONCOLOGY | Facility: CLINIC | Age: 54
End: 2024-06-26
Payer: COMMERCIAL

## 2024-06-26 ENCOUNTER — APPOINTMENT (OUTPATIENT)
Dept: HEMATOLOGY/ONCOLOGY | Facility: CLINIC | Age: 54
End: 2024-06-26
Payer: COMMERCIAL

## 2024-06-26 NOTE — TELEPHONE ENCOUNTER
(SW) received a call from patient.  Patient wanted to make sure that SW received her income verification.  SW let her know that SW did and SW reapplied for the [facility] juan manuel.  SW was able to look at the [facility] portal and patient was approved.  Patient stated that once she receives the check she is going to talk with her landlord regarding the bed bugs.  Patient stated that she could not sleep last night due to all the bugs.  Patient stated that she will be at treatment tomorrow.  SW will continue to follow patient.      Louis Velazquez MSW, LSW    [Walk ___ Months] : Walk: [unfilled] months

## 2024-06-27 ENCOUNTER — SOCIAL WORK (OUTPATIENT)
Dept: HEMATOLOGY/ONCOLOGY | Facility: CLINIC | Age: 54
End: 2024-06-27

## 2024-06-27 ENCOUNTER — INFUSION (OUTPATIENT)
Dept: HEMATOLOGY/ONCOLOGY | Facility: CLINIC | Age: 54
End: 2024-06-27
Payer: COMMERCIAL

## 2024-06-27 ENCOUNTER — APPOINTMENT (OUTPATIENT)
Dept: HEMATOLOGY/ONCOLOGY | Facility: CLINIC | Age: 54
End: 2024-06-27
Payer: COMMERCIAL

## 2024-06-27 ENCOUNTER — OFFICE VISIT (OUTPATIENT)
Dept: HEMATOLOGY/ONCOLOGY | Facility: CLINIC | Age: 54
End: 2024-06-27
Payer: COMMERCIAL

## 2024-06-27 ENCOUNTER — PHARMACY VISIT (OUTPATIENT)
Dept: PHARMACY | Facility: CLINIC | Age: 54
End: 2024-06-27
Payer: MEDICAID

## 2024-06-27 VITALS
RESPIRATION RATE: 16 BRPM | HEIGHT: 62 IN | OXYGEN SATURATION: 99 % | HEART RATE: 107 BPM | TEMPERATURE: 97.2 F | DIASTOLIC BLOOD PRESSURE: 77 MMHG | SYSTOLIC BLOOD PRESSURE: 124 MMHG | BODY MASS INDEX: 22.86 KG/M2 | WEIGHT: 124.2 LBS

## 2024-06-27 DIAGNOSIS — C82.41 GRADE 3B FOLLICULAR LYMPHOMA OF LYMPH NODES OF NECK (MULTI): ICD-10-CM

## 2024-06-27 DIAGNOSIS — C82.41 GRADE 3B FOLLICULAR LYMPHOMA OF LYMPH NODES OF NECK (MULTI): Primary | ICD-10-CM

## 2024-06-27 LAB
ALBUMIN SERPL BCP-MCNC: 2.8 G/DL (ref 3.4–5)
ALP SERPL-CCNC: 386 U/L (ref 33–110)
ALT SERPL W P-5'-P-CCNC: 37 U/L (ref 7–45)
ANION GAP SERPL CALC-SCNC: 9 MMOL/L (ref 10–20)
AST SERPL W P-5'-P-CCNC: 94 U/L (ref 9–39)
BASOPHILS # BLD AUTO: 0.04 X10*3/UL (ref 0–0.1)
BASOPHILS NFR BLD AUTO: 0.6 %
BILIRUB SERPL-MCNC: 0.4 MG/DL (ref 0–1.2)
BUN SERPL-MCNC: 8 MG/DL (ref 6–23)
CALCIUM SERPL-MCNC: 8.5 MG/DL (ref 8.6–10.3)
CHLORIDE SERPL-SCNC: 105 MMOL/L (ref 98–107)
CO2 SERPL-SCNC: 28 MMOL/L (ref 21–32)
CREAT SERPL-MCNC: 0.78 MG/DL (ref 0.5–1.05)
EGFRCR SERPLBLD CKD-EPI 2021: 89 ML/MIN/1.73M*2
EOSINOPHIL # BLD AUTO: 1.65 X10*3/UL (ref 0–0.7)
EOSINOPHIL NFR BLD AUTO: 25.3 %
ERYTHROCYTE [DISTWIDTH] IN BLOOD BY AUTOMATED COUNT: 18.3 % (ref 11.5–14.5)
GLUCOSE SERPL-MCNC: 66 MG/DL (ref 74–99)
HCT VFR BLD AUTO: 31.9 % (ref 36–46)
HGB BLD-MCNC: 11 G/DL (ref 12–16)
IMM GRANULOCYTES # BLD AUTO: 0.02 X10*3/UL (ref 0–0.7)
IMM GRANULOCYTES NFR BLD AUTO: 0.3 % (ref 0–0.9)
LDH SERPL L TO P-CCNC: 418 U/L (ref 84–246)
LYMPHOCYTES # BLD AUTO: 0.51 X10*3/UL (ref 1.2–4.8)
LYMPHOCYTES NFR BLD AUTO: 7.8 %
MCH RBC QN AUTO: 29.1 PG (ref 26–34)
MCHC RBC AUTO-ENTMCNC: 34.5 G/DL (ref 32–36)
MCV RBC AUTO: 84 FL (ref 80–100)
MONOCYTES # BLD AUTO: 0.72 X10*3/UL (ref 0.1–1)
MONOCYTES NFR BLD AUTO: 11 %
NEUTROPHILS # BLD AUTO: 3.59 X10*3/UL (ref 1.2–7.7)
NEUTROPHILS NFR BLD AUTO: 55 %
PLATELET # BLD AUTO: 333 X10*3/UL (ref 150–450)
POTASSIUM SERPL-SCNC: 4.1 MMOL/L (ref 3.5–5.3)
PROT SERPL-MCNC: 5.6 G/DL (ref 6.4–8.2)
RBC # BLD AUTO: 3.78 X10*6/UL (ref 4–5.2)
SODIUM SERPL-SCNC: 138 MMOL/L (ref 136–145)
URATE SERPL-MCNC: 4.4 MG/DL (ref 2.3–6.7)
WBC # BLD AUTO: 6.5 X10*3/UL (ref 4.4–11.3)

## 2024-06-27 PROCEDURE — 96375 TX/PRO/DX INJ NEW DRUG ADDON: CPT | Mod: INF

## 2024-06-27 PROCEDURE — 96415 CHEMO IV INFUSION ADDL HR: CPT

## 2024-06-27 PROCEDURE — 99215 OFFICE O/P EST HI 40 MIN: CPT | Performed by: INTERNAL MEDICINE

## 2024-06-27 PROCEDURE — 96413 CHEMO IV INFUSION 1 HR: CPT

## 2024-06-27 PROCEDURE — 84550 ASSAY OF BLOOD/URIC ACID: CPT

## 2024-06-27 PROCEDURE — 2500000004 HC RX 250 GENERAL PHARMACY W/ HCPCS (ALT 636 FOR OP/ED): Mod: SE | Performed by: INTERNAL MEDICINE

## 2024-06-27 PROCEDURE — RXMED WILLOW AMBULATORY MEDICATION CHARGE

## 2024-06-27 PROCEDURE — 84075 ASSAY ALKALINE PHOSPHATASE: CPT

## 2024-06-27 PROCEDURE — 2500000001 HC RX 250 WO HCPCS SELF ADMINISTERED DRUGS (ALT 637 FOR MEDICARE OP): Mod: SE | Performed by: INTERNAL MEDICINE

## 2024-06-27 PROCEDURE — 96411 CHEMO IV PUSH ADDL DRUG: CPT

## 2024-06-27 PROCEDURE — 83615 LACTATE (LD) (LDH) ENZYME: CPT

## 2024-06-27 PROCEDURE — 85025 COMPLETE CBC W/AUTO DIFF WBC: CPT

## 2024-06-27 RX ORDER — PROCHLORPERAZINE MALEATE 10 MG
10 TABLET ORAL EVERY 6 HOURS PRN
OUTPATIENT
Start: 2024-08-22

## 2024-06-27 RX ORDER — ALBUTEROL SULFATE 0.83 MG/ML
3 SOLUTION RESPIRATORY (INHALATION) AS NEEDED
OUTPATIENT
Start: 2024-08-24

## 2024-06-27 RX ORDER — DIPHENHYDRAMINE HCL 25 MG
50 CAPSULE ORAL ONCE
OUTPATIENT
Start: 2024-07-25

## 2024-06-27 RX ORDER — DIPHENHYDRAMINE HYDROCHLORIDE 50 MG/ML
50 INJECTION INTRAMUSCULAR; INTRAVENOUS AS NEEDED
OUTPATIENT
Start: 2024-07-27

## 2024-06-27 RX ORDER — FUROSEMIDE 20 MG/1
20 TABLET ORAL DAILY
Qty: 30 TABLET | Refills: 2 | Status: SHIPPED | OUTPATIENT
Start: 2024-06-27 | End: 2024-06-27 | Stop reason: SDUPTHER

## 2024-06-27 RX ORDER — DIPHENHYDRAMINE HYDROCHLORIDE 50 MG/ML
50 INJECTION INTRAMUSCULAR; INTRAVENOUS AS NEEDED
OUTPATIENT
Start: 2024-07-25

## 2024-06-27 RX ORDER — FAMOTIDINE 10 MG/ML
20 INJECTION INTRAVENOUS ONCE AS NEEDED
OUTPATIENT
Start: 2024-09-20

## 2024-06-27 RX ORDER — FUROSEMIDE 20 MG/1
20 TABLET ORAL DAILY
Qty: 30 TABLET | Refills: 2 | OUTPATIENT
Start: 2024-06-27

## 2024-06-27 RX ORDER — FAMOTIDINE 10 MG/ML
20 INJECTION INTRAVENOUS ONCE AS NEEDED
OUTPATIENT
Start: 2024-09-21

## 2024-06-27 RX ORDER — FAMOTIDINE 10 MG/ML
20 INJECTION INTRAVENOUS ONCE AS NEEDED
OUTPATIENT
Start: 2024-07-25

## 2024-06-27 RX ORDER — EPINEPHRINE 0.3 MG/.3ML
0.3 INJECTION SUBCUTANEOUS EVERY 5 MIN PRN
OUTPATIENT
Start: 2024-08-23

## 2024-06-27 RX ORDER — PROCHLORPERAZINE EDISYLATE 5 MG/ML
10 INJECTION INTRAMUSCULAR; INTRAVENOUS EVERY 6 HOURS PRN
OUTPATIENT
Start: 2024-07-25

## 2024-06-27 RX ORDER — DIPHENHYDRAMINE HYDROCHLORIDE 50 MG/ML
50 INJECTION INTRAMUSCULAR; INTRAVENOUS AS NEEDED
Status: DISCONTINUED | OUTPATIENT
Start: 2024-06-27 | End: 2024-06-27 | Stop reason: HOSPADM

## 2024-06-27 RX ORDER — EPINEPHRINE 0.3 MG/.3ML
0.3 INJECTION SUBCUTANEOUS EVERY 5 MIN PRN
OUTPATIENT
Start: 2024-09-20

## 2024-06-27 RX ORDER — FAMOTIDINE 10 MG/ML
20 INJECTION INTRAVENOUS ONCE AS NEEDED
OUTPATIENT
Start: 2024-07-27

## 2024-06-27 RX ORDER — PALONOSETRON 0.05 MG/ML
0.25 INJECTION, SOLUTION INTRAVENOUS ONCE
OUTPATIENT
Start: 2024-07-25

## 2024-06-27 RX ORDER — ALBUTEROL SULFATE 0.83 MG/ML
3 SOLUTION RESPIRATORY (INHALATION) AS NEEDED
OUTPATIENT
Start: 2024-07-27

## 2024-06-27 RX ORDER — PALONOSETRON 0.05 MG/ML
0.25 INJECTION, SOLUTION INTRAVENOUS ONCE
OUTPATIENT
Start: 2024-08-22

## 2024-06-27 RX ORDER — PROCHLORPERAZINE MALEATE 10 MG
10 TABLET ORAL EVERY 6 HOURS PRN
Status: DISCONTINUED | OUTPATIENT
Start: 2024-06-27 | End: 2024-06-27 | Stop reason: HOSPADM

## 2024-06-27 RX ORDER — ALBUTEROL SULFATE 0.83 MG/ML
3 SOLUTION RESPIRATORY (INHALATION) AS NEEDED
OUTPATIENT
Start: 2024-09-19

## 2024-06-27 RX ORDER — PROCHLORPERAZINE MALEATE 10 MG
10 TABLET ORAL EVERY 6 HOURS PRN
OUTPATIENT
Start: 2024-08-23

## 2024-06-27 RX ORDER — FAMOTIDINE 10 MG/ML
20 INJECTION INTRAVENOUS ONCE AS NEEDED
OUTPATIENT
Start: 2024-08-24

## 2024-06-27 RX ORDER — ALBUTEROL SULFATE 0.83 MG/ML
3 SOLUTION RESPIRATORY (INHALATION) AS NEEDED
Status: DISCONTINUED | OUTPATIENT
Start: 2024-06-27 | End: 2024-06-27 | Stop reason: HOSPADM

## 2024-06-27 RX ORDER — EPINEPHRINE 0.3 MG/.3ML
0.3 INJECTION SUBCUTANEOUS EVERY 5 MIN PRN
OUTPATIENT
Start: 2024-09-21

## 2024-06-27 RX ORDER — PROCHLORPERAZINE EDISYLATE 5 MG/ML
10 INJECTION INTRAMUSCULAR; INTRAVENOUS EVERY 6 HOURS PRN
Status: DISCONTINUED | OUTPATIENT
Start: 2024-06-27 | End: 2024-06-27 | Stop reason: HOSPADM

## 2024-06-27 RX ORDER — ACETAMINOPHEN 325 MG/1
650 TABLET ORAL ONCE
OUTPATIENT
Start: 2024-09-19

## 2024-06-27 RX ORDER — ACETAMINOPHEN 325 MG/1
650 TABLET ORAL ONCE
OUTPATIENT
Start: 2024-08-22

## 2024-06-27 RX ORDER — ALBUTEROL SULFATE 0.83 MG/ML
3 SOLUTION RESPIRATORY (INHALATION) AS NEEDED
OUTPATIENT
Start: 2024-07-26

## 2024-06-27 RX ORDER — HEPARIN SODIUM,PORCINE/PF 10 UNIT/ML
50 SYRINGE (ML) INTRAVENOUS AS NEEDED
Status: CANCELLED | OUTPATIENT
Start: 2024-06-27

## 2024-06-27 RX ORDER — FAMOTIDINE 10 MG/ML
20 INJECTION INTRAVENOUS ONCE AS NEEDED
OUTPATIENT
Start: 2024-08-23

## 2024-06-27 RX ORDER — EPINEPHRINE 0.3 MG/.3ML
0.3 INJECTION SUBCUTANEOUS EVERY 5 MIN PRN
OUTPATIENT
Start: 2024-07-26

## 2024-06-27 RX ORDER — PROCHLORPERAZINE MALEATE 10 MG
10 TABLET ORAL EVERY 6 HOURS PRN
OUTPATIENT
Start: 2024-07-26

## 2024-06-27 RX ORDER — DIPHENHYDRAMINE HYDROCHLORIDE 50 MG/ML
50 INJECTION INTRAMUSCULAR; INTRAVENOUS AS NEEDED
OUTPATIENT
Start: 2024-08-24

## 2024-06-27 RX ORDER — EPINEPHRINE 0.3 MG/.3ML
0.3 INJECTION SUBCUTANEOUS EVERY 5 MIN PRN
OUTPATIENT
Start: 2024-07-25

## 2024-06-27 RX ORDER — PALONOSETRON 0.05 MG/ML
0.25 INJECTION, SOLUTION INTRAVENOUS ONCE
OUTPATIENT
Start: 2024-09-19

## 2024-06-27 RX ORDER — FAMOTIDINE 10 MG/ML
20 INJECTION INTRAVENOUS ONCE AS NEEDED
Status: DISCONTINUED | OUTPATIENT
Start: 2024-06-27 | End: 2024-06-27 | Stop reason: HOSPADM

## 2024-06-27 RX ORDER — HEPARIN 100 UNIT/ML
500 SYRINGE INTRAVENOUS AS NEEDED
Status: DISCONTINUED | OUTPATIENT
Start: 2024-06-27 | End: 2024-06-27 | Stop reason: HOSPADM

## 2024-06-27 RX ORDER — EPINEPHRINE 0.3 MG/.3ML
0.3 INJECTION SUBCUTANEOUS EVERY 5 MIN PRN
OUTPATIENT
Start: 2024-08-24

## 2024-06-27 RX ORDER — PALONOSETRON 0.05 MG/ML
0.25 INJECTION, SOLUTION INTRAVENOUS ONCE
Status: COMPLETED | OUTPATIENT
Start: 2024-06-27 | End: 2024-06-27

## 2024-06-27 RX ORDER — DIPHENHYDRAMINE HYDROCHLORIDE 50 MG/ML
50 INJECTION INTRAMUSCULAR; INTRAVENOUS AS NEEDED
OUTPATIENT
Start: 2024-09-19

## 2024-06-27 RX ORDER — ACETAMINOPHEN 325 MG/1
650 TABLET ORAL ONCE
Status: COMPLETED | OUTPATIENT
Start: 2024-06-27 | End: 2024-06-27

## 2024-06-27 RX ORDER — PROCHLORPERAZINE EDISYLATE 5 MG/ML
10 INJECTION INTRAMUSCULAR; INTRAVENOUS EVERY 6 HOURS PRN
OUTPATIENT
Start: 2024-09-20

## 2024-06-27 RX ORDER — DIPHENHYDRAMINE HCL 25 MG
50 CAPSULE ORAL ONCE
OUTPATIENT
Start: 2024-09-19

## 2024-06-27 RX ORDER — DIPHENHYDRAMINE HYDROCHLORIDE 50 MG/ML
50 INJECTION INTRAMUSCULAR; INTRAVENOUS AS NEEDED
OUTPATIENT
Start: 2024-07-26

## 2024-06-27 RX ORDER — ALBUTEROL SULFATE 0.83 MG/ML
3 SOLUTION RESPIRATORY (INHALATION) AS NEEDED
OUTPATIENT
Start: 2024-09-20

## 2024-06-27 RX ORDER — HEPARIN 100 UNIT/ML
500 SYRINGE INTRAVENOUS AS NEEDED
Status: CANCELLED | OUTPATIENT
Start: 2024-06-27

## 2024-06-27 RX ORDER — FAMOTIDINE 10 MG/ML
20 INJECTION INTRAVENOUS ONCE AS NEEDED
OUTPATIENT
Start: 2024-09-19

## 2024-06-27 RX ORDER — ALBUTEROL SULFATE 0.83 MG/ML
3 SOLUTION RESPIRATORY (INHALATION) AS NEEDED
OUTPATIENT
Start: 2024-08-23

## 2024-06-27 RX ORDER — PROCHLORPERAZINE MALEATE 10 MG
10 TABLET ORAL EVERY 6 HOURS PRN
OUTPATIENT
Start: 2024-09-19

## 2024-06-27 RX ORDER — ALBUTEROL SULFATE 0.83 MG/ML
3 SOLUTION RESPIRATORY (INHALATION) AS NEEDED
OUTPATIENT
Start: 2024-09-21

## 2024-06-27 RX ORDER — DIPHENHYDRAMINE HYDROCHLORIDE 50 MG/ML
50 INJECTION INTRAMUSCULAR; INTRAVENOUS AS NEEDED
OUTPATIENT
Start: 2024-09-21

## 2024-06-27 RX ORDER — DIPHENHYDRAMINE HYDROCHLORIDE 50 MG/ML
50 INJECTION INTRAMUSCULAR; INTRAVENOUS AS NEEDED
OUTPATIENT
Start: 2024-09-20

## 2024-06-27 RX ORDER — PROCHLORPERAZINE MALEATE 10 MG
10 TABLET ORAL EVERY 6 HOURS PRN
OUTPATIENT
Start: 2024-09-20

## 2024-06-27 RX ORDER — ALBUTEROL SULFATE 0.83 MG/ML
3 SOLUTION RESPIRATORY (INHALATION) AS NEEDED
OUTPATIENT
Start: 2024-07-25

## 2024-06-27 RX ORDER — FAMOTIDINE 10 MG/ML
20 INJECTION INTRAVENOUS ONCE AS NEEDED
OUTPATIENT
Start: 2024-07-26

## 2024-06-27 RX ORDER — EPINEPHRINE 0.3 MG/.3ML
0.3 INJECTION SUBCUTANEOUS EVERY 5 MIN PRN
OUTPATIENT
Start: 2024-07-27

## 2024-06-27 RX ORDER — PROCHLORPERAZINE EDISYLATE 5 MG/ML
10 INJECTION INTRAMUSCULAR; INTRAVENOUS EVERY 6 HOURS PRN
OUTPATIENT
Start: 2024-08-23

## 2024-06-27 RX ORDER — EPINEPHRINE 0.3 MG/.3ML
0.3 INJECTION SUBCUTANEOUS EVERY 5 MIN PRN
OUTPATIENT
Start: 2024-09-19

## 2024-06-27 RX ORDER — ACETAMINOPHEN 325 MG/1
650 TABLET ORAL ONCE
OUTPATIENT
Start: 2024-07-25

## 2024-06-27 RX ORDER — PROCHLORPERAZINE EDISYLATE 5 MG/ML
10 INJECTION INTRAMUSCULAR; INTRAVENOUS EVERY 6 HOURS PRN
OUTPATIENT
Start: 2024-08-22

## 2024-06-27 RX ORDER — DIPHENHYDRAMINE HCL 25 MG
50 CAPSULE ORAL ONCE
OUTPATIENT
Start: 2024-08-22

## 2024-06-27 RX ORDER — DIPHENHYDRAMINE HYDROCHLORIDE 50 MG/ML
50 INJECTION INTRAMUSCULAR; INTRAVENOUS AS NEEDED
OUTPATIENT
Start: 2024-08-22

## 2024-06-27 RX ORDER — DIPHENHYDRAMINE HCL 25 MG
50 CAPSULE ORAL ONCE
Status: COMPLETED | OUTPATIENT
Start: 2024-06-27 | End: 2024-06-27

## 2024-06-27 RX ORDER — EPINEPHRINE 0.3 MG/.3ML
0.3 INJECTION SUBCUTANEOUS EVERY 5 MIN PRN
OUTPATIENT
Start: 2024-08-22

## 2024-06-27 RX ORDER — PROCHLORPERAZINE EDISYLATE 5 MG/ML
10 INJECTION INTRAMUSCULAR; INTRAVENOUS EVERY 6 HOURS PRN
OUTPATIENT
Start: 2024-07-26

## 2024-06-27 RX ORDER — DIPHENHYDRAMINE HYDROCHLORIDE 50 MG/ML
50 INJECTION INTRAMUSCULAR; INTRAVENOUS AS NEEDED
OUTPATIENT
Start: 2024-08-23

## 2024-06-27 RX ORDER — EPINEPHRINE 0.3 MG/.3ML
0.3 INJECTION SUBCUTANEOUS EVERY 5 MIN PRN
Status: DISCONTINUED | OUTPATIENT
Start: 2024-06-27 | End: 2024-06-27 | Stop reason: HOSPADM

## 2024-06-27 RX ORDER — PROCHLORPERAZINE MALEATE 10 MG
10 TABLET ORAL EVERY 6 HOURS PRN
OUTPATIENT
Start: 2024-07-25

## 2024-06-27 RX ORDER — ALBUTEROL SULFATE 0.83 MG/ML
3 SOLUTION RESPIRATORY (INHALATION) AS NEEDED
OUTPATIENT
Start: 2024-08-22

## 2024-06-27 RX ORDER — FAMOTIDINE 10 MG/ML
20 INJECTION INTRAVENOUS ONCE AS NEEDED
OUTPATIENT
Start: 2024-08-22

## 2024-06-27 RX ORDER — PROCHLORPERAZINE EDISYLATE 5 MG/ML
10 INJECTION INTRAMUSCULAR; INTRAVENOUS EVERY 6 HOURS PRN
OUTPATIENT
Start: 2024-09-19

## 2024-06-27 ASSESSMENT — NCCN CANCER DISTRESS MANAGEMENT
NCCN PRACTICAL CONCERNS: 6
NCCN PRACTICAL CONCERNS: 1
NCCN SOCIAL CONCERNS: 2
NCCN EMOTIONAL CONCERNS: 3
NCCN PHYSICAL CONCERNS: 2
NCCN PHYSICAL CONCERNS: 6
NCCN PRACTICAL CONCERNS: 10
NCCN OTHER CONCERNS: ANGER
NCCN PRACTICAL CONCERNS: 5
NCCN PHYSICAL CONCERNS: 9
NCCN EMOTIONAL CONCERNS: 1
NCCN PHYSICAL CONCERNS: 1
NCCN EMOTIONAL CONCERNS: 4
NCCN EMOTIONAL CONCERNS: 6
NCCN EMOTIONAL CONCERNS: 7
NCCN PRACTICAL CONCERNS: 8
NCCN PHYSICAL CONCERNS: 3
NCCN PHYSICAL CONCERNS: 8

## 2024-06-27 ASSESSMENT — PAIN SCALES - GENERAL: PAINLEVEL: 10-WORST PAIN EVER

## 2024-06-27 NOTE — SIGNIFICANT EVENT
06/27/24 1027   Prechemo Checklist   Has the patient been in the hospital, ED, or urgent care since last date of service No   Chemo/Immuno Consent Completed and Signed Yes   Protocol/Indications Verified Yes   Confirmed to previous date/time of medication Yes   Compared to previous dose Yes   All medications are dated accurately Yes   Pregnancy Test Negative Not applicable   Parameters Met Yes   Provider Name Dr. Pickering   BSA/Weight-Height Verified Yes   Dose Calculations Verified (current, total, cumulative) Yes

## 2024-06-27 NOTE — PROGRESS NOTES
Patient is here for cycle 3, day 1 today. She c/o night sweats, chills and feeling cold frequently. She has some edema in her BLE.  Dr. Pickering saw the patient for an office visit and prescribed lasix. She experienced some nausea during her treatment and was given prn compazine. Her nausea was not resolved at discharge. She was instructed on when she could take her compazine at home today. Pt verbalizes understanding. She was discharged in good condition and will return for her Bendeka tomorrow. She was reminded of her appointment time.     Her Rituximab titration is as follows:  100 mg/hr   54.5 ml/hr  27.3 ml  200 mg/hr   109.0 ml/hr 54.5 ml  300 mg/hr   163.5 ml/hr 81.8 ml  400 mg/hr    218   ml/hr 163.5 ml  Verified by BENSON Marin

## 2024-06-27 NOTE — PROGRESS NOTES
Patient ID: Nelsy Osullivan is a 54 y.o. female.    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  FISH is negative for Bcl-2, BCL6 and C-MYC.  PET/CT 4/4/24 showed malignant uptake in a left supraclavicular LN only.  Baseline LDH was elevated at 238 but the patient has chronically elevated LDH. She started BR chemotherapy 5/2/24.  She has a prior history of lymphoma (question Hodgkin's versus non-Hodgkin's) at age 16 with relapse at age 19.  When she was 16, she was treated with radiation therapy alone.  When she relapsed at age 19, she had disease on both sides of the diaphragm.  Her spleen was removed.  She was treated with several cycles of chemotherapy including Adriamycin and mustard gas underwent a second course of radiation therapy to her neck and chest.  She was treated by Dr. Carbajal and Dr. Acosta at that time.  Mitral valve and aortic valve stenosis secondary to prior radiation therapy.  She underwent animal aortic valve and mitral valve replacements in 2019.  Chronic pancreatitis (question etiology).  She is unable to tolerate pancreatic enzyme replacement therapy.     18 mm right interpolar thyroid nodule with February 19, 2024 pathology showing 9 follicular cells.  DM    The patient presents today for cycle 3 of Bendamustine and Rituxan chemotherapy.  Overall, she seems to be tolerating the chemotherapy reasonably well.  Unfortunately, she is still struggling with bedbugs.  She states that she recently secured a $500 loan and will be able to call an .  She continues to have a diffuse pruritic erythematous rash over most of her extremities and trunk.  The pruritus is not relieved with topical steroids or oral antihistamines.  She is following with palliative care and has been placed on p.o. Dilaudid which she thinks has been more effective in controlling her chronic abdominal pain.  She has also had increased  bilateral lower extremity edema.  She states that she does take Lasix on an as-needed basis.  She took Lasix 1-2 times last week which was helpful.  Unfortunately, she did run out of the prescription and needs a refill.  Objective    BSA: There is no height or weight on file to calculate BSA.  There were no vitals taken for this visit.     Physical Exam  Constitutional:       Appearance: Normal appearance.   HENT:      Head: Normocephalic.      Mouth/Throat:      Mouth: Mucous membranes are moist.      Pharynx: Oropharynx is clear.   Eyes:      Conjunctiva/sclera: Conjunctivae normal.      Pupils: Pupils are equal, round, and reactive to light.   Cardiovascular:      Rate and Rhythm: Normal rate.      Pulses: Normal pulses.   Pulmonary:      Effort: Pulmonary effort is normal.   Abdominal:      General: Bowel sounds are normal.   Musculoskeletal:      Cervical back: Normal range of motion.      Right lower leg: Edema present.      Left lower leg: Edema present.      Comments: There is 2+ bilateral lower extremity edema from feet to knees.   Skin:     General: Skin is warm.      Findings: Rash present.   Neurological:      General: No focal deficit present.      Mental Status: She is alert.   Psychiatric:         Mood and Affect: Mood normal.         Performance Status:  ECOG 1      Assessment/Plan   54-year-old female with stage II (low-volume) grade 3B follicular large cell lymphoma diagnosed with left cervical lymph node excision and PET/CT showing involvement in a left supraclavicular lymph node as well.  She is currently being treated with Bendamustine and Rituxan chemotherapy which overall, she is tolerating well.  She does not have any palpable adenopathy currently.  We will plan on repeating a PET/CT after her fourth cycle of treatment (early August 2024) and I will see her back at that time.  Hopefully, she will be able to eliminate her bedbug problem and will offer supportive care in the meantime.  She can  continue Lasix 20 mg p.o. once to twice daily as needed for lower extremity edema.  Plan:  Proceed with cycle #3 Bendamustine and Rituxan chemotherapy today  Supportive care including topical steroid therapy and oral antihistamines and eradication of bedbugs.  Lasix 20 to 40 mg daily as needed.  Reassess in 6 weeks with PET/CT prior to her next office visit.    Cancer Staging   No matching staging information was found for the patient.      Oncology History   Follicular lymphoma grade IIIb (Multi)   6/7/2022 Initial Diagnosis    Follicular lymphoma grade IIIb (CMS/HCC)     5/2/2024 -  Chemotherapy    Bendamustine + RiTUXimab, 28 Day Cycles                   Candida Pickering MD

## 2024-06-27 NOTE — PROGRESS NOTES
Patient had her follow up with Dr. Pickering today.  Patient scored an 8 on her distress screen.   (SW) met with patient today to assess needs and offer support.  Patient was A&Ox3 with appropriate and congruent mood and affect.   Patient stated that she was struggling to even call to take care of her bed bug situation.  SW offered to call a few places with her.  SW spoke with Bryan Brown and spoke with [phone].  Javy stated that he can do the chemical treatment for $800.00.  He stated that it was 6 treatments and was 6 months guaranteed.  Patient is going to reach out to [place] to see if they are able to help out at all.  Patient stated that she has been dealing with this for 6 months.  SW reviewed her distress screen.  Patient stated that she was upset because her son who has been staying with her told her that he hated her.  Patient got emotional and SW provided support and active listening.  Patient also marked having enough food.  Patient has been set up with [employer] and she is aware of the food elizabeth if needed.  Patient stated that she will reach out to  if she would need anything. SW will continue to follow patient.    Louis Velazquez MSW, LSW

## 2024-06-28 ENCOUNTER — INFUSION (OUTPATIENT)
Dept: HEMATOLOGY/ONCOLOGY | Facility: CLINIC | Age: 54
End: 2024-06-28
Payer: COMMERCIAL

## 2024-06-28 VITALS
HEART RATE: 110 BPM | HEIGHT: 62 IN | RESPIRATION RATE: 16 BRPM | TEMPERATURE: 97.2 F | OXYGEN SATURATION: 96 % | BODY MASS INDEX: 23.77 KG/M2 | DIASTOLIC BLOOD PRESSURE: 73 MMHG | WEIGHT: 129.2 LBS | SYSTOLIC BLOOD PRESSURE: 119 MMHG

## 2024-06-28 DIAGNOSIS — C82.41 GRADE 3B FOLLICULAR LYMPHOMA OF LYMPH NODES OF NECK (MULTI): ICD-10-CM

## 2024-06-28 PROCEDURE — 96409 CHEMO IV PUSH SNGL DRUG: CPT

## 2024-06-28 PROCEDURE — 2500000004 HC RX 250 GENERAL PHARMACY W/ HCPCS (ALT 636 FOR OP/ED): Mod: SE | Performed by: INTERNAL MEDICINE

## 2024-06-28 PROCEDURE — 96375 TX/PRO/DX INJ NEW DRUG ADDON: CPT | Mod: INF

## 2024-06-28 RX ORDER — PROCHLORPERAZINE MALEATE 10 MG
10 TABLET ORAL EVERY 6 HOURS PRN
Status: DISCONTINUED | OUTPATIENT
Start: 2024-06-28 | End: 2024-06-28 | Stop reason: HOSPADM

## 2024-06-28 RX ORDER — PROCHLORPERAZINE EDISYLATE 5 MG/ML
10 INJECTION INTRAMUSCULAR; INTRAVENOUS EVERY 6 HOURS PRN
Status: DISCONTINUED | OUTPATIENT
Start: 2024-06-28 | End: 2024-06-28 | Stop reason: HOSPADM

## 2024-06-28 RX ORDER — HEPARIN 100 UNIT/ML
500 SYRINGE INTRAVENOUS AS NEEDED
Status: DISCONTINUED | OUTPATIENT
Start: 2024-06-28 | End: 2024-06-28 | Stop reason: HOSPADM

## 2024-06-28 RX ORDER — HEPARIN SODIUM,PORCINE/PF 10 UNIT/ML
50 SYRINGE (ML) INTRAVENOUS AS NEEDED
OUTPATIENT
Start: 2024-06-28

## 2024-06-28 RX ORDER — FAMOTIDINE 10 MG/ML
20 INJECTION INTRAVENOUS ONCE AS NEEDED
Status: DISCONTINUED | OUTPATIENT
Start: 2024-06-28 | End: 2024-06-28 | Stop reason: HOSPADM

## 2024-06-28 RX ORDER — HEPARIN 100 UNIT/ML
500 SYRINGE INTRAVENOUS AS NEEDED
OUTPATIENT
Start: 2024-06-28

## 2024-06-28 RX ORDER — EPINEPHRINE 0.3 MG/.3ML
0.3 INJECTION SUBCUTANEOUS EVERY 5 MIN PRN
Status: DISCONTINUED | OUTPATIENT
Start: 2024-06-28 | End: 2024-06-28 | Stop reason: HOSPADM

## 2024-06-28 RX ORDER — ALBUTEROL SULFATE 0.83 MG/ML
3 SOLUTION RESPIRATORY (INHALATION) AS NEEDED
Status: DISCONTINUED | OUTPATIENT
Start: 2024-06-28 | End: 2024-06-28 | Stop reason: HOSPADM

## 2024-06-28 RX ORDER — DIPHENHYDRAMINE HYDROCHLORIDE 50 MG/ML
50 INJECTION INTRAMUSCULAR; INTRAVENOUS AS NEEDED
Status: DISCONTINUED | OUTPATIENT
Start: 2024-06-28 | End: 2024-06-28 | Stop reason: HOSPADM

## 2024-06-28 ASSESSMENT — PAIN SCALES - GENERAL: PAINLEVEL: 8

## 2024-06-28 NOTE — PROGRESS NOTES
Pt here for C3D2 bendeka infusion. Tolerated well. She is aware of plan of care, will call with further questions or concerns. Will return Monday for GF injection

## 2024-06-28 NOTE — SIGNIFICANT EVENT
06/28/24 1146   Prechemo Checklist   Has the patient been in the hospital, ED, or urgent care since last date of service No   Chemo/Immuno Consent Completed and Signed Yes   Protocol/Indications Verified Yes   Confirmed to previous date/time of medication Yes   Compared to previous dose Yes   All medications are dated accurately Yes   Pregnancy Test Negative Yes  (signed declination of pregnancy)   Parameters Met Yes   BSA/Weight-Height Verified Yes   Dose Calculations Verified (current, total, cumulative) Yes

## 2024-07-01 ENCOUNTER — INFUSION (OUTPATIENT)
Dept: HEMATOLOGY/ONCOLOGY | Facility: CLINIC | Age: 54
End: 2024-07-01
Payer: COMMERCIAL

## 2024-07-01 VITALS
BODY MASS INDEX: 23.74 KG/M2 | HEART RATE: 107 BPM | RESPIRATION RATE: 16 BRPM | HEIGHT: 62 IN | TEMPERATURE: 97.5 F | DIASTOLIC BLOOD PRESSURE: 63 MMHG | SYSTOLIC BLOOD PRESSURE: 109 MMHG | OXYGEN SATURATION: 98 % | WEIGHT: 129 LBS

## 2024-07-01 DIAGNOSIS — C82.41 GRADE 3B FOLLICULAR LYMPHOMA OF LYMPH NODES OF NECK (MULTI): ICD-10-CM

## 2024-07-01 PROCEDURE — 96372 THER/PROPH/DIAG INJ SC/IM: CPT

## 2024-07-01 PROCEDURE — 2500000004 HC RX 250 GENERAL PHARMACY W/ HCPCS (ALT 636 FOR OP/ED): Mod: JZ,SE | Performed by: INTERNAL MEDICINE

## 2024-07-01 RX ORDER — EPINEPHRINE 0.3 MG/.3ML
0.3 INJECTION SUBCUTANEOUS EVERY 5 MIN PRN
Status: DISCONTINUED | OUTPATIENT
Start: 2024-07-01 | End: 2024-07-01 | Stop reason: HOSPADM

## 2024-07-01 RX ORDER — DIPHENHYDRAMINE HYDROCHLORIDE 50 MG/ML
50 INJECTION INTRAMUSCULAR; INTRAVENOUS AS NEEDED
Status: DISCONTINUED | OUTPATIENT
Start: 2024-07-01 | End: 2024-07-01 | Stop reason: HOSPADM

## 2024-07-01 RX ORDER — FAMOTIDINE 10 MG/ML
20 INJECTION INTRAVENOUS ONCE AS NEEDED
Status: DISCONTINUED | OUTPATIENT
Start: 2024-07-01 | End: 2024-07-01 | Stop reason: HOSPADM

## 2024-07-01 RX ORDER — ALBUTEROL SULFATE 0.83 MG/ML
3 SOLUTION RESPIRATORY (INHALATION) AS NEEDED
Status: DISCONTINUED | OUTPATIENT
Start: 2024-07-01 | End: 2024-07-01 | Stop reason: HOSPADM

## 2024-07-01 ASSESSMENT — PAIN SCALES - GENERAL: PAINLEVEL: 7

## 2024-07-01 NOTE — PROGRESS NOTES
Pt here for GF injection following treatment. Tolerated well. She is aware of plan of care, will call with further questions or concerns. Will return in 3 weeks for next infusion

## 2024-07-08 ENCOUNTER — TELEPHONE (OUTPATIENT)
Dept: HEMATOLOGY/ONCOLOGY | Facility: CLINIC | Age: 54
End: 2024-07-08
Payer: COMMERCIAL

## 2024-07-08 NOTE — TELEPHONE ENCOUNTER
Patient called  (CASIMIRO) and stated that she got her check from LLS.  She was able to get the  out and spray her apartment.  SW was happy to hear this for her so she can start healing.  SW thanked patient for letting SW know.  She is also having a hard time reaching her  at [place].  The  accepted the $500.00 instead of $800.00.   SW thanked patient for letting SW know.  SW will see patient during her next appointment.    Louis Velazquez MSW, LSW

## 2024-07-09 ENCOUNTER — APPOINTMENT (OUTPATIENT)
Dept: RADIOLOGY | Facility: HOSPITAL | Age: 54
End: 2024-07-09
Payer: COMMERCIAL

## 2024-07-09 ENCOUNTER — HOSPITAL ENCOUNTER (INPATIENT)
Facility: HOSPITAL | Age: 54
LOS: 3 days | Discharge: HOME | End: 2024-07-12
Attending: EMERGENCY MEDICINE | Admitting: STUDENT IN AN ORGANIZED HEALTH CARE EDUCATION/TRAINING PROGRAM
Payer: COMMERCIAL

## 2024-07-09 ENCOUNTER — TELEPHONE (OUTPATIENT)
Dept: ENDOCRINOLOGY | Facility: CLINIC | Age: 54
End: 2024-07-09

## 2024-07-09 DIAGNOSIS — I50.32 CHRONIC DIASTOLIC (CONGESTIVE) HEART FAILURE (MULTI): ICD-10-CM

## 2024-07-09 DIAGNOSIS — I50.43 ACUTE ON CHRONIC COMBINED SYSTOLIC AND DIASTOLIC CHF (CONGESTIVE HEART FAILURE) (MULTI): ICD-10-CM

## 2024-07-09 DIAGNOSIS — J98.4 PNEUMONITIS: Primary | ICD-10-CM

## 2024-07-09 DIAGNOSIS — I50.42 CHRONIC COMBINED SYSTOLIC AND DIASTOLIC CONGESTIVE HEART FAILURE (MULTI): ICD-10-CM

## 2024-07-09 DIAGNOSIS — E16.2 HYPOGLYCEMIA: ICD-10-CM

## 2024-07-09 DIAGNOSIS — R06.02 SHORTNESS OF BREATH: ICD-10-CM

## 2024-07-09 DIAGNOSIS — R06.09 DYSPNEA ON EXERTION: ICD-10-CM

## 2024-07-09 DIAGNOSIS — R79.89 ELEVATED BRAIN NATRIURETIC PEPTIDE (BNP) LEVEL: ICD-10-CM

## 2024-07-09 DIAGNOSIS — J44.9 CHRONIC OBSTRUCTIVE PULMONARY DISEASE, UNSPECIFIED COPD TYPE (MULTI): ICD-10-CM

## 2024-07-09 DIAGNOSIS — I50.9 CONGESTIVE HEART FAILURE, UNSPECIFIED HF CHRONICITY, UNSPECIFIED HEART FAILURE TYPE (MULTI): ICD-10-CM

## 2024-07-09 DIAGNOSIS — Z95.2 PRESENCE OF PROSTHETIC HEART VALVE: ICD-10-CM

## 2024-07-09 PROBLEM — E11.649 HYPOGLYCEMIA ASSOCIATED WITH DIABETES (MULTI): Status: ACTIVE | Noted: 2024-07-09

## 2024-07-09 PROBLEM — E10.9 TYPE 1 DIABETES MELLITUS WITHOUT COMPLICATION (MULTI): Status: RESOLVED | Noted: 2023-12-17 | Resolved: 2024-07-09

## 2024-07-09 PROBLEM — D72.829 LEUKOCYTOSIS: Status: ACTIVE | Noted: 2024-07-09

## 2024-07-09 LAB
ALBUMIN SERPL BCP-MCNC: 2.9 G/DL (ref 3.4–5)
ALP SERPL-CCNC: 369 U/L (ref 33–110)
ALT SERPL W P-5'-P-CCNC: 20 U/L (ref 7–45)
ANION GAP SERPL CALC-SCNC: 9 MMOL/L (ref 10–20)
AST SERPL W P-5'-P-CCNC: 37 U/L (ref 9–39)
BASOPHILS # BLD AUTO: 0.03 X10*3/UL (ref 0–0.1)
BASOPHILS NFR BLD AUTO: 0.1 %
BILIRUB SERPL-MCNC: 0.3 MG/DL (ref 0–1.2)
BNP SERPL-MCNC: 215 PG/ML (ref 0–99)
BUN SERPL-MCNC: 8 MG/DL (ref 6–23)
CALCIUM SERPL-MCNC: 8.3 MG/DL (ref 8.6–10.3)
CARDIAC TROPONIN I PNL SERPL HS: 16 NG/L (ref 0–13)
CHLORIDE SERPL-SCNC: 104 MMOL/L (ref 98–107)
CO2 SERPL-SCNC: 28 MMOL/L (ref 21–32)
CREAT SERPL-MCNC: 0.85 MG/DL (ref 0.5–1.05)
EGFRCR SERPLBLD CKD-EPI 2021: 81 ML/MIN/1.73M*2
EOSINOPHIL # BLD AUTO: 1.04 X10*3/UL (ref 0–0.7)
EOSINOPHIL NFR BLD AUTO: 4.6 %
ERYTHROCYTE [DISTWIDTH] IN BLOOD BY AUTOMATED COUNT: 18.9 % (ref 11.5–14.5)
GLUCOSE BLD MANUAL STRIP-MCNC: 210 MG/DL (ref 74–99)
GLUCOSE BLD MANUAL STRIP-MCNC: 75 MG/DL (ref 74–99)
GLUCOSE SERPL-MCNC: 66 MG/DL (ref 74–99)
HCT VFR BLD AUTO: 33 % (ref 36–46)
HGB BLD-MCNC: 11.4 G/DL (ref 12–16)
IMM GRANULOCYTES # BLD AUTO: 0.62 X10*3/UL (ref 0–0.7)
IMM GRANULOCYTES NFR BLD AUTO: 2.8 % (ref 0–0.9)
LACTATE SERPL-SCNC: 1 MMOL/L (ref 0.4–2)
LYMPHOCYTES # BLD AUTO: 0.47 X10*3/UL (ref 1.2–4.8)
LYMPHOCYTES NFR BLD AUTO: 2.1 %
MAGNESIUM SERPL-MCNC: 1.41 MG/DL (ref 1.6–2.4)
MCH RBC QN AUTO: 29 PG (ref 26–34)
MCHC RBC AUTO-ENTMCNC: 34.5 G/DL (ref 32–36)
MCV RBC AUTO: 84 FL (ref 80–100)
MONOCYTES # BLD AUTO: 1.3 X10*3/UL (ref 0.1–1)
MONOCYTES NFR BLD AUTO: 5.8 %
NEUTROPHILS # BLD AUTO: 18.97 X10*3/UL (ref 1.2–7.7)
NEUTROPHILS NFR BLD AUTO: 84.6 %
NRBC BLD-RTO: 0 /100 WBCS (ref 0–0)
OVALOCYTES BLD QL SMEAR: NORMAL
PHOSPHATE SERPL-MCNC: 3.4 MG/DL (ref 2.5–4.9)
PLATELET # BLD AUTO: 229 X10*3/UL (ref 150–450)
POTASSIUM SERPL-SCNC: 3.6 MMOL/L (ref 3.5–5.3)
PROT SERPL-MCNC: 6 G/DL (ref 6.4–8.2)
RBC # BLD AUTO: 3.93 X10*6/UL (ref 4–5.2)
RBC MORPH BLD: NORMAL
SODIUM SERPL-SCNC: 137 MMOL/L (ref 136–145)
TARGETS BLD QL SMEAR: NORMAL
TOXIC GRANULES BLD QL SMEAR: PRESENT
TSH SERPL-ACNC: 2.18 MIU/L (ref 0.44–3.98)
WBC # BLD AUTO: 22.4 X10*3/UL (ref 4.4–11.3)

## 2024-07-09 PROCEDURE — 2500000001 HC RX 250 WO HCPCS SELF ADMINISTERED DRUGS (ALT 637 FOR MEDICARE OP)

## 2024-07-09 PROCEDURE — 96368 THER/DIAG CONCURRENT INF: CPT

## 2024-07-09 PROCEDURE — 99285 EMERGENCY DEPT VISIT HI MDM: CPT | Mod: 25

## 2024-07-09 PROCEDURE — 96366 THER/PROPH/DIAG IV INF ADDON: CPT

## 2024-07-09 PROCEDURE — 99223 1ST HOSP IP/OBS HIGH 75: CPT

## 2024-07-09 PROCEDURE — 96361 HYDRATE IV INFUSION ADD-ON: CPT

## 2024-07-09 PROCEDURE — 96365 THER/PROPH/DIAG IV INF INIT: CPT | Mod: 59

## 2024-07-09 PROCEDURE — 82947 ASSAY GLUCOSE BLOOD QUANT: CPT

## 2024-07-09 PROCEDURE — 84484 ASSAY OF TROPONIN QUANT: CPT | Performed by: EMERGENCY MEDICINE

## 2024-07-09 PROCEDURE — 36415 COLL VENOUS BLD VENIPUNCTURE: CPT | Performed by: STUDENT IN AN ORGANIZED HEALTH CARE EDUCATION/TRAINING PROGRAM

## 2024-07-09 PROCEDURE — 2060000001 HC INTERMEDIATE ICU ROOM DAILY

## 2024-07-09 PROCEDURE — 83605 ASSAY OF LACTIC ACID: CPT | Performed by: STUDENT IN AN ORGANIZED HEALTH CARE EDUCATION/TRAINING PROGRAM

## 2024-07-09 PROCEDURE — 84100 ASSAY OF PHOSPHORUS: CPT | Performed by: EMERGENCY MEDICINE

## 2024-07-09 PROCEDURE — 2500000002 HC RX 250 W HCPCS SELF ADMINISTERED DRUGS (ALT 637 FOR MEDICARE OP, ALT 636 FOR OP/ED)

## 2024-07-09 PROCEDURE — 2500000004 HC RX 250 GENERAL PHARMACY W/ HCPCS (ALT 636 FOR OP/ED)

## 2024-07-09 PROCEDURE — 85025 COMPLETE CBC W/AUTO DIFF WBC: CPT | Performed by: EMERGENCY MEDICINE

## 2024-07-09 PROCEDURE — 83735 ASSAY OF MAGNESIUM: CPT | Performed by: EMERGENCY MEDICINE

## 2024-07-09 PROCEDURE — 96374 THER/PROPH/DIAG INJ IV PUSH: CPT | Mod: 59

## 2024-07-09 PROCEDURE — 2500000004 HC RX 250 GENERAL PHARMACY W/ HCPCS (ALT 636 FOR OP/ED): Performed by: EMERGENCY MEDICINE

## 2024-07-09 PROCEDURE — 2500000001 HC RX 250 WO HCPCS SELF ADMINISTERED DRUGS (ALT 637 FOR MEDICARE OP): Performed by: STUDENT IN AN ORGANIZED HEALTH CARE EDUCATION/TRAINING PROGRAM

## 2024-07-09 PROCEDURE — 80053 COMPREHEN METABOLIC PANEL: CPT | Performed by: EMERGENCY MEDICINE

## 2024-07-09 PROCEDURE — 87040 BLOOD CULTURE FOR BACTERIA: CPT | Mod: PORLAB | Performed by: STUDENT IN AN ORGANIZED HEALTH CARE EDUCATION/TRAINING PROGRAM

## 2024-07-09 PROCEDURE — 71045 X-RAY EXAM CHEST 1 VIEW: CPT

## 2024-07-09 PROCEDURE — 84443 ASSAY THYROID STIM HORMONE: CPT | Performed by: EMERGENCY MEDICINE

## 2024-07-09 PROCEDURE — 71045 X-RAY EXAM CHEST 1 VIEW: CPT | Performed by: RADIOLOGY

## 2024-07-09 PROCEDURE — 83880 ASSAY OF NATRIURETIC PEPTIDE: CPT | Performed by: EMERGENCY MEDICINE

## 2024-07-09 PROCEDURE — 96372 THER/PROPH/DIAG INJ SC/IM: CPT

## 2024-07-09 RX ORDER — MORPHINE SULFATE 30 MG/1
30 TABLET, FILM COATED, EXTENDED RELEASE ORAL 3 TIMES DAILY
Status: DISCONTINUED | OUTPATIENT
Start: 2024-07-09 | End: 2024-07-12 | Stop reason: HOSPADM

## 2024-07-09 RX ORDER — BISACODYL 10 MG/1
10 SUPPOSITORY RECTAL DAILY PRN
Status: DISCONTINUED | OUTPATIENT
Start: 2024-07-09 | End: 2024-07-12 | Stop reason: HOSPADM

## 2024-07-09 RX ORDER — ACETAMINOPHEN 325 MG/1
650 TABLET ORAL EVERY 4 HOURS PRN
Status: DISCONTINUED | OUTPATIENT
Start: 2024-07-09 | End: 2024-07-12 | Stop reason: HOSPADM

## 2024-07-09 RX ORDER — TALC
3 POWDER (GRAM) TOPICAL NIGHTLY PRN
Status: DISCONTINUED | OUTPATIENT
Start: 2024-07-09 | End: 2024-07-12 | Stop reason: HOSPADM

## 2024-07-09 RX ORDER — ASPIRIN 81 MG/1
81 TABLET ORAL DAILY
Status: DISCONTINUED | OUTPATIENT
Start: 2024-07-09 | End: 2024-07-12 | Stop reason: HOSPADM

## 2024-07-09 RX ORDER — LEVOTHYROXINE SODIUM 125 UG/1
125 TABLET ORAL DAILY
Status: DISCONTINUED | OUTPATIENT
Start: 2024-07-10 | End: 2024-07-12 | Stop reason: HOSPADM

## 2024-07-09 RX ORDER — METOPROLOL SUCCINATE 50 MG/1
50 TABLET, EXTENDED RELEASE ORAL DAILY
Status: DISCONTINUED | OUTPATIENT
Start: 2024-07-09 | End: 2024-07-12 | Stop reason: HOSPADM

## 2024-07-09 RX ORDER — CYCLOBENZAPRINE HCL 10 MG
10 TABLET ORAL 3 TIMES DAILY PRN
Status: DISCONTINUED | OUTPATIENT
Start: 2024-07-09 | End: 2024-07-12 | Stop reason: HOSPADM

## 2024-07-09 RX ORDER — DEXTROSE MONOHYDRATE AND SODIUM CHLORIDE 5; .45 G/100ML; G/100ML
75 INJECTION, SOLUTION INTRAVENOUS CONTINUOUS
Status: DISCONTINUED | OUTPATIENT
Start: 2024-07-09 | End: 2024-07-11

## 2024-07-09 RX ORDER — ALBUTEROL SULFATE 90 UG/1
2 AEROSOL, METERED RESPIRATORY (INHALATION) EVERY 6 HOURS
Status: DISCONTINUED | OUTPATIENT
Start: 2024-07-09 | End: 2024-07-12 | Stop reason: HOSPADM

## 2024-07-09 RX ORDER — CARVEDILOL 12.5 MG/1
12.5 TABLET ORAL EVERY 12 HOURS
Status: DISCONTINUED | OUTPATIENT
Start: 2024-07-09 | End: 2024-07-12 | Stop reason: HOSPADM

## 2024-07-09 RX ORDER — ONDANSETRON HYDROCHLORIDE 2 MG/ML
4 INJECTION, SOLUTION INTRAVENOUS EVERY 6 HOURS PRN
Status: DISCONTINUED | OUTPATIENT
Start: 2024-07-09 | End: 2024-07-12 | Stop reason: HOSPADM

## 2024-07-09 RX ORDER — MAGNESIUM SULFATE HEPTAHYDRATE 40 MG/ML
2 INJECTION, SOLUTION INTRAVENOUS ONCE
Status: COMPLETED | OUTPATIENT
Start: 2024-07-09 | End: 2024-07-09

## 2024-07-09 RX ORDER — OXCARBAZEPINE 150 MG/1
150 TABLET, FILM COATED ORAL
Status: DISCONTINUED | OUTPATIENT
Start: 2024-07-09 | End: 2024-07-12 | Stop reason: HOSPADM

## 2024-07-09 RX ORDER — CEFTRIAXONE 1 G/50ML
1 INJECTION, SOLUTION INTRAVENOUS ONCE
Status: COMPLETED | OUTPATIENT
Start: 2024-07-09 | End: 2024-07-09

## 2024-07-09 RX ORDER — ENOXAPARIN SODIUM 100 MG/ML
40 INJECTION SUBCUTANEOUS EVERY 24 HOURS
Status: DISCONTINUED | OUTPATIENT
Start: 2024-07-09 | End: 2024-07-12 | Stop reason: HOSPADM

## 2024-07-09 RX ORDER — PANTOPRAZOLE SODIUM 40 MG/10ML
40 INJECTION, POWDER, LYOPHILIZED, FOR SOLUTION INTRAVENOUS
Status: DISCONTINUED | OUTPATIENT
Start: 2024-07-10 | End: 2024-07-12 | Stop reason: HOSPADM

## 2024-07-09 RX ORDER — POLYETHYLENE GLYCOL 3350 17 G/17G
17 POWDER, FOR SOLUTION ORAL DAILY
Status: DISCONTINUED | OUTPATIENT
Start: 2024-07-09 | End: 2024-07-12 | Stop reason: HOSPADM

## 2024-07-09 RX ORDER — ATORVASTATIN CALCIUM 40 MG/1
40 TABLET, FILM COATED ORAL NIGHTLY
Status: DISCONTINUED | OUTPATIENT
Start: 2024-07-09 | End: 2024-07-12 | Stop reason: HOSPADM

## 2024-07-09 RX ORDER — GUAIFENESIN 600 MG/1
600 TABLET, EXTENDED RELEASE ORAL EVERY 12 HOURS PRN
Status: DISCONTINUED | OUTPATIENT
Start: 2024-07-09 | End: 2024-07-12 | Stop reason: HOSPADM

## 2024-07-09 RX ORDER — PANTOPRAZOLE SODIUM 40 MG/1
40 TABLET, DELAYED RELEASE ORAL
Status: DISCONTINUED | OUTPATIENT
Start: 2024-07-10 | End: 2024-07-12 | Stop reason: HOSPADM

## 2024-07-09 RX ORDER — FUROSEMIDE 40 MG/1
20 TABLET ORAL DAILY
Status: DISCONTINUED | OUTPATIENT
Start: 2024-07-09 | End: 2024-07-09

## 2024-07-09 RX ORDER — CEFTRIAXONE 2 G/50ML
2 INJECTION, SOLUTION INTRAVENOUS EVERY 24 HOURS
Status: DISCONTINUED | OUTPATIENT
Start: 2024-07-09 | End: 2024-07-12 | Stop reason: HOSPADM

## 2024-07-09 RX ORDER — IPRATROPIUM BROMIDE AND ALBUTEROL SULFATE 2.5; .5 MG/3ML; MG/3ML
3 SOLUTION RESPIRATORY (INHALATION) EVERY 8 HOURS PRN
Status: DISCONTINUED | OUTPATIENT
Start: 2024-07-09 | End: 2024-07-12 | Stop reason: HOSPADM

## 2024-07-09 RX ORDER — BISACODYL 5 MG
10 TABLET, DELAYED RELEASE (ENTERIC COATED) ORAL DAILY PRN
Status: DISCONTINUED | OUTPATIENT
Start: 2024-07-09 | End: 2024-07-12 | Stop reason: HOSPADM

## 2024-07-09 RX ORDER — SPIRONOLACTONE 25 MG/1
25 TABLET ORAL
Status: DISCONTINUED | OUTPATIENT
Start: 2024-07-10 | End: 2024-07-09

## 2024-07-09 RX ORDER — FUROSEMIDE 10 MG/ML
40 INJECTION INTRAMUSCULAR; INTRAVENOUS 2 TIMES DAILY
Status: DISCONTINUED | OUTPATIENT
Start: 2024-07-09 | End: 2024-07-12 | Stop reason: HOSPADM

## 2024-07-09 RX ORDER — MIRTAZAPINE 15 MG/1
7.5 TABLET, FILM COATED ORAL NIGHTLY
Status: DISCONTINUED | OUTPATIENT
Start: 2024-07-09 | End: 2024-07-12 | Stop reason: HOSPADM

## 2024-07-09 RX ORDER — OXCARBAZEPINE 150 MG/1
1 TABLET, FILM COATED ORAL
COMMUNITY
Start: 2024-06-05

## 2024-07-09 ASSESSMENT — ENCOUNTER SYMPTOMS
HEMATURIA: 0
WHEEZING: 0
VOMITING: 0
CHEST TIGHTNESS: 0
TREMORS: 0
DIARRHEA: 0
HEADACHES: 0
CONSTIPATION: 0
FEVER: 0
CHILLS: 0
FATIGUE: 0
NAUSEA: 0
WEAKNESS: 0
ABDOMINAL PAIN: 0
BACK PAIN: 0
COUGH: 0
PALPITATIONS: 0
JOINT SWELLING: 0
FLANK PAIN: 0
SHORTNESS OF BREATH: 0
WOUND: 0

## 2024-07-09 ASSESSMENT — PAIN SCALES - GENERAL
PAINLEVEL_OUTOF10: 0 - NO PAIN
PAINLEVEL_OUTOF10: 0 - NO PAIN
PAINLEVEL_OUTOF10: 9
PAINLEVEL_OUTOF10: 5 - MODERATE PAIN

## 2024-07-09 ASSESSMENT — LIFESTYLE VARIABLES
TOTAL SCORE: 0
HAVE YOU EVER FELT YOU SHOULD CUT DOWN ON YOUR DRINKING: NO
HAVE PEOPLE ANNOYED YOU BY CRITICIZING YOUR DRINKING: NO
EVER FELT BAD OR GUILTY ABOUT YOUR DRINKING: NO
EVER HAD A DRINK FIRST THING IN THE MORNING TO STEADY YOUR NERVES TO GET RID OF A HANGOVER: NO

## 2024-07-09 ASSESSMENT — PAIN - FUNCTIONAL ASSESSMENT
PAIN_FUNCTIONAL_ASSESSMENT: 0-10
PAIN_FUNCTIONAL_ASSESSMENT: 0-10

## 2024-07-09 ASSESSMENT — COGNITIVE AND FUNCTIONAL STATUS - GENERAL
MOBILITY SCORE: 24
DAILY ACTIVITIY SCORE: 24

## 2024-07-09 ASSESSMENT — COLUMBIA-SUICIDE SEVERITY RATING SCALE - C-SSRS
2. HAVE YOU ACTUALLY HAD ANY THOUGHTS OF KILLING YOURSELF?: NO
1. IN THE PAST MONTH, HAVE YOU WISHED YOU WERE DEAD OR WISHED YOU COULD GO TO SLEEP AND NOT WAKE UP?: NO
6. HAVE YOU EVER DONE ANYTHING, STARTED TO DO ANYTHING, OR PREPARED TO DO ANYTHING TO END YOUR LIFE?: NO

## 2024-07-09 ASSESSMENT — PAIN DESCRIPTION - LOCATION: LOCATION: ABDOMEN

## 2024-07-09 NOTE — TELEPHONE ENCOUNTER
Patient c/o low sugars please see jairon download    (Patient aware that you are out until Monday 7/15/2024)

## 2024-07-09 NOTE — ED PROVIDER NOTES
"HPI   Chief Complaint   Patient presents with    Hypoglycemia     Pt has insulin pump and ate at 1645 and it dropped to 56. Ems gave one tube of oral glucose       HPI    Patient is a 55 yo female with IDDM type 2 with Non Hodgkin's Lymphoma presenting with hypoglycemia. States her monitor has been indicating low glucose levels recently. Her endocrinologist is on vacation and her primary stated for her to come to the ED. Denies chest pain, SOB, fever, chills, headache, and weakness. Bilateral pitting edema present, however patient states that comes and goes and she takes a \"Water pill\" for it. She completed her third session of chemotherapy on  and has 3 more sessions to go.     ROS: Complete 12 point review of systems performed, otherwise negative except as noted in the history of present illness    PMH: Reviewed, documented below in note. Pertinents in HPI  PSH: Reviewed and documented below in note. Pertinents in HPI  SH: No tobacco alcohol or illicits   Fam: Reviewed, noncontributory to patients current complaint  MEDS: Reviewed and documented below in note. Pertinents in HPI  ALLERGIES: Reviewed and documented below in note.                    Yulia Coma Scale Score: 15                     Patient History   No past medical history on file.  Past Surgical History:   Procedure Laterality Date    OTHER SURGICAL HISTORY  2020    Cholecystectomy    OTHER SURGICAL HISTORY  2020    Exploratory laparoscopy    OTHER SURGICAL HISTORY  2020    Heart surgery     No family history on file.  Social History     Tobacco Use    Smoking status: Former     Current packs/day: 0.00     Types: Cigarettes     Quit date:      Years since quittin.5     Passive exposure: Past    Smokeless tobacco: Never   Vaping Use    Vaping status: Every Day   Substance Use Topics    Alcohol use: Not Currently    Drug use: Never       Physical Exam   ED Triage Vitals [24 1725]   Temperature Heart Rate " Respirations BP   36.8 °C (98.3 °F) (!) 112 20 128/70      Pulse Ox Temp Source Heart Rate Source Patient Position   100 % Temporal Monitor --      BP Location FiO2 (%)     -- --       Physical Exam  Constitutional:       Appearance: Normal appearance.   Cardiovascular:      Rate and Rhythm: Regular rhythm. Tachycardia present.   Pulmonary:      Effort: Pulmonary effort is normal.      Breath sounds: Normal breath sounds.   Abdominal:      General: There is no distension.      Palpations: Abdomen is soft.      Tenderness: There is no abdominal tenderness.   Musculoskeletal:      Right lower leg: Edema present.      Left lower leg: Edema present.   Skin:     General: Skin is warm.   Neurological:      General: No focal deficit present.      Mental Status: She is alert and oriented to person, place, and time.   Psychiatric:         Mood and Affect: Mood normal.         Behavior: Behavior normal.         ED Course & MDM   ED Course as of 07/09/24 1930   Tue Jul 09, 2024 1918 Patient noted to have elevated troponin of 16, magnesium was low 1.4.  This was replaced.  She does have leukocytosis however also lymphoma noted in history.  Glucose is continue to drop despite oral intake.  Will start dextrose infusion.  TSH is in normal range. [ML]   1918 Chest x-ray performed showed evidence of hypervolemia versus pneumonia, suspect underlying pneumonia as a cause.  Will treat with IV antibiotics.  Patient will be admitted for further management and evaluation. [ML]      ED Course User Index  [ML] Marty R Lejeune, DO         Diagnoses as of 07/09/24 1930   Pneumonitis   Hypoglycemia       Medical Decision Making    Patient is a 55 yo female with IDDM type 2 with Non Hodgkin's Lymphoma presenting with hypoglycemia. Patient not experiencing any symptoms in the ED. Rule out infectious etiology for hypoglycemia. POCT glucose at 17:25 was 75. WBC elevated at 22.4, magnesium low at 1.41. Supplemental magnesium given.  She does have  leukocytosis however also lymphoma noted in history. Glucose is continue to drop despite oral intake. Will start dextrose infusion. TSH is in normal range. BNP Elevated at 215. Chest x-ray performed showed evidence of hypervolemia versus pneumonia, suspect underlying pneumonia as a cause. Will treat with IV antibiotics. Patient will be admitted to medicine for further management and evaluation.        Procedure  Procedures     Maria Del Carmen Calixto  07/09/24 1953

## 2024-07-10 ENCOUNTER — APPOINTMENT (OUTPATIENT)
Dept: CARDIOLOGY | Facility: HOSPITAL | Age: 54
End: 2024-07-10
Payer: COMMERCIAL

## 2024-07-10 LAB
ALBUMIN SERPL BCP-MCNC: 2.4 G/DL (ref 3.4–5)
ALP SERPL-CCNC: 308 U/L (ref 33–110)
ALT SERPL W P-5'-P-CCNC: 17 U/L (ref 7–45)
ANION GAP SERPL CALC-SCNC: 8 MMOL/L (ref 10–20)
AORTIC VALVE MEAN GRADIENT: 10 MMHG
AORTIC VALVE PEAK VELOCITY: 2.05 M/S
APPEARANCE UR: CLEAR
AST SERPL W P-5'-P-CCNC: 36 U/L (ref 9–39)
AV PEAK GRADIENT: 16.8 MMHG
BASOPHILS # BLD AUTO: 0.09 X10*3/UL (ref 0–0.1)
BASOPHILS NFR BLD AUTO: 0.6 %
BILIRUB SERPL-MCNC: 0.3 MG/DL (ref 0–1.2)
BILIRUB UR STRIP.AUTO-MCNC: NEGATIVE MG/DL
BUN SERPL-MCNC: 7 MG/DL (ref 6–23)
CALCIUM SERPL-MCNC: 7.1 MG/DL (ref 8.6–10.3)
CHLORIDE SERPL-SCNC: 105 MMOL/L (ref 98–107)
CO2 SERPL-SCNC: 25 MMOL/L (ref 21–32)
COLOR UR: COLORLESS
CREAT SERPL-MCNC: 0.78 MG/DL (ref 0.5–1.05)
EGFRCR SERPLBLD CKD-EPI 2021: 89 ML/MIN/1.73M*2
EJECTION FRACTION APICAL 4 CHAMBER: 63.2
EJECTION FRACTION: 64 %
EOSINOPHIL # BLD AUTO: 1.04 X10*3/UL (ref 0–0.7)
EOSINOPHIL NFR BLD AUTO: 7.1 %
ERYTHROCYTE [DISTWIDTH] IN BLOOD BY AUTOMATED COUNT: 18.9 % (ref 11.5–14.5)
GLUCOSE BLD MANUAL STRIP-MCNC: 150 MG/DL (ref 74–99)
GLUCOSE BLD MANUAL STRIP-MCNC: 153 MG/DL (ref 74–99)
GLUCOSE BLD MANUAL STRIP-MCNC: 187 MG/DL (ref 74–99)
GLUCOSE BLD MANUAL STRIP-MCNC: 208 MG/DL (ref 74–99)
GLUCOSE BLD MANUAL STRIP-MCNC: 212 MG/DL (ref 74–99)
GLUCOSE BLD MANUAL STRIP-MCNC: 373 MG/DL (ref 74–99)
GLUCOSE BLD MANUAL STRIP-MCNC: 85 MG/DL (ref 74–99)
GLUCOSE SERPL-MCNC: 231 MG/DL (ref 74–99)
GLUCOSE UR STRIP.AUTO-MCNC: ABNORMAL MG/DL
HCT VFR BLD AUTO: 29.6 % (ref 36–46)
HGB BLD-MCNC: 10.6 G/DL (ref 12–16)
HOLD SPECIMEN: NORMAL
IMM GRANULOCYTES # BLD AUTO: 0.44 X10*3/UL (ref 0–0.7)
IMM GRANULOCYTES NFR BLD AUTO: 3 % (ref 0–0.9)
KETONES UR STRIP.AUTO-MCNC: NEGATIVE MG/DL
LEFT ATRIUM VOLUME AREA LENGTH INDEX BSA: 24.9 ML/M2
LEFT VENTRICLE INTERNAL DIMENSION DIASTOLE: 3.7 CM (ref 3.5–6)
LEGIONELLA AG UR QL: NEGATIVE
LEUKOCYTE ESTERASE UR QL STRIP.AUTO: ABNORMAL
LV EJECTION FRACTION BIPLANE: 64 %
LYMPHOCYTES # BLD AUTO: 0.42 X10*3/UL (ref 1.2–4.8)
LYMPHOCYTES NFR BLD AUTO: 2.9 %
MAGNESIUM SERPL-MCNC: 1.47 MG/DL (ref 1.6–2.4)
MCH RBC QN AUTO: 29.3 PG (ref 26–34)
MCHC RBC AUTO-ENTMCNC: 35.8 G/DL (ref 32–36)
MCV RBC AUTO: 82 FL (ref 80–100)
MITRAL VALVE E/A RATIO: 1.17
MITRAL VALVE E/E' RATIO: 21.23
MONOCYTES # BLD AUTO: 1.14 X10*3/UL (ref 0.1–1)
MONOCYTES NFR BLD AUTO: 7.8 %
NEUTROPHILS # BLD AUTO: 11.46 X10*3/UL (ref 1.2–7.7)
NEUTROPHILS NFR BLD AUTO: 78.6 %
NITRITE UR QL STRIP.AUTO: NEGATIVE
NRBC BLD-RTO: 0 /100 WBCS (ref 0–0)
PH UR STRIP.AUTO: 6.5 [PH]
PLATELET # BLD AUTO: 196 X10*3/UL (ref 150–450)
POTASSIUM SERPL-SCNC: 3.3 MMOL/L (ref 3.5–5.3)
PROT SERPL-MCNC: 5.1 G/DL (ref 6.4–8.2)
PROT UR STRIP.AUTO-MCNC: NEGATIVE MG/DL
RBC # BLD AUTO: 3.62 X10*6/UL (ref 4–5.2)
RBC # UR STRIP.AUTO: NEGATIVE /UL
RBC #/AREA URNS AUTO: ABNORMAL /HPF
RIGHT VENTRICLE FREE WALL PEAK S': 6.09 CM/S
RIGHT VENTRICLE PEAK SYSTOLIC PRESSURE: 24 MMHG
S PNEUM AG UR QL: NEGATIVE
SODIUM SERPL-SCNC: 135 MMOL/L (ref 136–145)
SP GR UR STRIP.AUTO: 1.01
SQUAMOUS #/AREA URNS AUTO: ABNORMAL /HPF
TRICUSPID ANNULAR PLANE SYSTOLIC EXCURSION: 1.3 CM
UROBILINOGEN UR STRIP.AUTO-MCNC: NORMAL MG/DL
WBC # BLD AUTO: 14.6 X10*3/UL (ref 4.4–11.3)
WBC #/AREA URNS AUTO: >50 /HPF

## 2024-07-10 PROCEDURE — 80053 COMPREHEN METABOLIC PANEL: CPT

## 2024-07-10 PROCEDURE — 2500000002 HC RX 250 W HCPCS SELF ADMINISTERED DRUGS (ALT 637 FOR MEDICARE OP, ALT 636 FOR OP/ED): Performed by: INTERNAL MEDICINE

## 2024-07-10 PROCEDURE — 2500000004 HC RX 250 GENERAL PHARMACY W/ HCPCS (ALT 636 FOR OP/ED)

## 2024-07-10 PROCEDURE — 85025 COMPLETE CBC W/AUTO DIFF WBC: CPT

## 2024-07-10 PROCEDURE — 99233 SBSQ HOSP IP/OBS HIGH 50: CPT | Performed by: INTERNAL MEDICINE

## 2024-07-10 PROCEDURE — 82947 ASSAY GLUCOSE BLOOD QUANT: CPT

## 2024-07-10 PROCEDURE — 2060000001 HC INTERMEDIATE ICU ROOM DAILY

## 2024-07-10 PROCEDURE — 93306 TTE W/DOPPLER COMPLETE: CPT | Performed by: INTERNAL MEDICINE

## 2024-07-10 PROCEDURE — 2500000001 HC RX 250 WO HCPCS SELF ADMINISTERED DRUGS (ALT 637 FOR MEDICARE OP)

## 2024-07-10 PROCEDURE — 2500000002 HC RX 250 W HCPCS SELF ADMINISTERED DRUGS (ALT 637 FOR MEDICARE OP, ALT 636 FOR OP/ED)

## 2024-07-10 PROCEDURE — 87086 URINE CULTURE/COLONY COUNT: CPT | Mod: PORLAB | Performed by: STUDENT IN AN ORGANIZED HEALTH CARE EDUCATION/TRAINING PROGRAM

## 2024-07-10 PROCEDURE — 3E0436Z INTRODUCTION OF NUTRITIONAL SUBSTANCE INTO CENTRAL VEIN, PERCUTANEOUS APPROACH: ICD-10-PCS | Performed by: INTERNAL MEDICINE

## 2024-07-10 PROCEDURE — 83735 ASSAY OF MAGNESIUM: CPT | Performed by: STUDENT IN AN ORGANIZED HEALTH CARE EDUCATION/TRAINING PROGRAM

## 2024-07-10 PROCEDURE — 2500000004 HC RX 250 GENERAL PHARMACY W/ HCPCS (ALT 636 FOR OP/ED): Performed by: STUDENT IN AN ORGANIZED HEALTH CARE EDUCATION/TRAINING PROGRAM

## 2024-07-10 PROCEDURE — 2500000001 HC RX 250 WO HCPCS SELF ADMINISTERED DRUGS (ALT 637 FOR MEDICARE OP): Performed by: STUDENT IN AN ORGANIZED HEALTH CARE EDUCATION/TRAINING PROGRAM

## 2024-07-10 PROCEDURE — 2500000004 HC RX 250 GENERAL PHARMACY W/ HCPCS (ALT 636 FOR OP/ED): Performed by: INTERNAL MEDICINE

## 2024-07-10 PROCEDURE — 87449 NOS EACH ORGANISM AG IA: CPT | Mod: PORLAB | Performed by: STUDENT IN AN ORGANIZED HEALTH CARE EDUCATION/TRAINING PROGRAM

## 2024-07-10 PROCEDURE — 81001 URINALYSIS AUTO W/SCOPE: CPT | Performed by: STUDENT IN AN ORGANIZED HEALTH CARE EDUCATION/TRAINING PROGRAM

## 2024-07-10 PROCEDURE — 2500000001 HC RX 250 WO HCPCS SELF ADMINISTERED DRUGS (ALT 637 FOR MEDICARE OP): Performed by: INTERNAL MEDICINE

## 2024-07-10 PROCEDURE — 93306 TTE W/DOPPLER COMPLETE: CPT

## 2024-07-10 PROCEDURE — 87899 AGENT NOS ASSAY W/OPTIC: CPT | Mod: PORLAB | Performed by: STUDENT IN AN ORGANIZED HEALTH CARE EDUCATION/TRAINING PROGRAM

## 2024-07-10 RX ORDER — HYDROMORPHONE HYDROCHLORIDE 4 MG/1
4 TABLET ORAL EVERY 4 HOURS PRN
Status: DISCONTINUED | OUTPATIENT
Start: 2024-07-10 | End: 2024-07-12 | Stop reason: HOSPADM

## 2024-07-10 RX ORDER — MORPHINE SULFATE 30 MG/1
30 TABLET, FILM COATED, EXTENDED RELEASE ORAL EVERY 8 HOURS SCHEDULED
Status: DISCONTINUED | OUTPATIENT
Start: 2024-07-10 | End: 2024-07-10

## 2024-07-10 RX ORDER — MAGNESIUM SULFATE HEPTAHYDRATE 40 MG/ML
2 INJECTION, SOLUTION INTRAVENOUS ONCE
Status: COMPLETED | OUTPATIENT
Start: 2024-07-10 | End: 2024-07-10

## 2024-07-10 RX ORDER — DEXTROSE 50 % IN WATER (D50W) INTRAVENOUS SYRINGE
12.5
Status: DISCONTINUED | OUTPATIENT
Start: 2024-07-10 | End: 2024-07-12 | Stop reason: HOSPADM

## 2024-07-10 RX ORDER — POTASSIUM CHLORIDE 20 MEQ/1
40 TABLET, EXTENDED RELEASE ORAL 2 TIMES DAILY
Status: COMPLETED | OUTPATIENT
Start: 2024-07-10 | End: 2024-07-10

## 2024-07-10 RX ORDER — INSULIN LISPRO 100 [IU]/ML
0-10 INJECTION, SOLUTION INTRAVENOUS; SUBCUTANEOUS
Status: DISCONTINUED | OUTPATIENT
Start: 2024-07-10 | End: 2024-07-12 | Stop reason: HOSPADM

## 2024-07-10 RX ORDER — DEXTROSE 50 % IN WATER (D50W) INTRAVENOUS SYRINGE
25
Status: DISCONTINUED | OUTPATIENT
Start: 2024-07-10 | End: 2024-07-12 | Stop reason: HOSPADM

## 2024-07-10 RX ORDER — HYDROMORPHONE HYDROCHLORIDE 2 MG/1
1 TABLET ORAL EVERY 4 HOURS PRN
Status: DISCONTINUED | OUTPATIENT
Start: 2024-07-10 | End: 2024-07-10

## 2024-07-10 SDOH — SOCIAL STABILITY: SOCIAL INSECURITY: DO YOU FEEL ANYONE HAS EXPLOITED OR TAKEN ADVANTAGE OF YOU FINANCIALLY OR OF YOUR PERSONAL PROPERTY?: NO

## 2024-07-10 SDOH — SOCIAL STABILITY: SOCIAL INSECURITY: ARE YOU OR HAVE YOU BEEN THREATENED OR ABUSED PHYSICALLY, EMOTIONALLY, OR SEXUALLY BY ANYONE?: NO

## 2024-07-10 SDOH — SOCIAL STABILITY: SOCIAL INSECURITY: DOES ANYONE TRY TO KEEP YOU FROM HAVING/CONTACTING OTHER FRIENDS OR DOING THINGS OUTSIDE YOUR HOME?: NO

## 2024-07-10 SDOH — SOCIAL STABILITY: SOCIAL INSECURITY: DO YOU FEEL UNSAFE GOING BACK TO THE PLACE WHERE YOU ARE LIVING?: NO

## 2024-07-10 SDOH — SOCIAL STABILITY: SOCIAL INSECURITY: ARE THERE ANY APPARENT SIGNS OF INJURIES/BEHAVIORS THAT COULD BE RELATED TO ABUSE/NEGLECT?: NO

## 2024-07-10 SDOH — SOCIAL STABILITY: SOCIAL INSECURITY: ABUSE: ADULT

## 2024-07-10 SDOH — SOCIAL STABILITY: SOCIAL INSECURITY: HAVE YOU HAD THOUGHTS OF HARMING ANYONE ELSE?: NO

## 2024-07-10 SDOH — SOCIAL STABILITY: SOCIAL INSECURITY: HAVE YOU HAD ANY THOUGHTS OF HARMING ANYONE ELSE?: NO

## 2024-07-10 SDOH — SOCIAL STABILITY: SOCIAL INSECURITY: HAS ANYONE EVER THREATENED TO HURT YOUR FAMILY OR YOUR PETS?: NO

## 2024-07-10 SDOH — SOCIAL STABILITY: SOCIAL INSECURITY: WERE YOU ABLE TO COMPLETE ALL THE BEHAVIORAL HEALTH SCREENINGS?: YES

## 2024-07-10 ASSESSMENT — COGNITIVE AND FUNCTIONAL STATUS - GENERAL
CLIMB 3 TO 5 STEPS WITH RAILING: A LITTLE
MOBILITY SCORE: 23
DAILY ACTIVITIY SCORE: 24
DAILY ACTIVITIY SCORE: 24
PATIENT BASELINE BEDBOUND: NO
MOBILITY SCORE: 24

## 2024-07-10 ASSESSMENT — PAIN SCALES - GENERAL
PAINLEVEL_OUTOF10: 8
PAINLEVEL_OUTOF10: 8
PAINLEVEL_OUTOF10: 0 - NO PAIN
PAINLEVEL_OUTOF10: 5 - MODERATE PAIN
PAINLEVEL_OUTOF10: 9
PAINLEVEL_OUTOF10: 0 - NO PAIN
PAINLEVEL_OUTOF10: 0 - NO PAIN

## 2024-07-10 ASSESSMENT — ACTIVITIES OF DAILY LIVING (ADL)
BATHING: INDEPENDENT
TOILETING: INDEPENDENT
HEARING - RIGHT EAR: FUNCTIONAL
LACK_OF_TRANSPORTATION: NO
PATIENT'S MEMORY ADEQUATE TO SAFELY COMPLETE DAILY ACTIVITIES?: YES
WALKS IN HOME: INDEPENDENT
FEEDING YOURSELF: INDEPENDENT
LACK_OF_TRANSPORTATION: NO
LACK_OF_TRANSPORTATION: NO
HEARING - LEFT EAR: FUNCTIONAL
ADEQUATE_TO_COMPLETE_ADL: YES
JUDGMENT_ADEQUATE_SAFELY_COMPLETE_DAILY_ACTIVITIES: YES
DRESSING YOURSELF: INDEPENDENT
GROOMING: INDEPENDENT

## 2024-07-10 ASSESSMENT — PAIN - FUNCTIONAL ASSESSMENT
PAIN_FUNCTIONAL_ASSESSMENT: 0-10
PAIN_FUNCTIONAL_ASSESSMENT: 0-10

## 2024-07-10 ASSESSMENT — LIFESTYLE VARIABLES
HOW OFTEN DO YOU HAVE 6 OR MORE DRINKS ON ONE OCCASION: NEVER
AUDIT-C TOTAL SCORE: 0
HOW MANY STANDARD DRINKS CONTAINING ALCOHOL DO YOU HAVE ON A TYPICAL DAY: PATIENT DOES NOT DRINK
AUDIT-C TOTAL SCORE: 0
SKIP TO QUESTIONS 9-10: 1
HOW OFTEN DO YOU HAVE A DRINK CONTAINING ALCOHOL: NEVER

## 2024-07-10 ASSESSMENT — PAIN SCALES - PAIN ASSESSMENT IN ADVANCED DEMENTIA (PAINAD): TOTALSCORE: MEDICATION (SEE MAR)

## 2024-07-10 ASSESSMENT — PATIENT HEALTH QUESTIONNAIRE - PHQ9
SUM OF ALL RESPONSES TO PHQ9 QUESTIONS 1 & 2: 0
2. FEELING DOWN, DEPRESSED OR HOPELESS: NOT AT ALL
1. LITTLE INTEREST OR PLEASURE IN DOING THINGS: NOT AT ALL

## 2024-07-10 NOTE — PROGRESS NOTES
Occupational Therapy - Screen                 Therapy Communication Note    Patient Name: Nelsy Osullivan  MRN: [id number]  Today's Date: 7/10/2024     Discipline: Occupational Therapy - chart reviewed and order received        Comment: AMPAC is 24, Per  nursing staff  - pt independent with ambulation in room no skilled therapy needs at this time .    OT order Screen/ DC      KATHARINE Sharp, OTR/L

## 2024-07-10 NOTE — PROGRESS NOTES
Nelsy Osullivan is a 54 y.o. female on day 1 of admission presenting with Pneumonitis.    Subjective   Nelsy Osullivan is a 54 y.o. female with PMHx s/f CAD, CHF, HTN, IDDM 1 with insulin pump, hypothyroidism, non-hodgkin's lymphoma, COPD presenting with hypoglycemia. The patient states her blood glucose levels has been dropping to 50-60s during days for the past 2-3 days. She notes that her glucose levels has been dropping at night for a month but has been managing fine during the days. She is on insulin pump, had it turned off on the transport to hospital via EMS. She is unsure of her basal rate and she reports that she occasionally use bolus when the glucose runs low. She is unsure of her bolus rate/amounts. She is getting treatment for her lymphoma, she has done 3 treatments and 3 more to go. She also notes she occasionally takes her lasix for her leg swelling. She denies f/c/n/v, lightheadedness, dizziness, vision changes, chest pain, shortness of breath, syncope, focal weakness.      ED Course (Summary):   Vitals on presentation: 98.3 F, 112 bpm, 20 RR, 128/70, 100% on RA  Labs: Chemistries-glucose 66, sodium 137, potassium 3.6, calcium 8.3, albumin 2.9, alkaline phosphatase 369, magnesium 1.41, lactate 1.0,   Troponin 16  CBC leukocytes 22.4, hemoglobin 11.4, neutrophils 18.97  Imaging: CXR - Diffuse interstitial pulmonary opacities suggestive of pulmonary edema. Small right pleural effusion. Questionable trace left pleural effusion   Interventions: Rocephin, magnesium sulfate IV 2 g, NaCl bolus 1000 mL, admission for further management    7/10: She remains quite weak.            Objective     Constitutional: Pleasant and cooperative. Laying in bed in no acute distress. Conversant.   Skin: Warm and dry; no obvious lesions, rashes, pallor, or jaundice. Good turgor.   Eyes: EOMI. Anicteric sclera.   ENT: Mucous membranes moist; no obvious injury or deformity appreciated.   Head and Neck: Normocephalic,  "atraumatic. ROM preserved. Trachea midline. No appreciable JVD.   Respiratory: Nonlabored on RA. Lungs rhonchi at bilaterally bases. Chest rise is equal.  Cardiovascular: RRR. Murmur present. Extremities are warm and well-perfused with good capillary refill (< 3 seconds). No chest wall tenderness.   GI: Abdomen soft, nontender, nondistended. No obvious organomegaly appreciated. Bowel sounds are present and normoactive.  : No CVA tenderness.   MSK: No gross abnormalities appreciated. No limitations to AROM/PROM appreciated.   Extremities: 1+ pitting edema. Neurovascularly intact.   Neuro: A&Ox3. CN 2-12 grossly intact. Able to respond to questions appropriately and clearly. No acute focal neurologic deficits appreciated.  Psych: Appropriate mood and behavior.    Last Recorded Vitals  Blood pressure 105/63, pulse 89, temperature 37.2 °C (99 °F), temperature source Temporal, resp. rate 17, height 1.626 m (5' 4\"), weight 57.6 kg (126 lb 15.8 oz), SpO2 93%.  Intake/Output last 3 Shifts:  I/O last 3 completed shifts:  In: 625 (10.9 mL/kg) [I.V.:325 (5.6 mL/kg); IV Piggyback:300]  Out: 800 (13.9 mL/kg) [Urine:800 (0.4 mL/kg/hr)]  Weight: 57.6 kg     Relevant Results           This patient currently has cardiac telemetry ordered; if you would like to modify or discontinue the telemetry order, click here to go to the orders activity to modify/discontinue the order.  This patient has a central line   Reason for the central line remaining today? Parenteral nutrition                 Assessment/Plan   Acute on chronic CHF with pulmonary edema  -Evidenced by presenting sxs: leg swelling  -CXR with: Diffuse interstitial pulmonary opacities suggestive of pulmonary edema. Small right pleural effusion. Questionable trace left pleural effusion.   -Echocardiogram pending; most recent on 9/30/22 showed left ventricular systolic function is normal with a 70-75% estimated ejection fraction    -BNP: 215  -Continue Lasix 40 mg IV BID "   -Monitor electrolytes and replenish generously as needed   -Strict I&O’s   -Monitor fluid status closely   -Cardiology consultation, appreciate further recommendations     Pneumonitis/UTI/Leukocytosis  -SIRS criteria: 2/4 with pulse and WBC  -Continue antibiotic coverage with rocephin, zithromax D2  -Strep, legionella urine antigen  -Sputum culture  -DuoNebs and Mucinex PRN  -blood Cx x2  -UCx pending     IDDM with hypoglycemia  -insulin pump is turned off  -BGs improving with treatment     Hypomagnesemia  -replete PRN     Elevated troponin  -pt denies anginal symptoms  -troponin 16, this is a Type II MI 2/2 acute CHF exacerbation     Hypothyroidism  -continue synthyroid     CAD s/p MI in 2018, HTN, HLD  -Continue home medications   -Monitor and adjust as needed while admitted      Chronic anemia  -hgb 11.4 and at baseline     stage II (low-volume) grade 3B follicular large cell lymphoma   -following outpatient heme/onc and ongoing Bendamustine and Rituxan chemotherapy       Bentley Wiley MD PhD

## 2024-07-10 NOTE — H&P
[facility] - GENERAL MEDICINE HISTORY AND PHYSICAL    History Obtained From: Patient    History Of Present Illness:  Nelsy Osullivan is a 54 y.o. female with PMHx s/f CAD, CHF, HTN, IDDM 1 with insulin pump, hypothyroidism, non-hodgkin's lymphoma, COPD presenting with hypoglycemia. The patient states her blood glucose levels has been dropping to 50-60s during days for the past 2-3 days. She notes that her glucose levels has been dropping at night for a month but has been managing fine during the days. She is on insulin pump, had it turned off on the transport to hospital via EMS. She is unsure of her basal rate and she reports that she occasionally use bolus when the glucose runs low. She is unsure of her bolus rate/amounts. She is getting treatment for her lymphoma, she has done 3 treatments and 3 more to go. She also notes she occasionally takes her lasix for her leg swelling. She denies f/c/n/v, lightheadedness, dizziness, vision changes, chest pain, shortness of breath, syncope, focal weakness.     ED Course (Summary):   Vitals on presentation: 98.3 F, 112 bpm, 20 RR, 128/70, 100% on RA  Labs: Chemistries-glucose 66, sodium 137, potassium 3.6, calcium 8.3, albumin 2.9, alkaline phosphatase 369, magnesium 1.41, lactate 1.0,   Troponin 16  CBC leukocytes 22.4, hemoglobin 11.4, neutrophils 18.97  Imaging: CXR - Diffuse interstitial pulmonary opacities suggestive of pulmonary edema. Small right pleural effusion. Questionable trace left pleural effusion   Interventions: Rocephin, magnesium sulfate IV 2 g, NaCl bolus 1000 mL, admission for further management    ED Course (From Provider):  ED Course as of 07/09/24 2317 Tue Jul 09, 2024 1918 Patient noted to have elevated troponin of 16, magnesium was low 1.4.  This was replaced.  She does have leukocytosis however also lymphoma noted in history.  Glucose is continue to drop despite oral intake.  Will start dextrose infusion.  TSH is in normal  range. [ML]   1918 Chest x-ray performed showed evidence of hypervolemia versus pneumonia, suspect underlying pneumonia as a cause.  Will treat with IV antibiotics.  Patient will be admitted for further management and evaluation. [ML]      ED Course User Index  [ML] Marty R Lejeune, DO         Diagnoses as of 07/09/24 2317   Pneumonitis   Hypoglycemia     Relevant Results  Results for orders placed or performed during the hospital encounter of 07/09/24 (from the past 24 hour(s))   POCT GLUCOSE   Result Value Ref Range    POCT Glucose 75 74 - 99 mg/dL   CBC and Auto Differential   Result Value Ref Range    WBC 22.4 (H) 4.4 - 11.3 x10*3/uL    nRBC 0.0 0.0 - 0.0 /100 WBCs    RBC 3.93 (L) 4.00 - 5.20 x10*6/uL    Hemoglobin 11.4 (L) 12.0 - 16.0 g/dL    Hematocrit 33.0 (L) 36.0 - 46.0 %    MCV 84 80 - 100 fL    MCH 29.0 26.0 - 34.0 pg    MCHC 34.5 32.0 - 36.0 g/dL    RDW 18.9 (H) 11.5 - 14.5 %    Platelets 229 150 - 450 x10*3/uL    Neutrophils % 84.6 40.0 - 80.0 %    Immature Granulocytes %, Automated 2.8 (H) 0.0 - 0.9 %    Lymphocytes % 2.1 13.0 - 44.0 %    Monocytes % 5.8 2.0 - 10.0 %    Eosinophils % 4.6 0.0 - 6.0 %    Basophils % 0.1 0.0 - 2.0 %    Neutrophils Absolute 18.97 (H) 1.20 - 7.70 x10*3/uL    Immature Granulocytes Absolute, Automated 0.62 0.00 - 0.70 x10*3/uL    Lymphocytes Absolute 0.47 (L) 1.20 - 4.80 x10*3/uL    Monocytes Absolute 1.30 (H) 0.10 - 1.00 x10*3/uL    Eosinophils Absolute 1.04 (H) 0.00 - 0.70 x10*3/uL    Basophils Absolute 0.03 0.00 - 0.10 x10*3/uL   Magnesium   Result Value Ref Range    Magnesium 1.41 (L) 1.60 - 2.40 mg/dL   Comprehensive metabolic panel   Result Value Ref Range    Glucose 66 (L) 74 - 99 mg/dL    Sodium 137 136 - 145 mmol/L    Potassium 3.6 3.5 - 5.3 mmol/L    Chloride 104 98 - 107 mmol/L    Bicarbonate 28 21 - 32 mmol/L    Anion Gap 9 (L) 10 - 20 mmol/L    Urea Nitrogen 8 6 - 23 mg/dL    Creatinine 0.85 0.50 - 1.05 mg/dL    eGFR 81 >60 mL/min/1.73m*2    Calcium 8.3 (L) 8.6 -  10.3 mg/dL    Albumin 2.9 (L) 3.4 - 5.0 g/dL    Alkaline Phosphatase 369 (H) 33 - 110 U/L    Total Protein 6.0 (L) 6.4 - 8.2 g/dL    AST 37 9 - 39 U/L    Bilirubin, Total 0.3 0.0 - 1.2 mg/dL    ALT 20 7 - 45 U/L   Troponin I, High Sensitivity   Result Value Ref Range    Troponin I, High Sensitivity 16 (H) 0 - 13 ng/L   Phosphorus   Result Value Ref Range    Phosphorus 3.4 2.5 - 4.9 mg/dL   TSH with reflex to Free T4 if abnormal   Result Value Ref Range    Thyroid Stimulating Hormone 2.18 0.44 - 3.98 mIU/L   B-type natriuretic peptide   Result Value Ref Range     (H) 0 - 99 pg/mL   Morphology   Result Value Ref Range    RBC Morphology See Below     Target Cells Few     Ovalocytes Few     Toxic Granulation Present    Lactate   Result Value Ref Range    Lactate 1.0 0.4 - 2.0 mmol/L   POCT GLUCOSE   Result Value Ref Range    POCT Glucose 210 (H) 74 - 99 mg/dL      XR chest 1 view    Result Date: 7/9/2024  Interpreted By:  Jm Escoto, STUDY: XR CHEST 1 VIEW;  7/9/2024 6:18 pm   INDICATION: Signs/Symptoms:hypoglycemia.   COMPARISON: 03/21/2024   ACCESSION NUMBER(S): [id number]   ORDERING CLINICIAN: MARTY LEJEUNE   FINDINGS: Small right pleural effusion. Questionable trace left pleural effusion. Diffuse interstitial pulmonary opacities, stable. Normal heart size. Sternotomy wires. Right-sided Port-A-Cath. Atherosclerosis. No pneumothorax. No acute osseous abnormality. Stable linear radiopaque structure projecting over the left hilum.       Diffuse interstitial pulmonary opacities suggestive of pulmonary edema. Small right pleural effusion. Questionable trace left pleural effusion.   Signed by: Jm Escoto 7/9/2024 6:26 PM Dictation workstation:   [id number]    Scheduled medications:  albuterol, 2 puff, inhalation, q6h  aspirin, 81 mg, oral, Daily  atorvastatin, 40 mg, oral, Nightly  azithromycin, 500 mg, intravenous, Once  azithromycin, 500 mg, intravenous, q24h  carvedilol, 12.5 mg, oral,  q12h  cefTRIAXone, 2 g, intravenous, q24h  DULoxetine, 80 mg, oral, Daily  enoxaparin, 40 mg, subcutaneous, q24h  furosemide, 40 mg, intravenous, BID  [START ON 7/10/2024] levothyroxine, 125 mcg, oral, Daily  metoprolol succinate XL, 50 mg, oral, Daily  mirtazapine, 7.5 mg, oral, Nightly  morphine CR, 30 mg, oral, TID  OXcarbazepine, 150 mg, oral, q12h KIMBERLY  [START ON 7/10/2024] pantoprazole, 40 mg, oral, Daily before breakfast   Or  [START ON 7/10/2024] pantoprazole, 40 mg, intravenous, Daily before breakfast  polyethylene glycol, 17 g, oral, Daily      Continuous medications:  dextrose 5%-0.45 % sodium chloride, 75 mL/hr, Last Rate: 75 mL/hr (07/09/24 2122)      PRN medications:  PRN medications: acetaminophen, bisacodyl, bisacodyl, cyclobenzaprine, guaiFENesin, ipratropium-albuteroL, melatonin, ondansetron     Past Medical History  She has no past medical history on file.    Surgical History  She has a past surgical history that includes Other surgical history (04/14/2020); Other surgical history (04/14/2020); and Other surgical history (04/14/2020).     Social History  She reports that she quit smoking about 2 years ago. Her smoking use included cigarettes. She has been exposed to tobacco smoke. She has never used smokeless tobacco. She reports that she does not currently use alcohol. She reports that she does not use drugs.    Family History  No family history on file.    Allergies  Acetaminophen-codeine, Adhesive, Codeine, Fentanyl, Fetrin, Ketorolac, Meperidine, Metformin, Propoxyphene n-acetaminophen, Silver, Adhesive tape-silicones, Iodinated contrast media, Latex, Penicillins, and Wound dressings    Code Status  Full Code     Review of Systems   Constitutional:  Negative for chills, fatigue and fever.   HENT:  Negative for congestion.    Respiratory:  Negative for cough, chest tightness, shortness of breath and wheezing.    Cardiovascular:  Positive for leg swelling. Negative for chest pain and  palpitations.   Gastrointestinal:  Negative for abdominal pain, constipation, diarrhea, nausea and vomiting.   Genitourinary:  Negative for flank pain and hematuria.   Musculoskeletal:  Negative for back pain and joint swelling.   Skin:  Negative for rash and wound.   Neurological:  Negative for tremors, syncope, weakness and headaches.       Last Recorded Vitals  /74   Pulse 98   Temp 36.1 °C (97 °F)   Resp 20   Wt 57.6 kg (126 lb 15.8 oz)   SpO2 98%      Physical Exam:  Vital signs and nursing notes reviewed.   Constitutional: Pleasant and cooperative. Laying in bed in no acute distress. Conversant.   Skin: Warm and dry; no obvious lesions, rashes, pallor, or jaundice. Good turgor.   Eyes: EOMI. Anicteric sclera.   ENT: Mucous membranes moist; no obvious injury or deformity appreciated.   Head and Neck: Normocephalic, atraumatic. ROM preserved. Trachea midline. No appreciable JVD.   Respiratory: Nonlabored on RA. Lungs rhonchi at bilaterally bases. Chest rise is equal.  Cardiovascular: RRR. Murmur present. Extremities are warm and well-perfused with good capillary refill (< 3 seconds). No chest wall tenderness.   GI: Abdomen soft, nontender, nondistended. No obvious organomegaly appreciated. Bowel sounds are present and normoactive.  : No CVA tenderness.   MSK: No gross abnormalities appreciated. No limitations to AROM/PROM appreciated.   Extremities: 1+ pitting edema. Neurovascularly intact.   Neuro: A&Ox3. CN 2-12 grossly intact. Able to respond to questions appropriately and clearly. No acute focal neurologic deficits appreciated.  Psych: Appropriate mood and behavior.    Assessment/Plan   Principal Problem:    Pneumonitis  Active Problems:    Hypothyroidism    Dyslipidemia    Essential (primary) hypertension    Non-Hodgkin's lymphoma (Multi)    Acute on chronic combined systolic and diastolic CHF (congestive heart failure) (Multi)    Leukocytosis    Hypoglycemia associated with diabetes (Multi)     Elevated troponin    Acute on chronic CHF with pulmonary edema  -Evidenced by presenting sxs: leg swelling  -CXR with: Diffuse interstitial pulmonary opacities suggestive of pulmonary edema. Small right pleural effusion. Questionable trace left pleural effusion.   -Echocardiogram pending; most recent on 9/30/22 showed left ventricular systolic function is normal with a 70-75% estimated ejection fraction    -BNP: 215  -Initiate lasix 40 mg IV BID   -Monitor electrolytes and replenish generously as needed   -Strict I&O’s   -Monitor fluid status closely   -Cardiology consultation, appreciate further recommendations    Pneumonitis  Leukocytosis  -SIRS criteria: 2/4 with pulse and WBC  -Continue antibiotic coverage with rocephin, zithromax  -Strep, legionella urine antigen  -Sputum culture  -DuoNebs and Mucinex PRN  -blood Cx x2  -UA pending    IDDM with hypoglycemia  -insulin pump is turned off  -glucose q2h checks  -serum glucose 66 --> gave NaCl 1L in ED -->    Hypomagnesemia  -Mg 1.41--> given Mg sulfate IV 2g   -recheck in AM and replenish as necessary    Elevated troponin  -pt denies anginal symptoms  -troponin 16, likely related to acute CHF exacerbation    Hypothyroidism  -continue synthyroid    CAD s/p MI in 2018, HTN, HLD  -Continue home medications   -Monitor and adjust as needed while admitted     Chronic anemia  -hgb 11.4 and at baseline    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 Please contact for clarification if needed.

## 2024-07-10 NOTE — CARE PLAN
The patient's goals for the shift include      The clinical goals for the shift include P t's glucose will be maintained between  throughout shift

## 2024-07-10 NOTE — PROGRESS NOTES
Physical Therapy SCREEN                 Therapy Communication Note    Patient Name: Nelsy Osullivan  MRN: [id number]  Today's Date: 7/10/2024     Discipline: Physical Therapy SCREEN/DISCH    Comment: noted that AMPAC is 24, discussed with nursing staff who states pt has been ambulatory independently in the room-they did not see need for Skilled PT (PT did not view this as pt in isolation for bed bugs). n

## 2024-07-11 ENCOUNTER — APPOINTMENT (OUTPATIENT)
Dept: RADIOLOGY | Facility: HOSPITAL | Age: 54
End: 2024-07-11
Payer: COMMERCIAL

## 2024-07-11 LAB
ANION GAP SERPL CALC-SCNC: 8 MMOL/L (ref 10–20)
BACTERIA UR CULT: NO GROWTH
BUN SERPL-MCNC: 10 MG/DL (ref 6–23)
CALCIUM SERPL-MCNC: 7.9 MG/DL (ref 8.6–10.3)
CHLORIDE SERPL-SCNC: 101 MMOL/L (ref 98–107)
CO2 SERPL-SCNC: 28 MMOL/L (ref 21–32)
CREAT SERPL-MCNC: 0.86 MG/DL (ref 0.5–1.05)
CREAT UR-MCNC: 19.3 MG/DL (ref 20–320)
EGFRCR SERPLBLD CKD-EPI 2021: 79 ML/MIN/1.73M*2
ERYTHROCYTE [DISTWIDTH] IN BLOOD BY AUTOMATED COUNT: 19.1 % (ref 11.5–14.5)
GLUCOSE BLD MANUAL STRIP-MCNC: 133 MG/DL (ref 74–99)
GLUCOSE BLD MANUAL STRIP-MCNC: 152 MG/DL (ref 74–99)
GLUCOSE BLD MANUAL STRIP-MCNC: 274 MG/DL (ref 74–99)
GLUCOSE BLD MANUAL STRIP-MCNC: 56 MG/DL (ref 74–99)
GLUCOSE BLD MANUAL STRIP-MCNC: 83 MG/DL (ref 74–99)
GLUCOSE SERPL-MCNC: 262 MG/DL (ref 74–99)
HCT VFR BLD AUTO: 31 % (ref 36–46)
HGB BLD-MCNC: 10.8 G/DL (ref 12–16)
MAGNESIUM SERPL-MCNC: 1.54 MG/DL (ref 1.6–2.4)
MCH RBC QN AUTO: 28.9 PG (ref 26–34)
MCHC RBC AUTO-ENTMCNC: 34.8 G/DL (ref 32–36)
MCV RBC AUTO: 83 FL (ref 80–100)
NRBC BLD-RTO: 0 /100 WBCS (ref 0–0)
PLATELET # BLD AUTO: 207 X10*3/UL (ref 150–450)
POTASSIUM SERPL-SCNC: 4.7 MMOL/L (ref 3.5–5.3)
PROT UR-ACNC: 6 MG/DL (ref 5–24)
PROT/CREAT UR: 0.31 MG/MG CREAT (ref 0–0.17)
RBC # BLD AUTO: 3.74 X10*6/UL (ref 4–5.2)
SODIUM SERPL-SCNC: 132 MMOL/L (ref 136–145)
WBC # BLD AUTO: 12.5 X10*3/UL (ref 4.4–11.3)

## 2024-07-11 PROCEDURE — 83735 ASSAY OF MAGNESIUM: CPT | Performed by: INTERNAL MEDICINE

## 2024-07-11 PROCEDURE — 2500000001 HC RX 250 WO HCPCS SELF ADMINISTERED DRUGS (ALT 637 FOR MEDICARE OP)

## 2024-07-11 PROCEDURE — 82947 ASSAY GLUCOSE BLOOD QUANT: CPT

## 2024-07-11 PROCEDURE — 2500000001 HC RX 250 WO HCPCS SELF ADMINISTERED DRUGS (ALT 637 FOR MEDICARE OP): Performed by: STUDENT IN AN ORGANIZED HEALTH CARE EDUCATION/TRAINING PROGRAM

## 2024-07-11 PROCEDURE — 2500000001 HC RX 250 WO HCPCS SELF ADMINISTERED DRUGS (ALT 637 FOR MEDICARE OP): Performed by: INTERNAL MEDICINE

## 2024-07-11 PROCEDURE — 2500000002 HC RX 250 W HCPCS SELF ADMINISTERED DRUGS (ALT 637 FOR MEDICARE OP, ALT 636 FOR OP/ED)

## 2024-07-11 PROCEDURE — 80048 BASIC METABOLIC PNL TOTAL CA: CPT | Performed by: INTERNAL MEDICINE

## 2024-07-11 PROCEDURE — 71250 CT THORAX DX C-: CPT | Performed by: RADIOLOGY

## 2024-07-11 PROCEDURE — 2500000004 HC RX 250 GENERAL PHARMACY W/ HCPCS (ALT 636 FOR OP/ED)

## 2024-07-11 PROCEDURE — 85027 COMPLETE CBC AUTOMATED: CPT | Performed by: INTERNAL MEDICINE

## 2024-07-11 PROCEDURE — 2060000001 HC INTERMEDIATE ICU ROOM DAILY

## 2024-07-11 PROCEDURE — 99233 SBSQ HOSP IP/OBS HIGH 50: CPT | Performed by: INTERNAL MEDICINE

## 2024-07-11 PROCEDURE — 2500000004 HC RX 250 GENERAL PHARMACY W/ HCPCS (ALT 636 FOR OP/ED): Performed by: STUDENT IN AN ORGANIZED HEALTH CARE EDUCATION/TRAINING PROGRAM

## 2024-07-11 PROCEDURE — 82570 ASSAY OF URINE CREATININE: CPT | Performed by: INTERNAL MEDICINE

## 2024-07-11 PROCEDURE — 2500000004 HC RX 250 GENERAL PHARMACY W/ HCPCS (ALT 636 FOR OP/ED): Performed by: INTERNAL MEDICINE

## 2024-07-11 PROCEDURE — 99222 1ST HOSP IP/OBS MODERATE 55: CPT | Performed by: INTERNAL MEDICINE

## 2024-07-11 PROCEDURE — 71250 CT THORAX DX C-: CPT

## 2024-07-11 RX ORDER — MAGNESIUM SULFATE HEPTAHYDRATE 40 MG/ML
2 INJECTION, SOLUTION INTRAVENOUS ONCE
Status: COMPLETED | OUTPATIENT
Start: 2024-07-11 | End: 2024-07-11

## 2024-07-11 SDOH — HEALTH STABILITY: MENTAL HEALTH
HOW OFTEN DO YOU NEED TO HAVE SOMEONE HELP YOU WHEN YOU READ INSTRUCTIONS, PAMPHLETS, OR OTHER WRITTEN MATERIAL FROM YOUR DOCTOR OR PHARMACY?: NEVER

## 2024-07-11 SDOH — ECONOMIC STABILITY: INCOME INSECURITY: HOW HARD IS IT FOR YOU TO PAY FOR THE VERY BASICS LIKE FOOD, HOUSING, MEDICAL CARE, AND HEATING?: NOT VERY HARD

## 2024-07-11 SDOH — SOCIAL STABILITY: SOCIAL NETWORK: HOW OFTEN DO YOU GET TOGETHER WITH FRIENDS OR RELATIVES?: PATIENT DECLINED

## 2024-07-11 SDOH — SOCIAL STABILITY: SOCIAL INSECURITY
WITHIN THE LAST YEAR, HAVE YOU BEEN HUMILIATED OR EMOTIONALLY ABUSED IN OTHER WAYS BY YOUR PARTNER OR EX-PARTNER?: PATIENT DECLINED

## 2024-07-11 SDOH — ECONOMIC STABILITY: FOOD INSECURITY: WITHIN THE PAST 12 MONTHS, YOU WORRIED THAT YOUR FOOD WOULD RUN OUT BEFORE YOU GOT MONEY TO BUY MORE.: PATIENT DECLINED

## 2024-07-11 SDOH — SOCIAL STABILITY: SOCIAL NETWORK: HOW OFTEN DO YOU ATTEND CHURCH OR RELIGIOUS SERVICES?: PATIENT DECLINED

## 2024-07-11 SDOH — ECONOMIC STABILITY: TRANSPORTATION INSECURITY
IN THE PAST 12 MONTHS, HAS THE LACK OF TRANSPORTATION KEPT YOU FROM MEDICAL APPOINTMENTS OR FROM GETTING MEDICATIONS?: PATIENT DECLINED

## 2024-07-11 SDOH — ECONOMIC STABILITY: INCOME INSECURITY
IN THE PAST 12 MONTHS, HAS THE ELECTRIC, GAS, OIL, OR WATER COMPANY THREATENED TO SHUT OFF SERVICE IN YOUR HOME?: PATIENT DECLINED

## 2024-07-11 SDOH — HEALTH STABILITY: MENTAL HEALTH: HOW OFTEN DO YOU HAVE A DRINK CONTAINING ALCOHOL?: PATIENT DECLINED

## 2024-07-11 SDOH — SOCIAL STABILITY: SOCIAL INSECURITY
WITHIN THE LAST YEAR, HAVE YOU BEEN KICKED, HIT, SLAPPED, OR OTHERWISE PHYSICALLY HURT BY YOUR PARTNER OR EX-PARTNER?: PATIENT DECLINED

## 2024-07-11 SDOH — ECONOMIC STABILITY: INCOME INSECURITY: IN THE LAST 12 MONTHS, WAS THERE A TIME WHEN YOU WERE NOT ABLE TO PAY THE MORTGAGE OR RENT ON TIME?: PATIENT DECLINED

## 2024-07-11 SDOH — SOCIAL STABILITY: SOCIAL NETWORK
DO YOU BELONG TO ANY CLUBS OR ORGANIZATIONS SUCH AS CHURCH GROUPS UNIONS, FRATERNAL OR ATHLETIC GROUPS, OR SCHOOL GROUPS?: PATIENT DECLINED

## 2024-07-11 SDOH — HEALTH STABILITY: MENTAL HEALTH: HOW OFTEN DO YOU HAVE 6 OR MORE DRINKS ON ONE OCCASION?: PATIENT DECLINED

## 2024-07-11 SDOH — SOCIAL STABILITY: SOCIAL NETWORK: HOW OFTEN DO YOU ATTENT MEETINGS OF THE CLUB OR ORGANIZATION YOU BELONG TO?: PATIENT DECLINED

## 2024-07-11 SDOH — ECONOMIC STABILITY: HOUSING INSECURITY: AT ANY TIME IN THE PAST 12 MONTHS, WERE YOU HOMELESS OR LIVING IN A SHELTER (INCLUDING NOW)?: PATIENT DECLINED

## 2024-07-11 SDOH — ECONOMIC STABILITY: FOOD INSECURITY: WITHIN THE PAST 12 MONTHS, THE FOOD YOU BOUGHT JUST DIDN'T LAST AND YOU DIDN'T HAVE MONEY TO GET MORE.: PATIENT DECLINED

## 2024-07-11 SDOH — HEALTH STABILITY: PHYSICAL HEALTH: ON AVERAGE, HOW MANY MINUTES DO YOU ENGAGE IN EXERCISE AT THIS LEVEL?: PATIENT DECLINED

## 2024-07-11 SDOH — SOCIAL STABILITY: SOCIAL NETWORK: ARE YOU MARRIED, WIDOWED, DIVORCED, SEPARATED, NEVER MARRIED, OR LIVING WITH A PARTNER?: PATIENT DECLINED

## 2024-07-11 SDOH — HEALTH STABILITY: MENTAL HEALTH
STRESS IS WHEN SOMEONE FEELS TENSE, NERVOUS, ANXIOUS, OR CAN'T SLEEP AT NIGHT BECAUSE THEIR MIND IS TROUBLED. HOW STRESSED ARE YOU?: PATIENT DECLINED

## 2024-07-11 SDOH — ECONOMIC STABILITY: TRANSPORTATION INSECURITY
IN THE PAST 12 MONTHS, HAS LACK OF TRANSPORTATION KEPT YOU FROM MEETINGS, WORK, OR FROM GETTING THINGS NEEDED FOR DAILY LIVING?: PATIENT DECLINED

## 2024-07-11 SDOH — HEALTH STABILITY: PHYSICAL HEALTH
ON AVERAGE, HOW MANY DAYS PER WEEK DO YOU ENGAGE IN MODERATE TO STRENUOUS EXERCISE (LIKE A BRISK WALK)?: PATIENT DECLINED

## 2024-07-11 SDOH — SOCIAL STABILITY: SOCIAL INSECURITY: WITHIN THE LAST YEAR, HAVE YOU BEEN AFRAID OF YOUR PARTNER OR EX-PARTNER?: PATIENT DECLINED

## 2024-07-11 SDOH — SOCIAL STABILITY: SOCIAL INSECURITY
WITHIN THE LAST YEAR, HAVE TO BEEN RAPED OR FORCED TO HAVE ANY KIND OF SEXUAL ACTIVITY BY YOUR PARTNER OR EX-PARTNER?: PATIENT DECLINED

## 2024-07-11 SDOH — HEALTH STABILITY: MENTAL HEALTH: HOW MANY STANDARD DRINKS CONTAINING ALCOHOL DO YOU HAVE ON A TYPICAL DAY?: PATIENT DECLINED

## 2024-07-11 ASSESSMENT — LIFESTYLE VARIABLES
AUDIT-C TOTAL SCORE: -1
SKIP TO QUESTIONS 9-10: 0

## 2024-07-11 ASSESSMENT — ACTIVITIES OF DAILY LIVING (ADL)
LACK_OF_TRANSPORTATION: NO
LACK_OF_TRANSPORTATION: NO

## 2024-07-11 ASSESSMENT — PAIN - FUNCTIONAL ASSESSMENT
PAIN_FUNCTIONAL_ASSESSMENT: 0-10
PAIN_FUNCTIONAL_ASSESSMENT: 0-10

## 2024-07-11 ASSESSMENT — PAIN SCALES - GENERAL
PAINLEVEL_OUTOF10: 9
PAINLEVEL_OUTOF10: 9
PAINLEVEL_OUTOF10: 7
PAINLEVEL_OUTOF10: 3

## 2024-07-11 ASSESSMENT — PAIN DESCRIPTION - LOCATION: LOCATION: BACK

## 2024-07-11 NOTE — CARE PLAN
The patient's goals for the shift include  breath easily  Problem: Pain - Adult  Goal: Verbalizes/displays adequate comfort level or baseline comfort level  Outcome: Progressing     Problem: Safety - Adult  Goal: Free from fall injury  Outcome: Progressing     Problem: Discharge Planning  Goal: Discharge to home or other facility with appropriate resources  Outcome: Progressing     Problem: Chronic Conditions and Co-morbidities  Goal: Patient's chronic conditions and co-morbidity symptoms are monitored and maintained or improved  Outcome: Progressing     Problem: Pain  Goal: Takes deep breaths with improved pain control throughout the shift  Outcome: Progressing  Goal: Turns in bed with improved pain control throughout the shift  Outcome: Progressing  Goal: Walks with improved pain control throughout the shift  Outcome: Progressing  Goal: Performs ADL's with improved pain control throughout shift  Outcome: Progressing  Goal: Participates in PT with improved pain control throughout the shift  Outcome: Progressing  Goal: Free from opioid side effects throughout the shift  Outcome: Progressing  Goal: Free from acute confusion related to pain meds throughout the shift  Outcome: Progressing     Problem: Diabetes  Goal: Achieve decreasing blood glucose levels by end of shift  Outcome: Progressing  Goal: Increase stability of blood glucose readings by end of shift  Outcome: Progressing  Goal: Decrease in ketones present in urine by end of shift  Outcome: Progressing  Goal: Maintain electrolyte levels within acceptable range throughout shift  Outcome: Progressing  Goal: Maintain glucose levels >70mg/dl to <250mg/dl throughout shift  Outcome: Progressing  Goal: No changes in neurological exam by end of shift  Outcome: Progressing  Goal: Learn about and adhere to nutrition recommendations by end of shift  Outcome: Progressing  Goal: Vital signs within normal range for age by end of shift  Outcome: Progressing  Goal: Increase  self care and/or family involovement by end of shift  Outcome: Progressing  Goal: Receive DSME education by end of shift  Outcome: Progressing     Problem: Heart Failure  Goal: Improved gas exchange this shift  Outcome: Progressing  Goal: Improved urinary output this shift  Outcome: Progressing  Goal: Reduction in peripheral edema within 24 hours  Outcome: Progressing  Goal: Report improvement of dyspnea/breathlessness this shift  Outcome: Progressing  Goal: Weight from fluid excess reduced over 2-3 days, then stabilize  Outcome: Progressing  Goal: Increase self care and/or family involvement in 24 hours  Outcome: Progressing       The clinical goals for the shift include Patient glucose will maintain between  throughout shift

## 2024-07-11 NOTE — PROGRESS NOTES
7/11/24 1526   07/11/24 1522   Discharge Planning   Living Arrangements Alone   Support Systems Friends/neighbors   Assistance Needed none   Type of Residence Private residence   Number of Stairs to Enter Residence 0   Number of Stairs Within Residence 12   Do you have animals or pets at home? No   Home or Post Acute Services None   Does the patient need discharge transport arranged? Yes     Spoke with pt. Pt from home and lives in an apartment alone. Pt appeared alert and oriented. Pt states being independent and bernadine use of any assistive devices. Pt states utilizing [employer], insurance company, and [employer] is able to obtain meds and get to appointments. PCP is Gene Martinez and had seen in the last six months. Pt admits to vaping tobacco products. Has had three chemo treatments and needs three more for non-hodgkins and last treatment was June 27th. [facility] Pt declined a C list. Pt wants nursing, remote monitoring and a dietitian. Will notify MD. Chikis Sandoval RN, BSN, Nico/ Luke MAHER Supervisor

## 2024-07-11 NOTE — PROGRESS NOTES
Nelsy Osullivan is a 54 y.o. female on day 2 of admission presenting with Pneumonitis.    Subjective   Nelsy Osullivan is a 54 y.o. female with PMHx s/f CAD, CHF, HTN, IDDM 1 with insulin pump, hypothyroidism, non-hodgkin's lymphoma, COPD presenting with hypoglycemia. The patient states her blood glucose levels has been dropping to 50-60s during days for the past 2-3 days. She notes that her glucose levels has been dropping at night for a month but has been managing fine during the days. She is on insulin pump, had it turned off on the transport to hospital via EMS. She is unsure of her basal rate and she reports that she occasionally use bolus when the glucose runs low. She is unsure of her bolus rate/amounts. She is getting treatment for her lymphoma, she has done 3 treatments and 3 more to go. She also notes she occasionally takes her lasix for her leg swelling. She denies f/c/n/v, lightheadedness, dizziness, vision changes, chest pain, shortness of breath, syncope, focal weakness.      ED Course (Summary):   Vitals on presentation: 98.3 F, 112 bpm, 20 RR, 128/70, 100% on RA  Labs: Chemistries-glucose 66, sodium 137, potassium 3.6, calcium 8.3, albumin 2.9, alkaline phosphatase 369, magnesium 1.41, lactate 1.0,   Troponin 16  CBC leukocytes 22.4, hemoglobin 11.4, neutrophils 18.97  Imaging: CXR - Diffuse interstitial pulmonary opacities suggestive of pulmonary edema. Small right pleural effusion. Questionable trace left pleural effusion   Interventions: Rocephin, magnesium sulfate IV 2 g, NaCl bolus 1000 mL, admission for further management    7/10: She remains quite weak.   07/11: Patient was evaluated this morning, shortness of breath is improving, no fever or chills, no nausea or vomiting.           Objective     Constitutional: Pleasant and cooperative. Laying in bed in no acute distress. Conversant.   Skin: Warm and dry; no obvious lesions, rashes, pallor, or jaundice. Good turgor.   Eyes: EOMI. Anicteric  "sclera.   ENT: Mucous membranes moist; no obvious injury or deformity appreciated.   Head and Neck: Normocephalic, atraumatic. ROM preserved. Trachea midline. No appreciable JVD.   Respiratory: Nonlabored on RA. Lungs rhonchi at bilaterally bases. Chest rise is equal.  Cardiovascular: RRR. Murmur present. Extremities are warm and well-perfused with good capillary refill (< 3 seconds). No chest wall tenderness.   GI: Abdomen soft, nontender, nondistended. No obvious organomegaly appreciated. Bowel sounds are present and normoactive.  : No CVA tenderness.   MSK: No gross abnormalities appreciated. No limitations to AROM/PROM appreciated.   Extremities: 1+ pitting edema. Neurovascularly intact.   Neuro: A&Ox3. CN 2-12 grossly intact. Able to respond to questions appropriately and clearly. No acute focal neurologic deficits appreciated.  Psych: Appropriate mood and behavior.    Last Recorded Vitals  Blood pressure 93/55, pulse 82, temperature 37.3 °C (99.2 °F), temperature source Temporal, resp. rate 19, height 1.626 m (5' 4\"), weight 62.5 kg (137 lb 12.6 oz), SpO2 93%.  Intake/Output last 3 Shifts:  I/O last 3 completed shifts:  In: 875 (14 mL/kg) [I.V.:325 (5.2 mL/kg); IV Piggyback:550]  Out: 1850 (29.6 mL/kg) [Urine:1850 (0.8 mL/kg/hr)]  Weight: 62.5 kg     Relevant Results           This patient currently has cardiac telemetry ordered; if you would like to modify or discontinue the telemetry order, click here to go to the orders activity to modify/discontinue the order.  This patient has a central line   Reason for the central line remaining today? Parenteral nutrition                 Assessment/Plan   Acute on chronic  heart failure  -Evidenced by presenting sxs: leg swelling  -CXR with: Diffuse interstitial pulmonary opacities suggestive of pulmonary edema. Small right pleural effusion. Questionable trace left pleural effusion.   -Echocardiogram shows normal LV systolic function, decreased RV systolic " function  -BNP: 215  -Continue Lasix 40 mg IV BID   -Monitor electrolytes and replenish generously as needed   -Strict I&O’s   -Monitor fluid status closely   -Cardiology consultation, appreciate further recommendations     Community-acquired-pneumonia  -Continue on IV Rocephin/Zithromax,  -Will obtain HRCT  -Follow cultures-blood cultures and urine cultures negative so far     IDDM with hypoglycemia  -insulin pump is turned off  -BGs improving with treatment     Hypomagnesemia  -replete PRN     Elevated troponin  -pt denies anginal symptoms  -troponin 16, this is a Type II MI 2/2 acute CHF exacerbation     Hypothyroidism  -continue synthyroid     CAD s/p MI in 2018, HTN, HLD  -Continue home medications   -Monitor and adjust as needed while admitted      Chronic anemia  -hgb 11.4 and at baseline     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

## 2024-07-11 NOTE — CONSULTS
Inpatient consult to Cardiology  Consult performed by: Vivek Viera MD  Consult ordered by: Yuridia Lundy MD        History Of Present Illness:    April D Abran is a 54 y.o. female with PMHx s/f CAD, CHF, HTN, IDDM 1 with insulin pump, hypothyroidism, non-hodgkin's lymphoma, COPD presenting with hypoglycemia. The patient states her blood glucose levels has been dropping to 50-60s during days for the past 2-3 days. She notes that her glucose levels has been dropping at night for a month but has been managing fine during the days. She is on insulin pump, had it turned off on the transport to hospital via EMS. She is unsure of her basal rate and she reports that she occasionally use bolus when the glucose runs low. She is unsure of her bolus rate/amounts. She is getting treatment for her lymphoma, she has done 3 treatments and 3 more to go. She also notes she occasionally takes her lasix for her leg swelling. She denies f/c/n/v, lightheadedness, dizziness, vision changes, chest pain, shortness of breath, syncope, focal weakness.    She denies any CP or SOB. CXR was interpreted as Pulmonary edema. Echo with normal LV function and normal functioning bioprosthetic valves  Last Recorded Vitals:  Vitals:    07/10/24 2335 07/11/24 0325 07/11/24 0845 07/11/24 1208   BP: 94/58 100/50 110/70 93/55   BP Location: Left arm Left arm Left arm Left arm   Patient Position: Lying Lying Lying Lying   Pulse: 82 75 82 82   Resp: 18 18 19 19   Temp: 37 °C (98.6 °F) 36.8 °C (98.2 °F) 37.7 °C (99.8 °F) 37.3 °C (99.2 °F)   TempSrc: Temporal Temporal Temporal Temporal   SpO2: 96% 94% 95% 93%   Weight:  62.5 kg (137 lb 12.6 oz)     Height:           Last Labs:  CBC - 7/11/2024:  4:35 AM  12.5 10.8 207    31.0      CMP - 7/11/2024:  4:35 AM  7.9 5.1 36 --- 0.3   3.4 2.4 17 308      PTT - No results in last year.  _   _ _     Troponin I, High Sensitivity   Date/Time Value Ref Range Status   07/09/2024 06:01 PM 16 (H) 0 - 13 ng/L Final    03/21/2024 12:43 PM 6 0 - 13 ng/L Final   03/21/2024 11:14 AM 5 0 - 13 ng/L Final     BNP   Date/Time Value Ref Range Status   07/09/2024 06:01  (H) 0 - 99 pg/mL Final   10/17/2023 10:50  (H) 0 - 99 pg/mL Final     POC HEMOGLOBIN A1c   Date/Time Value Ref Range Status   12/13/2023 11:30 AM 8.9 (A) 4.2 - 6.5 % Final     Hemoglobin A1C   Date/Time Value Ref Range Status   05/31/2024 12:25 PM 7.8 (H) see below % Final   04/04/2023 03:18 PM 13.5 (A) % Final     Comment:          Diagnosis of Diabetes-Adults   Non-Diabetic: < or = 5.6%   Increased risk for developing diabetes: 5.7-6.4%   Diagnostic of diabetes: > or = 6.5%  .       Monitoring of Diabetes                Age (y)     Therapeutic Goal (%)   Adults:          >18           <7.0   Pediatrics:    13-18           <7.5                   7-12           <8.0                   0- 6            7.5-8.5   American Diabetes Association. Diabetes Care 33(S1), Jan 2010.     08/05/2022 01:35 PM 10.3 (A) % Final     Comment:          Diagnosis of Diabetes-Adults   Non-Diabetic: < or = 5.6%   Increased risk for developing diabetes: 5.7-6.4%   Diagnostic of diabetes: > or = 6.5%  .       Monitoring of Diabetes                Age (y)     Therapeutic Goal (%)   Adults:          >18           <7.0   Pediatrics:    13-18           <7.5                   7-12           <8.0                   0- 6            7.5-8.5   American Diabetes Association. Diabetes Care 33(S1), Jan 2010.       LDL Calculated   Date/Time Value Ref Range Status   05/31/2024 12:25 PM 31 <=99 mg/dL Final     Comment:                                 Near   Borderline      AGE      Desirable  Optimal    High     High     Very High     0-19 Y     0 - 109     ---    110-129   >/= 130     ----    20-24 Y     0 - 119     ---    120-159   >/= 160     ----      >24 Y     0 -  99   100-129  130-159   160-189     >/=190       VLDL   Date/Time Value Ref Range Status   05/31/2024 12:25 PM 11 0 - 40 mg/dL  Final   04/04/2023 03:18 PM 20 0 - 40 mg/dL Final   08/05/2022 01:35 PM 14 0 - 40 mg/dL Final   04/06/2022 03:59 PM 11 0 - 40 mg/dL Final      Last I/O:  I/O last 3 completed shifts:  In: 875 (14 mL/kg) [I.V.:325 (5.2 mL/kg); IV Piggyback:550]  Out: 1850 (29.6 mL/kg) [Urine:1850 (0.8 mL/kg/hr)]  Weight: 62.5 kg     Past Cardiology Tests (Last 3 Years):  EKG:  ECG 12 lead 03/21/2024    Echo:  Transthoracic Echo (TTE) Complete 07/10/2024    Ejection Fractions:  EF   Date/Time Value Ref Range Status   07/10/2024 01:20 PM 64 %      Cath:  No results found for this or any previous visit from the past 1095 days.    Stress Test:  No results found for this or any previous visit from the past 1095 days.    Cardiac Imaging:  No results found for this or any previous visit from the past 1095 days.      Past Medical History:  She has no past medical history on file.    Past Surgical History:  She has a past surgical history that includes Other surgical history (04/14/2020); Other surgical history (04/14/2020); and Other surgical history (04/14/2020).      Social History:  She reports that she quit smoking about 2 years ago. Her smoking use included cigarettes. She has been exposed to tobacco smoke. She has never used smokeless tobacco. She reports that she does not currently use alcohol. She reports that she does not use drugs.    Family History:  No family history on file.     Allergies:  Acetaminophen-codeine, Adhesive, Codeine, Fentanyl, Fetrin, Ketorolac, Meperidine, Metformin, Propoxyphene n-acetaminophen, Silver, Adhesive tape-silicones, Iodinated contrast media, Latex, Penicillins, and Wound dressings    Inpatient Medications:  Scheduled medications   Medication Dose Route Frequency    albuterol  2 puff inhalation q6h    aspirin  81 mg oral Daily    atorvastatin  40 mg oral Nightly    azithromycin  500 mg intravenous q24h    carvedilol  12.5 mg oral q12h    cefTRIAXone  2 g intravenous q24h    DULoxetine  80 mg oral  Daily    enoxaparin  40 mg subcutaneous q24h    furosemide  40 mg intravenous BID    insulin lispro  0-10 Units subcutaneous TID    levothyroxine  125 mcg oral Daily    metoprolol succinate XL  50 mg oral Daily    mirtazapine  7.5 mg oral Nightly    morphine CR  30 mg oral TID    OXcarbazepine  150 mg oral q12h KIMBERLY    pantoprazole  40 mg oral Daily before breakfast    Or    pantoprazole  40 mg intravenous Daily before breakfast    polyethylene glycol  17 g oral Daily     PRN medications   Medication    acetaminophen    bisacodyl    bisacodyl    cyclobenzaprine    dextrose    dextrose    glucagon    glucagon    guaiFENesin    HYDROmorphone    ipratropium-albuteroL    melatonin    ondansetron     Continuous Medications   Medication Dose Last Rate    [Held by provider] dextrose 5%-0.45 % sodium chloride  75 mL/hr 75 mL/hr (07/09/24 5887)     Outpatient Medications:  Current Outpatient Medications   Medication Instructions    albuterol 90 mcg/actuation inhaler 2 puffs, inhalation, Every 6 hours    aspirin 81 mg, oral, Daily    atorvastatin (LIPITOR) 40 mg, oral, Daily    BD Alcohol Swabs pads, medicated USE SIX TIMES DAILY    belladonna alkaloids-opium (B&O Supprettes) 16.2-60 mg suppository 60 mg of opioid, rectal, Every 8 hours PRN    bisacodyl (DULCOLAX (BISACODYL)) 5 mg, oral, Daily PRN    blood-glucose sensor (FreeStyle Ashley 3 Sensor) device 1 each, miscellaneous, Continuous, Use to check glucose, change every 14 days    carvedilol (COREG) 12.5 mg, oral, Every 12 hours    cetirizine (ZYRTEC) 10 mg, oral, Daily    cyclobenzaprine (FLEXERIL) 10 mg, oral, 3 times daily PRN    DULoxetine (CYMBALTA) 80 mg, oral, Daily    esomeprazole (NEXIUM) 40 mg, oral, Daily PRN    estradiol (ESTRACE) 2 mg, oral, Daily    FreeStyle Ashley 2 Sensor kit 1 each, subcutaneous, Every 14 days    FreeStyle Ashley 3 [place] misc Use as instructed    furosemide (LASIX) 20 mg, oral, Daily    insulin aspart (NovoLOG) 100 unit/mL injection FOR USE  WITH INSULIN PUMP MAX DAILY DOSE  UNITS    levothyroxine (Synthroid, Levoxyl) 125 mcg tablet TAKE 1 TABLET BY MOUTH IN THE MORNING ON EMPTY STOMACH    lidocaine-prilocaine (Emla) 2.5-2.5 % cream APPLY AT INSERTION SITE 30-45 MINUTES BEFORE YOU ARRIVE FOR BLOOD WORK OR CHEMOTHERAPY    melatonin 3 mg tablet 2 tablets, oral, Nightly    metoprolol succinate XL (Toprol-XL) 50 mg 24 hr tablet oral    mirtazapine (REMERON) 7.5 mg, oral, Nightly    morphine CR (MS CONTIN) 30 mg, oral, 3 times daily, Do not crush, chew, or split.    naloxone (Narcan) 4 mg/0.1 mL nasal spray Use 1 spray in one nostril as needed for overdose. May repeat every 2 to 3 min in alternating nostrils until medical assistance is available    naratriptan (AMERGE) 2.5 mg, oral, Once as needed    OLANZapine (ZyPREXA) 10 mg tablet oral, 2 times daily PRN    omeprazole (PRILOSEC) 40 mg    Omnipod Dash Pods, Gen 4, cartridge     ondansetron (ZOFRAN) 8 mg, oral, Every 8 hours PRN    OXcarbazepine (Trileptal) 150 mg tablet 1 tablet, oral, Every 12 hours scheduled (0630,1830)    phenazopyridine (Pyridium) 200 mg tablet 1 tablet, oral, 3 times daily    prochlorperazine (COMPAZINE) 10 mg, oral, Every 6 hours PRN    promethazine (Phenergan) 25 mg tablet oral    sennosides-docusate sodium (Senna-S) 8.6-50 mg tablet 1 tablet, oral, 2 times daily    spironolactone (Aldactone) 25 mg tablet oral, Daily RT    torsemide (DEMADEX) 20 mg, oral, Daily RT       Physical Exam:  Constitutional: Pleasant and cooperative. Laying in bed in no acute distress. Conversant.   Skin: Warm and dry; no obvious lesions, rashes, pallor, or jaundice. Good turgor.   Eyes: EOMI. Anicteric sclera.   ENT: Mucous membranes moist; no obvious injury or deformity appreciated.   Head and Neck: Normocephalic, atraumatic. ROM preserved. Trachea midline. No appreciable JVD.   Respiratory: Nonlabored on RA. Lungs rhonchi at bilaterally bases. Chest rise is equal.  Cardiovascular: RRR. Murmur  present. Extremities are warm and well-perfused with good capillary refill (< 3 seconds). No chest wall tenderness.   GI: Abdomen soft, nontender, nondistended. No obvious organomegaly appreciated. Bowel sounds are present and normoactive.  : No CVA tenderness.   MSK: No gross abnormalities appreciated. No limitations to AROM/PROM appreciated.   Extremities: 1+ pitting edema. Neurovascularly intact.   Neuro: A&Ox3. CN 2-12 grossly intact. Able to respond to questions appropriately and clearly. No acute focal neurologic deficits appreciated.  Psych: Appropriate mood and behavior.     Assessment/Plan   1)Abnormal CXR suggetive of fluid overload  Could be 3rd spacing of fluids due to hypoalbuminemia  No evidence of systolic heart failure on Echo  Check CT chest  Continue diuresis and improve nutrional status for albumin  Check Urine protein to R/O Nephrotic syndrome    Implantable Port 07/09/24 Right Chest Single lumen port (Active)   Site Assessment Clean;Dry;Intact 07/11/24 0813   Line Status (1) Flushed 07/11/24 0813   Dressing Status Clean;Dry 07/11/24 0813   Number of days: 2       Code Status:  Full Code    I spent 30 minutes in the professional and overall care of this patient.        Vivek Viera MD

## 2024-07-11 NOTE — CARE PLAN
The patient's goals for the shift include  pain level between 3-4/10 bbby end of shift    The clinical goals for the shift include Patient glucose will maintain between  throughout shift    Over the shift, the patient did make progress toward the following goals.       Problem: Pain - Adult  Goal: Verbalizes/displays adequate comfort level or baseline comfort level  Outcome: Progressing     Problem: Chronic Conditions and Co-morbidities  Goal: Patient's chronic conditions and co-morbidity symptoms are monitored and maintained or improved  Outcome: Progressing     Problem: Pain  Goal: Takes deep breaths with improved pain control throughout the shift  Outcome: Progressing

## 2024-07-11 NOTE — PROGRESS NOTES
Social work consult placed for positive medical risk screen. SW reviewed pt's chart and communicated with TCC. No SW needs foreseen at this time. SW signing off; available upon request.    NOEMI Fiore, LSW ([id number])   Care Transitions

## 2024-07-12 ENCOUNTER — HOME HEALTH ADMISSION (OUTPATIENT)
Dept: HOME HEALTH SERVICES | Facility: HOME HEALTH | Age: 54
End: 2024-07-12
Payer: COMMERCIAL

## 2024-07-12 ENCOUNTER — DOCUMENTATION (OUTPATIENT)
Dept: HOME HEALTH SERVICES | Facility: HOME HEALTH | Age: 54
End: 2024-07-12
Payer: COMMERCIAL

## 2024-07-12 ENCOUNTER — PHARMACY VISIT (OUTPATIENT)
Dept: PHARMACY | Facility: CLINIC | Age: 54
End: 2024-07-12
Payer: MEDICAID

## 2024-07-12 VITALS
BODY MASS INDEX: 21.75 KG/M2 | HEART RATE: 86 BPM | WEIGHT: 127.43 LBS | OXYGEN SATURATION: 95 % | SYSTOLIC BLOOD PRESSURE: 93 MMHG | DIASTOLIC BLOOD PRESSURE: 53 MMHG | RESPIRATION RATE: 18 BRPM | HEIGHT: 64 IN | TEMPERATURE: 99.4 F

## 2024-07-12 DIAGNOSIS — G89.3 NEOPLASM RELATED PAIN: ICD-10-CM

## 2024-07-12 DIAGNOSIS — K86.1 CHRONIC PANCREATITIS, UNSPECIFIED PANCREATITIS TYPE (MULTI): ICD-10-CM

## 2024-07-12 DIAGNOSIS — K62.89 RECTAL PAIN: Primary | ICD-10-CM

## 2024-07-12 PROBLEM — J98.4 PNEUMONITIS: Status: RESOLVED | Noted: 2024-07-09 | Resolved: 2024-07-12

## 2024-07-12 PROBLEM — R79.89 ELEVATED TROPONIN: Status: RESOLVED | Noted: 2024-07-09 | Resolved: 2024-07-12

## 2024-07-12 PROBLEM — D72.829 LEUKOCYTOSIS: Status: RESOLVED | Noted: 2024-07-09 | Resolved: 2024-07-12

## 2024-07-12 PROBLEM — I50.43 ACUTE ON CHRONIC COMBINED SYSTOLIC AND DIASTOLIC CHF (CONGESTIVE HEART FAILURE) (MULTI): Status: RESOLVED | Noted: 2024-07-09 | Resolved: 2024-07-12

## 2024-07-12 LAB
ANION GAP SERPL CALC-SCNC: 8 MMOL/L (ref 10–20)
BUN SERPL-MCNC: 10 MG/DL (ref 6–23)
CALCIUM SERPL-MCNC: 8.1 MG/DL (ref 8.6–10.3)
CHLORIDE SERPL-SCNC: 98 MMOL/L (ref 98–107)
CO2 SERPL-SCNC: 29 MMOL/L (ref 21–32)
CREAT SERPL-MCNC: 0.89 MG/DL (ref 0.5–1.05)
EGFRCR SERPLBLD CKD-EPI 2021: 76 ML/MIN/1.73M*2
ERYTHROCYTE [DISTWIDTH] IN BLOOD BY AUTOMATED COUNT: 18.8 % (ref 11.5–14.5)
GLUCOSE BLD MANUAL STRIP-MCNC: 150 MG/DL (ref 74–99)
GLUCOSE BLD MANUAL STRIP-MCNC: 251 MG/DL (ref 74–99)
GLUCOSE SERPL-MCNC: 196 MG/DL (ref 74–99)
HCT VFR BLD AUTO: 30.1 % (ref 36–46)
HGB BLD-MCNC: 10.5 G/DL (ref 12–16)
MAGNESIUM SERPL-MCNC: 1.52 MG/DL (ref 1.6–2.4)
MCH RBC QN AUTO: 28.8 PG (ref 26–34)
MCHC RBC AUTO-ENTMCNC: 34.9 G/DL (ref 32–36)
MCV RBC AUTO: 83 FL (ref 80–100)
NRBC BLD-RTO: 0 /100 WBCS (ref 0–0)
PLATELET # BLD AUTO: 202 X10*3/UL (ref 150–450)
POTASSIUM SERPL-SCNC: 4.1 MMOL/L (ref 3.5–5.3)
RBC # BLD AUTO: 3.65 X10*6/UL (ref 4–5.2)
SODIUM SERPL-SCNC: 131 MMOL/L (ref 136–145)
WBC # BLD AUTO: 8.8 X10*3/UL (ref 4.4–11.3)

## 2024-07-12 PROCEDURE — 2500000001 HC RX 250 WO HCPCS SELF ADMINISTERED DRUGS (ALT 637 FOR MEDICARE OP): Performed by: STUDENT IN AN ORGANIZED HEALTH CARE EDUCATION/TRAINING PROGRAM

## 2024-07-12 PROCEDURE — 82947 ASSAY GLUCOSE BLOOD QUANT: CPT

## 2024-07-12 PROCEDURE — 2500000004 HC RX 250 GENERAL PHARMACY W/ HCPCS (ALT 636 FOR OP/ED): Performed by: INTERNAL MEDICINE

## 2024-07-12 PROCEDURE — 2500000002 HC RX 250 W HCPCS SELF ADMINISTERED DRUGS (ALT 637 FOR MEDICARE OP, ALT 636 FOR OP/ED)

## 2024-07-12 PROCEDURE — RXMED WILLOW AMBULATORY MEDICATION CHARGE

## 2024-07-12 PROCEDURE — 99239 HOSP IP/OBS DSCHRG MGMT >30: CPT | Performed by: INTERNAL MEDICINE

## 2024-07-12 PROCEDURE — 2500000004 HC RX 250 GENERAL PHARMACY W/ HCPCS (ALT 636 FOR OP/ED): Performed by: CLINICAL NURSE SPECIALIST

## 2024-07-12 PROCEDURE — 2500000001 HC RX 250 WO HCPCS SELF ADMINISTERED DRUGS (ALT 637 FOR MEDICARE OP): Performed by: INTERNAL MEDICINE

## 2024-07-12 PROCEDURE — 2500000001 HC RX 250 WO HCPCS SELF ADMINISTERED DRUGS (ALT 637 FOR MEDICARE OP)

## 2024-07-12 PROCEDURE — 80048 BASIC METABOLIC PNL TOTAL CA: CPT | Performed by: INTERNAL MEDICINE

## 2024-07-12 PROCEDURE — 99232 SBSQ HOSP IP/OBS MODERATE 35: CPT | Performed by: CLINICAL NURSE SPECIALIST

## 2024-07-12 PROCEDURE — 83735 ASSAY OF MAGNESIUM: CPT | Performed by: INTERNAL MEDICINE

## 2024-07-12 PROCEDURE — 85027 COMPLETE CBC AUTOMATED: CPT | Performed by: INTERNAL MEDICINE

## 2024-07-12 PROCEDURE — 2500000004 HC RX 250 GENERAL PHARMACY W/ HCPCS (ALT 636 FOR OP/ED): Performed by: STUDENT IN AN ORGANIZED HEALTH CARE EDUCATION/TRAINING PROGRAM

## 2024-07-12 RX ORDER — CEFDINIR 300 MG/1
300 CAPSULE ORAL 2 TIMES DAILY
Qty: 4 CAPSULE | Refills: 0 | Status: SHIPPED | OUTPATIENT
Start: 2024-07-12 | End: 2024-07-14

## 2024-07-12 RX ORDER — MAGNESIUM SULFATE HEPTAHYDRATE 40 MG/ML
4 INJECTION, SOLUTION INTRAVENOUS ONCE
Status: COMPLETED | OUTPATIENT
Start: 2024-07-12 | End: 2024-07-12

## 2024-07-12 RX ORDER — TORSEMIDE 20 MG/1
TABLET ORAL
Qty: 36 TABLET | Refills: 0 | Status: SHIPPED | OUTPATIENT
Start: 2024-07-12 | End: 2024-08-14

## 2024-07-12 RX ORDER — HYDROMORPHONE HYDROCHLORIDE 2 MG/1
2-4 TABLET ORAL EVERY 4 HOURS PRN
Qty: 210 TABLET | Refills: 0 | Status: SHIPPED | OUTPATIENT
Start: 2024-07-12 | End: 2024-08-02

## 2024-07-12 RX ORDER — MORPHINE SULFATE 30 MG/1
30 TABLET, FILM COATED, EXTENDED RELEASE ORAL 3 TIMES DAILY
Qty: 90 TABLET | Refills: 0 | Status: SHIPPED | OUTPATIENT
Start: 2024-07-12 | End: 2024-07-12

## 2024-07-12 RX ORDER — HEPARIN 100 UNIT/ML
500 SYRINGE INTRAVENOUS ONCE AS NEEDED
Status: COMPLETED | OUTPATIENT
Start: 2024-07-12 | End: 2024-07-12

## 2024-07-12 RX ORDER — HYDROMORPHONE HYDROCHLORIDE 2 MG/1
2-4 TABLET ORAL EVERY 4 HOURS PRN
Qty: 210 TABLET | Refills: 0 | Status: SHIPPED | OUTPATIENT
Start: 2024-07-12 | End: 2024-07-12

## 2024-07-12 RX ORDER — HYOSCYAMINE SULFATE 0.12 MG/1
0.12 TABLET, ORALLY DISINTEGRATING ORAL EVERY 4 HOURS PRN
Qty: 120 TABLET | Refills: 0 | Status: SHIPPED | OUTPATIENT
Start: 2024-07-12

## 2024-07-12 RX ORDER — MORPHINE SULFATE 30 MG/1
30 TABLET, FILM COATED, EXTENDED RELEASE ORAL 3 TIMES DAILY
Qty: 90 TABLET | Refills: 0 | Status: SHIPPED | OUTPATIENT
Start: 2024-07-12 | End: 2024-08-11

## 2024-07-12 ASSESSMENT — COGNITIVE AND FUNCTIONAL STATUS - GENERAL
MOBILITY SCORE: 24
DAILY ACTIVITIY SCORE: 24

## 2024-07-12 ASSESSMENT — PAIN SCALES - GENERAL
PAINLEVEL_OUTOF10: 9
PAINLEVEL_OUTOF10: 8
PAINLEVEL_OUTOF10: 4
PAINLEVEL_OUTOF10: 9
PAINLEVEL_OUTOF10: 9

## 2024-07-12 ASSESSMENT — PAIN DESCRIPTION - LOCATION
LOCATION: BACK

## 2024-07-12 ASSESSMENT — PAIN - FUNCTIONAL ASSESSMENT: PAIN_FUNCTIONAL_ASSESSMENT: 0-10

## 2024-07-12 NOTE — DISCHARGE SUMMARY
Discharge Diagnosis  Pneumonitis  Fluid overload leading to pulmonary edema  Severe protein calorie malnutrition with hypoalbuminemia        This discharge took greater than 35 minutes.    Test Results Pending At Discharge  Pending Labs       Order Current Status    Blood Culture Preliminary result    Blood Culture Preliminary result            Hospital Course   Nelsy Osullivan is a 54 y.o. female with PMHx s/f CAD, CHF, HTN, IDDM 1 with insulin pump, hypothyroidism, non-hodgkin's lymphoma, COPD presenting with hypoglycemia. The patient states her blood glucose levels has been dropping to 50-60s during days for the past 2-3 days. She notes that her glucose levels has been dropping at night for a month but has been managing fine during the days. She is on insulin pump, had it turned off on the transport to hospital via EMS. She is unsure of her basal rate and she reports that she occasionally use bolus when the glucose runs low. She is unsure of her bolus rate/amounts. She is getting treatment for her lymphoma, she has done 3 treatments and 3 more to go. She also notes she occasionally takes her lasix for her leg swelling. She denies f/c/n/v, lightheadedness, dizziness, vision changes, chest pain, shortness of breath, syncope, focal weakness.      ED Course (Summary):   Vitals on presentation: 98.3 F, 112 bpm, 20 RR, 128/70, 100% on RA  Labs: Chemistries-glucose 66, sodium 137, potassium 3.6, calcium 8.3, albumin 2.9, alkaline phosphatase 369, magnesium 1.41, lactate 1.0,   Troponin 16  CBC leukocytes 22.4, hemoglobin 11.4, neutrophils 18.97  Imaging: CXR - Diffuse interstitial pulmonary opacities suggestive of pulmonary edema. Small right pleural effusion. Questionable trace left pleural effusion   Interventions: Rocephin, magnesium sulfate IV 2 g, NaCl bolus 1000 mL, admission for further management     7/10: She remains quite weak.   07/11: Patient was evaluated this morning, shortness of breath is improving,  no fever or chills, no nausea or vomiting.  07/12: Patient was evaluated this morning, patient states she is feeling better and wanted to go home.  She will be discharged home on oral torsemide 20 mg twice a day for 3 days then once a day.  She will continue on cefdinir for 2 more days to complete the course of antibiotics.  Insulin pump has been turned off prior to admission.  She was instructed to follow-up with her PCP/endocrinology for the management of diabetes and hypo-/hyperglycemia.  She was also referred to home care as well as healthy Home program.    Pertinent Physical Exam At Time of Discharge  Physical Exam  Patient is awake and orient, not in apparent distress  Eyes: PERRLA, no conjunctival congestion  Chest: Bilateral Air entry, no crackles or wheezing  Heart: s1S2 regular, no murmur  Abdomen: Soft, non tender, BS present  Ext:    Home Medications     Medication List      START taking these medications     cefdinir 300 mg capsule; Commonly known as: Omnicef; Take 1 capsule (300   mg) by mouth 2 times a day for 2 days.     CHANGE how you take these medications     torsemide 20 mg tablet; Commonly known as: Demadex; Take 1 tablet (20   mg) by mouth 2 times a day for 3 days, THEN 1 tablet (20 mg) once daily.;   Start taking on: July 12, 2024; What changed: See the new instructions.     CONTINUE taking these medications     albuterol 90 mcg/actuation inhaler   aspirin 81 mg EC tablet   atorvastatin 40 mg tablet; Commonly known as: Lipitor   BD Alcohol Swabs pads, medicated; Generic drug: alcohol swabs   belladonna alkaloids-opium 16.2-60 mg suppository; Commonly known as:   B&O Supprettes; Insert 1 suppository (60 mg of opioid) into the rectum   every 8 hours if needed for bladder spasms or cramping (rectal cramping).   carvedilol 12.5 mg tablet; Commonly known as: Coreg   cetirizine 10 mg tablet; Commonly known as: ZyrTEC   cyclobenzaprine 10 mg tablet; Commonly known as: Flexeril   Dulcolax (bisacodyl)  5 mg EC tablet; Generic drug: bisacodyl   DULoxetine 60 mg DR capsule; Commonly known as: Cymbalta   esomeprazole 40 mg DR capsule; Commonly known as: NexIUM   estradiol 2 mg tablet; Commonly known as: Estrace   FreeStyle Ashley 2 Sensor kit; Generic drug: flash glucose sensor kit;   Inject 1 each under the skin every 14 (fourteen) days.   FreeStyle Ashley 3 [place] misc; Generic drug: blood-glucose   meter,continuous; Use as instructed   FreeStyle Ashley 3 Sensor device; Generic drug: blood-glucose sensor; 1   each continuously. Use to check glucose, change every 14 days   insulin aspart 100 unit/mL injection; Commonly known as: NovoLOG; FOR   USE WITH INSULIN PUMP MAX DAILY DOSE  UNITS   levothyroxine 125 mcg tablet; Commonly known as: Synthroid, Levoxyl   lidocaine-prilocaine 2.5-2.5 % cream; Commonly known as: Emla   melatonin 3 mg tablet   metoprolol succinate XL 50 mg 24 hr tablet; Commonly known as: Toprol-XL   mirtazapine 7.5 mg tablet; Commonly known as: Remeron   morphine CR 30 mg 12 hr tablet; Commonly known as: MS Contin; Take 1   tablet (30 mg) by mouth 3 times a day. Do not crush, chew, or split.   naloxone 4 mg/0.1 mL nasal spray; Commonly known as: Narcan   naratriptan 2.5 mg tablet; Commonly known as: Amerge; Take 1 tablet (2.5   mg) by mouth 1 time if needed for migraine.   OLANZapine 10 mg tablet; Commonly known as: ZyPREXA   omeprazole 40 mg DR capsule; Commonly known as: PriLOSEC   Omnipod Dash Pods (Gen 4) cartridge; Generic drug: insulin pump   cart,cont inf,BT   ondansetron 8 mg tablet; Commonly known as: Zofran; Take 1 tablet (8 mg)   by mouth every 8 hours if needed for nausea or vomiting.   OXcarbazepine 150 mg tablet; Commonly known as: Trileptal   prochlorperazine 10 mg tablet; Commonly known as: Compazine; Take 1   tablet (10 mg) by mouth every 6 hours if needed for nausea or vomiting.   promethazine 25 mg tablet; Commonly known as: Phenergan   Pyridium 200 mg tablet; Generic drug:  phenazopyridine   Senna-S 8.6-50 mg tablet; Generic drug: sennosides-docusate sodium; Take   1 tablet by mouth 2 times a day.   spironolactone 25 mg tablet; Commonly known as: Aldactone     STOP taking these medications     furosemide 20 mg tablet; Commonly known as: Lasix   HYDROmorphone 2 mg tablet; Commonly known as: Dilaudid       Outpatient Follow-Up  No follow-ups on file.     Yuridia Lundy MD  7/12/2024  10:23 AM

## 2024-07-12 NOTE — CARE PLAN
The patient's goals for the shift include  pt will be hemodynamically stable.    The clinical goals for the shift include Patient glucose will maintain between  throughout  shift.    Over the shift, the patient did make progress toward the following goals. Barriers to progression include understanding. Recommendations to address these barriers include education.

## 2024-07-12 NOTE — PROGRESS NOTES
DC order noted.  Called and spoke to patient to verify discharge plans.  [facility]   Explained to patient the services that would be provided.  Pt agreeable.  Plan is to discharge home with [facility] and [facility].

## 2024-07-12 NOTE — PROGRESS NOTES
Subjective Data:  Today, Ms. Osullivan is ambulating around room, she is upset that she remains in the hospital and would like to go home. Denies chest pain or pressure, no palpitations, she reports her breathing is better. Monitor demonstrates SR 90 bpm.    Overnight Events:    None     Objective Data:  Last Recorded Vitals:  Vitals:    07/11/24 1749 07/11/24 2033 07/12/24 0018 07/12/24 0414   BP: 117/69 93/54 93/57 95/58   BP Location: Left arm Left arm Left arm Left arm   Patient Position: Lying Lying Lying Lying   Pulse: 73 81 83 83   Resp: 20 18 18    Temp: 37.3 °C (99.2 °F) 36.7 °C (98.1 °F) 36.8 °C (98.3 °F) 37 °C (98.6 °F)   TempSrc: Temporal Temporal Temporal Temporal   SpO2: 95% 96% 94% 91%   Weight:    57.8 kg (127 lb 6.8 oz)   Height:           Last Labs:  CBC - 7/12/2024:  3:42 AM  8.8 10.5 202    30.1      CMP - 7/12/2024:  3:42 AM  8.1 5.1 36 --- 0.3   3.4 2.4 17 308      PTT - No results in last year.  _   _ _     TROPHS   Date/Time Value Ref Range Status   07/09/2024 06:01 PM 16 0 - 13 ng/L Final   03/21/2024 12:43 PM 6 0 - 13 ng/L Final   03/21/2024 11:14 AM 5 0 - 13 ng/L Final     BNP   Date/Time Value Ref Range Status   07/09/2024 06:01  0 - 99 pg/mL Final   10/17/2023 10:50  0 - 99 pg/mL Final     HGBA1C   Date/Time Value Ref Range Status   05/31/2024 12:25 PM 7.8 see below % Final   12/13/2023 11:30 AM 8.9 4.2 - 6.5 % Final   04/04/2023 03:18 PM 13.5 % Final     Comment:          Diagnosis of Diabetes-Adults   Non-Diabetic: < or = 5.6%   Increased risk for developing diabetes: 5.7-6.4%   Diagnostic of diabetes: > or = 6.5%  .       Monitoring of Diabetes                Age (y)     Therapeutic Goal (%)   Adults:          >18           <7.0   Pediatrics:    13-18           <7.5                   7-12           <8.0                   0- 6            7.5-8.5   American Diabetes Association. Diabetes Care 33(S1), Jan 2010.     08/05/2022 01:35 PM 10.3 % Final     Comment:          Diagnosis  of Diabetes-Adults   Non-Diabetic: < or = 5.6%   Increased risk for developing diabetes: 5.7-6.4%   Diagnostic of diabetes: > or = 6.5%  .       Monitoring of Diabetes                Age (y)     Therapeutic Goal (%)   Adults:          >18           <7.0   Pediatrics:    13-18           <7.5                   7-12           <8.0                   0- 6            7.5-8.5   American Diabetes Association. Diabetes Care 33(S1), Jan 2010.       LDLCALC   Date/Time Value Ref Range Status   05/31/2024 12:25 PM 31 <=99 mg/dL Final     Comment:                                 Near   Borderline      AGE      Desirable  Optimal    High     High     Very High     0-19 Y     0 - 109     ---    110-129   >/= 130     ----    20-24 Y     0 - 119     ---    120-159   >/= 160     ----      >24 Y     0 -  99   100-129  130-159   160-189     >/=190       VLDL   Date/Time Value Ref Range Status   05/31/2024 12:25 PM 11 0 - 40 mg/dL Final   04/04/2023 03:18 PM 20 0 - 40 mg/dL Final   08/05/2022 01:35 PM 14 0 - 40 mg/dL Final   04/06/2022 03:59 PM 11 0 - 40 mg/dL Final      Last I/O:  I/O last 3 completed shifts:  In: 545.5 (9.4 mL/kg) [P.O.:295.5; IV Piggyback:250]  Out: - (0 mL/kg)   Weight: 57.8 kg     Past Cardiology Tests (Last 3 Years):  EKG:  ECG 12 lead 03/21/2024    Echo:  Transthoracic Echo (TTE) Complete 07/10/2024    Ejection Fractions:  EF   Date/Time Value Ref Range Status   07/10/2024 01:20 PM 64 %      Cath:  No results found for this or any previous visit from the past 1095 days.    Stress Test:  No results found for this or any previous visit from the past 1095 days.    Cardiac Imaging:  No results found for this or any previous visit from the past 1095 days.      Inpatient Medications:  Scheduled medications   Medication Dose Route Frequency    albuterol  2 puff inhalation q6h    aspirin  81 mg oral Daily    atorvastatin  40 mg oral Nightly    azithromycin  500 mg intravenous q24h    carvedilol  12.5 mg oral q12h     cefTRIAXone  2 g intravenous q24h    DULoxetine  80 mg oral Daily    enoxaparin  40 mg subcutaneous q24h    furosemide  40 mg intravenous BID    insulin lispro  0-10 Units subcutaneous TID    levothyroxine  125 mcg oral Daily    magnesium sulfate  4 g intravenous Once    metoprolol succinate XL  50 mg oral Daily    mirtazapine  7.5 mg oral Nightly    morphine CR  30 mg oral TID    OXcarbazepine  150 mg oral q12h KIMBERLY    pantoprazole  40 mg oral Daily before breakfast    Or    pantoprazole  40 mg intravenous Daily before breakfast    polyethylene glycol  17 g oral Daily     PRN medications   Medication    acetaminophen    bisacodyl    bisacodyl    cyclobenzaprine    dextrose    dextrose    glucagon    glucagon    guaiFENesin    HYDROmorphone    ipratropium-albuteroL    melatonin    ondansetron     Continuous Medications   Medication Dose Last Rate       Physical Exam:  Physical Exam  Vitals reviewed.   Constitutional:       Appearance: Normal appearance.   HENT:      Head: Normocephalic.      Mouth/Throat:      Mouth: Mucous membranes are moist.   Cardiovascular:      Rate and Rhythm: Normal rate and regular rhythm.      Pulses: Normal pulses.      Heart sounds: Normal heart sounds. No murmur heard.     No friction rub. No gallop.   Pulmonary:      Effort: Pulmonary effort is normal.      Breath sounds: Normal breath sounds. No wheezing, rhonchi or rales.      Comments: RA  Abdominal:      General: Bowel sounds are normal.      Palpations: Abdomen is soft.   Musculoskeletal:         General: Normal range of motion.      Cervical back: Normal range of motion.      Right lower leg: Edema present.      Left lower leg: Edema present.   Skin:     General: Skin is warm and dry.   Neurological:      General: No focal deficit present.      Mental Status: She is alert and oriented to person, place, and time.   Psychiatric:         Mood and Affect: Mood normal.         Behavior: Behavior normal.         Thought Content: Thought  content normal.         Judgment: Judgment normal.           Assessment/Plan   1)Abnormal CXR suggetive of fluid overload  Could be 3rd spacing of fluids due to hypoalbuminemia  No evidence of systolic heart failure on Echo  Check CT chest  Continue diuresis and improve nutrional status for albumin  Check Urine protein to R/O Nephrotic syndrome    7/12/24:   Patient reports her breathing is at baseline and would like to go home. She remains with BLE edema and reports that her edema is because her Hematologist prescribed the wrong diuretic, patient usually takes torsemide and was given furosemide.  She reports she takes metoprolol XL and carvedilol and follows with outside cardiology at [facility]. She is likely going to be discharged today, would recommend getting an accurate medication list and sending patient home on Torsemide 20 mg twice daily for next 3 days and then back to daily dosing. Recommend that patient see her OP cardiologist within the next 2 weeks for further management. Cardiology will sign off, please call with any additional cardiac concerns..   C/ode Status:  Full Code          Aide Ocampo, APRN-CNP

## 2024-07-12 NOTE — CONSULTS
Nutrition Initial Assessment:   Nutrition Assessment    Reason for Assessment: Provider consult order    Medical history per chart:   CAD, CHF, HTN, IDDM 1 with insulin pump, hypothyroidism, non-hodgkin's lymphoma, COPD presenting with hypoglycemia     7/12:  Patient awake at time of visit, daughter present at time of visit.  Patient requesting to follow with outpatient oncology RD, notified RD of patient request.  Patient reports eating well when feeling well, does report decreased appetite and nausea for 1 day after chemotherapy.  Patient dislikes milk based oral supplements, discussed option of Ensure clear but it is higher in carbohydrates/sugar, patient reports plan for discharge today and declined at this time.  Weight fluctuates based on fluid, usual dry weight reported at ~125 lbs.         Current Diet: Adult diet Regular  Supplement(s): No  Average meal Intake during admission:  no meal intake noted, patient reports current good appetite.          Nutrition Related Findings:   Oral Symptoms: Teeth: Missing teeth   GI symptoms: no GI issues at this time.   BM:    Food allergies: NKFA. is allergic to acetaminophen-codeine, adhesive, codeine, fentanyl, fetrin, ketorolac, meperidine, metformin, propoxyphene n-acetaminophen, silver, adhesive tape-silicones, iodinated contrast media, latex, penicillins, and wound dressings.  Meds/Labs reviewed.  albuterol, 2 puff, inhalation, q6h  aspirin, 81 mg, oral, Daily  atorvastatin, 40 mg, oral, Nightly  azithromycin, 500 mg, intravenous, q24h  carvedilol, 12.5 mg, oral, q12h  cefTRIAXone, 2 g, intravenous, q24h  DULoxetine, 80 mg, oral, Daily  enoxaparin, 40 mg, subcutaneous, q24h  furosemide, 40 mg, intravenous, BID  insulin lispro, 0-10 Units, subcutaneous, TID  levothyroxine, 125 mcg, oral, Daily  magnesium sulfate, 4 g, intravenous, Once  metoprolol succinate XL, 50 mg, oral, Daily  mirtazapine, 7.5 mg, oral, Nightly  morphine CR, 30 mg, oral, TID  OXcarbazepine, 150  "mg, oral, q12h KIMBERLY  pantoprazole, 40 mg, oral, Daily before breakfast   Or  pantoprazole, 40 mg, intravenous, Daily before breakfast  polyethylene glycol, 17 g, oral, Daily             Nutrition Significant Labs:    Results from last 7 days   Lab Units 07/12/24  0342 07/11/24  0435 07/10/24  0340 07/09/24  1801   GLUCOSE mg/dL 196* 262* 231* 66*   SODIUM mmol/L 131* 132* 135* 137   POTASSIUM mmol/L 4.1 4.7 3.3* 3.6   CHLORIDE mmol/L 98 101 105 104   CO2 mmol/L 29 28 25 28   BUN mg/dL 10 10 7 8   CREATININE mg/dL 0.89 0.86 0.78 0.85   EGFR mL/min/1.73m*2 76 79 89 81   CALCIUM mg/dL 8.1* 7.9* 7.1* 8.3*   PHOSPHORUS mg/dL  --   --   --  3.4   MAGNESIUM mg/dL 1.52* 1.54* 1.47* 1.41*     Lab Results   Component Value Date    HGBA1C 7.8 (H) 05/31/2024    HGBA1C 8.9 (A) 12/13/2023     Results from last 7 days   Lab Units 07/12/24  0016 07/11/24  2030 07/11/24  1810 07/11/24  1747 07/11/24  1207 07/11/24  0850 07/10/24  2313 07/10/24  2042   POCT GLUCOSE mg/dL 251* 133* 83 56* 274* 152* 150* 85       Anthropometrics:  Height: 162.6 cm (5' 4\")   Weight: 57.8 kg (127 lb 6.8 oz)   BMI (Calculated): 21.86  IBW/kg (Dietitian Calculated): 54.5 kg              Weight History:   Wt Readings from Last 10 Encounters:   07/12/24 57.8 kg (127 lb 6.8 oz)   07/01/24 58.5 kg (129 lb)   06/28/24 58.6 kg (129 lb 3.2 oz)   06/27/24 56.3 kg (124 lb 3.2 oz)   06/14/24 54 kg (119 lb 0.8 oz)   06/03/24 57.6 kg (126 lb 15.8 oz)   05/31/24 59.9 kg (132 lb)   05/30/24 56.8 kg (125 lb 4.8 oz)   05/30/24 56.8 kg (125 lb 4.8 oz)   05/24/24 53.1 kg (117 lb 1 oz)        Weight Change %:  Weight History / % Weight Change: Variable weights noted per history, patient reports weight fluctuates based on fluid, patient with current edema, will monitor trends.  Significant Weight Loss:  (unable to assess changes at this time.)          Nutrition Focused Physical Exam Findings:  defer: patient receiving nursing care, evidence of loss per visual "     Edema:  Edema Location: BLE  Physical Findings:  Skin: Negative    Estimated Needs:   Total Energy Estimated Needs (kCal):  (6578-4875)  Method for Estimating Needs: 30-32, IBW  Total Protein Estimated Needs (g):  (65-75)  Method for Estimating Needs: 1.2-1.4, IBW     Method for Estimating Needs: per physician        Nutrition Diagnosis   Nutrition Diagnosis:  Malnutrition Diagnosis  Patient has Malnutrition Diagnosis:  (unable to determine at this time)    Nutrition Diagnosis  Patient has Nutrition Diagnosis: Yes  Diagnosis Status (1): New  Nutrition Diagnosis 1: Predicted inadequate energy intake  Related to (1): chronic illness with fluctuating appetite/meal intakes  As Evidenced by (1): reported patient history       Nutrition Interventions/Recommendations   Nutrition Interventions and Recommendations:        Nutrition Prescription:  Individualized Nutrition Prescription Provided for : Regular diet        Nutrition Interventions:   Food and/or Nutrient Delivery Interventions  Interventions: Meals and snacks  Meals and Snacks: General healthful diet  Goal: >75% intake of meals         Nutrition Education:   Education Documentation  No documentation found.      Nutrition Counseling  Counseling Theoretical Approach: Other (Comment)  Goal: Reviewed available supplement options  Counseling Strategies: Other (Comment)  Goal: Notified [facility] via secure mail regarding patient request.       Nutrition Monitoring and Evaluation   Monitoring/Evaluation:   Food/Nutrient Related History Monitoring  Monitoring and Evaluation Plan: Energy intake  Energy Intake: Estimated energy intake  Criteria: meet >75% of estimated needs    Body Composition/Growth/Weight History  Monitoring and Evaluation Plan: Weight  Weight: Weight change  Criteria: Maintain stable, dry weight    Biochemical Data, Medical Tests and Procedures  Monitoring and Evaluation Plan: Electrolyte/renal panel, Glucose/endocrine  profile  Electrolyte and Renal Panel: Magnesium, Phosphorus, Potassium, Sodium  Criteria: WNL  Glucose/Endocrine Profile: Glucose, casual  Criteria: WNL    Nutrition Focused Physical Findings  Monitoring and Evaluation Plan: Skin  Criteria: Maintain skin integrity            Time Spent/Follow-up Reminder:   Follow Up  Time Spent (min): 45 minutes  Last Date of Nutrition Visit: 07/12/24  Nutrition Follow-Up Needed?: Dietitian to reassess per policy  Follow up Comment: 7/15-7/17

## 2024-07-12 NOTE — HH CARE COORDINATION
Home Care received a Referral for Nursing and Registered Dietician. We have processed the referral for a Start of Care on 7.13 or 7.14.24.     If you have any questions or concerns, please feel free to contact us at [phone]. Follow the prompts, enter your five digit zip code, and you will be directed to your care team on [facility].

## 2024-07-14 LAB
BACTERIA BLD CULT: NORMAL
BACTERIA BLD CULT: NORMAL

## 2024-07-16 ENCOUNTER — HOME CARE VISIT (OUTPATIENT)
Dept: HOME HEALTH SERVICES | Facility: HOME HEALTH | Age: 54
End: 2024-07-16
Payer: COMMERCIAL

## 2024-07-16 VITALS
HEART RATE: 99 BPM | RESPIRATION RATE: 16 BRPM | DIASTOLIC BLOOD PRESSURE: 60 MMHG | OXYGEN SATURATION: 95 % | SYSTOLIC BLOOD PRESSURE: 100 MMHG | TEMPERATURE: 97.1 F

## 2024-07-16 PROCEDURE — G0299 HHS/HOSPICE OF RN EA 15 MIN: HCPCS

## 2024-07-16 ASSESSMENT — ENCOUNTER SYMPTOMS
NAUSEA: 1
FATIGUES EASILY: 1
SHORTNESS OF BREATH: 1
STOOL FREQUENCY: DAILY
BOWEL PATTERN NORMAL: 1
DYSPNEA ACTIVITY LEVEL: AFTER AMBULATING LESS THAN 10 FT
SUBJECTIVE PAIN PROGRESSION: UNCHANGED
CHANGE IN APPETITE: UNCHANGED
MUSCLE WEAKNESS: 1
DENIES PAIN: 1
APPETITE LEVEL: FAIR
PERSON REPORTING PAIN: PATIENT
DYSPNEA ON EXERTION: 1
LAST BOWEL MOVEMENT: 67037

## 2024-07-16 ASSESSMENT — LIFESTYLE VARIABLES: SMOKING_STATUS: CURRENT SMOKER

## 2024-07-16 ASSESSMENT — ACTIVITIES OF DAILY LIVING (ADL)
ENTERING_EXITING_HOME: SUPERVISION
OASIS_M1830: 03

## 2024-07-18 ENCOUNTER — HOME CARE VISIT (OUTPATIENT)
Dept: HOME HEALTH SERVICES | Facility: HOME HEALTH | Age: 54
End: 2024-07-18
Payer: COMMERCIAL

## 2024-07-18 PROCEDURE — G0270 MNT SUBS TX FOR CHANGE DX: HCPCS

## 2024-07-18 SDOH — HEALTH STABILITY: MENTAL HEALTH: NUTRITION HISTORY: CARB CONTROLLED DIET

## 2024-07-21 NOTE — PROGRESS NOTES
SUPPORTIVE AND PALLIATIVE ONCOLOGY FOLLOW UP - OUTPATIENT      SERVICE DATE: 7/26/2024    Referred by:  Dr. 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  She is newly diagnosed and is planned to get bendamustine and rituxan every 28 days. She has been following with Jonathan Worrell with [facility] palliative care, but has since requested to switch to palliative care closer to where she lives. She was recently hospitalized for CHF exacerbation    Information was collected from chart review, discussion with patient/family, and discussion with care team     SUBJECTIVE:  She reports that her pelvic pain is worse now and feels that her medication could be increased. We discussed increasing her morphine and she is agreeable with this. She states that she has been taking the PO dilaudid 2 tablets about every 4 hours. She states that she has not started the levsin rectally as she got 4 capsules when she was discharged from the pharmacy and then she got a second refill that looked very different than the 4 caps that she got when she was discharged, so she panicked and did not know what to do. We discussed that the medications that she should be using are the smaller of the 2 medications which should be the second refill not the capsules.     She reports that she feels that her anxiety has calmed down and she is in good spirits today. She reports that she is looking forward to the fair.     Pain Assessment:  Pain Score:   8  Location: Abdomen  Education:        Symptom Assessment:  ROS otherwise negative, pertinent positives documented in the HPI       Information  obtained from: chart review and interview of patient  ______________________________________________________________________     Oncology History   Follicular lymphoma grade IIIb (Multi)   6/7/2022 Initial Diagnosis    Follicular lymphoma grade IIIb (CMS/HCC)     5/2/2024 -  Chemotherapy    Bendamustine + RiTUXimab, 28 Day Cycles         No past medical history on file.  Past Surgical History:   Procedure Laterality Date    OTHER SURGICAL HISTORY  04/14/2020    Cholecystectomy    OTHER SURGICAL HISTORY  04/14/2020    Exploratory laparoscopy    OTHER SURGICAL HISTORY  04/14/2020    Heart surgery     No family history on file.     SOCIAL HISTORY  Children 4 children and 5 grandchildren    Social History:  reports that she quit smoking about 2 years ago. Her smoking use included cigarettes. She has been exposed to tobacco smoke. She has never used smokeless tobacco. She reports that she does not currently use alcohol. She reports that she does not use drugs.    She has food insecurities and has bed bugs   REVIEW OF SYSTEMS  Review of systems negative unless noted in HPI.       Objective     Palliative Performance Scale % (PPS) 90     Labs:  Results for orders placed or performed in visit on 07/25/24 (from the past 96 hour(s))   CBC and Auto Differential   Result Value Ref Range    WBC 5.3 4.4 - 11.3 x10*3/uL    RBC 3.73 (L) 4.00 - 5.20 x10*6/uL    Hemoglobin 10.9 (L) 12.0 - 16.0 g/dL    Hematocrit 31.2 (L) 36.0 - 46.0 %    MCV 84 80 - 100 fL    MCH 29.2 26.0 - 34.0 pg    MCHC 34.9 32.0 - 36.0 g/dL    RDW 18.4 (H) 11.5 - 14.5 %    Platelets 205 150 - 450 x10*3/uL    Neutrophils % 40.9 40.0 - 80.0 %    Immature Granulocytes %, Automated 0.6 0.0 - 0.9 %    Lymphocytes % 18.8 13.0 - 44.0 %    Monocytes % 19.0 2.0 - 10.0 %    Eosinophils % 20.3 0.0 - 6.0 %    Basophils % 0.4 0.0 - 2.0 %    Neutrophils Absolute 2.15 1.20 - 7.70 x10*3/uL    Immature Granulocytes Absolute, Automated 0.03 0.00 - 0.70 x10*3/uL    Lymphocytes  Absolute 0.99 (L) 1.20 - 4.80 x10*3/uL    Monocytes Absolute 1.00 0.10 - 1.00 x10*3/uL    Eosinophils Absolute 1.07 (H) 0.00 - 0.70 x10*3/uL    Basophils Absolute 0.02 0.00 - 0.10 x10*3/uL   Comprehensive metabolic panel   Result Value Ref Range    Glucose 122 (H) 74 - 99 mg/dL    Sodium 137 136 - 145 mmol/L    Potassium 3.6 3.5 - 5.3 mmol/L    Chloride 103 98 - 107 mmol/L    Bicarbonate 28 21 - 32 mmol/L    Anion Gap 10 10 - 20 mmol/L    Urea Nitrogen 10 6 - 23 mg/dL    Creatinine 0.75 0.50 - 1.05 mg/dL    eGFR >90 >60 mL/min/1.73m*2    Calcium 8.6 8.6 - 10.3 mg/dL    Albumin 2.8 (L) 3.4 - 5.0 g/dL    Alkaline Phosphatase 387 (H) 33 - 110 U/L    Total Protein 6.0 (L) 6.4 - 8.2 g/dL    AST 61 (H) 9 - 39 U/L    Bilirubin, Total 0.3 0.0 - 1.2 mg/dL    ALT 31 7 - 45 U/L   Lactate dehydrogenase   Result Value Ref Range     (H) 84 - 246 U/L   Uric Acid   Result Value Ref Range    Uric Acid 6.2 2.3 - 6.7 mg/dL                 Medications:   Current Outpatient Medications   Medication Instructions    albuterol 90 mcg/actuation inhaler 2 puffs, inhalation, Every 6 hours    aspirin 81 mg, oral, Daily    atorvastatin (LIPITOR) 40 mg, oral, Daily    BD Alcohol Swabs pads, medicated USE SIX TIMES DAILY    belladonna alkaloids-opium (B&O Supprettes) 16.2-60 mg suppository 60 mg of opioid, rectal, Every 8 hours PRN    bisacodyl (DULCOLAX (BISACODYL)) 5 mg, oral, Daily PRN    blood-glucose sensor (FreeStyle Ashley 3 Sensor) device 1 each, miscellaneous, Continuous, Use to check glucose, change every 14 days    carvedilol (COREG) 12.5 mg, oral, Every 12 hours    cetirizine (ZYRTEC) 10 mg, oral, Daily    cyclobenzaprine (FLEXERIL) 10 mg, oral, 3 times daily PRN    DULoxetine (CYMBALTA) 60 mg, oral, Daily    DULoxetine (CYMBALTA) 20 mg, oral, Daily    esomeprazole (NEXIUM) 40 mg, oral, Daily PRN    estradiol (ESTRACE) 2 mg, oral, Daily    FreeStyle Ashley 2 Sensor kit 1 each, subcutaneous, Every 14 days    FreeStyle Ashley 3  Shorter misc Use as instructed    [START ON 8/2/2024] HYDROmorphone (DILAUDID) 4 mg, oral, Every 4 hours PRN, G89.3    hyoscyamine (LEVSIN/SL) 0.125 mg, sublingual, Every 4 hours PRN    insulin aspart (NovoLOG) 100 unit/mL injection FOR USE WITH INSULIN PUMP MAX DAILY DOSE  UNITS    levothyroxine (Synthroid, Levoxyl) 125 mcg tablet TAKE 1 TABLET BY MOUTH IN THE MORNING ON EMPTY STOMACH    lidocaine-prilocaine (Emla) 2.5-2.5 % cream APPLY AT INSERTION SITE 30-45 MINUTES BEFORE YOU ARRIVE FOR BLOOD WORK OR CHEMOTHERAPY    melatonin 3 mg tablet 2 tablets, oral, Nightly    metoprolol succinate XL (TOPROL-XL) 50 mg, oral, Daily    mirtazapine (REMERON) 7.5 mg, oral, Nightly    morphine CR (MS CONTIN) 60 mg, oral, 2 times daily, Do not crush, chew, or split. Cancer related pain G89.3    naloxone (Narcan) 4 mg/0.1 mL nasal spray Use 1 spray in one nostril as needed for overdose. May repeat every 2 to 3 min in alternating nostrils until medical assistance is available    naratriptan (AMERGE) 2.5 mg, oral, Once as needed    OLANZapine (ZyPREXA) 10 mg tablet oral, 2 times daily PRN    omeprazole (PRILOSEC) 40 mg, oral, Daily    Omnipod Dash Pods, Gen 4, cartridge     ondansetron (ZOFRAN) 8 mg, oral, Every 8 hours PRN    OXcarbazepine (Trileptal) 150 mg tablet 1 tablet, oral, Every 12 hours scheduled (0630,1830)    pancrelipase, Lip-Prot-Amyl, (Creon) 36,000-114,000- 180,000 unit capsule,delayed release(DR/EC) capsule 6 capsules, oral, 2 times daily    phenazopyridine (Pyridium) 200 mg tablet 1 tablet, oral, 3 times daily    prochlorperazine (COMPAZINE) 10 mg, oral, Every 6 hours PRN    promethazine (PHENERGAN) 25 mg, oral, Every 6 hours PRN    sennosides-docusate sodium (Senna-S) 8.6-50 mg tablet 1 tablet, oral, 2 times daily    spironolactone (Aldactone) 25 mg tablet oral, Daily RT    torsemide (Demadex) 20 mg tablet Take 1 tablet (20 mg) by mouth 2 times a day for 3 days, THEN 1 tablet (20 mg) once daily.        Allergies:   Allergies   Allergen Reactions    Acetaminophen-Codeine Unknown    Adhesive Unknown    Codeine Unknown    Fentanyl Unknown     Reaction from Community: Other(See Desc) Comments: MIGRAINES IF USED FOR PAIN, BUT OK FOR ANESTHESIA    Other reaction(s): Intolerance    Fetrin Unknown    Ketorolac Itching    Meperidine Unknown     Reaction from Community: Other(See Desc)    Metformin Unknown    Propoxyphene N-Acetaminophen GI Upset    Silver Hives and Other     Tegaderm    Adhesive Tape-Silicones Rash    Iodinated Contrast Media Rash     Does well with 13hour premedication    Latex Rash    Penicillins Rash    Wound Dressings Rash     tagaderm       PHYSICAL EXAMINATION  Vital Signs:   Vital signs reviewed  Vitals:    07/26/24 1105   BP: 114/71   Pulse: 108   Resp: 16   Temp: 36.7 °C (98.1 °F)   SpO2: 99%           Pain Score:   8         Physical Exam  Constitutional:       Appearance: Normal appearance. She is normal weight.   HENT:      Head: Normocephalic and atraumatic.      Mouth/Throat:      Mouth: Mucous membranes are dry.   Eyes:      Extraocular Movements: Extraocular movements intact.   Cardiovascular:      Comments: No signs of cardiac distress  Pulmonary:      Effort: Pulmonary effort is normal.      Comments: No signs of respiratory distress  Abdominal:      General: Abdomen is flat.      Palpations: Abdomen is soft.   Musculoskeletal:         General: Normal range of motion.   Skin:     General: Skin is warm and dry.   Neurological:      Mental Status: She is alert and oriented to person, place, and time. Mental status is at baseline.   Psychiatric:         Mood and Affect: Mood normal.         Behavior: Behavior normal.         Thought Content: Thought content normal.         Judgment: Judgment normal.         ASSESSMENT/PLAN    Pain  Pain is: cancer related pain and chronic  Type: visceral  Pain control: sub-optimally controlled  Previously tried/intolerances: Did not find oxycodone  effective, avoid Tylenol due to liver impairment      Pain Plan:  Increase MS continue 60 mg Q12  Continue Dilaudid 4mg Q4 PRN   Continue Cymbalta 80mg daily   Continue Flexeril 10mg TID PRN   Levsin rectally 0.125mg Q6 PRN     Opioid Use  Medication Management:   OARRS report reviewed with no aberrant behavior; consistent with  prescriptions/records and patient history  .  Overdose Risk Score 410.   This has been discussed with patient.   We will continue to closely monitor the patient for signs of prescription misuse including UDS, OARRS review and subjective reports at each visit.  No concurrent benzodiazepine use   I am a provider who is certified in Hospice and Palliative Medicine and have conducted a face-face visit and examination for this patient.  Routine Urine Drug Screen is needed and was completed on 5/10/24 appropriately positive for opioids and negative for illicit substances  Controlled Substance Agreement: is needed. Completed on: 5/10/24  Specifically discussed that controlled substance prescriptions will only be provided by our group as outlined in the completed agreement  Naloxone is needed  Red Flags: None at this time, but UDS pending      Nausea   Intermittent nausea without vomiting related to chemotherapy  Nausea Plan:  Zyprexa 10mg BID PRN   Compazine 10mg PRN     Diarrhea   Related to chemotherapy  Diarrhea Plan:  Imodium prn      Altered Mood  Chronic anxiety and depression related to health concerns   Mood Plan:  Continue Remeron 7.5mg nightly   Continue Cymbalta 60mg daily       Decreased appetite  Related to malignancy and chemotherapy  Anorexia Plan:   Zyprexa 10mg BID PRN     Itching related to bedbug infestation   Continue Atarax 25mg TID PRN        Advance Directives  Existence of Advance Directives: Reports that she has completed them   Decision maker: OSMEL is reportedly her sister   Code Status: Full code    Next Follow-Up Visit:  Return to clinic in 5 weeks      Signature and billing  Thank you for allowing us to participate in the care of this patient. Recommendations will be communicated back to the consulting service by way of shared electronic medical record or face-to-face.    Medical complexity was high level due to due to complexity of problems, extensive data review, and high risk of management/treatment.  Time was spent on the following: Prep Time, Time Directly with Patient/Family/Caregiver, Documentation Time. Total time spent: 45      DATA   Diagnostic tests and information reviewed for today's visit:  Most recent labs, Most recent imaging, Medications           SIGNATURE: RALPH Eugene-CNS    Contact information:  [facility]  Monday-Friday 8 AM-5 PM  Phone:  [phone], press option #5, then option #1.   Or Epic Secure Chat

## 2024-07-22 ENCOUNTER — TELEPHONE (OUTPATIENT)
Dept: HEMATOLOGY/ONCOLOGY | Facility: CLINIC | Age: 54
End: 2024-07-22
Payer: COMMERCIAL

## 2024-07-22 NOTE — TELEPHONE ENCOUNTER
(SW) received a call from patient.  Patient wanted SW to know that the  no longer has to come back up unless she does see another bed bug.  Patient stated that she has been feeling better since being discharged from the hospital.  Patient's next appointment is this Thursday.  SW will continue to follow patient.     SW called patient back to provide an update.  SW had to leave a message to call SW back.      Louis Velazquez MSW, LSW

## 2024-07-22 NOTE — TELEPHONE ENCOUNTER
Patient called again.  She states that she is still experiencing low glucose of 55 at night.  She has been waiting to find out what changes she should make.  She is using the Omnipod and Freestyle Ashley 3.  Most recent data (from [employer]) uploaded and attached to message. Patient is not connected to [employer] with her Omnipod.

## 2024-07-23 ENCOUNTER — HOSPITAL ENCOUNTER (EMERGENCY)
Facility: HOSPITAL | Age: 54
Discharge: HOME | End: 2024-07-23
Attending: EMERGENCY MEDICINE
Payer: COMMERCIAL

## 2024-07-23 VITALS
SYSTOLIC BLOOD PRESSURE: 111 MMHG | HEART RATE: 99 BPM | TEMPERATURE: 98.6 F | OXYGEN SATURATION: 99 % | HEIGHT: 64 IN | DIASTOLIC BLOOD PRESSURE: 71 MMHG | WEIGHT: 115 LBS | BODY MASS INDEX: 19.63 KG/M2 | RESPIRATION RATE: 18 BRPM

## 2024-07-23 DIAGNOSIS — E16.2 HYPOGLYCEMIA: Primary | ICD-10-CM

## 2024-07-23 LAB
ANION GAP SERPL CALC-SCNC: 8 MMOL/L (ref 10–20)
BASOPHILS # BLD AUTO: 0.05 X10*3/UL (ref 0–0.1)
BASOPHILS NFR BLD AUTO: 1 %
BUN SERPL-MCNC: 11 MG/DL (ref 6–23)
CALCIUM SERPL-MCNC: 8.6 MG/DL (ref 8.6–10.3)
CHLORIDE SERPL-SCNC: 105 MMOL/L (ref 98–107)
CO2 SERPL-SCNC: 30 MMOL/L (ref 21–32)
CREAT SERPL-MCNC: 0.79 MG/DL (ref 0.5–1.05)
EGFRCR SERPLBLD CKD-EPI 2021: 88 ML/MIN/1.73M*2
EOSINOPHIL # BLD AUTO: 0.91 X10*3/UL (ref 0–0.7)
EOSINOPHIL NFR BLD AUTO: 18.3 %
ERYTHROCYTE [DISTWIDTH] IN BLOOD BY AUTOMATED COUNT: 19.2 % (ref 11.5–14.5)
GLUCOSE SERPL-MCNC: 66 MG/DL (ref 74–99)
HCT VFR BLD AUTO: 28.5 % (ref 36–46)
HGB BLD-MCNC: 10.1 G/DL (ref 12–16)
IMM GRANULOCYTES # BLD AUTO: 0.03 X10*3/UL (ref 0–0.7)
IMM GRANULOCYTES NFR BLD AUTO: 0.6 % (ref 0–0.9)
LYMPHOCYTES # BLD AUTO: 0.91 X10*3/UL (ref 1.2–4.8)
LYMPHOCYTES NFR BLD AUTO: 18.3 %
MCH RBC QN AUTO: 29.6 PG (ref 26–34)
MCHC RBC AUTO-ENTMCNC: 35.4 G/DL (ref 32–36)
MCV RBC AUTO: 84 FL (ref 80–100)
MONOCYTES # BLD AUTO: 1.02 X10*3/UL (ref 0.1–1)
MONOCYTES NFR BLD AUTO: 20.5 %
NEUTROPHILS # BLD AUTO: 2.06 X10*3/UL (ref 1.2–7.7)
NEUTROPHILS NFR BLD AUTO: 41.3 %
NRBC BLD-RTO: 0 /100 WBCS (ref 0–0)
PLATELET # BLD AUTO: 142 X10*3/UL (ref 150–450)
POTASSIUM SERPL-SCNC: 3.7 MMOL/L (ref 3.5–5.3)
RBC # BLD AUTO: 3.41 X10*6/UL (ref 4–5.2)
SODIUM SERPL-SCNC: 139 MMOL/L (ref 136–145)
WBC # BLD AUTO: 5 X10*3/UL (ref 4.4–11.3)

## 2024-07-23 PROCEDURE — 99283 EMERGENCY DEPT VISIT LOW MDM: CPT

## 2024-07-23 PROCEDURE — 82374 ASSAY BLOOD CARBON DIOXIDE: CPT | Performed by: EMERGENCY MEDICINE

## 2024-07-23 PROCEDURE — 85025 COMPLETE CBC W/AUTO DIFF WBC: CPT | Performed by: EMERGENCY MEDICINE

## 2024-07-23 ASSESSMENT — PAIN - FUNCTIONAL ASSESSMENT: PAIN_FUNCTIONAL_ASSESSMENT: 0-10

## 2024-07-23 ASSESSMENT — PAIN DESCRIPTION - LOCATION: LOCATION: ABDOMEN

## 2024-07-23 ASSESSMENT — PAIN SCALES - GENERAL: PAINLEVEL_OUTOF10: 7

## 2024-07-23 NOTE — ED PROVIDER NOTES
HPI   No chief complaint on file.      Chief complaint: Low blood sugar    History of present illness: Patient is a 54-year-old female with history of diabetes, hypothyroidism hypertension lymphoma COPD presenting to the emergency department with complaints of a low blood glucose reading.  According to the patient's, her blood glucose monitor was reading at 54.  As result, the patient contacted 911.  On arrival, the patient's blood glucoses seem to spontaneously improve as EMS reading was 82.  The patient states that she is currently undergoing chemotherapy.  Patient states that she was feeling somewhat dizzy prior to arrival in the emergency department but now feels well.  She denies any fever she denies pain anywhere.        History provided by:  Patient   used: No            Patient History   No past medical history on file.  Past Surgical History:   Procedure Laterality Date    OTHER SURGICAL HISTORY  2020    Cholecystectomy    OTHER SURGICAL HISTORY  2020    Exploratory laparoscopy    OTHER SURGICAL HISTORY  2020    Heart surgery     No family history on file.  Social History     Tobacco Use    Smoking status: Former     Current packs/day: 0.00     Types: Cigarettes     Quit date:      Years since quittin.5     Passive exposure: Past    Smokeless tobacco: Never   Vaping Use    Vaping status: Every Day   Substance Use Topics    Alcohol use: Not Currently    Drug use: Never       Physical Exam   ED Triage Vitals   Temp Pulse Resp BP   -- -- -- --      SpO2 Temp src Heart Rate Source Patient Position   -- -- -- --      BP Location FiO2 (%)     -- --       Physical Exam  Constitutional:       Appearance: Normal appearance.   HENT:      Head: Normocephalic and atraumatic.      Right Ear: External ear normal.      Left Ear: External ear normal.      Nose: Nose normal.      Mouth/Throat:      Mouth: Mucous membranes are moist.   Eyes:      General: Lids are normal.       Extraocular Movements: Extraocular movements intact.      Pupils: Pupils are equal, round, and reactive to light.   Cardiovascular:      Rate and Rhythm: Normal rate and regular rhythm.      Heart sounds: Normal heart sounds.   Pulmonary:      Effort: Pulmonary effort is normal.      Breath sounds: Normal breath sounds.   Abdominal:      General: Abdomen is flat.      Palpations: Abdomen is soft.      Tenderness: There is no abdominal tenderness. There is no guarding or rebound.   Musculoskeletal:         General: No deformity. Normal range of motion.      Cervical back: Normal range of motion and neck supple.   Skin:     General: Skin is warm.      Capillary Refill: Capillary refill takes less than 2 seconds.      Coloration: Skin is not jaundiced.   Neurological:      General: No focal deficit present.      Mental Status: She is alert and oriented to person, place, and time.   Psychiatric:         Mood and Affect: Mood normal.         Behavior: Behavior normal.           ED Course & MDM   Diagnoses as of 07/25/24 1423   Hypoglycemia                       No data recorded                      Medical Decision Making  Medical decision making: Patient remained stable throughout her time in the emergency department.  CBC demonstrated hemoglobin of 10 but no other significant abnormalities Chem-7 was within normal limits.    Patient presents to the emergency department with hyperglycemia.  Workup was performed as above and demonstrated a mild hypoglycemia.  The patient was reassured she was given by mouth food.  After this, she felt improved given her otherwise negative workup, comfortable the patient being discharged home she was instructed to return for any worsening symptoms but otherwise follow-up with a primary care physician and to monitor her glucose closely.  Patient expressed understanding and agreement.  The patient was then discharged home in improved condition.    Amount and/or Complexity of Data  Reviewed  Labs: ordered. Decision-making details documented in ED Course.        Procedure  Procedures     Jesse Grubbs MD  07/25/24 0020

## 2024-07-23 NOTE — ED TRIAGE NOTES
Pt. arrived via EMS with c/o home blood glucose monitor reading low. Pt. reports reading of 54.  EMS BG reading showed 82. Pt.  c/o minimal shakiness. Pt. stated that she is currently receiving treatments for cancer at this time. Pt. also reports having symptoms related to her chronic pancreatitis.

## 2024-07-23 NOTE — TELEPHONE ENCOUNTER
Patient called to check on the status of the message.  She states that she had to be seen in the ER last night due to low blood glucose levels.  She  would like a call back today to advise on what you recommend.

## 2024-07-24 ENCOUNTER — TELEPHONE (OUTPATIENT)
Dept: ENDOCRINOLOGY | Facility: CLINIC | Age: 54
End: 2024-07-24
Payer: COMMERCIAL

## 2024-07-24 ENCOUNTER — HOME CARE VISIT (OUTPATIENT)
Dept: HOME HEALTH SERVICES | Facility: HOME HEALTH | Age: 54
End: 2024-07-24
Payer: COMMERCIAL

## 2024-07-24 ENCOUNTER — TELEPHONE (OUTPATIENT)
Dept: HEMATOLOGY/ONCOLOGY | Facility: CLINIC | Age: 54
End: 2024-07-24
Payer: COMMERCIAL

## 2024-07-24 VITALS
DIASTOLIC BLOOD PRESSURE: 72 MMHG | TEMPERATURE: 98.6 F | SYSTOLIC BLOOD PRESSURE: 118 MMHG | OXYGEN SATURATION: 99 % | RESPIRATION RATE: 16 BRPM | HEART RATE: 90 BPM

## 2024-07-24 PROCEDURE — G0300 HHS/HOSPICE OF LPN EA 15 MIN: HCPCS

## 2024-07-24 ASSESSMENT — ENCOUNTER SYMPTOMS
APPETITE LEVEL: GOOD
CHANGE IN APPETITE: UNCHANGED
DENIES PAIN: 1
LAST BOWEL MOVEMENT: 67045

## 2024-07-24 NOTE — TELEPHONE ENCOUNTER
(SW) called patient regarding phone call the other day about her bed bug concern.  SW let her know that we will assess her and then make a decision to what room she will go too.  Patient stated that she woke up last night to 3 alive bed bugs in her bed.  SW encouraged her to call the .  Patient stated that she will do that and understood our policy.  SW asked how she was feeling since she went back to the ED.  Patient stated that she was doing better and her endocrinologist lowered her dose of insulin.  SW will continue to follow patient.    Louis Velazquez MSW, LSW

## 2024-07-25 ENCOUNTER — NUTRITION (OUTPATIENT)
Dept: HEMATOLOGY/ONCOLOGY | Facility: CLINIC | Age: 54
End: 2024-07-25
Payer: COMMERCIAL

## 2024-07-25 ENCOUNTER — INFUSION (OUTPATIENT)
Dept: HEMATOLOGY/ONCOLOGY | Facility: CLINIC | Age: 54
End: 2024-07-25
Payer: COMMERCIAL

## 2024-07-25 ENCOUNTER — SOCIAL WORK (OUTPATIENT)
Dept: HEMATOLOGY/ONCOLOGY | Facility: CLINIC | Age: 54
End: 2024-07-25
Payer: COMMERCIAL

## 2024-07-25 VITALS
WEIGHT: 115.8 LBS | HEIGHT: 62 IN | TEMPERATURE: 98.6 F | SYSTOLIC BLOOD PRESSURE: 128 MMHG | OXYGEN SATURATION: 97 % | BODY MASS INDEX: 21.31 KG/M2 | RESPIRATION RATE: 16 BRPM | DIASTOLIC BLOOD PRESSURE: 79 MMHG | HEART RATE: 100 BPM

## 2024-07-25 VITALS — HEIGHT: 62 IN | WEIGHT: 115.8 LBS | BODY MASS INDEX: 21.31 KG/M2

## 2024-07-25 DIAGNOSIS — C82.41 GRADE 3B FOLLICULAR LYMPHOMA OF LYMPH NODES OF NECK (MULTI): ICD-10-CM

## 2024-07-25 LAB
ALBUMIN SERPL BCP-MCNC: 2.8 G/DL (ref 3.4–5)
ALP SERPL-CCNC: 387 U/L (ref 33–110)
ALT SERPL W P-5'-P-CCNC: 31 U/L (ref 7–45)
ANION GAP SERPL CALC-SCNC: 10 MMOL/L (ref 10–20)
AST SERPL W P-5'-P-CCNC: 61 U/L (ref 9–39)
BASOPHILS # BLD AUTO: 0.02 X10*3/UL (ref 0–0.1)
BASOPHILS NFR BLD AUTO: 0.4 %
BILIRUB SERPL-MCNC: 0.3 MG/DL (ref 0–1.2)
BUN SERPL-MCNC: 10 MG/DL (ref 6–23)
CALCIUM SERPL-MCNC: 8.6 MG/DL (ref 8.6–10.3)
CHLORIDE SERPL-SCNC: 103 MMOL/L (ref 98–107)
CO2 SERPL-SCNC: 28 MMOL/L (ref 21–32)
CREAT SERPL-MCNC: 0.75 MG/DL (ref 0.5–1.05)
EGFRCR SERPLBLD CKD-EPI 2021: >90 ML/MIN/1.73M*2
EOSINOPHIL # BLD AUTO: 1.07 X10*3/UL (ref 0–0.7)
EOSINOPHIL NFR BLD AUTO: 20.3 %
ERYTHROCYTE [DISTWIDTH] IN BLOOD BY AUTOMATED COUNT: 18.4 % (ref 11.5–14.5)
GLUCOSE SERPL-MCNC: 122 MG/DL (ref 74–99)
HCT VFR BLD AUTO: 31.2 % (ref 36–46)
HGB BLD-MCNC: 10.9 G/DL (ref 12–16)
IMM GRANULOCYTES # BLD AUTO: 0.03 X10*3/UL (ref 0–0.7)
IMM GRANULOCYTES NFR BLD AUTO: 0.6 % (ref 0–0.9)
LDH SERPL L TO P-CCNC: 318 U/L (ref 84–246)
LYMPHOCYTES # BLD AUTO: 0.99 X10*3/UL (ref 1.2–4.8)
LYMPHOCYTES NFR BLD AUTO: 18.8 %
MCH RBC QN AUTO: 29.2 PG (ref 26–34)
MCHC RBC AUTO-ENTMCNC: 34.9 G/DL (ref 32–36)
MCV RBC AUTO: 84 FL (ref 80–100)
MONOCYTES # BLD AUTO: 1 X10*3/UL (ref 0.1–1)
MONOCYTES NFR BLD AUTO: 19 %
NEUTROPHILS # BLD AUTO: 2.15 X10*3/UL (ref 1.2–7.7)
NEUTROPHILS NFR BLD AUTO: 40.9 %
PLATELET # BLD AUTO: 205 X10*3/UL (ref 150–450)
POTASSIUM SERPL-SCNC: 3.6 MMOL/L (ref 3.5–5.3)
PROT SERPL-MCNC: 6 G/DL (ref 6.4–8.2)
RBC # BLD AUTO: 3.73 X10*6/UL (ref 4–5.2)
SODIUM SERPL-SCNC: 137 MMOL/L (ref 136–145)
URATE SERPL-MCNC: 6.2 MG/DL (ref 2.3–6.7)
WBC # BLD AUTO: 5.3 X10*3/UL (ref 4.4–11.3)

## 2024-07-25 PROCEDURE — 96375 TX/PRO/DX INJ NEW DRUG ADDON: CPT | Mod: INF

## 2024-07-25 PROCEDURE — 2500000004 HC RX 250 GENERAL PHARMACY W/ HCPCS (ALT 636 FOR OP/ED): Mod: SE | Performed by: INTERNAL MEDICINE

## 2024-07-25 PROCEDURE — 96417 CHEMO IV INFUS EACH ADDL SEQ: CPT

## 2024-07-25 PROCEDURE — 85025 COMPLETE CBC W/AUTO DIFF WBC: CPT

## 2024-07-25 PROCEDURE — 96411 CHEMO IV PUSH ADDL DRUG: CPT

## 2024-07-25 PROCEDURE — 84550 ASSAY OF BLOOD/URIC ACID: CPT

## 2024-07-25 PROCEDURE — 83615 LACTATE (LD) (LDH) ENZYME: CPT

## 2024-07-25 PROCEDURE — 2500000001 HC RX 250 WO HCPCS SELF ADMINISTERED DRUGS (ALT 637 FOR MEDICARE OP): Mod: SE | Performed by: INTERNAL MEDICINE

## 2024-07-25 PROCEDURE — 96413 CHEMO IV INFUSION 1 HR: CPT

## 2024-07-25 PROCEDURE — 84075 ASSAY ALKALINE PHOSPHATASE: CPT

## 2024-07-25 RX ORDER — DIPHENHYDRAMINE HCL 25 MG
50 CAPSULE ORAL ONCE
Status: COMPLETED | OUTPATIENT
Start: 2024-07-25 | End: 2024-07-25

## 2024-07-25 RX ORDER — ACETAMINOPHEN 325 MG/1
650 TABLET ORAL ONCE
Status: COMPLETED | OUTPATIENT
Start: 2024-07-25 | End: 2024-07-25

## 2024-07-25 RX ORDER — HEPARIN SODIUM,PORCINE/PF 10 UNIT/ML
50 SYRINGE (ML) INTRAVENOUS AS NEEDED
Status: CANCELLED | OUTPATIENT
Start: 2024-07-25

## 2024-07-25 RX ORDER — PALONOSETRON 0.05 MG/ML
0.25 INJECTION, SOLUTION INTRAVENOUS ONCE
Status: COMPLETED | OUTPATIENT
Start: 2024-07-25 | End: 2024-07-25

## 2024-07-25 RX ORDER — HEPARIN 100 UNIT/ML
500 SYRINGE INTRAVENOUS AS NEEDED
Status: DISCONTINUED | OUTPATIENT
Start: 2024-07-25 | End: 2024-07-25 | Stop reason: HOSPADM

## 2024-07-25 RX ORDER — HEPARIN 100 UNIT/ML
500 SYRINGE INTRAVENOUS AS NEEDED
Status: CANCELLED | OUTPATIENT
Start: 2024-07-25

## 2024-07-25 ASSESSMENT — PAIN SCALES - GENERAL: PAINLEVEL: 8

## 2024-07-25 NOTE — SIGNIFICANT EVENT
07/25/24 1041   Prechemo Checklist   Has the patient been in the hospital, ED, or urgent care since last date of service Yes  (MD is aware, ED visit not related to treatment)   Chemo/Immuno Consent Completed and Signed Yes   Protocol/Indications Verified Yes   Confirmed to previous date/time of medication Yes   Compared to previous dose Yes   All medications are dated accurately Yes   Pregnancy Test Negative Yes   Parameters Met Yes   BSA/Weight-Height Verified Yes   Dose Calculations Verified (current, total, cumulative) Yes

## 2024-07-25 NOTE — PROGRESS NOTES
NUTRITION Follow UP NOTE    Nutrition Assessment     Reason for Visit:  Nelsy Osullivan is a 54 y.o. 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   She is status post cervical lymph node excision February 8, 2024.     She is receiving Bendamustine and Rituxan- 28 Day Cycles.     Referred to this service per screen and assessed 5/30 during infusion.  She was seen for follow up today.    Note pt was admitted on 7/9 for Hypoglycemia and found to have:  Pneumonitis  Fluid overload leading to pulmonary edema  Severe protein calorie malnutrition with hypoalbuminemia    Note pt with a h/o Chronic pancreatitis (r/t DM) with chronic abdominal pain. Per MD, she is unable to tolerate pancreatic enzyme replacement therapy.     Followed by [facility] care and initiated on narcotic regimen.  CNP noted pt with food insecurities.  Pt was referred to [facility] and had an appointment on 6/25/24.    Pt reports bedbugs in her apartment for quite some time.    PMH noted: DM I, hypothyroidism, CHF, COPD, CAD, Dyslipidemia, Major depressive disorder, UGB, malnutrition, Cholecystectomy, C-dif (5/2023)    Note pt with [facility] for coverage.    Basic metabolic panel              Component  Ref Range & Units 2 d ago  (7/23/24) 13 d ago  (7/12/24) 2 wk ago  (7/11/24) 2 wk ago  (7/10/24) 2 wk ago  (7/9/24) 4 wk ago  (6/27/24) 1 mo ago  (5/31/24)   Glucose  74 - 99 mg/dL 66 Low  196 High  262 High  231 High  66 Low  66 Low  164 High    Sodium  136 - 145 mmol/L 139 131 Low  132 Low  135 Low  137 138 135 Low    Potassium  3.5 - 5.3 mmol/L 3.7 4.1 4.7 3.3 Low  3.6 4.1 4.4   Chloride  98 - 107 mmol/L 105 98 101 105 104 105 105   Bicarbonate  21 - 32 mmol/L 30 29 28 25 28 28 22   Anion Gap  10 - 20 mmol/L 8 Low  8 Low  8 Low  8 Low  9 Low  9 Low  12   Urea Nitrogen  6 - 23 mg/dL 11 10 10 7 8 8 12   Creatinine  0.50 - 1.05 mg/dL 0.79 0.89 0.86 0.78 0.85 0.78 0.88   eGFR  >60 mL/min/1.73m*2 88 76 CM 79 CM 89 CM 81 CM 89 CM  "77 CM   Comment: Calculations of estimated GFR are performed using the 2021 CKD-EPI Study Refit equation without the race variable for the IDMS-Traceable creatinine methods.  https://jasn.asnjournals.org/content/early/2021/09/22/ASN.9319369519   Calcium  8.6 - 10.3 mg/dL 8.6 8.1 Low  7.9 Low  7.1 Low  8.3 Low  8.5 Low  8.7   Resulting Agency [facility]          Magnesium            Component  Ref Range & Units 13 d ago  (7/12/24) 2 wk ago  (7/11/24) 2 wk ago  (7/10/24) 2 wk ago  (7/9/24) 1 yr ago  (4/5/23) 1 yr ago  (3/28/23) 1 yr ago  (3/27/23)   Magnesium  1.60 - 2.40 mg/dL 1.52 Low  1.54 Low  1.47 Low  1.41 Low  1.53 Low  1.71 1.43 Low         Lipase              Component  Ref Range & Units 7 mo ago  (10/17/23) 1 yr ago  (5/20/23) 1 yr ago  (4/5/23) 1 yr ago  (2/2/23) 1 yr ago  (9/29/22) 2 yr ago  (4/6/22) 2 yr ago  (12/21/21)   Lipase  9 - 82 U/L 3 Low  3 Low  CM 3 Low  CM 5 Low  CM 6 Low  CM <3 Abnormal  CM <3 Abnormal  CM   Resulting Agency [facility]        Hemoglobin A1C               Component  Ref Range & Units 1 mo ago  (5/31/24) 1 yr ago  (4/4/23) 1 yr ago  (8/5/22) 2 yr ago  (4/6/22) 2 yr ago  (1/14/22) 3 yr ago  (7/20/21) 3 yr ago  (4/12/21)   Hemoglobin A1C  see below % 7.8 High  13.5 Abnormal  R, CM 10.3 Abnormal  R, CM 7.9 Abnormal  R, CM 7.7 Abnormal  R, CM 7.7 R, CM 8.5 R, CM   Estimated Average Glucose  Not Established mg/dL 177 341 R 249 R 180 R 174 R 174 R 197 R   Resulting Agency [facility]        Anthropometrics:  Anthropometrics  Height: 158.6 cm (5' 2.44\")  Weight: 52.5 kg (115 lb 12.8 oz)  BMI (Calculated): 20.88  IBW/kg (Dietitian Calculated): 50.9 kg  Weight Change  Significant Weight Loss: Yes    Historically wt " has fluctuated greatly with periods of significant losses.  Pt states this trend is normal for her.     Wt Readings    7/25/24 52.5 kg - loss of 3.8 kg (6.7%) in 1 month- significant    6/27/24 56.3 kg   05/30/24 56.8 kg (125 lb 4.8 oz)- question validity of this wt, as it reflects a 3.7 kg gain in 6 days???   05/24/24 53.1 kg (117 lb 1 oz)- loss of 2.3 kg (4.2%) in 3 weeks- SIGNIFICANT   05/10/24 54 kg (119 lb 0.8 oz)   05/03/24 55.4 kg (122 lb 3.2 oz)- gain of 3.2 kg in 1 month   04/30/24 54.7 kg (120 lb 8 oz)   04/22/24 53.6 kg (118 lb 2.7 oz)   04/11/24 53.6 kg (118 lb 2.7 oz)   04/03/24 52.2 kg (115 lb)- loss of 4 kg (7.1%) in 2 weeks- SIGNIFICANT   03/21/24 56.2 kg (123 lb 14.4 oz)- gain of 14 kg in < 12 months   03/14/24 52.2 kg (115 lb 1.3 oz)   02/23/24 54.9 kg (121 lb)   01/26/24 54.4 kg (120 lb)   01/09/24 51.7 kg (114 lb)     12/13/23 49.8 kg (109 lb 12.8 oz)   10/17/23 45.4 kg (100 lb)   09/11/23 47.2 kg (104 lb)   08/30/23 (!) 40.8 kg (90 lb)   04/04/23 (!) 42.2 kg (93 lb)- loss of 6.3 kg (13%) in < 6 months  SIGNIFICANT   10/11/22 48.5 kg (107 lb)- gain of 4 kg in < 5 months   05/20/22 44.5 kg (98 lb)   04/06/22 44 kg (97 lb)            Food And Nutrient Intake:  Food and Nutrient History  Food and Nutrient History: She eats 3 meals a day plus snacks.  Has not been having [place] toast kitty morning like prior. She does not watch what she eats since she is under weight. Feels the chemo makes her BS go down.  c/o fatigue- did not eat after tx since she slept so much.  Fluid Intake: She was drinking Circo water (flavored water) and staying hydrated.  Has a large drink cup full of coffee, however today.  She also has a large cup of cranberry juice in addition.  Drinks 2 each 20 oz water btles a day. (h/o pop- Pepsi)  GI Symptoms: diarrhea   pt c/o diarrhea, reporting 7-20 BM's in 24 hrs.  Stated she is eating but it is all coming back out.    Taking her Senna before and after her chemotherapy per NP.  Has  "a PPI on Medication list and reports she takes it.    Recall she reported she had tried every pancreatic enzyme and they all make her abd pain worse. Reports that this was 20 years ago. She reported feeling nauseous just thinking about it.       Food Intake  Meal 2: 1 quesilla with chicken and cheese, sour/gauc, 1 taco with meat, cheese and lettuce and a bowl of carson beans with cheese, red sauce and sour on exam                                                        Nutrition Focused Physical Exam Findings:      Subcutaneous Fat Loss  Orbital Fat Pads: Mild-Moderate (slight dark circles and slight hollowing)  Buccal Fat Pads: Mild-Moderate (flat cheeks, minimal bounce)  Triceps: Defer  Ribs: Defer    Muscle Wasting  Temporalis: Mild-Moderate (slight depression)  Pectoralis (Clavicular Region): Defer  Deltoid/Trapezius: Defer  Interosseous: Defer  Trapezius/Infraspinatus/Supraspinatus (Scapular Region): Defer  Quadriceps: Defer  Gastrocnemius: Defer              Energy Needs  Calculated Energy Needs Using Equations  Height: 158.6 cm (5' 2.44\")  Estimated Energy Needs  Total Energy Estimated Needs (kCal): 1760 kCal  Total Estimated Energy Need per Day (kCal/kg): 31 kCal/kg  Estimated Fluid Needs  Total Fluid Estimated Needs (mL): 1705 mL  Total Fluid Estimated Needs (mL/kg): 30 mL/kg  Estimated Protein Needs  Total Protein Estimated Needs (g): 74 g  Total Protein Estimated Needs (g/kg): 1.3 g/kg        Nutrition Diagnosis   Malnutrition Diagnosis  Patient has Malnutrition Diagnosis: Yes  Diagnosis Status: Ongoing  Malnutrition Diagnosis: Moderate malnutrition related to chronic disease or condition  As Evidenced by: h/o significant wt loss with mild to moderate losses of both adipose and muscle stores per NFPE    Nutrition Diagnosis  Patient has Nutrition Diagnosis: Yes  Diagnosis Status (1): Ongoing  Nutrition Diagnosis 1: Altered GI function  Related to (1): chronic pancreatitis without PERT for decades, side " effects from tx  As Evidenced by (1): clinical hx and pt report  Additional Assessment Information (1): Pt would benefit from PERT and may tolerate therapy at this point in her clinical hx.  She is willing to try.  Additional Nutrition Diagnosis: Diagnosis 2  Diagnosis Status (2): Ongoing  Nutrition Diagnosis 2: Altered nutrition related to laboratory values  Related to (2): Questionable excessive administration of insulin at times and/or inappropriate timing of intake  As Evidenced by (2): BS of 66 on 7/23, 7/9 and 6/27    Nutrition Interventions/Recommendations   Nutrition Prescription  Individualized Nutrition Prescription Provided for : Low-moderate concentrated sugar JOON    Food and Nutrition Delivery  Food and Nutrition Delivery  Meals & Snacks: Modify schedule of foods/fluids  Goals: 5-6 small frequent meals/snacks daily  Medical Food Supplement: Ensure Enlive  Goals: Twice daily to provide 700 calories and 40 g of protein (CMN sent to [employer] [facility] [employer])    Nutrition Education  Nutrition Education  Nutrition Education Content: Content related nutrition education  Goals: Proper use of PERT for effective digestion of macronutrients    Coordination of Care  Coordination of Nutrition Care by a Nutrition Professional  Collaboration and referral of nutrition care: Collaboration by nutrition professional with other providers  Goals: Prescribe PERT for pt (Creon 36,000 units of lipase- 2-3 with meals and 1-2 with snacks.  Total of 12 per day.)    Patient Instructions   What You Need to Know About Pancreatic Enzymes (SCC)    Nutrition Monitoring and Evaluation   Food/Nutrient Related History Monitoring  Monitoring and Evaluation Plan: Fluid intake, Amount of food, Meal/snack pattern  Fluid Intake: Estimated fluid intake  Criteria: Meet daily hydration needs  Amount of Food: Estimated amout of food  Criteria: Meet energy and protein needs  Meal/Snack Pattern: Estimated meal and snack pattern  Criteria: as  above  Additional Plan: Use and tolerance of PERT  Body Composition/Growth/Weight History  Monitoring and Evaluation Plan: Weight  Weight: Weight change  Criteria: Maintenance and/or repletion of muscle stores  Biochemical Data, Medical Tests and Procedures  Monitoring and Evaluation Plan: Electrolyte/renal panel, Glucose/endocrine profile, Vitamin profile  Criteria: WNL  Glucose/Endocrine Profile: Glucose, casual, Hemoglobin A1c (HgbA1c)  Criteria: < 250; < 7.0 respectively  Nutrition Focused Physical Findings  Monitoring and Evaluation Plan: Adipose, Digestive System, Muscles  Adipose: Loss of subcutaneous fat  Criteria: No further loss  Digestive System: Diarrhea  Criteria: Solid stools that sink  Muscles: Muscle atrophy  Criteria: No further loss- repletion      Total time spent: 80 minutes

## 2024-07-25 NOTE — PROGRESS NOTES
(SW) met with patient today to assess needs and offer support.  Patient was A&Ox3 with appropriate and congruent mood and affect.  Patient showed SW her arms with the bug bites.  SW asked if she called the .  She stated that he was coming back tomorrow after her treatment.  Patient asked about the potty chair in the room.  Patient stated that she did not want to use that.  SW discussed that it will not be long term and that it was to keep everyone safe.  SW asked if she needed anything.  Patient asked for a blanket and some coffee.  Patient was appreciative.  SW will continue to follow patient.      Louis Velazquez MSW, LSW

## 2024-07-25 NOTE — PROGRESS NOTES
Patient presents for bendeka/rituximab treatment, has no complaints alert and oriented x 4. CVAD accessed per order,  labs drawn per orders. Patient meets parameters for treatment. MD is aware patient was in ED on 7/16 for hypoglycemia - not related to treatment.     Patient tolerated treatment well, no reaction noted. After treatment, Mediport flushed with 10 ml NS followed by 5 ml of 100mg/ml Heparin Flush, Reyes Needle removed per practice policy and procedure, site care given with bandaid applied. Pt tolerated procedure well. Patient feels well and has no complaints, vitals taken as ordered all WNL and stable. Patient returns for D2 tomorrow, 7/26/2024 at 10:30 AM. Patient verbalized understanding of all teaching and education.  Patient discharged in stable condition with no further needs.      Rituxin Rates:    100 mg/hr  Rate=  55   Vol = 27  200 mg/hr  Rate= 109   Vol = 55  300 mg/hr  Rate= 164  Vol = 82  400 mg/hr  Rate= 218  Vol =164

## 2024-07-26 ENCOUNTER — OFFICE VISIT (OUTPATIENT)
Dept: PALLIATIVE MEDICINE | Facility: CLINIC | Age: 54
End: 2024-07-26
Payer: COMMERCIAL

## 2024-07-26 ENCOUNTER — INFUSION (OUTPATIENT)
Dept: HEMATOLOGY/ONCOLOGY | Facility: CLINIC | Age: 54
End: 2024-07-26
Payer: COMMERCIAL

## 2024-07-26 ENCOUNTER — TELEPHONE (OUTPATIENT)
Dept: HEMATOLOGY/ONCOLOGY | Facility: HOSPITAL | Age: 54
End: 2024-07-26

## 2024-07-26 VITALS
TEMPERATURE: 98.1 F | HEART RATE: 108 BPM | RESPIRATION RATE: 16 BRPM | OXYGEN SATURATION: 99 % | WEIGHT: 122 LBS | DIASTOLIC BLOOD PRESSURE: 71 MMHG | BODY MASS INDEX: 22 KG/M2 | SYSTOLIC BLOOD PRESSURE: 114 MMHG

## 2024-07-26 VITALS
SYSTOLIC BLOOD PRESSURE: 114 MMHG | HEIGHT: 62 IN | OXYGEN SATURATION: 99 % | WEIGHT: 122 LBS | BODY MASS INDEX: 22.45 KG/M2 | RESPIRATION RATE: 16 BRPM | HEART RATE: 108 BPM | DIASTOLIC BLOOD PRESSURE: 71 MMHG | TEMPERATURE: 98.1 F

## 2024-07-26 DIAGNOSIS — G89.3 NEOPLASM RELATED PAIN: Primary | ICD-10-CM

## 2024-07-26 DIAGNOSIS — C82.41 GRADE 3B FOLLICULAR LYMPHOMA OF LYMPH NODES OF NECK (MULTI): ICD-10-CM

## 2024-07-26 PROCEDURE — 99215 OFFICE O/P EST HI 40 MIN: CPT | Performed by: CLINICAL NURSE SPECIALIST

## 2024-07-26 PROCEDURE — 96375 TX/PRO/DX INJ NEW DRUG ADDON: CPT | Mod: INF

## 2024-07-26 PROCEDURE — 2500000004 HC RX 250 GENERAL PHARMACY W/ HCPCS (ALT 636 FOR OP/ED): Mod: SE | Performed by: INTERNAL MEDICINE

## 2024-07-26 PROCEDURE — 96409 CHEMO IV PUSH SNGL DRUG: CPT

## 2024-07-26 RX ORDER — HEPARIN SODIUM,PORCINE/PF 10 UNIT/ML
50 SYRINGE (ML) INTRAVENOUS AS NEEDED
OUTPATIENT
Start: 2024-07-26

## 2024-07-26 RX ORDER — MORPHINE SULFATE 60 MG/1
60 TABLET, FILM COATED, EXTENDED RELEASE ORAL 2 TIMES DAILY
Qty: 60 TABLET | Refills: 0 | Status: SHIPPED | OUTPATIENT
Start: 2024-07-26

## 2024-07-26 RX ORDER — HEPARIN 100 UNIT/ML
500 SYRINGE INTRAVENOUS AS NEEDED
Status: DISCONTINUED | OUTPATIENT
Start: 2024-07-26 | End: 2024-07-26 | Stop reason: HOSPADM

## 2024-07-26 RX ORDER — HYDROMORPHONE HYDROCHLORIDE 2 MG/1
4 TABLET ORAL EVERY 4 HOURS PRN
Qty: 360 TABLET | Refills: 0 | Status: SHIPPED | OUTPATIENT
Start: 2024-08-02 | End: 2024-09-01

## 2024-07-26 RX ORDER — HEPARIN 100 UNIT/ML
500 SYRINGE INTRAVENOUS AS NEEDED
OUTPATIENT
Start: 2024-07-26

## 2024-07-26 ASSESSMENT — PAIN SCALES - GENERAL
PAINLEVEL: 8
PAINLEVEL: 8

## 2024-07-26 NOTE — PROGRESS NOTES
Patient presents for D2 treatment,  has no complaints alert and oriented x 4. CVAD accessed per order, meets parameters for treatment. Patient tolerated treatment well, no reaction noted. After treatment, Mediport flushed with 10 ml NS followed by 5 ml of 100mg/ml Heparin Flush, Reyes Needle removed per practice policy and procedure, site care given with gauze and hypafix.  Pt tolerated procedure well. Patient feels well and has no complaints, vital signs stable. Patient returns for growth factor shot on Monday, July 28th at 11:30 AM. Patient discharged in stable condition with no further needs.

## 2024-07-26 NOTE — SIGNIFICANT EVENT
07/26/24 1052   Prechemo Checklist   Has the patient been in the hospital, ED, or urgent care since last date of service No   Chemo/Immuno Consent Completed and Signed Yes   Protocol/Indications Verified Yes   Confirmed to previous date/time of medication Yes   Compared to previous dose Yes   All medications are dated accurately Yes   Pregnancy Test Negative Yes   Parameters Met Yes   BSA/Weight-Height Verified Yes   Dose Calculations Verified (current, total, cumulative) Yes

## 2024-07-26 NOTE — TELEPHONE ENCOUNTER
Patient called back.  No answer.  Voicemail message left for patient Dilaudid 2 mg tab was due to be filled at  [facility] 8/2 and patient filled morphine 60 mg 90/30 on 7/12.   [facility] called and confirmed.

## 2024-07-29 ENCOUNTER — APPOINTMENT (OUTPATIENT)
Dept: HEMATOLOGY/ONCOLOGY | Facility: CLINIC | Age: 54
End: 2024-07-29
Payer: COMMERCIAL

## 2024-07-29 ENCOUNTER — INFUSION (OUTPATIENT)
Dept: HEMATOLOGY/ONCOLOGY | Facility: CLINIC | Age: 54
End: 2024-07-29
Payer: COMMERCIAL

## 2024-07-29 VITALS
HEIGHT: 62 IN | DIASTOLIC BLOOD PRESSURE: 83 MMHG | SYSTOLIC BLOOD PRESSURE: 131 MMHG | HEART RATE: 112 BPM | RESPIRATION RATE: 16 BRPM | TEMPERATURE: 97.2 F | BODY MASS INDEX: 22 KG/M2 | OXYGEN SATURATION: 100 %

## 2024-07-29 DIAGNOSIS — C82.41 GRADE 3B FOLLICULAR LYMPHOMA OF LYMPH NODES OF NECK (MULTI): ICD-10-CM

## 2024-07-29 PROCEDURE — 96372 THER/PROPH/DIAG INJ SC/IM: CPT

## 2024-07-29 PROCEDURE — 2500000004 HC RX 250 GENERAL PHARMACY W/ HCPCS (ALT 636 FOR OP/ED): Mod: JZ,SE | Performed by: INTERNAL MEDICINE

## 2024-07-29 ASSESSMENT — PAIN SCALES - GENERAL: PAINLEVEL: 0-NO PAIN

## 2024-07-29 NOTE — PROGRESS NOTES
Pt here for growth factor, tolerated without incident. Discharged in stable condition, no further needs at this time. Has schedule for follow up.

## 2024-07-30 ENCOUNTER — APPOINTMENT (OUTPATIENT)
Dept: HOME HEALTH SERVICES | Facility: HOME HEALTH | Age: 54
End: 2024-07-30
Payer: COMMERCIAL

## 2024-08-01 ENCOUNTER — HOSPITAL ENCOUNTER (OUTPATIENT)
Dept: RADIOLOGY | Facility: HOSPITAL | Age: 54
Discharge: HOME | End: 2024-08-01
Payer: COMMERCIAL

## 2024-08-01 DIAGNOSIS — C82.41 GRADE 3B FOLLICULAR LYMPHOMA OF LYMPH NODES OF NECK (MULTI): ICD-10-CM

## 2024-08-01 PROCEDURE — 3430000001 HC RX 343 DIAGNOSTIC RADIOPHARMACEUTICALS: Performed by: INTERNAL MEDICINE

## 2024-08-01 PROCEDURE — A9552 F18 FDG: HCPCS | Performed by: INTERNAL MEDICINE

## 2024-08-01 PROCEDURE — 78815 PET IMAGE W/CT SKULL-THIGH: CPT | Mod: PS

## 2024-08-01 RX ORDER — FLUDEOXYGLUCOSE F 18 200 MCI/ML
12.6 INJECTION, SOLUTION INTRAVENOUS
Status: COMPLETED | OUTPATIENT
Start: 2024-08-01 | End: 2024-08-01

## 2024-08-02 ENCOUNTER — PHARMACY VISIT (OUTPATIENT)
Dept: PHARMACY | Facility: CLINIC | Age: 54
End: 2024-08-02
Payer: MEDICAID

## 2024-08-02 ENCOUNTER — HOME CARE VISIT (OUTPATIENT)
Dept: HOME HEALTH SERVICES | Facility: HOME HEALTH | Age: 54
End: 2024-08-02
Payer: COMMERCIAL

## 2024-08-02 PROCEDURE — G0321 HH/HSPC RN PHONE VISIT: HCPCS

## 2024-08-02 PROCEDURE — RXMED WILLOW AMBULATORY MEDICATION CHARGE

## 2024-08-06 ENCOUNTER — PHARMACY VISIT (OUTPATIENT)
Dept: PHARMACY | Facility: CLINIC | Age: 54
End: 2024-08-06
Payer: MEDICAID

## 2024-08-06 PROCEDURE — RXMED WILLOW AMBULATORY MEDICATION CHARGE

## 2024-08-08 ENCOUNTER — APPOINTMENT (OUTPATIENT)
Dept: HEMATOLOGY/ONCOLOGY | Facility: CLINIC | Age: 54
End: 2024-08-08
Payer: COMMERCIAL

## 2024-08-08 ENCOUNTER — TELEPHONE (OUTPATIENT)
Dept: HEMATOLOGY/ONCOLOGY | Facility: CLINIC | Age: 54
End: 2024-08-08

## 2024-08-08 ENCOUNTER — NUTRITION (OUTPATIENT)
Dept: HEMATOLOGY/ONCOLOGY | Facility: CLINIC | Age: 54
End: 2024-08-08

## 2024-08-08 ENCOUNTER — OFFICE VISIT (OUTPATIENT)
Dept: HEMATOLOGY/ONCOLOGY | Facility: CLINIC | Age: 54
End: 2024-08-08
Payer: COMMERCIAL

## 2024-08-08 VITALS
SYSTOLIC BLOOD PRESSURE: 110 MMHG | WEIGHT: 123.2 LBS | BODY MASS INDEX: 22.67 KG/M2 | RESPIRATION RATE: 16 BRPM | DIASTOLIC BLOOD PRESSURE: 69 MMHG | HEIGHT: 62 IN

## 2024-08-08 VITALS — WEIGHT: 123.2 LBS | BODY MASS INDEX: 22.67 KG/M2 | HEIGHT: 62 IN

## 2024-08-08 DIAGNOSIS — L30.9 DERMATITIS: Primary | ICD-10-CM

## 2024-08-08 DIAGNOSIS — C82.41 GRADE 3B FOLLICULAR LYMPHOMA OF LYMPH NODES OF NECK (MULTI): ICD-10-CM

## 2024-08-08 LAB
APPEARANCE UR: CLEAR
BACTERIA #/AREA URNS AUTO: ABNORMAL /HPF
BILIRUB UR STRIP.AUTO-MCNC: NEGATIVE MG/DL
COLOR UR: ABNORMAL
GLUCOSE UR STRIP.AUTO-MCNC: NORMAL MG/DL
KETONES UR STRIP.AUTO-MCNC: NEGATIVE MG/DL
LEUKOCYTE ESTERASE UR QL STRIP.AUTO: ABNORMAL
NITRITE UR QL STRIP.AUTO: NEGATIVE
PH UR STRIP.AUTO: 5.5 [PH]
PROT UR STRIP.AUTO-MCNC: NEGATIVE MG/DL
RBC # UR STRIP.AUTO: NEGATIVE /UL
RBC #/AREA URNS AUTO: ABNORMAL /HPF
SP GR UR STRIP.AUTO: 1.01
SQUAMOUS #/AREA URNS AUTO: ABNORMAL /HPF
UROBILINOGEN UR STRIP.AUTO-MCNC: NORMAL MG/DL
WBC #/AREA URNS AUTO: >50 /HPF
WBC CLUMPS #/AREA URNS AUTO: ABNORMAL /HPF

## 2024-08-08 PROCEDURE — 3048F LDL-C <100 MG/DL: CPT | Performed by: INTERNAL MEDICINE

## 2024-08-08 PROCEDURE — 99215 OFFICE O/P EST HI 40 MIN: CPT | Performed by: INTERNAL MEDICINE

## 2024-08-08 PROCEDURE — 81001 URINALYSIS AUTO W/SCOPE: CPT | Performed by: INTERNAL MEDICINE

## 2024-08-08 PROCEDURE — 3078F DIAST BP <80 MM HG: CPT | Performed by: INTERNAL MEDICINE

## 2024-08-08 PROCEDURE — 3008F BODY MASS INDEX DOCD: CPT | Performed by: INTERNAL MEDICINE

## 2024-08-08 PROCEDURE — 3074F SYST BP LT 130 MM HG: CPT | Performed by: INTERNAL MEDICINE

## 2024-08-08 PROCEDURE — 87086 URINE CULTURE/COLONY COUNT: CPT | Mod: PORLAB | Performed by: INTERNAL MEDICINE

## 2024-08-08 PROCEDURE — 3061F NEG MICROALBUMINURIA REV: CPT | Performed by: INTERNAL MEDICINE

## 2024-08-08 PROCEDURE — 3051F HG A1C>EQUAL 7.0%<8.0%: CPT | Performed by: INTERNAL MEDICINE

## 2024-08-08 RX ORDER — DIAPER,BRIEF,INFANT-TODD,DISP
EACH MISCELLANEOUS 2 TIMES DAILY
Qty: 30 G | Refills: 2 | Status: SHIPPED | OUTPATIENT
Start: 2024-08-08

## 2024-08-08 NOTE — PROGRESS NOTES
Patient ID: Nelsy Osullivan is a 54 y.o. female.    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  FISH is negative for Bcl-2, BCL6 and C-MYC.  PET/CT 4/4/24 showed malignant uptake in a left supraclavicular LN only.  Baseline LDH was elevated at 238 but the patient has chronically elevated LDH. She started BR chemotherapy 5/2/24.  She has a prior history of lymphoma (question Hodgkin's versus non-Hodgkin's) at age 16 with relapse at age 19.  When she was 16, she was treated with radiation therapy alone.  When she relapsed at age 19, she had disease on both sides of the diaphragm.  Her spleen was removed.  She was treated with several cycles of chemotherapy including Adriamycin and mustard gas underwent a second course of radiation therapy to her neck and chest.  She was treated by Dr. Carbajal and Dr. Acosta at that time.  Mitral valve and aortic valve stenosis secondary to prior radiation therapy.  She underwent animal aortic valve and mitral valve replacements in 2019.  Chronic pancreatitis (question etiology).  She is unable to tolerate pancreatic enzyme replacement therapy.     18 mm right interpolar thyroid nodule with February 19, 2024 pathology showing 9 follicular cells.  DM    The patient presents today for follow-up and to review the results of her recent PET/CT.  Since her last office visit, she was admitted to  [facility] July 9 through July 12 for fluid overload with pulmonary edema and on July 23 for hypoglycemia.  She has been treated for bedbugs.  She started taking Creon tablets 2 weeks ago-tolerating well.  She developed a pruritic erythematous rash over her posterior calves last evening.  Rash is associated with increased LE edema.  Denies fever or chills.  Complains of urinary retention and urgency--thinks she may have UTI.    Objective    BSA: There is no height or weight on file to calculate BSA.  There were no  vitals taken for this visit.     Physical Exam  Constitutional:       Appearance: Normal appearance.   HENT:      Head: Normocephalic.      Mouth/Throat:      Mouth: Mucous membranes are moist.      Pharynx: Oropharynx is clear.   Eyes:      Conjunctiva/sclera: Conjunctivae normal.      Pupils: Pupils are equal, round, and reactive to light.   Cardiovascular:      Rate and Rhythm: Normal rate and regular rhythm.   Pulmonary:      Effort: Pulmonary effort is normal.      Breath sounds: Normal breath sounds.   Abdominal:      General: Bowel sounds are normal.      Palpations: Abdomen is soft.   Musculoskeletal:         General: Normal range of motion.      Cervical back: Normal range of motion.   Skin:     General: Skin is warm.      Findings: Rash (posterior calves bilaterally; maculopapular; erythematous) present.   Neurological:      General: No focal deficit present.      Mental Status: She is alert and oriented to person, place, and time.   Psychiatric:         Mood and Affect: Mood normal.         Behavior: Behavior normal.       NM PET CT lymphoma staging  Status: Final result     PACS Images     Show images for NM PET CT lymphoma staging  In Basket Actions    Done  Result Mgmt     View in In Basket  Signed by    Signed Time Phone Pager   Lorenzo Miller MD 8/02/2024 12:37 [phone]     Exam Information    Status Exam Begun Exam Ended   Final 8/01/2024 10:03 8/01/2024 11:32     Study Result    Narrative & Impression   Interpreted By:  Lorenzo Miller and Bera Kaustav   STUDY:  NM PET CT LYMPHOMA STAGING;  8/1/2024 11:32 am      INDICATION:  Signs/Symptoms:follow up PET after 4th cycle of chemotherapy for  lymphoma. 54-year-old with follicular large-cell lymphoma status post  cervical lymph node resection in February 2024 and 4 cycles of  chemotherapy. Patient had history of childhood lymphoma status post  chemoradiation and splenectomy.      COMPARISON:  CT chest on 07/11/2024  PET-CT on 04/05/2024       ACCESSION NUMBER(S):  [id number]      ORDERING CLINICIAN:  IVETTE HAND      TECHNIQUE:  DIVISION OF NUCLEAR MEDICINE  POSITRON EMISSION TOMOGRAPHY (PET-CT)      The patient received an intravenous dose of 12.6 mCi of Fluorine-18  fluorodeoxyglucose (FDG).  Positron emission tomographic (PET) images  from mid thigh to skull base were then acquired after a one hour  delay. Further images of the head and neck with the arms down were  also obtained. Also acquired was a contemporaneous low dose  non-contrast CT scan performed for attenuation correction of PET  images and anatomic localization.  The PET and CT images were  digitally fused for display.  All images were acquired on a combined  PET-CT scanner unit.  Some areas of FDG accumulation may be described  in standardized uptake value (SUV) units.      CODING:  Subsequent Treatment Strategy (PS)      CALIBRATION:  Dose Injection-to-Scan Interval (mins): 56 min  Mediastinal bloodpool SUV (normal 1.5-2.5): 1.2  Blood glucose: 76 mg/dL      FINDINGS:  HEAD AND NECK:  No evidence of focal FDG avid lesion in the visualized brain  parenchyma, noting that evaluation is limited because of the expected  physiologic diffuse FDG uptake in the brain. No focal FDG avid  soft  tissue lesion is seen in the neck. Previously seen FDG avid left  supraclavicular lymph node does not demonstrate FDG avidity on this  exam. Otherwise, no FDG avid  cervical lymphadenopathy is present.      CHEST:  No focal FDG avid  lesion is seen in the lung parenchyma.  Right-greater-than-left pleural effusions, without significant FDG  avidity. Ground-glass like opacities especially within the right lung  base likely due to atelectasis versus pulmonary edema. No evidence of  FDG avid mediastinal, hilar or axillary lymphadenopathy. Prior median  sternotomy and cardiac valve replacement.      ABDOMEN AND PELVIS:  No FDG avid  soft tissue lesion is present in the abdomen and pelvis.  No evidence of  FDG avid  lymphadenopathy. Previously seen FDG avid  right inguinal lymph node does not demonstrate FDG avidity on this  exam. Physiologic radiotracer uptake is present in the liver with  excretion into the bowel loops and the genitourinary tract. Status  post splenectomy. Multiple splenules are noted, with physiologic  uptake.      MUSCULOSKELETAL:  No focal FDG avid  lesion is seen in the axial or appendicular to  suggest osseous metastasis. Interval appearance of diffusely  increased FDG uptake within the bone marrow, likely due to  chemotherapy.      IMPRESSION:  1. Previously seen hypermetabolic activity within a left lateral  supraclavicular and right inguinal lymph node has resolved (Deauville  score 2).  2. No new FDG avid disease throughout the body.  3. Diffusely increased FDG uptake within the bone marrow in the  spleen, likely due to prior chemotherapy.             Performance Status:  ECOG 1      Assessment/Plan     54-year-old female with multiple medical problems including a prior history of Hodgkin's lymphoma that was treated with multiagent systemic chemotherapy including Adriamycin and radiation therapy to the chest.  She presented in February 2020 for with new left cervical adenopathy and was found to have a grade 3B follicular lymphoma with definitive involvement of the left cervical and supraclavicular lymph nodes.  There was nonspecific uptake in a right inguinal lymph node.  She has completed 4 cycles of Bendamustine and Rituxan and does not have evidence of active lymphoma currently.  The plan was to complete a total of 6 cycles and then monitor with periodic imaging.  The rash on the back of her legs is maculopapular in nature.  Could be related to atopic dermatitis, allergic reaction, lower extremity edema.  I am not sure if it is related to the chemotherapy.  Would recommend topical steroid cream for the time being.  She will contact us if her symptoms worsen.  Otherwise, we will proceed  with her fifth cycle of Bendamustine and Rituxan therapy August 27, 2024.  The patient understood all of the information presented.  She was very happy about the results of her current PET scan.  Plan:  Topical steroid cream to lower extremity rash  Supportive care including as needed diuretics and pancreatic enzymes  If patient clinically stable and rash improves, proceed with cycle #5 Bendamustine and Rituxan therapy August 27, 2024  Reassess in 4 weeks  /C&S  Cancer Staging   No matching staging information was found for the patient.      Oncology History   Follicular lymphoma grade IIIb (Multi)   6/7/2022 Initial Diagnosis    Follicular lymphoma grade IIIb (CMS/HCC)     5/2/2024 -  Chemotherapy    Bendamustine + RiTUXimab, 28 Day Cycles                   Candida Pickering MD

## 2024-08-08 NOTE — PROGRESS NOTES
NUTRITION Follow UP NOTE    Nutrition Assessment     Reason for Visit:  Nelsy Osullivan is a 54 y.o. 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   She is status post cervical lymph node excision February 8, 2024.     She is receiving Bendamustine and Rituxan- 28 Day Cycles.     Referred to this service per screen and assessed 5/30 during infusion.  She was seen again for follow up today.    Note pt was admitted on 7/9 for Hypoglycemia and found to have:  Pneumonitis  Fluid overload leading to pulmonary edema  Severe protein calorie malnutrition with hypoalbuminemia    Note pt with a h/o Chronic pancreatitis (r/t DM) with chronic abdominal pain. Per MD, she is unable to tolerate pancreatic enzyme replacement therapy.     Followed by [facility] care and initiated on narcotic regimen.  CNP noted pt with food insecurities.  Pt was referred to [facility] and had an appointment on 6/25/24.    Pt reported bedbugs in her apartment for quite some time. Today she reports they are all gone.    PMH noted: DM I, hypothyroidism, CHF, COPD, CAD, Dyslipidemia, Major depressive disorder, UGB, malnutrition, Cholecystectomy, C-dif (5/2023)    Note pt with [facility] for coverage.    Comprehensive metabolic panel              Component  Ref Range & Units 2 wk ago  (7/25/24) 2 wk ago  (7/23/24) 3 wk ago  (7/12/24) 4 wk ago  (7/11/24) 4 wk ago  (7/10/24) 1 mo ago  (7/9/24) 1 mo ago  (6/27/24)   Glucose  74 - 99 mg/dL 122 High  66 Low  196 High  262 High  231 High  66 Low  66 Low    Sodium  136 - 145 mmol/L 137 139 131 Low  132 Low  135 Low  137 138   Potassium  3.5 - 5.3 mmol/L 3.6 3.7 4.1 4.7 3.3 Low  3.6 4.1   Chloride  98 - 107 mmol/L 103 105 98 101 105 104 105   Bicarbonate  21 - 32 mmol/L 28 30 29 28 25 28 28   Anion Gap  10 - 20 mmol/L 10 8 Low  8 Low  8 Low  8 Low  9 Low  9 Low    Urea Nitrogen  6 - 23 mg/dL 10 11 10 10 7 8 8   Creatinine  0.50 - 1.05 mg/dL 0.75 0.79 0.89 0.86 0.78 0.85 0.78   eGFR  >60  "mL/min/1.73m*2 >90 88 CM 76 CM 79 CM 89 CM 81 CM 89 CM   Comment: Calculations of estimated GFR are performed using the 2021 CKD-EPI Study Refit equation without the race variable for the IDMS-Traceable creatinine methods.  https://jasn.asnjournals.org/content/early/2021/09/22/ASN.4519891843   Calcium  8.6 - 10.3 mg/dL 8.6 8.6 8.1 Low  7.9 Low  7.1 Low  8.3 Low  8.5 Low    Albumin  3.4 - 5.0 g/dL 2.8 Low     2.4 Low  2.9 Low  2.8 Low    Alkaline Phosphatase  33 - 110 U/L 387 High     308 High  369 High  386 High    Total Protein  6.4 - 8.2 g/dL 6.0 Low     5.1 Low  6.0 Low  5.6 Low    AST  9 - 39 U/L 61 High     36 37 94 High    Bilirubin, Total  0.0 - 1.2 mg/dL 0.3    0.3 0.3 0.4   ALT  7 - 45 U/L 31    17 CM 20 CM 37 CM        Lipase              Component  Ref Range & Units 7 mo ago  (10/17/23) 1 yr ago  (5/20/23) 1 yr ago  (4/5/23) 1 yr ago  (2/2/23) 1 yr ago  (9/29/22) 2 yr ago  (4/6/22) 2 yr ago  (12/21/21)   Lipase  9 - 82 U/L 3 Low  3 Low  CM 3 Low  CM 5 Low  CM 6 Low  CM <3 Abnormal  CM <3 Abnormal  CM   Resulting Agency [facility]        Hemoglobin A1C               Component  Ref Range & Units 1 mo ago  (5/31/24) 1 yr ago  (4/4/23) 1 yr ago  (8/5/22) 2 yr ago  (4/6/22) 2 yr ago  (1/14/22) 3 yr ago  (7/20/21) 3 yr ago  (4/12/21)   Hemoglobin A1C  see below % 7.8 High  13.5 Abnormal  R, CM 10.3 Abnormal  R, CM 7.9 Abnormal  R, CM 7.7 Abnormal  R, CM 7.7 R, CM 8.5 R, CM   Estimated Average Glucose  Not Established mg/dL 177 341 R 249 R 180 R 174 R 174 R 197 R   [facility]        Anthropometrics:  Anthropometrics  Height: 158.6 cm (5' 2.44\")  Weight: 55.9 kg (123 lb 3.2 oz)  BMI (Calculated): 22.22  IBW/kg (Dietitian Calculated): 50.9 kg  Weight " Change  Weight History / % Weight Change: Gain of 3.6 kg in 2 weeks after significant loss  Significant Weight Loss: Yes (recent h/o)    Historically wt has fluctuated greatly with periods of significant losses.  Pt states this trend is normal for her.     Wt Readings    8/8/24 55.9 kg   7/25/24 52.5 kg- loss of 3.8 kg (6.7%) in 1 month- significant   6/27/24 56.3 kg   05/30/24 56.8 kg (125 lb 4.8 oz)- question validity of this wt, as it reflects a 3.7 kg gain in 6 days???   05/24/24 53.1 kg (117 lb 1 oz)- loss of 2.3 kg (4.2%) in 3 weeks- SIGNIFICANT   05/10/24 54 kg (119 lb 0.8 oz)   05/03/24 55.4 kg (122 lb 3.2 oz)- gain of 3.2 kg in 1 month   04/30/24 54.7 kg (120 lb 8 oz)   04/22/24 53.6 kg (118 lb 2.7 oz)   04/11/24 53.6 kg (118 lb 2.7 oz)   04/03/24 52.2 kg (115 lb)- loss of 4 kg (7.1%) in 2 weeks- SIGNIFICANT   03/21/24 56.2 kg (123 lb 14.4 oz)- gain of 14 kg in < 12 months   03/14/24 52.2 kg (115 lb 1.3 oz)   02/23/24 54.9 kg (121 lb)   01/26/24 54.4 kg (120 lb)   01/09/24 51.7 kg (114 lb)     12/13/23 49.8 kg (109 lb 12.8 oz)   10/17/23 45.4 kg (100 lb)   09/11/23 47.2 kg (104 lb)   08/30/23 (!) 40.8 kg (90 lb)   04/04/23 (!) 42.2 kg (93 lb)- loss of 6.3 kg (13%) in < 6 months  SIGNIFICANT   10/11/22 48.5 kg (107 lb)- gain of 4 kg in < 5 months   05/20/22 44.5 kg (98 lb)   04/06/22 44 kg (97 lb)            Food And Nutrient Intake:  Food and Nutrient History  Food and Nutrient History: States her appetite is good but she cannot always eat as much as normal.  Went to [employer] yesterday and had chicken tenders and deep fried mushrooms- she could not finish the root beer float however.  Notes she has been able to eat fruits without experiencing diarrhea.  Had 3 different fruits yesterday and had no issue.  Fluid Intake: Adequate per labs  GI Symptoms: diarrhea (Much improved)   She has not had an oily stool in 1 week- since she has been taking the Creon.  Reports she has regular stools- anywhere from 3-5 each day.  " Not necessarily having a lot of volume with each stool, however, but at least 1 each.    Reports her GI MD told her she could not eat fresh fruits and vegetables and is amazed she can tolerate them now.    Recall at previous visit pt C/o diarrhea, reporting 7-20 BM's in 24 hrs  Stated she is eating but it was coming back out.    She originally told this RDN that she has tried every pancreatic enzyme and they all make her abd pain worse. Reported that this was 20 years ago. She reported feeling nauseous just thinking about it.                                                   Food Supplement Intake  Oral Nutrition Supplements: Boost Very High Calorie (ordered for pt from [facility])  Pt reports she has heard nothing about her ONS coverage/delivery.  This RDN phoned [employer] and spoke with Rachelle who reported they were still awaiting prior auth from Medicaid.   Medication and Complementary/Alternative Medicine Use  Prescription Medication Use: She is taking her Creon      Nutrition Focused Physical Exam Findings:      Subcutaneous Fat Loss  Orbital Fat Pads: Mild-Moderate (slight dark circles and slight hollowing)  Buccal Fat Pads: Mild-Moderate (flat cheeks, minimal bounce)  Triceps: Defer  Ribs: Defer    Muscle Wasting  Temporalis: Mild-Moderate (slight depression)  Pectoralis (Clavicular Region): Defer  Deltoid/Trapezius: Defer  Interosseous: Defer  Trapezius/Infraspinatus/Supraspinatus (Scapular Region): Defer  Quadriceps: Defer  Gastrocnemius: Defer    Edema  Edema: +3 moderate  Edema Location: BLLE (Pt states she is on diuretic for CHF)         Energy Needs  Calculated Energy Needs Using Equations  Height: 158.6 cm (5' 2.44\")  Estimated Energy Needs  Total Energy Estimated Needs (kCal): 1760 kCal  Total Estimated Energy Need per Day (kCal/kg): 31 kCal/kg  Estimated Fluid Needs  Total Fluid Estimated Needs (mL): 1705 mL  Total Fluid Estimated Needs (mL/kg): 30 mL/kg  Estimated Protein Needs  Total Protein " Estimated Needs (g): 74 g  Total Protein Estimated Needs (g/kg): 1.3 g/kg        Nutrition Diagnosis   Malnutrition Diagnosis  Patient has Malnutrition Diagnosis: Yes  Diagnosis Status: Ongoing  Malnutrition Diagnosis: Moderate malnutrition related to chronic disease or condition  As Evidenced by: significant wt loss with mild to moderate losses of both adipose and muscle stores per NFPE  Additional Assessment Information: Pt likely with micronutrient deficiencies d/t chronic malabsorption, jose eduardo the fat soluble vitamins A, D, E and K    Nutrition Diagnosis  Patient has Nutrition Diagnosis: Yes  Diagnosis Status (1): Ongoing  Nutrition Diagnosis 1: Altered GI function  Related to (1): chronic pancreatitis without PERT for decades, side effects from tx  As Evidenced by (1): clinical hx and pt report  Additional Assessment Information (1): PERT seems to be effective for pt (WIll continue to monitor and educate on use as needed)  Additional Nutrition Diagnosis: Diagnosis 2  Diagnosis Status (2): Ongoing  Nutrition Diagnosis 2: Altered nutrition related to laboratory values (BS)  Related to (2): Questionable excessive administration of insulin at times and/or inappropriate timing of intake  As Evidenced by (2): BS of 66 on 7/23, 7/9 and 6/27    Nutrition Interventions/Recommendations   Nutrition Prescription  Individualized Nutrition Prescription Provided for : Low sodium, low-moderate concentrated sugar JOON    Food and Nutrition Delivery  Food and Nutrition Delivery  Meals & Snacks: Diets modified for specific foods or ingredients, Modify schedule of foods/fluids  Goals: 5-6 small frequent meals/snacks daily  Medical Food Supplement: Boost Very High Calorie  Goals: Twice daily to provide 1060 calories and 44g of protein  Vitamin Supplement Therapy: A, D, E, K  Goals: f/u at next visit    Nutrition Education  Nutrition Education  Nutrition Education Content: Content related nutrition education  Goals: Proper use of PERT  for effective digestion of macronutrients    Coordination of Care       There are no Patient Instructions on file for this visit.    Nutrition Monitoring and Evaluation   Food/Nutrient Related History Monitoring  Monitoring and Evaluation Plan: Fluid intake, Amount of food, Meal/snack pattern  Fluid Intake: Estimated fluid intake  Criteria: Meet daily hydration needs  Amount of Food: Estimated amout of food  Criteria: Meet energy and protein needs  Meal/Snack Pattern: Estimated meal and snack pattern  Criteria: as above  Body Composition/Growth/Weight History  Monitoring and Evaluation Plan: Weight  Weight: Weight change  Criteria: Maintenance  Biochemical Data, Medical Tests and Procedures  Monitoring and Evaluation Plan: Electrolyte/renal panel, Glucose/endocrine profile, Vitamin profile  Criteria: WNL  Glucose/Endocrine Profile: Glucose, casual, Hemoglobin A1c (HgbA1c)  Criteria: < 250; < 7.0 respectively  Nutrition Focused Physical Findings  Monitoring and Evaluation Plan: Adipose, Digestive System, Muscles  Adipose: Loss of subcutaneous fat  Criteria: No further loss  Digestive System: Diarrhea  Criteria: Compliance with PERT as prescribed  Muscles: Muscle atrophy  Criteria: No further loss          Time Spent  Prep time on day of patient encounter: 5 minutes  Time spent directly with patient, family or caregiver: 25 minutes  Additional Time Spent on Patient Care Activities: 5 minutes  Documentation Time: 20 minutes  Other Time Spent: 0 minutes  Total: 55 minutes

## 2024-08-08 NOTE — TELEPHONE ENCOUNTER
Patient called  (CASIMIRO).  She wanted SW to know that she know longer has bed bugs.  SW let her know that we will still bring her back to a room and assess her situation.  SW discussed that this will be done to protect everyone.  SW discussed that we then see how everything is going in her home closer to her next treatment.  Patient was in agreement.  Patient stated that she had another grandchild born. SW congratulated her.   SW will continue to follow patient.    Louis Velazquez MSW, LSW

## 2024-08-08 NOTE — PATIENT INSTRUCTIONS
Office visit with dr. Pickering    Next treatment C5 due on 8/27 April complaining of UTI symptoms. UA c&s obtained    Follow up with dr. Pickering in 4 weeks

## 2024-08-09 ENCOUNTER — HOME CARE VISIT (OUTPATIENT)
Dept: HOME HEALTH SERVICES | Facility: HOME HEALTH | Age: 54
End: 2024-08-09
Payer: COMMERCIAL

## 2024-08-09 LAB — HOLD SPECIMEN: NORMAL

## 2024-08-09 NOTE — PROGRESS NOTES
SUPPORTIVE AND PALLIATIVE ONCOLOGY FOLLOW UP - OUTPATIENT      SERVICE DATE: 8/30/2024    Referred by:  Dr. 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  She is newly diagnosed and is planned to get bendamustine and rituxan every 28 days. She had been following with Jonathan Worrell with [facility] palliative care, but has since requested to switch to palliative care closer to where she lives. She was recently hospitalized for CHF exacerbation    Information was collected from chart review, discussion with patient/family, and discussion with care team     SUBJECTIVE:  Patient states that she is having a hard time with the morphine. She states that she feel like it is making her too sleepy and is not really controlling her pain. She states that her pain is about the same and remains in her pelvis and abdomen. She states that she continues to have rectal pain and has been using lidocaine in her rectum which has been helping. We discussed switching off of the morphing and on to a different long acting. Discussed the different long actings and she elected to go with long acting dilaudid. Discussed the possibility of methadone in the future should her Qtc recheck be at an acceptable level.         Pain Assessment:  Pain Score:   9  Location: Abdomen  Education:        Symptom Assessment:  ROS otherwise negative, pertinent positives documented in the HPI       Information obtained from: chart review and interview of patient  ______________________________________________________________________     Oncology History   Follicular lymphoma grade IIIb (Multi)    6/7/2022 Initial Diagnosis    Follicular lymphoma grade IIIb (CMS/HCC)     5/2/2024 -  Chemotherapy    Bendamustine + RiTUXimab, 28 Day Cycles         No past medical history on file.  Past Surgical History:   Procedure Laterality Date    OTHER SURGICAL HISTORY  04/14/2020    Cholecystectomy    OTHER SURGICAL HISTORY  04/14/2020    Exploratory laparoscopy    OTHER SURGICAL HISTORY  04/14/2020    Heart surgery     No family history on file.     SOCIAL HISTORY  Children 4 children and 5 grandchildren    Social History:  reports that she quit smoking about 2 years ago. Her smoking use included cigarettes. She has been exposed to tobacco smoke. She has never used smokeless tobacco. She reports that she does not currently use alcohol. She reports that she does not use drugs.    She has food insecurities and has bed bugs   REVIEW OF SYSTEMS  Review of systems negative unless noted in HPI.       Objective     Palliative Performance Scale % (PPS) 90     Labs:  Results for orders placed or performed in visit on 08/27/24 (from the past 96 hour(s))   CBC and Auto Differential   Result Value Ref Range    WBC 2.8 (L) 4.4 - 11.3 x10*3/uL    nRBC      RBC 3.45 (L) 4.00 - 5.20 x10*6/uL    Hemoglobin 10.2 (L) 12.0 - 16.0 g/dL    Hematocrit 30.4 (L) 36.0 - 46.0 %    MCV 88 80 - 100 fL    MCH 29.6 26.0 - 34.0 pg    MCHC 33.6 32.0 - 36.0 g/dL    RDW 19.3 (H) 11.5 - 14.5 %    Platelets 146 (L) 150 - 450 x10*3/uL    Neutrophils % 27.8 40.0 - 80.0 %    Immature Granulocytes %, Automated 6.5 (H) 0.0 - 0.9 %    Lymphocytes % 19.1 13.0 - 44.0 %    Monocytes % 26.4 2.0 - 10.0 %    Eosinophils % 18.4 0.0 - 6.0 %    Basophils % 1.8 0.0 - 2.0 %    Neutrophils Absolute 0.77 (L) 1.20 - 7.70 x10*3/uL    Immature Granulocytes Absolute, Automated 0.18 0.00 - 0.70 x10*3/uL    Lymphocytes Absolute 0.53 (L) 1.20 - 4.80 x10*3/uL    Monocytes Absolute 0.73 0.10 - 1.00 x10*3/uL    Eosinophils Absolute 0.51 0.00 - 0.70 x10*3/uL    Basophils Absolute 0.05  0.00 - 0.10 x10*3/uL   Comprehensive metabolic panel   Result Value Ref Range    Glucose 241 (H) 74 - 99 mg/dL    Sodium 133 (L) 136 - 145 mmol/L    Potassium 4.0 3.5 - 5.3 mmol/L    Chloride 98 98 - 107 mmol/L    Bicarbonate 31 21 - 32 mmol/L    Anion Gap 8 (L) 10 - 20 mmol/L    Urea Nitrogen 14 6 - 23 mg/dL    Creatinine 0.80 0.50 - 1.05 mg/dL    eGFR 87 >60 mL/min/1.73m*2    Calcium 8.8 8.6 - 10.3 mg/dL    Albumin 3.1 (L) 3.4 - 5.0 g/dL    Alkaline Phosphatase 358 (H) 33 - 110 U/L    Total Protein 6.1 (L) 6.4 - 8.2 g/dL    AST 42 (H) 9 - 39 U/L    Bilirubin, Total 0.4 0.0 - 1.2 mg/dL    ALT 20 7 - 45 U/L   Lactate dehydrogenase   Result Value Ref Range     (H) 84 - 246 U/L   Uric Acid   Result Value Ref Range    Uric Acid 6.3 2.3 - 6.7 mg/dL   Morphology   Result Value Ref Range    RBC Morphology See Below     Hypochromia Mild     Target Cells Few     Ovalocytes Few     Teardrop Cells Few                  Medications:   Current Outpatient Medications   Medication Instructions    albuterol 90 mcg/actuation inhaler 2 puffs, inhalation, Every 6 hours    aspirin 81 mg, oral, Daily    atorvastatin (LIPITOR) 40 mg, oral, Daily    BD Alcohol Swabs pads, medicated USE SIX TIMES DAILY    belladonna alkaloids-opium (B&O Supprettes) 16.2-60 mg suppository 60 mg of opioid, rectal, Every 8 hours PRN    bisacodyl (DULCOLAX (BISACODYL)) 5 mg, oral, Daily PRN    blood-glucose sensor (FreeStyle Ashley 3 Sensor) device 1 each, miscellaneous, Continuous, Use to check glucose, change every 14 days    carvedilol (COREG) 12.5 mg, oral, Every 12 hours    cetirizine (ZYRTEC) 10 mg, oral, Daily    cyclobenzaprine (FLEXERIL) 10 mg, oral, 3 times daily PRN    DULoxetine (CYMBALTA) 60 mg, oral, Daily    DULoxetine (CYMBALTA) 20 mg, oral, Daily    esomeprazole (NEXIUM) 40 mg, oral, Daily PRN    estradiol (ESTRACE) 2 mg, oral, Daily    FreeStyle Ashley 3 [place] misc Use as instructed    hydrocortisone 0.5 % cream Topical, 2 times daily     HYDROmorphone (Dilaudid) 2 mg tablet Take 2 tablets (4 mg) by mouth every 4 hours if needed for severe pain (7 - 10) or moderate pain (4 - 6). G89.3 Do not fill before August 2, 2024.    HYDROmorphone 24 mg, oral, Daily    hyoscyamine (LEVSIN/SL) 0.125 mg, sublingual, Every 4 hours PRN    insulin aspart (NovoLOG) 100 unit/mL injection FOR USE WITH INSULIN PUMP MAX DAILY DOSE  UNITS    levothyroxine (Synthroid, Levoxyl) 125 mcg tablet TAKE 1 TABLET BY MOUTH IN THE MORNING ON EMPTY STOMACH    lidocaine-prilocaine (Emla) 2.5-2.5 % cream APPLY AT INSERTION SITE 30-45 MINUTES BEFORE YOU ARRIVE FOR BLOOD WORK OR CHEMOTHERAPY    melatonin 3 mg tablet 2 tablets, oral, Nightly    metoprolol succinate XL (TOPROL-XL) 50 mg, oral, Daily    mirtazapine (REMERON) 7.5 mg, oral, Nightly    morphine CR (MS CONTIN) 60 mg, oral, 2 times daily, Do not crush, chew, or split. Cancer related pain G89.3    naloxone (Narcan) 4 mg/0.1 mL nasal spray Use 1 spray in one nostril as needed for overdose. May repeat every 2 to 3 min in alternating nostrils until medical assistance is available    naratriptan (AMERGE) 2.5 mg, oral, Once as needed    OLANZapine (ZyPREXA) 10 mg tablet oral, 2 times daily PRN    omeprazole (PRILOSEC) 40 mg, oral, Daily    Omnipod Dash Pods, Gen 4, cartridge     ondansetron (ZOFRAN) 8 mg, oral, Every 8 hours PRN    OXcarbazepine (Trileptal) 150 mg tablet 1 tablet, oral, Every 12 hours scheduled (0630,1830)    pancrelipase, Lip-Prot-Amyl, (Creon) 36,000-114,000- 180,000 unit capsule,delayed release(DR/EC) capsule 6 capsules, oral, 2 times daily    phenazopyridine (Pyridium) 200 mg tablet 1 tablet, oral, 3 times daily    prochlorperazine (COMPAZINE) 10 mg, oral, Every 6 hours PRN    promethazine (PHENERGAN) 25 mg, oral, Every 6 hours PRN    sennosides-docusate sodium (Senna-S) 8.6-50 mg tablet 1 tablet, oral, 2 times daily    spironolactone (Aldactone) 25 mg tablet oral, Daily RT    torsemide (Demadex) 20 mg  tablet Take 1 tablet (20 mg) by mouth 2 times a day for 3 days, THEN 1 tablet (20 mg) once daily.       Allergies:   Allergies   Allergen Reactions    Acetaminophen-Codeine Unknown    Adhesive Unknown    Codeine Unknown    Fentanyl Unknown     Reaction from Community: Other(See Desc) Comments: MIGRAINES IF USED FOR PAIN, BUT OK FOR ANESTHESIA    Other reaction(s): Intolerance    Fetrin Unknown    Ketorolac Itching    Meperidine Unknown     Reaction from Community: Other(See Desc)    Metformin Unknown    Propoxyphene N-Acetaminophen GI Upset    Silver Hives and Other     Tegaderm    Adhesive Tape-Silicones Rash    Iodinated Contrast Media Rash     Does well with 13hour premedication    Latex Rash    Penicillins Rash    Wound Dressings Rash     tagaderm       PHYSICAL EXAMINATION  Vital Signs:   Vital signs reviewed  Vitals:    08/30/24 1336   BP: 114/69   Pulse: 96   Resp: 16   Temp: 36 °C (96.8 °F)   SpO2: 92%             Pain Score:   9         Physical Exam  Constitutional:       Appearance: Normal appearance. She is normal weight.   HENT:      Head: Normocephalic and atraumatic.      Mouth/Throat:      Mouth: Mucous membranes are dry.   Eyes:      Extraocular Movements: Extraocular movements intact.   Cardiovascular:      Comments: No signs of cardiac distress  Pulmonary:      Effort: Pulmonary effort is normal.      Comments: No signs of respiratory distress  Abdominal:      General: Abdomen is flat.      Palpations: Abdomen is soft.   Musculoskeletal:         General: Normal range of motion.   Skin:     General: Skin is warm and dry.   Neurological:      Mental Status: She is alert and oriented to person, place, and time. Mental status is at baseline.   Psychiatric:         Mood and Affect: Mood normal.         Behavior: Behavior normal.         Thought Content: Thought content normal.         Judgment: Judgment normal.         ASSESSMENT/PLAN    Pain  Pain is: cancer related pain and chronic  Type:  visceral  Pain control: sub-optimally controlled  Previously tried/intolerances: Did not find oxycodone effective, avoid Tylenol due to liver impairment      Pain Plan:  Stop MS continue 60 mg Q12 due to feeling to sleepy   Start long acting dilaudid 24mg daily   Recheck Qtc last Qtc was >500  Continue Dilaudid 4mg Q4 PRN   Continue Cymbalta 80mg daily   Continue Flexeril 10mg TID PRN   Stop Levsin rectally 0.125mg Q6 PRN (not using)  Continue Lidocaine rectally     Opioid Use  Medication Management:   OARRS report reviewed with no aberrant behavior; consistent with  prescriptions/records and patient history  .  Overdose Risk Score 380.   This has been discussed with patient.   We will continue to closely monitor the patient for signs of prescription misuse including UDS, OARRS review and subjective reports at each visit.  No concurrent benzodiazepine use   I am a provider who is certified in Hospice and Palliative Medicine and have conducted a face-face visit and examination for this patient.  Routine Urine Drug Screen is needed and was completed on 5/10/24 appropriately positive for opioids and negative for illicit substances  Controlled Substance Agreement: is needed. Completed on: 5/10/24  Specifically discussed that controlled substance prescriptions will only be provided by our group as outlined in the completed agreement  Naloxone is needed  Red Flags: None at this time, but UDS pending      Nausea   Intermittent nausea without vomiting related to chemotherapy  Nausea Plan:  Zyprexa 10mg BID PRN   Compazine 10mg PRN     Diarrhea   Related to chemotherapy  Diarrhea Plan:  Imodium prn      Altered Mood  Chronic anxiety and depression related to health concerns   Mood Plan:  Continue Remeron 7.5mg nightly   Continue Cymbalta 60mg daily       Decreased appetite  Related to malignancy and chemotherapy  Anorexia Plan:   Zyprexa 10mg BID PRN     Itching related to bedbug infestation   Continue Atarax 25mg  TIRACHEL PRN        Advance Directives  Existence of Advance Directives: Reports that she has completed them   Decision maker: OSMEL is reportedly her sister   Code Status: Full code    Next Follow-Up Visit:  Return to clinic in 2 weeks     Signature and billing  Thank you for allowing us to participate in the care of this patient. Recommendations will be communicated back to the consulting service by way of shared electronic medical record or face-to-face.    Medical complexity was high level due to due to complexity of problems, extensive data review, and high risk of management/treatment.  Time was spent on the following: Prep Time, Time Directly with Patient/Family/Caregiver, Documentation Time. Total time spent: 45      DATA   Diagnostic tests and information reviewed for today's visit:  Most recent labs, Most recent imaging, Medications           SIGNATURE: RALPH Eugene-CNS    Contact information:  [facility] Pall[facility]  Monday-Friday 8 AM-5 PM  Phone:  [phone], press option #5, then option #1.   Or Epic Secure Chat

## 2024-08-10 LAB — BACTERIA UR CULT: NORMAL

## 2024-08-13 ENCOUNTER — APPOINTMENT (OUTPATIENT)
Dept: ENDOCRINOLOGY | Facility: CLINIC | Age: 54
End: 2024-08-13
Payer: COMMERCIAL

## 2024-08-13 VITALS
WEIGHT: 121 LBS | HEART RATE: 104 BPM | BODY MASS INDEX: 20.66 KG/M2 | SYSTOLIC BLOOD PRESSURE: 102 MMHG | HEIGHT: 64 IN | DIASTOLIC BLOOD PRESSURE: 62 MMHG

## 2024-08-13 DIAGNOSIS — E10.9 TYPE 1 DIABETES MELLITUS WITHOUT COMPLICATION (MULTI): Primary | ICD-10-CM

## 2024-08-13 DIAGNOSIS — Z46.81 INSULIN PUMP TITRATION: ICD-10-CM

## 2024-08-13 LAB — POC HEMOGLOBIN A1C: 6.7 % (ref 4.2–6.5)

## 2024-08-13 PROCEDURE — 3061F NEG MICROALBUMINURIA REV: CPT | Performed by: INTERNAL MEDICINE

## 2024-08-13 PROCEDURE — 3048F LDL-C <100 MG/DL: CPT | Performed by: INTERNAL MEDICINE

## 2024-08-13 PROCEDURE — 83036 HEMOGLOBIN GLYCOSYLATED A1C: CPT | Performed by: INTERNAL MEDICINE

## 2024-08-13 PROCEDURE — 3008F BODY MASS INDEX DOCD: CPT | Performed by: INTERNAL MEDICINE

## 2024-08-13 PROCEDURE — 3051F HG A1C>EQUAL 7.0%<8.0%: CPT | Performed by: INTERNAL MEDICINE

## 2024-08-13 PROCEDURE — 95251 CONT GLUC MNTR ANALYSIS I&R: CPT | Performed by: INTERNAL MEDICINE

## 2024-08-13 PROCEDURE — 3074F SYST BP LT 130 MM HG: CPT | Performed by: INTERNAL MEDICINE

## 2024-08-13 PROCEDURE — 99214 OFFICE O/P EST MOD 30 MIN: CPT | Performed by: INTERNAL MEDICINE

## 2024-08-13 PROCEDURE — 3078F DIAST BP <80 MM HG: CPT | Performed by: INTERNAL MEDICINE

## 2024-08-13 NOTE — PROGRESS NOTES
"Subjective   April RACHEL Osullivan is a 54 y.o. female who presents for a follow-up evaluation of Type 1 diabetes mellitus. The initial diagnosis of diabetes was made in 2006 .  Diabetes was diagnosed after having recurrent bouts of pancreatitis.    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 She was found to have a third occurrence of lymphoma.  She is stage II. She has completed 4 out of 6 cycles of chemotherapy.  She has marked hyperglycemia for two days as she gets a glucocorticoid.      Known complications due to diabetes included CAD s/p MI in 2018    Cardiovascular risk factors include diabetes mellitus. The patient is not on an ACE inhibitor or angiotensin II receptor blocker.  The patient has not been previously hospitalized due to diabetic ketoacidosis.     Current symptoms/problems include none. Her clinical course has been stable.     The patient is currently checking the blood glucose multiple times per day.    Patient is using: continuous glucose monitor                                                                                                  Hypoglycemia frequency: 10% (increased from 5%)  Hypoglycemia awareness: Yes       Review of Systems   Constitutional:  Positive for unexpected weight change (Weight gain).   All other systems reviewed and are negative.    Objective   /62 (BP Location: Left arm, Patient Position: Sitting, BP Cuff Size: Adult)   Pulse 104   Ht 1.626 m (5' 4\")   Wt 54.9 kg (121 lb)   BMI 20.77 kg/m²   Physical Exam  Vitals and nursing note reviewed.   Constitutional:       General: She is not in acute distress.     Appearance: Normal appearance. She is normal weight.   HENT:      Head: Normocephalic and atraumatic.      Nose: Nose normal.      Mouth/Throat:      Mouth: Mucous membranes are moist.   Eyes:      Extraocular Movements: Extraocular movements intact.   Pulmonary:      Effort: Pulmonary effort is normal.   Musculoskeletal:         " General: Normal range of motion.   Neurological:      Mental Status: She is alert and oriented to person, place, and time.   Psychiatric:         Mood and Affect: Mood normal.         Lab Review  Lab Results   Component Value Date    HGBA1C 6.7 (A) 08/13/2024     Lab Results   Component Value Date    GLUCOSE 122 (H) 07/25/2024    CALCIUM 8.6 07/25/2024     07/25/2024    K 3.6 07/25/2024    CO2 28 07/25/2024     07/25/2024    BUN 10 07/25/2024    CREATININE 0.75 07/25/2024     Lab Results   Component Value Date    CHOL 87 05/31/2024    CHOL 123 04/04/2023    CHOL 84 08/05/2022     Lab Results   Component Value Date    HDL 45.0 05/31/2024    HDL 66.0 04/04/2023    HDL 44.7 08/05/2022     Lab Results   Component Value Date    LDLCALC 31 05/31/2024     Lab Results   Component Value Date    TRIG 56 05/31/2024    TRIG 100 04/04/2023    TRIG 71 08/05/2022         Health Maintenance:   Foot Exam: July 2021  Eye Exam:  Urine Albumin: July 11, 2024     CGM Interpretation/Plan   14 day CGM download was reviewed in detail as documented above and will be attached to chart.  A minimum of 72 hours of glucose data was used to inform the management plan outlined below.  67% of values is within target range, which is an improvement from 55%.  The amount of hypoglycemia has increased compared to her last download despite a recent decrease in basal rate.  Hypoglycemia can occur at all time points.      Assessment/Plan   54-year-old female presents for follow up for type 3c diabetes mellitus. Her blood pressure is at goal today.    Type 1 diabetes mellitus without complication (Multi)  To use fixed carbs - 60g for average meal, 30 grams to small meal and 10g with coffee  To decrease basal rate to 0.35 units/hr   The A1C is at goal    PUMP SETTINGS FOR WHEN GETTING CHEMO  Menu (3 horizontal lines at the top left)  Temp basal rate presents  Steroids  Activate (twice)  Renew after 12 hours      Insulin pump titration  To change  pump sites every three days    Follow up in 3-4 months

## 2024-08-13 NOTE — PATIENT INSTRUCTIONS
Thank you for choosing [facility]  for your health care needs.  If you have any questions, concerns or medical needs, please feel free to contact our office at [phone].    Please ensure you complete your blood work one week before the next scheduled appointment.    To use fixed carbs - 60g for average meal, 30 grams to small meal and 10g with coffee  To decrease basal rate to 0.35 units/hr     PUMP SETTINGS FOR THURSDAY AND FRIDAY WHEN GETTING CHEMO  Menu (3 horizontal lines at the top left)  Temp basal rate presents  Steroids  Activate (twice)  Renew after 12 hours    Follow up in 3-4 months

## 2024-08-14 ASSESSMENT — ENCOUNTER SYMPTOMS: UNEXPECTED WEIGHT CHANGE: 1

## 2024-08-14 NOTE — ASSESSMENT & PLAN NOTE
To use fixed carbs - 60g for average meal, 30 grams to small meal and 10g with coffee  To decrease basal rate to 0.35 units/hr   The A1C is at goal    PUMP SETTINGS FOR WHEN GETTING CHEMO  Menu (3 horizontal lines at the top left)  Temp basal rate presents  Steroids  Activate (twice)  Renew after 12 hours

## 2024-08-17 ENCOUNTER — HOME CARE VISIT (OUTPATIENT)
Dept: HOME HEALTH SERVICES | Facility: HOME HEALTH | Age: 54
End: 2024-08-17
Payer: COMMERCIAL

## 2024-08-17 ASSESSMENT — ACTIVITIES OF DAILY LIVING (ADL)
HOME_HEALTH_OASIS: 00
OASIS_M1830: 01

## 2024-08-19 ENCOUNTER — TELEPHONE (OUTPATIENT)
Dept: ENDOCRINOLOGY | Facility: CLINIC | Age: 54
End: 2024-08-19

## 2024-08-19 NOTE — TELEPHONE ENCOUNTER
(Last seen 8/13/2024)  (Next appt 11/13/2024)      Patient state at last appointment pump settings were changed, and now alarm keeps going off, patient is asking if settings can be changed to stop the frequent alarming.     (Gave patient Dimple with Omnipod number)

## 2024-08-22 ENCOUNTER — APPOINTMENT (OUTPATIENT)
Dept: HEMATOLOGY/ONCOLOGY | Facility: CLINIC | Age: 54
End: 2024-08-22
Payer: COMMERCIAL

## 2024-08-22 ENCOUNTER — TELEPHONE (OUTPATIENT)
Dept: HEMATOLOGY/ONCOLOGY | Facility: CLINIC | Age: 54
End: 2024-08-22
Payer: COMMERCIAL

## 2024-08-22 NOTE — TELEPHONE ENCOUNTER
(SW) received a call from patient.  Patient wanted to let SW know that she has not seen bed bugs now for the past 3 weeks.  Patient stated that her arms and body are cleared up.  SW let patient know that we will still bring her back to a room and assess her situation. SW will provide update to the .      Louis Velazquez MSW, LSW

## 2024-08-23 ENCOUNTER — APPOINTMENT (OUTPATIENT)
Dept: HEMATOLOGY/ONCOLOGY | Facility: CLINIC | Age: 54
End: 2024-08-23
Payer: COMMERCIAL

## 2024-08-26 ENCOUNTER — APPOINTMENT (OUTPATIENT)
Dept: HEMATOLOGY/ONCOLOGY | Facility: CLINIC | Age: 54
End: 2024-08-26
Payer: COMMERCIAL

## 2024-08-27 ENCOUNTER — INFUSION (OUTPATIENT)
Dept: HEMATOLOGY/ONCOLOGY | Facility: CLINIC | Age: 54
End: 2024-08-27
Payer: COMMERCIAL

## 2024-08-27 ENCOUNTER — SOCIAL WORK (OUTPATIENT)
Dept: HEMATOLOGY/ONCOLOGY | Facility: CLINIC | Age: 54
End: 2024-08-27

## 2024-08-27 ENCOUNTER — TELEPHONE (OUTPATIENT)
Dept: HEMATOLOGY/ONCOLOGY | Facility: CLINIC | Age: 54
End: 2024-08-27
Payer: COMMERCIAL

## 2024-08-27 VITALS
HEIGHT: 62 IN | HEART RATE: 93 BPM | BODY MASS INDEX: 21.68 KG/M2 | SYSTOLIC BLOOD PRESSURE: 117 MMHG | OXYGEN SATURATION: 100 % | RESPIRATION RATE: 16 BRPM | WEIGHT: 117.8 LBS | DIASTOLIC BLOOD PRESSURE: 72 MMHG | TEMPERATURE: 97.2 F

## 2024-08-27 DIAGNOSIS — C82.41 GRADE 3B FOLLICULAR LYMPHOMA OF LYMPH NODES OF NECK (MULTI): ICD-10-CM

## 2024-08-27 LAB
ALBUMIN SERPL BCP-MCNC: 3.1 G/DL (ref 3.4–5)
ALP SERPL-CCNC: 358 U/L (ref 33–110)
ALT SERPL W P-5'-P-CCNC: 20 U/L (ref 7–45)
ANION GAP SERPL CALC-SCNC: 8 MMOL/L (ref 10–20)
AST SERPL W P-5'-P-CCNC: 42 U/L (ref 9–39)
BASOPHILS # BLD AUTO: 0.05 X10*3/UL (ref 0–0.1)
BASOPHILS NFR BLD AUTO: 1.8 %
BILIRUB SERPL-MCNC: 0.4 MG/DL (ref 0–1.2)
BUN SERPL-MCNC: 14 MG/DL (ref 6–23)
CALCIUM SERPL-MCNC: 8.8 MG/DL (ref 8.6–10.3)
CHLORIDE SERPL-SCNC: 98 MMOL/L (ref 98–107)
CO2 SERPL-SCNC: 31 MMOL/L (ref 21–32)
CREAT SERPL-MCNC: 0.8 MG/DL (ref 0.5–1.05)
DACRYOCYTES BLD QL SMEAR: NORMAL
EGFRCR SERPLBLD CKD-EPI 2021: 87 ML/MIN/1.73M*2
EOSINOPHIL # BLD AUTO: 0.51 X10*3/UL (ref 0–0.7)
EOSINOPHIL NFR BLD AUTO: 18.4 %
ERYTHROCYTE [DISTWIDTH] IN BLOOD BY AUTOMATED COUNT: 19.3 % (ref 11.5–14.5)
GLUCOSE SERPL-MCNC: 241 MG/DL (ref 74–99)
HCT VFR BLD AUTO: 30.4 % (ref 36–46)
HGB BLD-MCNC: 10.2 G/DL (ref 12–16)
HYPOCHROMIA BLD QL SMEAR: NORMAL
IMM GRANULOCYTES # BLD AUTO: 0.18 X10*3/UL (ref 0–0.7)
IMM GRANULOCYTES NFR BLD AUTO: 6.5 % (ref 0–0.9)
LDH SERPL L TO P-CCNC: 273 U/L (ref 84–246)
LYMPHOCYTES # BLD AUTO: 0.53 X10*3/UL (ref 1.2–4.8)
LYMPHOCYTES NFR BLD AUTO: 19.1 %
MCH RBC QN AUTO: 29.6 PG (ref 26–34)
MCHC RBC AUTO-ENTMCNC: 33.6 G/DL (ref 32–36)
MCV RBC AUTO: 88 FL (ref 80–100)
MONOCYTES # BLD AUTO: 0.73 X10*3/UL (ref 0.1–1)
MONOCYTES NFR BLD AUTO: 26.4 %
NEUTROPHILS # BLD AUTO: 0.77 X10*3/UL (ref 1.2–7.7)
NEUTROPHILS NFR BLD AUTO: 27.8 %
NRBC BLD-RTO: ABNORMAL /100{WBCS}
OVALOCYTES BLD QL SMEAR: NORMAL
PLATELET # BLD AUTO: 146 X10*3/UL (ref 150–450)
POTASSIUM SERPL-SCNC: 4 MMOL/L (ref 3.5–5.3)
PROT SERPL-MCNC: 6.1 G/DL (ref 6.4–8.2)
RBC # BLD AUTO: 3.45 X10*6/UL (ref 4–5.2)
RBC MORPH BLD: NORMAL
SODIUM SERPL-SCNC: 133 MMOL/L (ref 136–145)
TARGETS BLD QL SMEAR: NORMAL
URATE SERPL-MCNC: 6.3 MG/DL (ref 2.3–6.7)
WBC # BLD AUTO: 2.8 X10*3/UL (ref 4.4–11.3)

## 2024-08-27 PROCEDURE — 84550 ASSAY OF BLOOD/URIC ACID: CPT

## 2024-08-27 PROCEDURE — 84075 ASSAY ALKALINE PHOSPHATASE: CPT

## 2024-08-27 PROCEDURE — 83615 LACTATE (LD) (LDH) ENZYME: CPT

## 2024-08-27 PROCEDURE — 85025 COMPLETE CBC W/AUTO DIFF WBC: CPT

## 2024-08-27 PROCEDURE — 36591 DRAW BLOOD OFF VENOUS DEVICE: CPT

## 2024-08-27 RX ORDER — HEPARIN SODIUM,PORCINE/PF 10 UNIT/ML
50 SYRINGE (ML) INTRAVENOUS AS NEEDED
OUTPATIENT
Start: 2024-08-27

## 2024-08-27 RX ORDER — HEPARIN 100 UNIT/ML
500 SYRINGE INTRAVENOUS AS NEEDED
Status: DISCONTINUED | OUTPATIENT
Start: 2024-08-27 | End: 2024-08-27 | Stop reason: HOSPADM

## 2024-08-27 RX ORDER — HEPARIN 100 UNIT/ML
500 SYRINGE INTRAVENOUS AS NEEDED
OUTPATIENT
Start: 2024-08-27

## 2024-08-27 ASSESSMENT — PAIN SCALES - GENERAL: PAINLEVEL: 10-WORST PAIN EVER

## 2024-08-27 NOTE — PROGRESS NOTES
Pt here for C5 rituxin/td infusion. ANC was 0.77 per Dr Pickering will hold for one week. Pt rescheduled to return next week. She is aware of plan of care, will call with further questions or concerns.

## 2024-08-27 NOTE — PROGRESS NOTES
(SW) met with patient today to assess needs and offer support.  Patient was A&Ox3 with appropriate and congruent mood and affect. Patient stated that she was very tired.  Patient stated that she stayed up watching a movie.  During this conversation patient found out that her labs were off and may not be able to have her treatment.  Patient stated that she was feeling better about the bed bugs being gone.  Patient's arms were cleared up.  Patient stated that she can now have people over again.  SW provided support and active listening.  SW asked patient if she was willing to complete a survey with the [employer] [facility].  Patient was in agreement.  Patient reported no SW needs.  SW will continue to follow patient.    Louis Velazquez MSW, LSW

## 2024-08-27 NOTE — TELEPHONE ENCOUNTER
Dr. Pickering notified of ANC 0.77,  protocol is 1.0  Orders received to hold treatment today, reschedule for 9/5 and 9/6  Then she will return on 9/9 for Neulasta anytime

## 2024-08-28 ENCOUNTER — APPOINTMENT (OUTPATIENT)
Dept: HEMATOLOGY/ONCOLOGY | Facility: CLINIC | Age: 54
End: 2024-08-28
Payer: COMMERCIAL

## 2024-08-29 ENCOUNTER — APPOINTMENT (OUTPATIENT)
Dept: HEMATOLOGY/ONCOLOGY | Facility: CLINIC | Age: 54
End: 2024-08-29
Payer: COMMERCIAL

## 2024-08-30 ENCOUNTER — PHARMACY VISIT (OUTPATIENT)
Dept: PHARMACY | Facility: CLINIC | Age: 54
End: 2024-08-30
Payer: MEDICAID

## 2024-08-30 ENCOUNTER — OFFICE VISIT (OUTPATIENT)
Dept: PALLIATIVE MEDICINE | Facility: CLINIC | Age: 54
End: 2024-08-30
Payer: COMMERCIAL

## 2024-08-30 VITALS
WEIGHT: 122.69 LBS | DIASTOLIC BLOOD PRESSURE: 69 MMHG | HEART RATE: 96 BPM | OXYGEN SATURATION: 92 % | BODY MASS INDEX: 22.58 KG/M2 | RESPIRATION RATE: 16 BRPM | HEIGHT: 62 IN | TEMPERATURE: 96.8 F | SYSTOLIC BLOOD PRESSURE: 114 MMHG

## 2024-08-30 DIAGNOSIS — G89.3 NEOPLASM RELATED PAIN: Primary | ICD-10-CM

## 2024-08-30 PROCEDURE — 99215 OFFICE O/P EST HI 40 MIN: CPT | Performed by: CLINICAL NURSE SPECIALIST

## 2024-08-30 PROCEDURE — RXMED WILLOW AMBULATORY MEDICATION CHARGE

## 2024-08-30 RX ORDER — HYDROMORPHONE HYDROCHLORIDE 2 MG/1
2 TABLET ORAL EVERY 4 HOURS PRN
Qty: 260 TABLET | Refills: 0 | Status: CANCELLED | OUTPATIENT
Start: 2024-08-30

## 2024-08-30 RX ORDER — MORPHINE SULFATE 60 MG/1
60 TABLET, FILM COATED, EXTENDED RELEASE ORAL 2 TIMES DAILY
Qty: 60 TABLET | Refills: 0 | Status: CANCELLED | OUTPATIENT
Start: 2024-08-30

## 2024-08-30 RX ORDER — HYDROMORPHONE HYDROCHLORIDE 2 MG/1
4 TABLET ORAL EVERY 4 HOURS PRN
Qty: 360 TABLET | Refills: 0 | Status: SHIPPED | OUTPATIENT
Start: 2024-08-30 | End: 2024-08-31 | Stop reason: SDUPTHER

## 2024-08-30 RX ORDER — HYDROMORPHONE HYDROCHLORIDE 12 MG/1
24 TABLET, EXTENDED RELEASE ORAL DAILY
Qty: 14 TABLET | Refills: 0 | Status: SHIPPED | OUTPATIENT
Start: 2024-08-30 | End: 2024-09-13

## 2024-08-30 RX ORDER — CYCLOBENZAPRINE HCL 10 MG
10 TABLET ORAL 3 TIMES DAILY PRN
Qty: 60 TABLET | Refills: 2 | Status: CANCELLED | OUTPATIENT
Start: 2024-08-30

## 2024-08-30 RX ORDER — HYDROMORPHONE HYDROCHLORIDE 2 MG/1
4 TABLET ORAL EVERY 4 HOURS PRN
Qty: 360 TABLET | Refills: 0 | Status: CANCELLED | OUTPATIENT
Start: 2024-08-30 | End: 2024-09-29

## 2024-08-30 ASSESSMENT — PAIN SCALES - GENERAL: PAINLEVEL: 9

## 2024-08-30 NOTE — PROGRESS NOTES
SUPPORTIVE AND PALLIATIVE ONCOLOGY FOLLOW UP - OUTPATIENT      SERVICE DATE: 9/13/2024    Referred by:  Dr. 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  She is newly diagnosed and is planned to get bendamustine and rituxan every 28 days. She had been following with Jonathan Worrell with [facility] palliative care, but has since requested to switch to palliative care closer to where she lives. She was recently hospitalized for CHF exacerbation    Information was collected from chart review, discussion with patient/family, and discussion with care team     SUBJECTIVE:  Patient reports that her pain is not changed with the change to the long acting dilaudid. She states that she has not noticed any difference. She states that she has now developed low back pain which is newer for her. She states that she does feel that her pain medications need to be adjusted. We discussed increasing her long acting dilaudid and she is agreeable with this.     Reports that she is bummed that her chemotherapy keeps getting pushed back due to labs as she was hoping to be done end of October, but now is looking to be done end of nov/dec. She otherwise reports that she is feeling well and denies any other symptoms.         Pain Assessment:  Pain Score:   9  Location: Back  Education:        Symptom Assessment:  ROS otherwise negative, pertinent positives documented in the HPI       Information obtained from: chart review and interview of patient  ______________________________________________________________________     Oncology History   Follicular lymphoma grade IIIb (Multi)    6/7/2022 Initial Diagnosis    Follicular lymphoma grade IIIb (CMS/HCC)     5/2/2024 -  Chemotherapy    Bendamustine + RiTUXimab, 28 Day Cycles         No past medical history on file.  Past Surgical History:   Procedure Laterality Date    OTHER SURGICAL HISTORY  04/14/2020    Cholecystectomy    OTHER SURGICAL HISTORY  04/14/2020    Exploratory laparoscopy    OTHER SURGICAL HISTORY  04/14/2020    Heart surgery     No family history on file.     SOCIAL HISTORY  Children 4 children and 5 grandchildren    Social History:  reports that she quit smoking about 2 years ago. Her smoking use included cigarettes. She has been exposed to tobacco smoke. She has never used smokeless tobacco. She reports that she does not currently use alcohol. She reports that she does not use drugs.    She has food insecurities and has bed bugs   REVIEW OF SYSTEMS  Review of systems negative unless noted in HPI.       Objective     Palliative Performance Scale % (PPS) 90     Labs:  Results for orders placed or performed in visit on 09/10/24 (from the past 96 hour(s))   CBC and Auto Differential   Result Value Ref Range    WBC 26.3 (H) 4.4 - 11.3 x10*3/uL    nRBC      RBC 3.39 (L) 4.00 - 5.20 x10*6/uL    Hemoglobin 10.1 (L) 12.0 - 16.0 g/dL    Hematocrit 30.1 (L) 36.0 - 46.0 %    MCV 89 80 - 100 fL    MCH 29.8 26.0 - 34.0 pg    MCHC 33.6 32.0 - 36.0 g/dL    RDW 19.1 (H) 11.5 - 14.5 %    Platelets 133 (L) 150 - 450 x10*3/uL    Immature Granulocytes %, Automated 9.7 (H) 0.0 - 0.9 %    Immature Granulocytes Absolute, Automated 2.56 (H) 0.00 - 0.70 x10*3/uL   Comprehensive Metabolic Panel   Result Value Ref Range    Glucose 268 (H) 74 - 99 mg/dL    Sodium 131 (L) 136 - 145 mmol/L    Potassium 4.2 3.5 - 5.3 mmol/L    Chloride 101 98 - 107 mmol/L    Bicarbonate 25 21 - 32 mmol/L    Anion Gap 9 (L) 10 - 20 mmol/L    Urea Nitrogen 9 6 - 23 mg/dL    Creatinine 0.76 0.50 - 1.05 mg/dL    eGFR >90 >60 mL/min/1.73m*2    Calcium 8.6 8.6 - 10.3 mg/dL     Albumin 3.1 (L) 3.4 - 5.0 g/dL    Alkaline Phosphatase 319 (H) 33 - 110 U/L    Total Protein 6.0 (L) 6.4 - 8.2 g/dL    AST 31 9 - 39 U/L    Bilirubin, Total 0.3 0.0 - 1.2 mg/dL    ALT 13 7 - 45 U/L   Lactate Dehydrogenase   Result Value Ref Range     (H) 84 - 246 U/L   Uric acid   Result Value Ref Range    Uric Acid 6.8 (H) 2.3 - 6.7 mg/dL   Manual Differential   Result Value Ref Range    Neutrophils %, Manual 67.0 40.0 - 80.0 %    Bands %, Manual 7.0 0.0 - 5.0 %    Lymphocytes %, Manual 12.0 13.0 - 44.0 %    Monocytes %, Manual 6.0 2.0 - 10.0 %    Eosinophils %, Manual 1.0 0.0 - 6.0 %    Basophils %, Manual 0.0 0.0 - 2.0 %    Metamyelocytes %, Manual 6.0 0.0 - 0.0 %    Myelocytes %, Manual 1.0 0.0 - 0.0 %    Seg Neutrophils Absolute, Manual 17.62 (H) 1.20 - 7.00 x10*3/uL    Bands Absolute, Manual 1.84 (H) 0.00 - 0.70 x10*3/uL    Lymphocytes Absolute, Manual 3.16 1.20 - 4.80 x10*3/uL    Monocytes Absolute, Manual 1.58 (H) 0.10 - 1.00 x10*3/uL    Eosinophils Absolute, Manual 0.26 0.00 - 0.70 x10*3/uL    Basophils Absolute, Manual 0.00 0.00 - 0.10 x10*3/uL    Metamyelocytes Absolute, Manual 1.58 0.00 - 0.00 x10*3/uL    Myelocytes Absolute, Manual 0.26 0.00 - 0.00 x10*3/uL    Total Cells Counted 100     Neutrophils Absolute, Manual 19.46 (H) 1.20 - 7.70 x10*3/uL    RBC Morphology See Below     Dohle Bodies Present     Toxic Granulation Present     Vacuolated Neutrophils Present                  Medications:   Current Outpatient Medications   Medication Instructions    albuterol 90 mcg/actuation inhaler 2 puffs, inhalation, Every 6 hours    aspirin 81 mg, oral, Daily    atorvastatin (LIPITOR) 40 mg, oral, Daily    BD Alcohol Swabs pads, medicated USE SIX TIMES DAILY    bisacodyl (DULCOLAX (BISACODYL)) 5 mg, oral, Daily PRN    blood-glucose sensor (FreeStyle Ashley 3 Sensor) device 1 each, miscellaneous, Continuous, Use to check glucose, change every 14 days    blood-glucose sensor (FreeStyle Ashley 3 Sensor) device  1 each, miscellaneous, Continuous, Use to check glucose, change every 15 days *FreeStyle Ashley 3 plus    carvedilol (COREG) 12.5 mg, oral, Every 12 hours    cetirizine (ZYRTEC) 10 mg, oral, Daily    cyclobenzaprine (FLEXERIL) 10 mg, oral, 3 times daily PRN    DULoxetine (CYMBALTA) 60 mg, oral, Daily    DULoxetine (CYMBALTA) 20 mg, oral, Daily    esomeprazole (NEXIUM) 40 mg, oral, Daily PRN    estradiol (ESTRACE) 2 mg, oral, Daily    FreeStyle Ashley 3 [place] misc Use as instructed    hydrocortisone 0.5 % cream Topical, 2 times daily    [START ON 9/27/2024] HYDROmorphone (DILAUDID) 4 mg, oral, Every 4 hours PRN    HYDROmorphone ER (EXALGO) 32 mg, oral, Daily    HYDROmorphone 36 mg, oral, Daily    insulin aspart (NovoLOG) 100 unit/mL injection FOR USE WITH INSULIN PUMP MAX DAILY DOSE  UNITS    levothyroxine (Synthroid, Levoxyl) 125 mcg tablet TAKE 1 TABLET BY MOUTH IN THE MORNING ON EMPTY STOMACH    lidocaine-prilocaine (Emla) 2.5-2.5 % cream APPLY AT INSERTION SITE 30-45 MINUTES BEFORE YOU ARRIVE FOR BLOOD WORK OR CHEMOTHERAPY    melatonin 3 mg tablet 2 tablets, oral, Nightly    metoprolol succinate XL (TOPROL-XL) 50 mg, oral, Daily    mirtazapine (REMERON) 7.5 mg, oral, Nightly    naloxone (Narcan) 4 mg/0.1 mL nasal spray Use 1 spray in one nostril as needed for overdose. May repeat every 2 to 3 min in alternating nostrils until medical assistance is available    naratriptan (AMERGE) 2.5 mg, oral, Once as needed    OLANZapine (ZyPREXA) 10 mg tablet oral, 2 times daily PRN    omeprazole (PRILOSEC) 40 mg, oral, Daily    Omnipod Dash Pods, Gen 4, cartridge     ondansetron (ZOFRAN) 8 mg, oral, Every 8 hours PRN    OXcarbazepine (Trileptal) 150 mg tablet 1 tablet, oral, Every 12 hours scheduled (0630,1830)    pancrelipase, Lip-Prot-Amyl, (Creon) 36,000-114,000- 180,000 unit capsule,delayed release(DR/EC) capsule 6 capsules, oral, 2 times daily    phenazopyridine (Pyridium) 200 mg tablet 1 tablet, oral, 3 times daily     prochlorperazine (COMPAZINE) 10 mg, oral, Every 6 hours PRN    promethazine (PHENERGAN) 25 mg, oral, Every 6 hours PRN    sennosides-docusate sodium (Senna-S) 8.6-50 mg tablet 1 tablet, oral, 2 times daily    spironolactone (Aldactone) 25 mg tablet oral, Daily RT    torsemide (DEMADEX) 20 mg, oral, Daily       Allergies:   Allergies   Allergen Reactions    Acetaminophen-Codeine Unknown    Adhesive Unknown    Codeine Unknown    Fentanyl Unknown     Reaction from Community: Other(See Desc) Comments: MIGRAINES IF USED FOR PAIN, BUT OK FOR ANESTHESIA    Other reaction(s): Intolerance    Fetrin Unknown    Ketorolac Itching    Meperidine Unknown     Reaction from Community: Other(See Desc)    Metformin Unknown    Propoxyphene N-Acetaminophen GI Upset    Silver Hives and Other     Tegaderm    Adhesive Tape-Silicones Rash    Iodinated Contrast Media Rash     Does well with 13hour premedication    Latex Rash    Penicillins Rash    Wound Dressings Rash     tagaderm       PHYSICAL EXAMINATION  Vital Signs:   Vital signs reviewed  Vitals:    09/13/24 1108   BP: 133/80   Pulse: 108   Resp: 16   Temp: 36.4 °C (97.5 °F)   SpO2: 100%               Pain Score:   9         Physical Exam  Constitutional:       Appearance: Normal appearance. She is normal weight.   HENT:      Head: Normocephalic and atraumatic.      Mouth/Throat:      Mouth: Mucous membranes are dry.   Eyes:      Extraocular Movements: Extraocular movements intact.   Cardiovascular:      Comments: No signs of cardiac distress  Pulmonary:      Effort: Pulmonary effort is normal.      Comments: No signs of respiratory distress  Abdominal:      General: Abdomen is flat.      Palpations: Abdomen is soft.   Musculoskeletal:         General: Normal range of motion.   Skin:     General: Skin is warm and dry.   Neurological:      Mental Status: She is alert and oriented to person, place, and time. Mental status is at baseline.   Psychiatric:         Mood and Affect: Mood  normal.         Behavior: Behavior normal.         Thought Content: Thought content normal.         Judgment: Judgment normal.         ASSESSMENT/PLAN    Pain  Pain is: cancer related pain and chronic  Type: visceral  Pain control: sub-optimally controlled  Previously tried/intolerances: Did not find oxycodone effective, avoid Tylenol due to liver impairment , Morphine made her too sleepy     Pain Plan:  Continue long acting dilaudid 36mg daily   Recheck Qtc last Qtc was >500  Continue Dilaudid 4mg Q4 PRN   Continue Cymbalta 80mg daily   Continue Flexeril 10mg TID PRN   Continue Lidocaine rectally     Opioid Use  Medication Management:   OARRS report reviewed with no aberrant behavior; consistent with  prescriptions/records and patient history  .  Overdose Risk Score 380.   This has been discussed with patient.   We will continue to closely monitor the patient for signs of prescription misuse including UDS, OARRS review and subjective reports at each visit.  No concurrent benzodiazepine use   I am a provider who is certified in Hospice and Palliative Medicine and have conducted a face-face visit and examination for this patient.  Routine Urine Drug Screen is needed and was completed on 5/10/24 appropriately positive for opioids and negative for illicit substances  Controlled Substance Agreement: is needed. Completed on: 5/10/24  Specifically discussed that controlled substance prescriptions will only be provided by our group as outlined in the completed agreement  Naloxone is needed  Red Flags: None at this time, but UDS pending      Nausea   Intermittent nausea without vomiting related to chemotherapy  Nausea Plan:  Zyprexa 10mg BID PRN   Compazine 10mg PRN     Diarrhea   Related to chemotherapy  Diarrhea Plan:  Imodium prn      Altered Mood  Chronic anxiety and depression related to health concerns   Mood Plan:  Continue Remeron 7.5mg nightly   Continue Cymbalta 60mg daily       Decreased  appetite  Related to malignancy and chemotherapy  Anorexia Plan:   Zyprexa 10mg BID PRN     Itching related to bedbug infestation   Continue Atarax 25mg TID PRN    Lower extremity edema  Renewed her prescription for torsemide and instructed to follow up with her cardiologist     Advance Directives  Existence of Advance Directives: Reports that she has completed them   Decision maker: OSMEL is reportedly her sister   Code Status: Full code    Next Follow-Up Visit:  Return to clinic in 4 weeks     Signature and billing  Thank you for allowing us to participate in the care of this patient. Recommendations will be communicated back to the consulting service by way of shared electronic medical record or face-to-face.    Medical complexity was high level due to due to complexity of problems, extensive data review, and high risk of management/treatment.  Time was spent on the following: Prep Time, Time Directly with Patient/Family/Caregiver, Documentation Time. Total time spent: 45      DATA   Diagnostic tests and information reviewed for today's visit:  Most recent labs, Most recent imaging, Medications           SIGNATURE: RALPH Eugene-CNS    Contact information:  Supportive and Palliative Oncology  Monday-Friday 8 AM-5 PM  Phone:  [phone], press option #5, then option #1.   Or Epic Secure Chat

## 2024-08-31 DIAGNOSIS — G89.3 NEOPLASM RELATED PAIN: ICD-10-CM

## 2024-08-31 RX ORDER — HYDROMORPHONE HYDROCHLORIDE 2 MG/1
4 TABLET ORAL EVERY 4 HOURS PRN
Qty: 256 TABLET | Refills: 0 | Status: SHIPPED | OUTPATIENT
Start: 2024-08-31 | End: 2024-09-30

## 2024-09-03 ENCOUNTER — PHARMACY VISIT (OUTPATIENT)
Dept: PHARMACY | Facility: CLINIC | Age: 54
End: 2024-09-03

## 2024-09-05 ENCOUNTER — INFUSION (OUTPATIENT)
Dept: HEMATOLOGY/ONCOLOGY | Facility: CLINIC | Age: 54
End: 2024-09-05
Payer: COMMERCIAL

## 2024-09-05 ENCOUNTER — NUTRITION (OUTPATIENT)
Dept: HEMATOLOGY/ONCOLOGY | Facility: CLINIC | Age: 54
End: 2024-09-05
Payer: COMMERCIAL

## 2024-09-05 ENCOUNTER — OFFICE VISIT (OUTPATIENT)
Dept: HEMATOLOGY/ONCOLOGY | Facility: CLINIC | Age: 54
End: 2024-09-05
Payer: COMMERCIAL

## 2024-09-05 VITALS
TEMPERATURE: 97.9 F | WEIGHT: 121.6 LBS | RESPIRATION RATE: 16 BRPM | DIASTOLIC BLOOD PRESSURE: 70 MMHG | HEIGHT: 62 IN | SYSTOLIC BLOOD PRESSURE: 120 MMHG | BODY MASS INDEX: 22.38 KG/M2 | HEART RATE: 88 BPM | OXYGEN SATURATION: 98 %

## 2024-09-05 VITALS — BODY MASS INDEX: 22.38 KG/M2 | WEIGHT: 121.6 LBS | HEIGHT: 62 IN

## 2024-09-05 DIAGNOSIS — G89.3 NEOPLASM RELATED PAIN: ICD-10-CM

## 2024-09-05 DIAGNOSIS — T45.1X5A CHEMOTHERAPY-INDUCED NEUTROPENIA (CMS-HCC): ICD-10-CM

## 2024-09-05 DIAGNOSIS — D70.1 CHEMOTHERAPY-INDUCED NEUTROPENIA (CMS-HCC): ICD-10-CM

## 2024-09-05 DIAGNOSIS — C82.41 GRADE 3B FOLLICULAR LYMPHOMA OF LYMPH NODES OF NECK (MULTI): ICD-10-CM

## 2024-09-05 DIAGNOSIS — T45.1X5A CHEMOTHERAPY-INDUCED NEUTROPENIA (CMS-HCC): Primary | ICD-10-CM

## 2024-09-05 DIAGNOSIS — L30.9 DERMATITIS: ICD-10-CM

## 2024-09-05 DIAGNOSIS — D70.1 CHEMOTHERAPY-INDUCED NEUTROPENIA (CMS-HCC): Primary | ICD-10-CM

## 2024-09-05 LAB
ALBUMIN SERPL BCP-MCNC: 3.2 G/DL (ref 3.4–5)
ALP SERPL-CCNC: 312 U/L (ref 33–110)
ALT SERPL W P-5'-P-CCNC: 13 U/L (ref 7–45)
ANION GAP SERPL CALC-SCNC: 9 MMOL/L (ref 10–20)
AST SERPL W P-5'-P-CCNC: 23 U/L (ref 9–39)
BASOPHILS # BLD AUTO: 0.03 X10*3/UL (ref 0–0.1)
BASOPHILS NFR BLD AUTO: 1.2 %
BILIRUB SERPL-MCNC: 0.3 MG/DL (ref 0–1.2)
BUN SERPL-MCNC: 9 MG/DL (ref 6–23)
CALCIUM SERPL-MCNC: 9.2 MG/DL (ref 8.6–10.3)
CHLORIDE SERPL-SCNC: 102 MMOL/L (ref 98–107)
CO2 SERPL-SCNC: 26 MMOL/L (ref 21–32)
CREAT SERPL-MCNC: 0.67 MG/DL (ref 0.5–1.05)
EGFRCR SERPLBLD CKD-EPI 2021: >90 ML/MIN/1.73M*2
EOSINOPHIL # BLD AUTO: 0.38 X10*3/UL (ref 0–0.7)
EOSINOPHIL NFR BLD AUTO: 15.1 %
ERYTHROCYTE [DISTWIDTH] IN BLOOD BY AUTOMATED COUNT: 18.9 % (ref 11.5–14.5)
GLUCOSE SERPL-MCNC: 237 MG/DL (ref 74–99)
HCT VFR BLD AUTO: 31.1 % (ref 36–46)
HGB BLD-MCNC: 10.7 G/DL (ref 12–16)
HYPOCHROMIA BLD QL SMEAR: NORMAL
IMM GRANULOCYTES # BLD AUTO: 0.13 X10*3/UL (ref 0–0.7)
IMM GRANULOCYTES NFR BLD AUTO: 5.2 % (ref 0–0.9)
LDH SERPL L TO P-CCNC: 291 U/L (ref 84–246)
LYMPHOCYTES # BLD AUTO: 0.75 X10*3/UL (ref 1.2–4.8)
LYMPHOCYTES NFR BLD AUTO: 29.9 %
MCH RBC QN AUTO: 30.1 PG (ref 26–34)
MCHC RBC AUTO-ENTMCNC: 34.4 G/DL (ref 32–36)
MCV RBC AUTO: 87 FL (ref 80–100)
MONOCYTES # BLD AUTO: 0.66 X10*3/UL (ref 0.1–1)
MONOCYTES NFR BLD AUTO: 26.3 %
NEUTROPHILS # BLD AUTO: 0.56 X10*3/UL (ref 1.2–7.7)
NEUTROPHILS NFR BLD AUTO: 22.3 %
NRBC BLD-RTO: ABNORMAL /100{WBCS}
OVALOCYTES BLD QL SMEAR: NORMAL
PLATELET # BLD AUTO: 205 X10*3/UL (ref 150–450)
POTASSIUM SERPL-SCNC: 4 MMOL/L (ref 3.5–5.3)
PROT SERPL-MCNC: 6.2 G/DL (ref 6.4–8.2)
RBC # BLD AUTO: 3.56 X10*6/UL (ref 4–5.2)
RBC MORPH BLD: NORMAL
SODIUM SERPL-SCNC: 133 MMOL/L (ref 136–145)
TARGETS BLD QL SMEAR: NORMAL
URATE SERPL-MCNC: 5.5 MG/DL (ref 2.3–6.7)
WBC # BLD AUTO: 2.5 X10*3/UL (ref 4.4–11.3)

## 2024-09-05 PROCEDURE — 85025 COMPLETE CBC W/AUTO DIFF WBC: CPT | Performed by: INTERNAL MEDICINE

## 2024-09-05 PROCEDURE — 96372 THER/PROPH/DIAG INJ SC/IM: CPT

## 2024-09-05 PROCEDURE — 84550 ASSAY OF BLOOD/URIC ACID: CPT | Performed by: INTERNAL MEDICINE

## 2024-09-05 PROCEDURE — 99214 OFFICE O/P EST MOD 30 MIN: CPT | Performed by: INTERNAL MEDICINE

## 2024-09-05 PROCEDURE — 2500000004 HC RX 250 GENERAL PHARMACY W/ HCPCS (ALT 636 FOR OP/ED): Mod: JZ,SE | Performed by: INTERNAL MEDICINE

## 2024-09-05 PROCEDURE — 3061F NEG MICROALBUMINURIA REV: CPT | Performed by: INTERNAL MEDICINE

## 2024-09-05 PROCEDURE — 3048F LDL-C <100 MG/DL: CPT | Performed by: INTERNAL MEDICINE

## 2024-09-05 PROCEDURE — 83615 LACTATE (LD) (LDH) ENZYME: CPT | Performed by: INTERNAL MEDICINE

## 2024-09-05 PROCEDURE — 3051F HG A1C>EQUAL 7.0%<8.0%: CPT | Performed by: INTERNAL MEDICINE

## 2024-09-05 PROCEDURE — 80053 COMPREHEN METABOLIC PANEL: CPT | Performed by: INTERNAL MEDICINE

## 2024-09-05 RX ORDER — HEPARIN SODIUM,PORCINE/PF 10 UNIT/ML
50 SYRINGE (ML) INTRAVENOUS AS NEEDED
OUTPATIENT
Start: 2024-09-05

## 2024-09-05 RX ORDER — HEPARIN 100 UNIT/ML
500 SYRINGE INTRAVENOUS AS NEEDED
Status: DISCONTINUED | OUTPATIENT
Start: 2024-09-05 | End: 2024-09-05 | Stop reason: HOSPADM

## 2024-09-05 RX ORDER — HEPARIN 100 UNIT/ML
500 SYRINGE INTRAVENOUS AS NEEDED
OUTPATIENT
Start: 2024-09-05

## 2024-09-05 RX ORDER — HYDROMORPHONE HYDROCHLORIDE 12 MG/1
24 TABLET, EXTENDED RELEASE ORAL DAILY
Qty: 14 TABLET | Refills: 0 | Status: SHIPPED | OUTPATIENT
Start: 2024-09-05 | End: 2024-09-19

## 2024-09-05 ASSESSMENT — PAIN SCALES - GENERAL: PAINLEVEL: 0-NO PAIN

## 2024-09-05 NOTE — PROGRESS NOTES
Patient ID: Nelsy Osullivan is a 54 y.o. female.    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  FISH is negative for Bcl-2, BCL6 and C-MYC.  PET/CT 4/4/24 showed malignant uptake in a left supraclavicular LN only.  Baseline LDH was elevated at 238 but the patient has chronically elevated LDH. She started BR chemotherapy 5/2/24.  PET/CT after 4 cycles was negative.  She has a prior history of lymphoma (question Hodgkin's versus non-Hodgkin's) at age 16 with relapse at age 19.  When she was 16, she was treated with radiation therapy alone.  When she relapsed at age 19, she had disease on both sides of the diaphragm.  Her spleen was removed.  She was treated with several cycles of chemotherapy including Adriamycin and mustard gas underwent a second course of radiation therapy to her neck and chest.  She was treated by Dr. Carbajal and Dr. Acosta at that time.  Mitral valve and aortic valve stenosis secondary to prior radiation therapy.  She underwent animal aortic valve and mitral valve replacements in 2019.  Chronic pancreatitis (question etiology).  She resumed pancreatic enzyme replacement therapy August 2024  18 mm right interpolar thyroid nodule with February 19, 2024 pathology showing benign follicular cells.  DM    The patient presents today for follow-up.  Her fifth cycle of Bendamustine and Rituxan chemotherapy has been delayed to this week due to an ANC of 770 last week despite neulasta.  She did not have any fevers.  Her LE rash has resolved and her LE edema has improved.  As noted above, she did start pancreatic enzyme replacement therapy about a month ago and so far seems to be tolerating it well.  She is going to start drinking boost on the days when her appetite is low following chemotherapy.   She was evaluated by palliative care on August 30 and was started on long-acting Dilaudid.  The patient has been consistently  taking the medication for the past 3 days and does not feel that it has improved her pain in any significant way.  She is supposed to come in for an EKG early next week for consideration of methadone therapy.  Objective    BSA: There is no height or weight on file to calculate BSA.  There were no vitals taken for this visit.     Physical Exam  Constitutional:       Appearance: Normal appearance.   HENT:      Head: Normocephalic.      Nose: Nose normal.      Mouth/Throat:      Mouth: Mucous membranes are moist.      Pharynx: Oropharynx is clear.   Eyes:      Conjunctiva/sclera: Conjunctivae normal.      Pupils: Pupils are equal, round, and reactive to light.   Cardiovascular:      Rate and Rhythm: Normal rate and regular rhythm.   Pulmonary:      Effort: Pulmonary effort is normal.   Abdominal:      General: Bowel sounds are normal.      Palpations: Abdomen is soft.   Musculoskeletal:         General: Normal range of motion.      Cervical back: Normal range of motion.   Skin:     General: Skin is warm and dry.   Neurological:      General: No focal deficit present.      Mental Status: She is alert and oriented to person, place, and time.   Psychiatric:         Mood and Affect: Mood normal.         Thought Content: Thought content normal.       Component  Ref Range & Units 9 d ago  (8/27/24) 1 mo ago  (7/25/24) 1 mo ago  (7/23/24) 1 mo ago  (7/12/24) 1 mo ago  (7/11/24) 1 mo ago  (7/10/24) 1 mo ago  (7/9/24)   Glucose  74 - 99 mg/dL 241 High  122 High  66 Low  196 High  262 High  231 High  66 Low    Sodium  136 - 145 mmol/L 133 Low  137 139 131 Low  132 Low  135 Low  137   Potassium  3.5 - 5.3 mmol/L 4.0 3.6 3.7 4.1 4.7 3.3 Low  3.6   Chloride  98 - 107 mmol/L 98 103 105 98 101 105 104   Bicarbonate  21 - 32 mmol/L 31 28 30 29 28 25 28   Anion Gap  10 - 20 mmol/L 8 Low  10 8 Low  8 Low  8 Low  8 Low  9 Low    Urea Nitrogen  6 - 23 mg/dL 14 10 11 10 10 7 8   Creatinine  0.50 - 1.05 mg/dL 0.80 0.75 0.79 0.89 0.86 0.78  0.85   eGFR  >60 mL/min/1.73m*2 87 >90 CM 88 CM 76 CM 79 CM 89 CM 81 CM   Comment: Calculations of estimated GFR are performed using the 2021 CKD-EPI Study Refit equation without the race variable for the IDMS-Traceable creatinine methods.  https://jasn.asnjournals.org/content/early/2021/09/22/ASN.4922622176   Calcium  8.6 - 10.3 mg/dL 8.8 8.6 8.6 8.1 Low  7.9 Low  7.1 Low  8.3 Low    Albumin  3.4 - 5.0 g/dL 3.1 Low  2.8 Low     2.4 Low  2.9 Low    Alkaline Phosphatase  33 - 110 U/L 358 High  387 High     308 High  369 High    Total Protein  6.4 - 8.2 g/dL 6.1 Low  6.0 Low     5.1 Low  6.0 Low    AST  9 - 39 U/L 42 High  61 High     36 37   Bilirubin, Total  0.0 - 1.2 mg/dL 0.4 0.3    0.3 0.3   ALT  7 - 45 U/L 20 31 CM          Component  Ref Range & Units 9 d ago  (8/27/24) 1 mo ago  (7/25/24) 1 mo ago  (7/23/24) 1 mo ago  (7/12/24) 1 mo ago  (7/11/24) 1 mo ago  (7/10/24) 1 mo ago  (7/9/24)   WBC  4.4 - 11.3 x10*3/uL 2.8 Low  5.3 5.0 8.8 12.5 High  14.6 High  22.4 High    nRBC   0.0 R 0.0 R 0.0 R 0.0 R 0.0 R   Comment: Not Measured   RBC  4.00 - 5.20 x10*6/uL 3.45 Low  3.73 Low  3.41 Low  3.65 Low  3.74 Low  3.62 Low  3.93 Low    Hemoglobin  12.0 - 16.0 g/dL 10.2 Low  10.9 Low  10.1 Low  10.5 Low  10.8 Low  10.6 Low  11.4 Low    Hematocrit  36.0 - 46.0 % 30.4 Low  31.2 Low  28.5 Low  30.1 Low  31.0 Low  29.6 Low  33.0 Low    MCV  80 - 100 fL 88 84 84 83 83 82 84   MCH  26.0 - 34.0 pg 29.6 29.2 29.6 28.8 28.9 29.3 29.0   MCHC  32.0 - 36.0 g/dL 33.6 34.9 35.4 34.9 34.8 35.8 34.5   RDW  11.5 - 14.5 % 19.3 High  18.4 High  19.2 High  18.8 High  19.1 High  18.9 High  18.9 High    Platelets  150 - 450 x10*3/uL 146 Low  205 142 Low  202 207 196 229   Neutrophils %  40.0 - 80.0 % 27.8 40.9 41.3   78.6 84.6   Immature Granulocytes %, Automated  0.0 - 0.9 % 6.5 High  0.6 CM 0.6 CM   3.0 High  CM 2.8 High  CM   Comment: Immature Granulocyte Count (IG) includes promyelocytes, myelocytes and metamyelocytes but does not include  bands. Percent differential counts (%) should be interpreted in the context of the absolute cell counts (cells/UL).   Lymphocytes %  13.0 - 44.0 % 19.1 18.8 18.3   2.9 2.1   Monocytes %  2.0 - 10.0 % 26.4 19.0 20.5   7.8 5.8   Eosinophils %  0.0 - 6.0 % 18.4 20.3 18.3   7.1 4.6   Basophils %  0.0 - 2.0 % 1.8 0.4 1.0   0.6 0.1   Neutrophils Absolute  1.20 - 7.70 x10*3/uL 0.77 Low  2.15 CM 2.06 CM   11.46 High  CM 18.97 High  CM   Comment: Percent differential counts (%) should be interpreted in the context of the absolute cell counts (cells/uL).   Immature Granulocytes Absolute, Automated  0.00 - 0.70 x10*3/uL 0.18 0.03 0.03   0.44 0.62   Lymphocytes Absolute  1.20 - 4.80 x10*3/uL 0.53 Low  0.99 Low  0.91 Low    0.42 Low  0.47 Low    Monocytes Absolute  0.10 - 1.00 x10*3/uL 0.73 1.00 1.02 High    1.14 High  1.30 High    Eosinophils Absolute  0.00 - 0.70 x10*3/uL 0.51 1.07 High  0.91 High    1.04 High  1.04 High    Basophils Absolute  0.00 - 0.10 x10*3/uL 0.05 0.02        UCX 8/8/24:  negative  Performance Status:    ECOG 1    Assessment/Plan   54-year-old female with multiple medical problems including a prior history of Hodgkin's lymphoma that was treated with multiagent systemic chemotherapy including Adriamycin and radiation therapy to the chest. She presented in February 2020 for with new left cervical adenopathy and was found to have a grade 3B follicular lymphoma with definitive involvement of the left cervical and supraclavicular lymph nodes. There was nonspecific uptake in a right inguinal lymph node.  She is currently receiving Bendamustine and rituximab chemotherapy which overall, she is tolerating reasonably well.  Her PET scan after 4 cycles of chemotherapy was negative for residual disease.  Hopefully, she will be able to receive her chemotherapy today.  Planning on completing a total of 6 cycles.  If she remains on schedule, her sixth and final cycle of chemotherapy will be administered in early  October.  I will see her back after that sixth and final cycle and we will plan on repeating a CT scan of the neck chest abdomen and pelvis in November 2024.  The patient understood and was agreeable.  Plan:   Proceed with cycle 5 BR today if counts acceptable.  Continue neulasta support  Reassess after 6th cycle BR and schedule follow up CT's November 2024  Follow up with palliative care for consideration of methadone.      Cancer Staging   No matching staging information was found for the patient.      Oncology History   Follicular lymphoma grade IIIb (Multi)   6/7/2022 Initial Diagnosis    Follicular lymphoma grade IIIb (CMS/HCC)     5/2/2024 -  Chemotherapy    Bendamustine + RiTUXimab, 28 Day Cycles                   Candida Pickering MD

## 2024-09-05 NOTE — PATIENT INSTRUCTIONS
Office evaluation before C5  ANC remains low, unable to proceed with cycle #5  April will receive 1 time dose of Udenyca today and re-evaluate next week    Follow up already scheduled for 10/3 with dr. tipton

## 2024-09-05 NOTE — PROGRESS NOTES
NUTRITION Rescreen NOTE    Nutrition Assessment     Reason for Visit:  Nelsy Osullivan is a 54 y.o. 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   She is status post cervical lymph node excision February 8, 2024.     She is receiving Bendamustine and Rituxan- 28 Day Cycles.   Tx held last cycle and today again for low BC's.    Referred to this service per screen and assessed 5/30 during infusion.  She was to be seen again for follow up today but was phoned.  I am compliant with HIPAA regulations.    Note pt was admitted on 7/9 for Hypoglycemia and found to have:  Pneumonitis  Fluid overload leading to pulmonary edema  Severe protein calorie malnutrition with hypoalbuminemia    Note pt with a h/o Chronic pancreatitis (r/t DM) with chronic abdominal pain. Per MD, she was unable to tolerate pancreatic enzyme replacement therapy.     Followed by pal care and initiated on narcotic regimen.  CNP noted pt with food insecurities.  Pt was referred to FFL and had an appointment on 6/25/24.    Pt reported bedbugs in her apartment for quite some time.  At last visit, she reported they are all gone.    PMH noted: DM I, hypothyroidism, CHF, COPD, CAD, Dyslipidemia, Major depressive disorder, UGB, malnutrition, Cholecystectomy, C-dif (5/2023)    Note pt with [facility] for coverage.  She was ordered Boost VHC (Strawberry) twice daily via [facility].  This RDN phoned DME on 8/8 and they were still awaiting prior authorization.     Comprehensive metabolic panel              Component  Ref Range & Units 09:01  (9/5/24) 9 d ago  (8/27/24) 1 mo ago  (7/25/24) 1 mo ago  (7/23/24) 1 mo ago  (7/12/24) 1 mo ago  (7/11/24) 1 mo ago  (7/10/24)   Glucose  74 - 99 mg/dL 237 High  241 High  122 High  66 Low  196 High  262 High  231 High    Sodium  136 - 145 mmol/L 133 Low  133 Low  137 139 131 Low  132 Low  135 Low    Potassium  3.5 - 5.3 mmol/L 4.0 4.0 3.6 3.7 4.1 4.7 3.3 Low    Chloride  98  - 107 mmol/L 102 98 103 105 98 101 105   Bicarbonate  21 - 32 mmol/L 26 31 28 30 29 28 25   Anion Gap  10 - 20 mmol/L 9 Low  8 Low  10 8 Low  8 Low  8 Low  8 Low    Urea Nitrogen  6 - 23 mg/dL 9 14 10 11 10 10 7   Creatinine  0.50 - 1.05 mg/dL 0.67 0.80 0.75 0.79 0.89 0.86 0.78   eGFR  >60 mL/min/1.73m*2 >90 87 CM >90 CM 88 CM 76 CM 79 CM 89 CM   Comment: Calculations of estimated GFR are performed using the 2021 CKD-EPI Study Refit equation without the race variable for the IDMS-Traceable creatinine methods.  https://jasn.asnjournals.org/content/early/2021/09/22/ASN.6141207279   Calcium  8.6 - 10.3 mg/dL 9.2 8.8 8.6 8.6 8.1 Low  7.9 Low  7.1 Low    Albumin  3.4 - 5.0 g/dL 3.2 Low  3.1 Low  2.8 Low     2.4 Low    Alkaline Phosphatase  33 - 110 U/L 312 High  358 High  387 High     308 High    Total Protein  6.4 - 8.2 g/dL 6.2 Low  6.1 Low  6.0 Low     5.1 Low    AST  9 - 39 U/L 23 42 High  61 High     36   Bilirubin, Total  0.0 - 1.2 mg/dL 0.3 0.4 0.3    0.3   ALT  7 - 45 U/L 13 20 CM 31 CM    17 CM   Comment: Patients treated with Sulfasalazine may generate falsely decreased results for ALT.   Resulting Agency [facility]          Lipase              Component  Ref Range & Units 7 mo ago  (10/17/23) 1 yr ago  (5/20/23) 1 yr ago  (4/5/23) 1 yr ago  (2/2/23) 1 yr ago  (9/29/22) 2 yr ago  (4/6/22) 2 yr ago  (12/21/21)   Lipase  9 - 82 U/L 3 Low  3 Low  CM 3 Low  CM 5 Low  CM 6 Low  CM <3 Abnormal  CM <3 Abnormal  CM   Resulting [facility]        Hemoglobin A1C               Component  Ref Range & Units 1 mo ago  (5/31/24) 1 yr ago  (4/4/23) 1 yr ago  (8/5/22) 2 yr ago  (4/6/22) 2 yr ago  (1/14/22) 3 yr ago  (7/20/21) 3 yr ago  (4/12/21)   Hemoglobin A1C  see below % 7.8 High  13.5 Abnormal  R, CM 10.3 Abnormal  R, CM 7.9 Abnormal  R, CM 7.7 Abnormal   "R, CM 7.7 R, CM 8.5 R, CM   Estimated Average Glucose  Not Established mg/dL 177 341 R 249 R 180 R 174 R 174 R 197 R   Resulting Agency [facility]        Anthropometrics:  Anthropometrics  Height: 158.6 cm (5' 2.44\")  Weight: 55.2 kg (121 lb 9.6 oz)  BMI (Calculated): 21.93  IBW/kg (Dietitian Calculated): 50.9 kg  Weight Change  Weight History / % Weight Change: Loss of 0.7 kg in 1 month  Significant Weight Loss: Yes (h/o)    Historically wt has fluctuated greatly with periods of significant losses.  Pt states this trend is normal for her.     Wt Readings    9/5/24 55.2 kg   8/8/24 55.9 kg- gain of 3.4 kg in 2 weeks    7/25/24 52.5 kg- loss of 3.8 kg (6.7%) in 1 month- significant   6/27/24 56.3 kg   05/30/24 56.8 kg (125 lb 4.8 oz)- question validity of this wt, as it reflects a 3.7 kg gain in 6 days???   05/24/24 53.1 kg (117 lb 1 oz)- loss of 2.3 kg (4.2%) in 3 weeks- SIGNIFICANT   05/10/24 54 kg (119 lb 0.8 oz)   05/03/24 55.4 kg (122 lb 3.2 oz)- gain of 3.2 kg in 1 month   04/30/24 54.7 kg (120 lb 8 oz)   04/22/24 53.6 kg (118 lb 2.7 oz)   04/11/24 53.6 kg (118 lb 2.7 oz)   04/03/24 52.2 kg (115 lb)- loss of 4 kg (7.1%) in 2 weeks- SIGNIFICANT   03/21/24 56.2 kg (123 lb 14.4 oz)- gain of 14 kg in < 12 months   03/14/24 52.2 kg (115 lb 1.3 oz)   02/23/24 54.9 kg (121 lb)   01/26/24 54.4 kg (120 lb)   01/09/24 51.7 kg (114 lb)     12/13/23 49.8 kg (109 lb 12.8 oz)   10/17/23 45.4 kg (100 lb)   09/11/23 47.2 kg (104 lb)   08/30/23 (!) 40.8 kg (90 lb)   04/04/23 (!) 42.2 kg (93 lb)- loss of 6.3 kg (13%) in < 6 months  SIGNIFICANT   10/11/22 48.5 kg (107 lb)- gain of 4 kg in < 5 months   05/20/22 44.5 kg (98 lb)   04/06/22 44 kg (97 lb)            Food And Nutrient Intake:    Appetite is good.   She continues to take her Creon with meals and snacks.  Recall she reported prior that she had not had an " "oily stool since she has been taking the Creon.  Reported regular stools- anywhere from 3-5 each day.  Not necessarily having a lot of volume with each stool, however, but at least 1 each.    Recall previously pt C/o diarrhea, reporting 7-20 BM's in 24 hrs  Stated she is eating but it was coming back out.                                                    Food Supplement Intake  Oral Nutrition Supplements: Boost Very High Calorie  Pt reports she received her Boost.  Reports she is tolerating it well.  IS taking 1 each daily to provide 530 calories and 22 g of protein.  She was reminded that she was ordered 2 each per day.  She was also encouraged top take 2 each Creon capsules with each serving- 1 at the beginning of drinking it and 1 each 1/2 way thru.  Note ONS has 26 g of fat per serving.     Nutrition Focused Physical Exam Findings:  Deferred d/t phone consult              Edema  Edema Location: LE edema has improved per MD         Energy Needs  Calculated Energy Needs Using Equations  Height: 158.6 cm (5' 2.44\")  Estimated Energy Needs  Total Energy Estimated Needs (kCal): 1760 kCal  Total Estimated Energy Need per Day (kCal/kg): 31 kCal/kg  Estimated Fluid Needs  Total Fluid Estimated Needs (mL): 1705 mL  Total Fluid Estimated Needs (mL/kg): 30 mL/kg  Estimated Protein Needs  Total Protein Estimated Needs (g): 74 g  Total Protein Estimated Needs (g/kg): 1.3 g/kg        Nutrition Diagnosis             Nutrition Interventions/Recommendations   Nutrition Prescription       Food and Nutrition Delivery       Nutrition Education       Coordination of Care         Nutrition Monitoring and Evaluation      Pt will be seen for follow up at next chemo infusion.          Time Spent  Prep time on day of patient encounter: 5 minutes  Time spent directly with patient, family or caregiver: 10 minutes  Additional Time Spent on Patient Care Activities: 0 minutes  Documentation Time: 15 minutes  Other Time Spent: 0 " minutes  Total: 30 minutes

## 2024-09-06 ENCOUNTER — APPOINTMENT (OUTPATIENT)
Dept: HEMATOLOGY/ONCOLOGY | Facility: CLINIC | Age: 54
End: 2024-09-06
Payer: COMMERCIAL

## 2024-09-06 ENCOUNTER — PHARMACY VISIT (OUTPATIENT)
Dept: PHARMACY | Facility: CLINIC | Age: 54
End: 2024-09-06
Payer: MEDICAID

## 2024-09-06 PROCEDURE — RXMED WILLOW AMBULATORY MEDICATION CHARGE

## 2024-09-09 ENCOUNTER — APPOINTMENT (OUTPATIENT)
Dept: HEMATOLOGY/ONCOLOGY | Facility: CLINIC | Age: 54
End: 2024-09-09
Payer: COMMERCIAL

## 2024-09-09 ENCOUNTER — HOSPITAL ENCOUNTER (OUTPATIENT)
Dept: CARDIOLOGY | Facility: HOSPITAL | Age: 54
Discharge: HOME | End: 2024-09-09
Payer: COMMERCIAL

## 2024-09-09 ENCOUNTER — PHARMACY VISIT (OUTPATIENT)
Dept: PHARMACY | Facility: CLINIC | Age: 54
End: 2024-09-09
Payer: MEDICAID

## 2024-09-09 DIAGNOSIS — G89.3 NEOPLASM RELATED PAIN: ICD-10-CM

## 2024-09-09 DIAGNOSIS — C82.40 FOLLICULAR LYMPHOMA GRADE IIIB, UNSPECIFIED BODY REGION (MULTI): ICD-10-CM

## 2024-09-09 LAB
ATRIAL RATE: 104 BPM
P AXIS: 76 DEGREES
PR INTERVAL: 139 MS
Q ONSET: 253 MS
QRS COUNT: 16 BEATS
QRS DURATION: 138 MS
QT INTERVAL: 383 MS
QTC CALCULATION(BAZETT): 504 MS
QTC FREDERICIA: 460 MS
R AXIS: -65 DEGREES
T AXIS: 19 DEGREES
T OFFSET: 444 MS
VENTRICULAR RATE: 104 BPM

## 2024-09-09 PROCEDURE — 93005 ELECTROCARDIOGRAM TRACING: CPT

## 2024-09-09 PROCEDURE — 93010 ELECTROCARDIOGRAM REPORT: CPT | Performed by: INTERNAL MEDICINE

## 2024-09-09 PROCEDURE — RXMED WILLOW AMBULATORY MEDICATION CHARGE

## 2024-09-10 ENCOUNTER — TELEPHONE (OUTPATIENT)
Dept: ENDOCRINOLOGY | Facility: CLINIC | Age: 54
End: 2024-09-10

## 2024-09-10 ENCOUNTER — INFUSION (OUTPATIENT)
Dept: HEMATOLOGY/ONCOLOGY | Facility: CLINIC | Age: 54
End: 2024-09-10
Payer: COMMERCIAL

## 2024-09-10 ENCOUNTER — APPOINTMENT (OUTPATIENT)
Dept: HEMATOLOGY/ONCOLOGY | Facility: CLINIC | Age: 54
End: 2024-09-10
Payer: COMMERCIAL

## 2024-09-10 VITALS
WEIGHT: 128.8 LBS | TEMPERATURE: 98.1 F | HEART RATE: 113 BPM | SYSTOLIC BLOOD PRESSURE: 112 MMHG | RESPIRATION RATE: 16 BRPM | DIASTOLIC BLOOD PRESSURE: 68 MMHG | HEIGHT: 62 IN | BODY MASS INDEX: 23.7 KG/M2 | OXYGEN SATURATION: 95 %

## 2024-09-10 DIAGNOSIS — E10.9 TYPE 1 DIABETES MELLITUS WITHOUT COMPLICATION (MULTI): Primary | ICD-10-CM

## 2024-09-10 DIAGNOSIS — C82.41 GRADE 3B FOLLICULAR LYMPHOMA OF LYMPH NODES OF NECK (MULTI): ICD-10-CM

## 2024-09-10 DIAGNOSIS — C82.40 FOLLICULAR LYMPHOMA GRADE IIIB, UNSPECIFIED BODY REGION (MULTI): ICD-10-CM

## 2024-09-10 LAB
ALBUMIN SERPL BCP-MCNC: 3.1 G/DL (ref 3.4–5)
ALP SERPL-CCNC: 319 U/L (ref 33–110)
ALT SERPL W P-5'-P-CCNC: 13 U/L (ref 7–45)
ANION GAP SERPL CALC-SCNC: 9 MMOL/L (ref 10–20)
AST SERPL W P-5'-P-CCNC: 31 U/L (ref 9–39)
BASOPHILS # BLD MANUAL: 0 X10*3/UL (ref 0–0.1)
BASOPHILS NFR BLD MANUAL: 0 %
BILIRUB SERPL-MCNC: 0.3 MG/DL (ref 0–1.2)
BUN SERPL-MCNC: 9 MG/DL (ref 6–23)
CALCIUM SERPL-MCNC: 8.6 MG/DL (ref 8.6–10.3)
CHLORIDE SERPL-SCNC: 101 MMOL/L (ref 98–107)
CO2 SERPL-SCNC: 25 MMOL/L (ref 21–32)
CREAT SERPL-MCNC: 0.76 MG/DL (ref 0.5–1.05)
DOHLE BOD BLD QL SMEAR: PRESENT
EGFRCR SERPLBLD CKD-EPI 2021: >90 ML/MIN/1.73M*2
EOSINOPHIL # BLD MANUAL: 0.26 X10*3/UL (ref 0–0.7)
EOSINOPHIL NFR BLD MANUAL: 1 %
ERYTHROCYTE [DISTWIDTH] IN BLOOD BY AUTOMATED COUNT: 19.1 % (ref 11.5–14.5)
GLUCOSE SERPL-MCNC: 268 MG/DL (ref 74–99)
HCT VFR BLD AUTO: 30.1 % (ref 36–46)
HGB BLD-MCNC: 10.1 G/DL (ref 12–16)
IMM GRANULOCYTES # BLD AUTO: 2.56 X10*3/UL (ref 0–0.7)
IMM GRANULOCYTES NFR BLD AUTO: 9.7 % (ref 0–0.9)
LDH SERPL L TO P-CCNC: 471 U/L (ref 84–246)
LYMPHOCYTES # BLD MANUAL: 3.16 X10*3/UL (ref 1.2–4.8)
LYMPHOCYTES NFR BLD MANUAL: 12 %
MCH RBC QN AUTO: 29.8 PG (ref 26–34)
MCHC RBC AUTO-ENTMCNC: 33.6 G/DL (ref 32–36)
MCV RBC AUTO: 89 FL (ref 80–100)
METAMYELOCYTES # BLD MANUAL: 1.58 X10*3/UL
METAMYELOCYTES NFR BLD MANUAL: 6 %
MONOCYTES # BLD MANUAL: 1.58 X10*3/UL (ref 0.1–1)
MONOCYTES NFR BLD MANUAL: 6 %
MYELOCYTES # BLD MANUAL: 0.26 X10*3/UL
MYELOCYTES NFR BLD MANUAL: 1 %
NEUTROPHILS # BLD MANUAL: 19.46 X10*3/UL (ref 1.2–7.7)
NEUTS BAND # BLD MANUAL: 1.84 X10*3/UL (ref 0–0.7)
NEUTS BAND NFR BLD MANUAL: 7 %
NEUTS SEG # BLD MANUAL: 17.62 X10*3/UL (ref 1.2–7)
NEUTS SEG NFR BLD MANUAL: 67 %
NEUTS VAC BLD QL SMEAR: PRESENT
NRBC BLD-RTO: ABNORMAL /100{WBCS}
PLATELET # BLD AUTO: 133 X10*3/UL (ref 150–450)
POTASSIUM SERPL-SCNC: 4.2 MMOL/L (ref 3.5–5.3)
PROT SERPL-MCNC: 6 G/DL (ref 6.4–8.2)
RBC # BLD AUTO: 3.39 X10*6/UL (ref 4–5.2)
RBC MORPH BLD: ABNORMAL
SODIUM SERPL-SCNC: 131 MMOL/L (ref 136–145)
TOTAL CELLS COUNTED BLD: 100
TOXIC GRANULES BLD QL SMEAR: PRESENT
URATE SERPL-MCNC: 6.8 MG/DL (ref 2.3–6.7)
WBC # BLD AUTO: 26.3 X10*3/UL (ref 4.4–11.3)

## 2024-09-10 PROCEDURE — 85027 COMPLETE CBC AUTOMATED: CPT

## 2024-09-10 PROCEDURE — 80053 COMPREHEN METABOLIC PANEL: CPT

## 2024-09-10 PROCEDURE — 36591 DRAW BLOOD OFF VENOUS DEVICE: CPT

## 2024-09-10 PROCEDURE — 84550 ASSAY OF BLOOD/URIC ACID: CPT

## 2024-09-10 PROCEDURE — 85007 BL SMEAR W/DIFF WBC COUNT: CPT

## 2024-09-10 PROCEDURE — 83615 LACTATE (LD) (LDH) ENZYME: CPT

## 2024-09-10 RX ORDER — BLOOD-GLUCOSE SENSOR
1 EACH MISCELLANEOUS CONTINUOUS
Qty: 2 EACH | Refills: 11 | Status: SHIPPED | OUTPATIENT
Start: 2024-09-10 | End: 2025-09-10

## 2024-09-10 RX ORDER — HEPARIN 100 UNIT/ML
500 SYRINGE INTRAVENOUS AS NEEDED
OUTPATIENT
Start: 2024-09-10

## 2024-09-10 RX ORDER — HEPARIN SODIUM,PORCINE/PF 10 UNIT/ML
50 SYRINGE (ML) INTRAVENOUS AS NEEDED
OUTPATIENT
Start: 2024-09-10

## 2024-09-10 RX ORDER — HEPARIN 100 UNIT/ML
500 SYRINGE INTRAVENOUS AS NEEDED
Status: DISCONTINUED | OUTPATIENT
Start: 2024-09-10 | End: 2024-09-10 | Stop reason: HOSPADM

## 2024-09-10 ASSESSMENT — PAIN SCALES - GENERAL: PAINLEVEL: 9

## 2024-09-10 NOTE — TELEPHONE ENCOUNTER
Patient was informed by [facility] that jairon 3 is on back order. She is trenton that a new rx be sent for new jairon ultra. Please advise

## 2024-09-10 NOTE — TELEPHONE ENCOUNTER
Freestyle Ashley Ultra is not an entity.  Are we referring to One Touch Ultra glucometer and test strips?    **Pharmacy has been called.    FreeStyle Ashley 3 is on back order until the end of October  Request for FreeStyle Ashley 3 plus sensor which uses the same reader and lasts for 15 days   Rx sent to pharmacy on file

## 2024-09-10 NOTE — PROGRESS NOTES
Pt here for ritux/bendeka treatment. Due to pt having udenyca injection last week for low counts, pt will need to be delayed another week so there is two weeks between gf inj and treatment. Notified Dr Barry and Dr Pickering, will hold treatment today. Pt rescheduled for next week. Pt is aware of plan of care,

## 2024-09-11 ENCOUNTER — APPOINTMENT (OUTPATIENT)
Dept: HEMATOLOGY/ONCOLOGY | Facility: CLINIC | Age: 54
End: 2024-09-11
Payer: COMMERCIAL

## 2024-09-12 ENCOUNTER — APPOINTMENT (OUTPATIENT)
Dept: HEMATOLOGY/ONCOLOGY | Facility: CLINIC | Age: 54
End: 2024-09-12
Payer: COMMERCIAL

## 2024-09-13 ENCOUNTER — OFFICE VISIT (OUTPATIENT)
Dept: PALLIATIVE MEDICINE | Facility: CLINIC | Age: 54
End: 2024-09-13
Payer: COMMERCIAL

## 2024-09-13 ENCOUNTER — PHARMACY VISIT (OUTPATIENT)
Dept: PHARMACY | Facility: CLINIC | Age: 54
End: 2024-09-13
Payer: MEDICAID

## 2024-09-13 VITALS
SYSTOLIC BLOOD PRESSURE: 133 MMHG | OXYGEN SATURATION: 100 % | DIASTOLIC BLOOD PRESSURE: 80 MMHG | BODY MASS INDEX: 23.59 KG/M2 | WEIGHT: 130.84 LBS | RESPIRATION RATE: 16 BRPM | HEART RATE: 108 BPM | TEMPERATURE: 97.5 F

## 2024-09-13 DIAGNOSIS — R06.09 DYSPNEA ON EXERTION: ICD-10-CM

## 2024-09-13 DIAGNOSIS — G89.3 NEOPLASM RELATED PAIN: ICD-10-CM

## 2024-09-13 PROCEDURE — RXMED WILLOW AMBULATORY MEDICATION CHARGE

## 2024-09-13 PROCEDURE — 99215 OFFICE O/P EST HI 40 MIN: CPT | Performed by: CLINICAL NURSE SPECIALIST

## 2024-09-13 RX ORDER — HYDROMORPHONE HYDROCHLORIDE 32 MG/1
32 TABLET, EXTENDED RELEASE ORAL DAILY
Qty: 30 TABLET | Refills: 0 | Status: SHIPPED | OUTPATIENT
Start: 2024-09-13 | End: 2024-10-13

## 2024-09-13 RX ORDER — HYDROMORPHONE HYDROCHLORIDE 2 MG/1
4 TABLET ORAL EVERY 4 HOURS PRN
Qty: 256 TABLET | Refills: 0 | Status: SHIPPED | OUTPATIENT
Start: 2024-09-27 | End: 2024-10-27

## 2024-09-13 RX ORDER — TORSEMIDE 20 MG/1
TABLET ORAL
Qty: 36 TABLET | Refills: 0 | Status: CANCELLED | OUTPATIENT
Start: 2024-09-13 | End: 2024-10-15

## 2024-09-13 RX ORDER — TORSEMIDE 20 MG/1
20 TABLET ORAL DAILY
Qty: 30 TABLET | Refills: 0 | Status: SHIPPED | OUTPATIENT
Start: 2024-09-13 | End: 2024-10-13

## 2024-09-13 RX ORDER — HYDROMORPHONE HYDROCHLORIDE 12 MG/1
36 TABLET, EXTENDED RELEASE ORAL DAILY
Qty: 90 TABLET | Refills: 0 | Status: SHIPPED | OUTPATIENT
Start: 2024-09-13 | End: 2024-10-15

## 2024-09-13 RX ORDER — TORSEMIDE 20 MG/1
20 TABLET ORAL DAILY
Qty: 30 TABLET | Refills: 1 | Status: SHIPPED | OUTPATIENT
Start: 2024-09-13 | End: 2024-09-13 | Stop reason: SDUPTHER

## 2024-09-13 RX ORDER — HYDROMORPHONE HYDROCHLORIDE 12 MG/1
36 TABLET, EXTENDED RELEASE ORAL DAILY
Qty: 90 TABLET | Refills: 0 | Status: SHIPPED | OUTPATIENT
Start: 2024-09-13 | End: 2024-09-13 | Stop reason: SDUPTHER

## 2024-09-13 ASSESSMENT — PAIN SCALES - GENERAL: PAINLEVEL: 9

## 2024-09-16 ENCOUNTER — TELEPHONE (OUTPATIENT)
Dept: HEMATOLOGY/ONCOLOGY | Facility: HOSPITAL | Age: 54
End: 2024-09-16
Payer: COMMERCIAL

## 2024-09-16 PROCEDURE — RXMED WILLOW AMBULATORY MEDICATION CHARGE

## 2024-09-16 NOTE — TELEPHONE ENCOUNTER
I spoke with  Nico and they have initiated a PA for the Hydromorphone ER. We are awaiting determination. I attempted to call April, but she did not answer and her VM box was full.

## 2024-09-17 ENCOUNTER — PHARMACY VISIT (OUTPATIENT)
Dept: PHARMACY | Facility: CLINIC | Age: 54
End: 2024-09-17
Payer: MEDICAID

## 2024-09-19 ENCOUNTER — APPOINTMENT (OUTPATIENT)
Dept: HEMATOLOGY/ONCOLOGY | Facility: CLINIC | Age: 54
End: 2024-09-19
Payer: COMMERCIAL

## 2024-09-19 ENCOUNTER — INFUSION (OUTPATIENT)
Dept: HEMATOLOGY/ONCOLOGY | Facility: CLINIC | Age: 54
End: 2024-09-19
Payer: COMMERCIAL

## 2024-09-19 ENCOUNTER — NUTRITION (OUTPATIENT)
Dept: HEMATOLOGY/ONCOLOGY | Facility: CLINIC | Age: 54
End: 2024-09-19
Payer: COMMERCIAL

## 2024-09-19 VITALS
OXYGEN SATURATION: 96 % | TEMPERATURE: 97.5 F | DIASTOLIC BLOOD PRESSURE: 68 MMHG | HEIGHT: 62 IN | BODY MASS INDEX: 22.39 KG/M2 | HEART RATE: 105 BPM | WEIGHT: 121.7 LBS | RESPIRATION RATE: 16 BRPM | SYSTOLIC BLOOD PRESSURE: 106 MMHG

## 2024-09-19 VITALS — BODY MASS INDEX: 22.39 KG/M2 | WEIGHT: 121.7 LBS | HEIGHT: 62 IN

## 2024-09-19 DIAGNOSIS — C82.40 FOLLICULAR LYMPHOMA GRADE IIIB, UNSPECIFIED BODY REGION (MULTI): ICD-10-CM

## 2024-09-19 DIAGNOSIS — C82.41 GRADE 3B FOLLICULAR LYMPHOMA OF LYMPH NODES OF NECK (MULTI): Primary | ICD-10-CM

## 2024-09-19 DIAGNOSIS — C82.41 GRADE 3B FOLLICULAR LYMPHOMA OF LYMPH NODES OF NECK (MULTI): ICD-10-CM

## 2024-09-19 LAB
ALBUMIN SERPL BCP-MCNC: 3.3 G/DL (ref 3.4–5)
ALP SERPL-CCNC: 460 U/L (ref 33–110)
ALT SERPL W P-5'-P-CCNC: 19 U/L (ref 7–45)
ANION GAP SERPL CALC-SCNC: 9 MMOL/L (ref 10–20)
AST SERPL W P-5'-P-CCNC: 53 U/L (ref 9–39)
BASOPHILS # BLD AUTO: 0.05 X10*3/UL (ref 0–0.1)
BASOPHILS NFR BLD AUTO: 0.6 %
BILIRUB SERPL-MCNC: 0.3 MG/DL (ref 0–1.2)
BUN SERPL-MCNC: 12 MG/DL (ref 6–23)
CALCIUM SERPL-MCNC: 8.5 MG/DL (ref 8.6–10.3)
CHLORIDE SERPL-SCNC: 96 MMOL/L (ref 98–107)
CO2 SERPL-SCNC: 30 MMOL/L (ref 21–32)
CREAT SERPL-MCNC: 0.86 MG/DL (ref 0.5–1.05)
EGFRCR SERPLBLD CKD-EPI 2021: 79 ML/MIN/1.73M*2
EOSINOPHIL # BLD AUTO: 0.63 X10*3/UL (ref 0–0.7)
EOSINOPHIL NFR BLD AUTO: 7.6 %
ERYTHROCYTE [DISTWIDTH] IN BLOOD BY AUTOMATED COUNT: 18.6 % (ref 11.5–14.5)
GLUCOSE SERPL-MCNC: 188 MG/DL (ref 74–99)
HCT VFR BLD AUTO: 30.3 % (ref 36–46)
HGB BLD-MCNC: 10.1 G/DL (ref 12–16)
IMM GRANULOCYTES # BLD AUTO: 0.05 X10*3/UL (ref 0–0.7)
IMM GRANULOCYTES NFR BLD AUTO: 0.6 % (ref 0–0.9)
LDH SERPL L TO P-CCNC: 309 U/L (ref 84–246)
LYMPHOCYTES # BLD AUTO: 0.86 X10*3/UL (ref 1.2–4.8)
LYMPHOCYTES NFR BLD AUTO: 10.3 %
MCH RBC QN AUTO: 29.4 PG (ref 26–34)
MCHC RBC AUTO-ENTMCNC: 33.3 G/DL (ref 32–36)
MCV RBC AUTO: 88 FL (ref 80–100)
MONOCYTES # BLD AUTO: 0.76 X10*3/UL (ref 0.1–1)
MONOCYTES NFR BLD AUTO: 9.1 %
NEUTROPHILS # BLD AUTO: 5.96 X10*3/UL (ref 1.2–7.7)
NEUTROPHILS NFR BLD AUTO: 71.8 %
NRBC BLD-RTO: ABNORMAL /100{WBCS}
PLATELET # BLD AUTO: 220 X10*3/UL (ref 150–450)
POLYCHROMASIA BLD QL SMEAR: NORMAL
POTASSIUM SERPL-SCNC: 4.1 MMOL/L (ref 3.5–5.3)
PROT SERPL-MCNC: 6.3 G/DL (ref 6.4–8.2)
RBC # BLD AUTO: 3.44 X10*6/UL (ref 4–5.2)
RBC MORPH BLD: NORMAL
SODIUM SERPL-SCNC: 131 MMOL/L (ref 136–145)
TARGETS BLD QL SMEAR: NORMAL
URATE SERPL-MCNC: 6.2 MG/DL (ref 2.3–6.7)
WBC # BLD AUTO: 8.3 X10*3/UL (ref 4.4–11.3)

## 2024-09-19 PROCEDURE — 96413 CHEMO IV INFUSION 1 HR: CPT

## 2024-09-19 PROCEDURE — 2500000004 HC RX 250 GENERAL PHARMACY W/ HCPCS (ALT 636 FOR OP/ED): Mod: JZ,SE | Performed by: INTERNAL MEDICINE

## 2024-09-19 PROCEDURE — 2500000004 HC RX 250 GENERAL PHARMACY W/ HCPCS (ALT 636 FOR OP/ED): Mod: SE | Performed by: INTERNAL MEDICINE

## 2024-09-19 PROCEDURE — 2500000001 HC RX 250 WO HCPCS SELF ADMINISTERED DRUGS (ALT 637 FOR MEDICARE OP): Mod: SE | Performed by: INTERNAL MEDICINE

## 2024-09-19 PROCEDURE — 83615 LACTATE (LD) (LDH) ENZYME: CPT

## 2024-09-19 PROCEDURE — 96415 CHEMO IV INFUSION ADDL HR: CPT

## 2024-09-19 PROCEDURE — 85025 COMPLETE CBC W/AUTO DIFF WBC: CPT

## 2024-09-19 PROCEDURE — 80053 COMPREHEN METABOLIC PANEL: CPT

## 2024-09-19 PROCEDURE — 96375 TX/PRO/DX INJ NEW DRUG ADDON: CPT | Mod: INF

## 2024-09-19 PROCEDURE — 84550 ASSAY OF BLOOD/URIC ACID: CPT

## 2024-09-19 PROCEDURE — 96411 CHEMO IV PUSH ADDL DRUG: CPT

## 2024-09-19 RX ORDER — DIPHENHYDRAMINE HCL 25 MG
50 CAPSULE ORAL ONCE
Status: COMPLETED | OUTPATIENT
Start: 2024-09-19 | End: 2024-09-19

## 2024-09-19 RX ORDER — ACETAMINOPHEN 325 MG/1
650 TABLET ORAL ONCE
Status: COMPLETED | OUTPATIENT
Start: 2024-09-19 | End: 2024-09-19

## 2024-09-19 RX ORDER — EPINEPHRINE 0.3 MG/.3ML
0.3 INJECTION SUBCUTANEOUS EVERY 5 MIN PRN
Status: DISCONTINUED | OUTPATIENT
Start: 2024-09-19 | End: 2024-09-19 | Stop reason: HOSPADM

## 2024-09-19 RX ORDER — ALBUTEROL SULFATE 0.83 MG/ML
3 SOLUTION RESPIRATORY (INHALATION) AS NEEDED
Status: DISCONTINUED | OUTPATIENT
Start: 2024-09-19 | End: 2024-09-19 | Stop reason: HOSPADM

## 2024-09-19 RX ORDER — FAMOTIDINE 10 MG/ML
20 INJECTION INTRAVENOUS ONCE AS NEEDED
Status: DISCONTINUED | OUTPATIENT
Start: 2024-09-19 | End: 2024-09-19 | Stop reason: HOSPADM

## 2024-09-19 RX ORDER — PALONOSETRON 0.05 MG/ML
0.25 INJECTION, SOLUTION INTRAVENOUS ONCE
Status: COMPLETED | OUTPATIENT
Start: 2024-09-19 | End: 2024-09-19

## 2024-09-19 RX ORDER — PROCHLORPERAZINE MALEATE 10 MG
10 TABLET ORAL EVERY 6 HOURS PRN
Status: DISCONTINUED | OUTPATIENT
Start: 2024-09-19 | End: 2024-09-19 | Stop reason: HOSPADM

## 2024-09-19 RX ORDER — DIPHENHYDRAMINE HYDROCHLORIDE 50 MG/ML
50 INJECTION INTRAMUSCULAR; INTRAVENOUS AS NEEDED
Status: DISCONTINUED | OUTPATIENT
Start: 2024-09-19 | End: 2024-09-19 | Stop reason: HOSPADM

## 2024-09-19 RX ORDER — HEPARIN 100 UNIT/ML
500 SYRINGE INTRAVENOUS AS NEEDED
Status: DISCONTINUED | OUTPATIENT
Start: 2024-09-19 | End: 2024-09-19 | Stop reason: HOSPADM

## 2024-09-19 RX ORDER — HEPARIN 100 UNIT/ML
500 SYRINGE INTRAVENOUS AS NEEDED
Status: CANCELLED | OUTPATIENT
Start: 2024-09-19

## 2024-09-19 RX ORDER — HEPARIN SODIUM,PORCINE/PF 10 UNIT/ML
50 SYRINGE (ML) INTRAVENOUS AS NEEDED
Status: CANCELLED | OUTPATIENT
Start: 2024-09-19

## 2024-09-19 RX ORDER — PROCHLORPERAZINE EDISYLATE 5 MG/ML
10 INJECTION INTRAMUSCULAR; INTRAVENOUS EVERY 6 HOURS PRN
Status: DISCONTINUED | OUTPATIENT
Start: 2024-09-19 | End: 2024-09-19 | Stop reason: HOSPADM

## 2024-09-19 ASSESSMENT — PAIN SCALES - GENERAL: PAINLEVEL: 8

## 2024-09-19 NOTE — PROGRESS NOTES
Pt here fro C5D1 ritux.bendeka. Met parameters for treatment today. Tolerated infusion well. She is aware of plan of care, will call with further questions or concerns. Will return tomorrow for D2

## 2024-09-19 NOTE — PROGRESS NOTES
NUTRITION Follow UP NOTE    Nutrition Assessment     Reason for Visit:  Nelsy Osullivan is a 54 y.o. 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   She is status post cervical lymph node excision February 8, 2024.     She is receiving Bendamustine and Rituxan- 28 Day Cycles.     Referred to this service per screen and assessed 5/30 during infusion.  She was seen again for follow up today.    Note pt was admitted on 7/9 for Hypoglycemia and found to have:  Pneumonitis  Fluid overload leading to pulmonary edema  Severe protein calorie malnutrition with hypoalbuminemia    Note pt with a h/o Chronic pancreatitis (r/t DM) with chronic abdominal pain. Per MD, she is unable to tolerate pancreatic enzyme replacement therapy.     Followed by pal care and initiated on narcotic regimen.  CNP noted pt with food insecurities.  Pt was referred to FFL and had an appointment on 6/25/24.    Pt reported bedbugs in her apartment for quite some time. On 8/8/24, she reports they are all gone.    PMH noted: DM I, hypothyroidism, CHF, COPD, CAD, Dyslipidemia, Major depressive disorder, UGB, malnutrition, Cholecystectomy, C-dif (5/2023)    Note pt with [employer] for coverage.  Pt receives Boost VHC ONS- twice daily from [place] [place]    Comprehensive Metabolic Panel            Component  Ref Range & Units 9 d ago  (9/10/24) 2 wk ago  (9/5/24) 3 wk ago  (8/27/24) 1 mo ago  (7/25/24) 1 mo ago  (7/23/24) 2 mo ago  (7/12/24) 2 mo ago  (7/11/24)   Glucose  74 - 99 mg/dL 268 High  237 High  241 High  122 High  66 Low  196 High  262 High    Sodium  136 - 145 mmol/L 131 Low  133 Low  133 Low  137 139 131 Low  132 Low    Potassium  3.5 - 5.3 mmol/L 4.2 4.0 4.0 3.6 3.7 4.1 4.7   Chloride  98 - 107 mmol/L 101 102 98 103 105 98 101   Bicarbonate  21 - 32 mmol/L 25 26 31 28 30 29 28   Anion Gap  10 - 20 mmol/L 9 Low  9 Low  8 Low  10 8 Low  8 Low  8 Low    Urea Nitrogen  6 - 23 mg/dL 9 9 14 10 11 10 10  "  Creatinine  0.50 - 1.05 mg/dL 0.76 0.67 0.80 0.75 0.79 0.89 0.86   eGFR  >60 mL/min/1.73m*2 >90 >90 CM 87 CM >90 CM 88 CM 76 CM 79 CM   Comment: Calculations of estimated GFR are performed using the 2021 CKD-EPI Study Refit equation without the race variable for the IDMS-Traceable creatinine methods.  https://jasn.asnjournals.org/content/early/2021/09/22/ASN.1418801682   Calcium  8.6 - 10.3 mg/dL 8.6 9.2 8.8 8.6 8.6 8.1 Low  7.9 Low    Albumin  3.4 - 5.0 g/dL 3.1 Low  3.2 Low  3.1 Low  2.8 Low       Alkaline Phosphatase  33 - 110 U/L 319 High  312 High  358 High  387 High       Total Protein  6.4 - 8.2 g/dL 6.0 Low  6.2 Low  6.1 Low  6.0 Low       AST  9 - 39 U/L 31 23 42 High  61 High       Bilirubin, Total  0.0 - 1.2 mg/dL 0.3 0.3 0.4 0.3      ALT  7 - 45 U/L 13 13 CM 20 CM 31 CM      Comment: Patients treated with Sulfasalazine may generate falsely decreased results for ALT.   Resulting Agency [facility]              Specimen Collected: 09/10/24 09:47 Last Resulted: 09/10/24 10:40        POCT glycosylated hemoglobin (Hb A1C) manually resulted         Component  Ref Range & Units 1 mo ago 9 mo ago   POC HEMOGLOBIN A1c  4.2 - 6.5 % 6.7 Abnormal  8.9 Abnormal               Specimen Collected: 08/13/24 11:30 Last Resulted: 08/13/24 11:30        Note pt with improved BS control      Anthropometrics:  Anthropometrics  Height: 158.6 cm (5' 2.44\")  Weight: 55.2 kg (121 lb 11.2 oz)  BMI (Calculated): 21.95  IBW/kg (Dietitian Calculated): 50.9 kg  Weight Change  Weight History / % Weight Change: Stable over 2 weeks  Significant Weight Loss: Yes (h/o)    Historically wt has fluctuated greatly with periods of significant losses.  Pt states this trend is normal for her.     Wt Readings    9/19/24 55.2 kg    9/5/24 55.2 kg- Loss of 1.5 kg (2.8%) in 1 month after gain.  Note that LE edema has improved.    8/8/24 55.9 kg- gain of 3.4 kg in 2 weeks    7/25/24 52.5 kg- loss of 3.8 kg (6.7%) in 1 " "month- significant   6/27/24 56.3 kg   05/30/24 56.8 kg (125 lb 4.8 oz)- question validity of this wt, as it reflects a 3.7 kg gain in 6 days???   05/24/24 53.1 kg (117 lb 1 oz)- loss of 2.3 kg (4.2%) in 3 weeks- SIGNIFICANT   05/10/24 54 kg (119 lb 0.8 oz)   05/03/24 55.4 kg (122 lb 3.2 oz)- gain of 3.2 kg in 1 month   04/30/24 54.7 kg (120 lb 8 oz)   04/22/24 53.6 kg (118 lb 2.7 oz)   04/11/24 53.6 kg (118 lb 2.7 oz)   04/03/24 52.2 kg (115 lb)- loss of 4 kg (7.1%) in 2 weeks- SIGNIFICANT   03/21/24 56.2 kg (123 lb 14.4 oz)- gain of 14 kg in < 12 months   03/14/24 52.2 kg (115 lb 1.3 oz)   02/23/24 54.9 kg (121 lb)   01/26/24 54.4 kg (120 lb)   01/09/24 51.7 kg (114 lb)     12/13/23 49.8 kg (109 lb 12.8 oz)   10/17/23 45.4 kg (100 lb)   09/11/23 47.2 kg (104 lb)   08/30/23 (!) 40.8 kg (90 lb)   04/04/23 (!) 42.2 kg (93 lb)- loss of 6.3 kg (13%) in < 6 months  SIGNIFICANT   10/11/22 48.5 kg (107 lb)- gain of 4 kg in < 5 months   05/20/22 44.5 kg (98 lb)   04/06/22 44 kg (97 lb)            Food And Nutrient Intake:  Food and Nutrient History  Food and Nutrient History: She eats whatever she wants.  Pt eats a lot of fast foods.  Her daughter made her meatloaf and mashed potatoes recently.  Eating Dorota Doones on exam but does not have Creon with her.  Energy Intake: Good > 75 %  Fluid Intake: Adequate per labs  GI Symptoms: diarrhea  Dentition: edentulous   Yesterday- had 1 episode of diarrhea.  Felt it was \"acid\" diarrhea.  Still having frequent stools but they are normally formed.  Had 4 BM's prior to above diarrhea, but these were formed.    States 1/2 her stools are sinking and 1/2 floating.    Recall prior to PERT, pt C/o diarrhea/oily stools, reporting 7-20 BM's in 24 hrs.  Stated she is eating but it was coming back out.       Food Intake  Meal 1: oatmeal with blueberries (Took 2 each Creon.  States this makes her BS go up.)  Meal 3: 2 pc of [employer] hut Cure[employer]acin pizza- dips her crust in the garlic butter (She " "took 3 each Creon)                                       Micronutrient Intake  Vitamin Intake: Multivitamin (Centrum for Woman > 50)    Food Supplement Intake  Oral Nutrition Supplements: Boost Very High Calorie  Reports she has not been drinking ONS since she has not had tx.  She wants to have them with decreased appetite and intake post chemo.   Notes she is aware how to reorder.      Medication and Complementary/Alternative Medicine Use  Prescription Medication Use: She has to keep reminding herself to take her Creon.  She forgot to bring them today. She is taking her PPI in the am after she eats- takes her synthroid this way too and has for years.      Nutrition Focused Physical Exam Findings:      Subcutaneous Fat Loss  Orbital Fat Pads: Mild-Moderate (slight dark circles and slight hollowing)  Buccal Fat Pads: Mild-Moderate (flat cheeks, minimal bounce)  Triceps: Defer  Ribs: Defer    Muscle Wasting  Temporalis: Mild-Moderate (slight depression)  Pectoralis (Clavicular Region): Defer  Deltoid/Trapezius: Defer  Interosseous: Defer  Trapezius/Infraspinatus/Supraspinatus (Scapular Region): Defer  Quadriceps: Defer  Gastrocnemius: Defer              Energy Needs  Calculated Energy Needs Using Equations  Height: 158.6 cm (5' 2.44\")  Estimated Energy Needs  Total Energy Estimated Needs (kCal): 1760 kCal  Total Estimated Energy Need per Day (kCal/kg): 31 kCal/kg  Estimated Fluid Needs  Total Fluid Estimated Needs (mL): 1705 mL  Total Fluid Estimated Needs (mL/kg): 30 mL/kg  Estimated Protein Needs  Total Protein Estimated Needs (g): 74 g  Total Protein Estimated Needs (g/kg): 1.3 g/kg        Nutrition Diagnosis   Malnutrition Diagnosis  Patient has Malnutrition Diagnosis: Yes  Diagnosis Status: Ongoing  Malnutrition Diagnosis: Moderate malnutrition related to chronic disease or condition  As Evidenced by: h/o significant wt loss with mild to moderate losses of both adipose and muscle stores per NFPE  Additional " Assessment Information: Pt likely with micronutrient deficiencies d/t chronic malabsorption, jose eduardo the fat soluble vitamins A, D, E and K    Nutrition Diagnosis  Patient has Nutrition Diagnosis: Yes  Diagnosis Status (1): Ongoing  Nutrition Diagnosis 1: Altered GI function  Related to (1): chronic pancreatitis without PERT for decades, side effects from tx (now on PERT)  As Evidenced by (1): clinical hx and pt report  Additional Assessment Information (1): PERT seems to be effective for pt, however, she is not always consistently taking it.  Additional Nutrition Diagnosis: Diagnosis 2  Diagnosis Status (2): Resolved  Nutrition Diagnosis 2: Altered nutrition related to laboratory values  Related to (2): Questionable excessive administration of insulin at times and/or inappropriate timing of intake  As Evidenced by (2): BS of 66 on 7/23, 7/9 and 6/27    Nutrition Interventions/Recommendations   Nutrition Prescription  Individualized Nutrition Prescription Provided for : Low-moderate concentrated sugar JOON    Food and Nutrition Delivery  Food and Nutrition Delivery  Meals & Snacks: Carbohydrate-modified diet  Goals: Maintain improved BS control (non-fasting BS< 250)  Medical Food Supplement: Boost Very High Calorie  Goals: at least 1 daily- even when she has not had chemo to assist with repletion of stores (Provides 530 calories and 22 g of protein per serving)  Vitamin Supplement Therapy: A, D, E, K  Goals: Replete fat soluble vitamins.  Check serum D level. (Continue DMV)  Other:: PERT  Goals: Consistent use (PT encouraged to carry Creon with her- in her purse)    Nutrition Education       Coordination of Care       There are no Patient Instructions on file for this visit.    Nutrition Monitoring and Evaluation   Food/Nutrient Related History Monitoring  Monitoring and Evaluation Plan: Fluid intake, Amount of food, Carbohydrate intake  Fluid Intake: Estimated fluid intake  Criteria: Meet daily hydration needs  Amount  of Food: Estimated amout of food  Criteria: Meet energy and protein needs  Estimated carbohydrate intake: Estimated carbohydrate intake  Criteria: as above  Body Composition/Growth/Weight History  Monitoring and Evaluation Plan: Weight  Weight: Weight change  Criteria: Maintenance and/or repletion of muscle stores  Biochemical Data, Medical Tests and Procedures  Monitoring and Evaluation Plan: Electrolyte/renal panel, Glucose/endocrine profile, Vitamin profile  Criteria: WNL  Glucose/Endocrine Profile: Glucose, casual, Hemoglobin A1c (HgbA1c)  Criteria: < 250; < 7.0 respectively  Vitamin Profile: Vitamin D, 25 hydroxy  Criteria: 30-80  Nutrition Focused Physical Findings  Monitoring and Evaluation Plan: Adipose, Digestive System, Muscles  Adipose: Loss of subcutaneous fat  Criteria: No further loss  Digestive System: Diarrhea  Criteria: Compliance with PERT as prescribed  Muscles: Muscle atrophy  Criteria: No further loss- repletion          Time Spent  Prep time on day of patient encounter: 5 minutes  Time spent directly with patient, family or caregiver: 30 minutes  Additional Time Spent on Patient Care Activities: 0 minutes  Documentation Time: 15 minutes  Other Time Spent: 0 minutes  Total: 50 minutes

## 2024-09-19 NOTE — SIGNIFICANT EVENT
09/19/24 1012   Prechemo Checklist   Has the patient been in the hospital, ED, or urgent care since last date of service No   Chemo/Immuno Consent Completed and Signed Yes   Protocol/Indications Verified Yes   Confirmed to previous date/time of medication Yes  (Delayed 3 times)   Compared to previous dose Yes   All medications are dated accurately Yes   Pregnancy Test Negative Not applicable  (Declination of pregnancy)   Parameters Met Yes   BSA/Weight-Height Verified Yes   Dose Calculations Verified (current, total, cumulative) Yes

## 2024-09-20 ENCOUNTER — APPOINTMENT (OUTPATIENT)
Dept: HEMATOLOGY/ONCOLOGY | Facility: CLINIC | Age: 54
End: 2024-09-20
Payer: COMMERCIAL

## 2024-09-20 ENCOUNTER — INFUSION (OUTPATIENT)
Dept: HEMATOLOGY/ONCOLOGY | Facility: CLINIC | Age: 54
End: 2024-09-20
Payer: COMMERCIAL

## 2024-09-20 VITALS
OXYGEN SATURATION: 97 % | RESPIRATION RATE: 16 BRPM | SYSTOLIC BLOOD PRESSURE: 105 MMHG | DIASTOLIC BLOOD PRESSURE: 68 MMHG | HEART RATE: 114 BPM | HEIGHT: 62 IN | WEIGHT: 119.2 LBS | BODY MASS INDEX: 21.94 KG/M2 | TEMPERATURE: 97.5 F

## 2024-09-20 DIAGNOSIS — C82.41 GRADE 3B FOLLICULAR LYMPHOMA OF LYMPH NODES OF NECK (MULTI): ICD-10-CM

## 2024-09-20 PROCEDURE — 96411 CHEMO IV PUSH ADDL DRUG: CPT

## 2024-09-20 PROCEDURE — 2500000004 HC RX 250 GENERAL PHARMACY W/ HCPCS (ALT 636 FOR OP/ED): Mod: SE | Performed by: INTERNAL MEDICINE

## 2024-09-20 PROCEDURE — 96375 TX/PRO/DX INJ NEW DRUG ADDON: CPT | Mod: INF

## 2024-09-20 RX ORDER — HEPARIN 100 UNIT/ML
500 SYRINGE INTRAVENOUS AS NEEDED
OUTPATIENT
Start: 2024-09-20

## 2024-09-20 RX ORDER — HEPARIN 100 UNIT/ML
500 SYRINGE INTRAVENOUS AS NEEDED
Status: DISCONTINUED | OUTPATIENT
Start: 2024-09-20 | End: 2024-09-20 | Stop reason: HOSPADM

## 2024-09-20 RX ORDER — PROCHLORPERAZINE EDISYLATE 5 MG/ML
10 INJECTION INTRAMUSCULAR; INTRAVENOUS EVERY 6 HOURS PRN
Status: DISCONTINUED | OUTPATIENT
Start: 2024-09-20 | End: 2024-09-20 | Stop reason: HOSPADM

## 2024-09-20 RX ORDER — FAMOTIDINE 10 MG/ML
20 INJECTION INTRAVENOUS ONCE AS NEEDED
Status: DISCONTINUED | OUTPATIENT
Start: 2024-09-20 | End: 2024-09-20 | Stop reason: HOSPADM

## 2024-09-20 RX ORDER — ALBUTEROL SULFATE 0.83 MG/ML
3 SOLUTION RESPIRATORY (INHALATION) AS NEEDED
Status: DISCONTINUED | OUTPATIENT
Start: 2024-09-20 | End: 2024-09-20 | Stop reason: HOSPADM

## 2024-09-20 RX ORDER — EPINEPHRINE 0.3 MG/.3ML
0.3 INJECTION SUBCUTANEOUS EVERY 5 MIN PRN
Status: DISCONTINUED | OUTPATIENT
Start: 2024-09-20 | End: 2024-09-20 | Stop reason: HOSPADM

## 2024-09-20 RX ORDER — DIPHENHYDRAMINE HYDROCHLORIDE 50 MG/ML
50 INJECTION INTRAMUSCULAR; INTRAVENOUS AS NEEDED
Status: DISCONTINUED | OUTPATIENT
Start: 2024-09-20 | End: 2024-09-20 | Stop reason: HOSPADM

## 2024-09-20 RX ORDER — PROCHLORPERAZINE MALEATE 10 MG
10 TABLET ORAL EVERY 6 HOURS PRN
Status: DISCONTINUED | OUTPATIENT
Start: 2024-09-20 | End: 2024-09-20 | Stop reason: HOSPADM

## 2024-09-20 RX ORDER — HEPARIN SODIUM,PORCINE/PF 10 UNIT/ML
50 SYRINGE (ML) INTRAVENOUS AS NEEDED
OUTPATIENT
Start: 2024-09-20

## 2024-09-20 ASSESSMENT — PAIN SCALES - GENERAL: PAINLEVEL: 8

## 2024-09-20 NOTE — SIGNIFICANT EVENT
09/20/24 1245   Prechemo Checklist   Has the patient been in the hospital, ED, or urgent care since last date of service No   Chemo/Immuno Consent Completed and Signed Yes   Protocol/Indications Verified Yes   Confirmed to previous date/time of medication Yes   Compared to previous dose Yes   All medications are dated accurately Yes   Pregnancy Test Negative Not applicable  (declination)   Parameters Met Yes   BSA/Weight-Height Verified Yes   Dose Calculations Verified (current, total, cumulative) Yes

## 2024-09-20 NOTE — PROGRESS NOTES
Patient is here for Cycle 5, Day 2. She tolerated treatment well. She will return 9/23/24 for growth factor injection. AVS given to patient. Discharged in stable condition.

## 2024-09-23 ENCOUNTER — HOSPITAL ENCOUNTER (EMERGENCY)
Facility: HOSPITAL | Age: 54
Discharge: LEFT BEFORE DISCHARGE | End: 2024-09-23
Payer: COMMERCIAL

## 2024-09-23 ENCOUNTER — INFUSION (OUTPATIENT)
Dept: HEMATOLOGY/ONCOLOGY | Facility: CLINIC | Age: 54
End: 2024-09-23
Payer: COMMERCIAL

## 2024-09-23 ENCOUNTER — APPOINTMENT (OUTPATIENT)
Dept: CARDIOLOGY | Facility: HOSPITAL | Age: 54
End: 2024-09-23
Payer: COMMERCIAL

## 2024-09-23 ENCOUNTER — APPOINTMENT (OUTPATIENT)
Dept: HEMATOLOGY/ONCOLOGY | Facility: CLINIC | Age: 54
End: 2024-09-23
Payer: COMMERCIAL

## 2024-09-23 VITALS
BODY MASS INDEX: 21.9 KG/M2 | HEART RATE: 105 BPM | RESPIRATION RATE: 16 BRPM | WEIGHT: 119 LBS | DIASTOLIC BLOOD PRESSURE: 68 MMHG | HEIGHT: 62 IN | OXYGEN SATURATION: 95 % | TEMPERATURE: 98.2 F | SYSTOLIC BLOOD PRESSURE: 105 MMHG

## 2024-09-23 VITALS
HEART RATE: 86 BPM | RESPIRATION RATE: 16 BRPM | SYSTOLIC BLOOD PRESSURE: 104 MMHG | TEMPERATURE: 98.3 F | DIASTOLIC BLOOD PRESSURE: 68 MMHG | HEIGHT: 62 IN | OXYGEN SATURATION: 97 % | BODY MASS INDEX: 21.9 KG/M2 | WEIGHT: 119 LBS

## 2024-09-23 DIAGNOSIS — C82.41 GRADE 3B FOLLICULAR LYMPHOMA OF LYMPH NODES OF NECK (MULTI): ICD-10-CM

## 2024-09-23 DIAGNOSIS — R10.9 ABDOMINAL PAIN, UNSPECIFIED ABDOMINAL LOCATION: Primary | ICD-10-CM

## 2024-09-23 LAB
ALBUMIN SERPL BCP-MCNC: 3.2 G/DL (ref 3.4–5)
ALP SERPL-CCNC: 424 U/L (ref 33–110)
ALT SERPL W P-5'-P-CCNC: 31 U/L (ref 7–45)
ANION GAP SERPL CALC-SCNC: 9 MMOL/L (ref 10–20)
AST SERPL W P-5'-P-CCNC: 59 U/L (ref 9–39)
BASOPHILS # BLD AUTO: 0.03 X10*3/UL (ref 0–0.1)
BASOPHILS NFR BLD AUTO: 0.2 %
BILIRUB DIRECT SERPL-MCNC: 0.1 MG/DL (ref 0–0.3)
BILIRUB SERPL-MCNC: 0.4 MG/DL (ref 0–1.2)
BUN SERPL-MCNC: 13 MG/DL (ref 6–23)
CALCIUM SERPL-MCNC: 8.7 MG/DL (ref 8.6–10.3)
CARDIAC TROPONIN I PNL SERPL HS: 8 NG/L (ref 0–13)
CHLORIDE SERPL-SCNC: 100 MMOL/L (ref 98–107)
CO2 SERPL-SCNC: 27 MMOL/L (ref 21–32)
CREAT SERPL-MCNC: 0.71 MG/DL (ref 0.5–1.05)
EGFRCR SERPLBLD CKD-EPI 2021: >90 ML/MIN/1.73M*2
EOSINOPHIL # BLD AUTO: 0.37 X10*3/UL (ref 0–0.7)
EOSINOPHIL NFR BLD AUTO: 3 %
ERYTHROCYTE [DISTWIDTH] IN BLOOD BY AUTOMATED COUNT: 18.5 % (ref 11.5–14.5)
GLUCOSE SERPL-MCNC: 208 MG/DL (ref 74–99)
HCT VFR BLD AUTO: 29.5 % (ref 36–46)
HGB BLD-MCNC: 10.3 G/DL (ref 12–16)
IMM GRANULOCYTES # BLD AUTO: 0.08 X10*3/UL (ref 0–0.7)
IMM GRANULOCYTES NFR BLD AUTO: 0.7 % (ref 0–0.9)
LACTATE SERPL-SCNC: 0.6 MMOL/L (ref 0.4–2)
LIPASE SERPL-CCNC: 9 U/L (ref 9–82)
LYMPHOCYTES # BLD AUTO: 0.13 X10*3/UL (ref 1.2–4.8)
LYMPHOCYTES NFR BLD AUTO: 1.1 %
MCH RBC QN AUTO: 29.6 PG (ref 26–34)
MCHC RBC AUTO-ENTMCNC: 34.9 G/DL (ref 32–36)
MCV RBC AUTO: 85 FL (ref 80–100)
MONOCYTES # BLD AUTO: 0.5 X10*3/UL (ref 0.1–1)
MONOCYTES NFR BLD AUTO: 4.1 %
NEUTROPHILS # BLD AUTO: 11.12 X10*3/UL (ref 1.2–7.7)
NEUTROPHILS NFR BLD AUTO: 90.9 %
NRBC BLD-RTO: 0.2 /100 WBCS (ref 0–0)
PLATELET # BLD AUTO: 256 X10*3/UL (ref 150–450)
POTASSIUM SERPL-SCNC: 4.4 MMOL/L (ref 3.5–5.3)
PROT SERPL-MCNC: 6.1 G/DL (ref 6.4–8.2)
RBC # BLD AUTO: 3.48 X10*6/UL (ref 4–5.2)
SODIUM SERPL-SCNC: 132 MMOL/L (ref 136–145)
WBC # BLD AUTO: 12.2 X10*3/UL (ref 4.4–11.3)

## 2024-09-23 PROCEDURE — 80076 HEPATIC FUNCTION PANEL: CPT | Performed by: NURSE PRACTITIONER

## 2024-09-23 PROCEDURE — 82565 ASSAY OF CREATININE: CPT | Performed by: NURSE PRACTITIONER

## 2024-09-23 PROCEDURE — 2500000004 HC RX 250 GENERAL PHARMACY W/ HCPCS (ALT 636 FOR OP/ED): Performed by: NURSE PRACTITIONER

## 2024-09-23 PROCEDURE — 2500000004 HC RX 250 GENERAL PHARMACY W/ HCPCS (ALT 636 FOR OP/ED)

## 2024-09-23 PROCEDURE — 2500000004 HC RX 250 GENERAL PHARMACY W/ HCPCS (ALT 636 FOR OP/ED): Mod: JZ,SE | Performed by: INTERNAL MEDICINE

## 2024-09-23 PROCEDURE — 99284 EMERGENCY DEPT VISIT MOD MDM: CPT

## 2024-09-23 PROCEDURE — 83690 ASSAY OF LIPASE: CPT | Performed by: NURSE PRACTITIONER

## 2024-09-23 PROCEDURE — 85025 COMPLETE CBC W/AUTO DIFF WBC: CPT | Performed by: NURSE PRACTITIONER

## 2024-09-23 PROCEDURE — 83605 ASSAY OF LACTIC ACID: CPT | Performed by: NURSE PRACTITIONER

## 2024-09-23 PROCEDURE — 93005 ELECTROCARDIOGRAM TRACING: CPT

## 2024-09-23 PROCEDURE — 96375 TX/PRO/DX INJ NEW DRUG ADDON: CPT

## 2024-09-23 PROCEDURE — 84484 ASSAY OF TROPONIN QUANT: CPT | Performed by: NURSE PRACTITIONER

## 2024-09-23 PROCEDURE — 96361 HYDRATE IV INFUSION ADD-ON: CPT

## 2024-09-23 PROCEDURE — 96374 THER/PROPH/DIAG INJ IV PUSH: CPT

## 2024-09-23 PROCEDURE — 96372 THER/PROPH/DIAG INJ SC/IM: CPT

## 2024-09-23 RX ORDER — EPINEPHRINE 0.3 MG/.3ML
0.3 INJECTION SUBCUTANEOUS EVERY 5 MIN PRN
Status: DISCONTINUED | OUTPATIENT
Start: 2024-09-23 | End: 2024-09-23 | Stop reason: HOSPADM

## 2024-09-23 RX ORDER — ALBUTEROL SULFATE 0.83 MG/ML
3 SOLUTION RESPIRATORY (INHALATION) AS NEEDED
Status: DISCONTINUED | OUTPATIENT
Start: 2024-09-23 | End: 2024-09-23 | Stop reason: HOSPADM

## 2024-09-23 RX ORDER — DIPHENHYDRAMINE HYDROCHLORIDE 50 MG/ML
50 INJECTION INTRAMUSCULAR; INTRAVENOUS ONCE
Status: DISCONTINUED | OUTPATIENT
Start: 2024-09-23 | End: 2024-09-23

## 2024-09-23 RX ORDER — DIPHENHYDRAMINE HYDROCHLORIDE 50 MG/ML
50 INJECTION INTRAMUSCULAR; INTRAVENOUS AS NEEDED
Status: DISCONTINUED | OUTPATIENT
Start: 2024-09-23 | End: 2024-09-23 | Stop reason: HOSPADM

## 2024-09-23 RX ORDER — ONDANSETRON HYDROCHLORIDE 2 MG/ML
4 INJECTION, SOLUTION INTRAVENOUS ONCE
Status: COMPLETED | OUTPATIENT
Start: 2024-09-23 | End: 2024-09-23

## 2024-09-23 RX ORDER — ONDANSETRON HYDROCHLORIDE 2 MG/ML
INJECTION, SOLUTION INTRAVENOUS
Status: COMPLETED
Start: 2024-09-23 | End: 2024-09-23

## 2024-09-23 RX ORDER — FAMOTIDINE 10 MG/ML
20 INJECTION INTRAVENOUS ONCE AS NEEDED
Status: DISCONTINUED | OUTPATIENT
Start: 2024-09-23 | End: 2024-09-23 | Stop reason: HOSPADM

## 2024-09-23 ASSESSMENT — PAIN DESCRIPTION - DESCRIPTORS: DESCRIPTORS: ACHING

## 2024-09-23 ASSESSMENT — PAIN DESCRIPTION - PAIN TYPE: TYPE: ACUTE PAIN

## 2024-09-23 ASSESSMENT — PAIN - FUNCTIONAL ASSESSMENT: PAIN_FUNCTIONAL_ASSESSMENT: 0-10

## 2024-09-23 ASSESSMENT — COLUMBIA-SUICIDE SEVERITY RATING SCALE - C-SSRS
2. HAVE YOU ACTUALLY HAD ANY THOUGHTS OF KILLING YOURSELF?: NO
6. HAVE YOU EVER DONE ANYTHING, STARTED TO DO ANYTHING, OR PREPARED TO DO ANYTHING TO END YOUR LIFE?: NO
1. IN THE PAST MONTH, HAVE YOU WISHED YOU WERE DEAD OR WISHED YOU COULD GO TO SLEEP AND NOT WAKE UP?: NO

## 2024-09-23 ASSESSMENT — PAIN SCALES - GENERAL
PAINLEVEL_OUTOF10: 10 - WORST POSSIBLE PAIN
PAINLEVEL: 10-WORST PAIN EVER

## 2024-09-23 ASSESSMENT — PAIN DESCRIPTION - LOCATION: LOCATION: ABDOMEN

## 2024-09-23 NOTE — ED PROVIDER NOTES
HPI   Chief Complaint   Patient presents with    abdominal pain/ concern pancreatitis       54-year-old female with a past history of lymphoma currently undergoing chemotherapy, CHF, COPD, diabetes with recurrent pancreatitis, hypothyroidism presents to the emergency department today for abdominal pain.  Patient states that she has been having lightheadedness and 10 out of 10 abdominal discomfort for the past few days.  She was having frequent bowel movements as well.  It is not diarrhea in nature.  No blood or melena.  She has been taking her at home p.o. Dilaudid with recent dose at 8 AM with no improvement in the pain.  Denies any fever or chills.  Denies any chest pain, shortness of breath, cough or congestion.  She does have lightheadedness upon standing.                           [place] Coma Scale Score: 15                  Patient History   No past medical history on file.  Past Surgical History:   Procedure Laterality Date    OTHER SURGICAL HISTORY  2020    Cholecystectomy    OTHER SURGICAL HISTORY  2020    Exploratory laparoscopy    OTHER SURGICAL HISTORY  2020    Heart surgery     No family history on file.  Social History     Tobacco Use    Smoking status: Former     Current packs/day: 0.00     Types: Cigarettes     Quit date:      Years since quittin.7     Passive exposure: Past    Smokeless tobacco: Never   Vaping Use    Vaping status: Every Day   Substance Use Topics    Alcohol use: Not Currently    Drug use: Never       Physical Exam   ED Triage Vitals [24 1329]   Temperature Heart Rate Respirations BP   36.8 °C (98.3 °F) 86 16 104/68      Pulse Ox Temp Source Heart Rate Source Patient Position   97 % Temporal Monitor Sitting      BP Location FiO2 (%)     Left arm --       Physical Exam  Vitals and nursing note reviewed.   Constitutional:       General: She is not in acute distress.     Appearance: Normal appearance. She is not toxic-appearing.   HENT:      Right Ear:  Tympanic membrane normal.      Left Ear: Tympanic membrane normal.      Mouth/Throat:      Mouth: Mucous membranes are moist.      Pharynx: Oropharynx is clear.   Eyes:      Extraocular Movements: Extraocular movements intact.      Pupils: Pupils are equal, round, and reactive to light.   Cardiovascular:      Rate and Rhythm: Normal rate and regular rhythm.      Pulses: Normal pulses.      Heart sounds: Normal heart sounds.   Pulmonary:      Effort: Pulmonary effort is normal.      Breath sounds: Normal breath sounds.   Abdominal:      General: Abdomen is flat. Bowel sounds are normal.      Palpations: Abdomen is soft.      Tenderness: There is generalized abdominal tenderness. There is guarding. There is no right CVA tenderness, left CVA tenderness or rebound.   Musculoskeletal:         General: Normal range of motion.      Cervical back: Normal range of motion and neck supple.   Skin:     General: Skin is warm and dry.      Capillary Refill: Capillary refill takes less than 2 seconds.   Neurological:      General: No focal deficit present.      Mental Status: She is alert and oriented to person, place, and time.   Psychiatric:         Mood and Affect: Mood normal.         Behavior: Behavior normal.         Judgment: Judgment normal.         Labs Reviewed   CBC WITH AUTO DIFFERENTIAL - Abnormal       Result Value    WBC 12.2 (*)     nRBC 0.2 (*)     RBC 3.48 (*)     Hemoglobin 10.3 (*)     Hematocrit 29.5 (*)     MCV 85      MCH 29.6      MCHC 34.9      RDW 18.5 (*)     Platelets 256      Neutrophils % 90.9      Immature Granulocytes %, Automated 0.7      Lymphocytes % 1.1      Monocytes % 4.1      Eosinophils % 3.0      Basophils % 0.2      Neutrophils Absolute 11.12 (*)     Immature Granulocytes Absolute, Automated 0.08      Lymphocytes Absolute 0.13 (*)     Monocytes Absolute 0.50      Eosinophils Absolute 0.37      Basophils Absolute 0.03     BASIC METABOLIC PANEL - Abnormal    Glucose 208 (*)     Sodium 132  (*)     Potassium 4.4      Chloride 100      Bicarbonate 27      Anion Gap 9 (*)     Urea Nitrogen 13      Creatinine 0.71      eGFR >90      Calcium 8.7     HEPATIC FUNCTION PANEL - Abnormal    Albumin 3.2 (*)     Bilirubin, Total 0.4      Bilirubin, Direct 0.1      Alkaline Phosphatase 424 (*)     ALT 31      AST 59 (*)     Total Protein 6.1 (*)    LIPASE - Normal    Lipase 9      Narrative:     Venipuncture immediately after or during the administration of Metamizole may lead to falsely low results. Testing should be performed immediately prior to Metamizole dosing.   LACTATE - Normal    Lactate 0.6      Narrative:     Venipuncture immediately after or during the administration of Metamizole may lead to falsely low results. Testing should be performed immediately prior to Metamizole dosing.   SERIAL TROPONIN-INITIAL - Normal    Troponin I, High Sensitivity 8      Narrative:     Less than 99th percentile of normal range cutoff-  Female and children under 18 years old <14 ng/L; Male <21 ng/L: Negative  Repeat testing should be performed if clinically indicated.     Female and children under 18 years old 14-50 ng/L; Male 21-50 ng/L:  Consistent with possible cardiac damage and possible increased clinical   risk. Serial measurements may help to assess extent of myocardial damage.     >50 ng/L: Consistent with cardiac damage, increased clinical risk and  myocardial infarction. Serial measurements may help assess extent of   myocardial damage.      NOTE: Children less than 1 year old may have higher baseline troponin   levels and results should be interpreted in conjunction with the overall   clinical context.     NOTE: Troponin I testing is performed using a different   testing methodology at [facility] than at other   [facility]. Direct result comparisons should only   be made within the same method.   URINALYSIS WITH REFLEX CULTURE AND MICROSCOPIC    Narrative:     The following orders were  created for panel order Urinalysis with Reflex Culture and Microscopic.  Procedure                               Abnormality         Status                     ---------                               -----------         ------                     Urinalysis with Reflex C...[[id number]]                                                 Extra Urine Gray Tube[[id number]]                                                         Please view results for these tests on the individual orders.   TROPONIN SERIES- (INITIAL, 1 HR)    Narrative:     The following orders were created for panel order Troponin I Series, High Sensitivity (0, 1 HR).  Procedure                               Abnormality         Status                     ---------                               -----------         ------                     Troponin I, High Sensiti...[[id number]]  Normal              Final result               Troponin, High Sensitivi...[[id number]]                                                   Please view results for these tests on the individual orders.   URINALYSIS WITH REFLEX CULTURE AND MICROSCOPIC   EXTRA URINE GRAY TUBE   SERIAL TROPONIN, 1 HOUR     Pain Management Panel  More data exists         Latest Ref Rng & Units 5/24/2024 5/10/2024   Pain Management Panel   Amphetamine Screen, Urine Presumptive Negative Presumptive Negative  Presumptive Negative    Barbiturate Screen, Urine Presumptive Negative Presumptive Negative  Presumptive Negative    Benzodiazepines Screen, Urine Presumptive Negative Presumptive Negative  Presumptive Negative    Codeine IgE <50 ng/mL <50  <50    Fentanyl Screen, Urine Presumptive Negative Presumptive Negative  Presumptive Negative    Hydromorphone Urine <25 ng/mL 101  68    Methadone Screen, Urine Presumptive Negative Presumptive Negative  Presumptive Negative    Morphine  <50 ng/mL >2,500  >2,500       Details                 No orders to display       ED Course & MDM   Diagnoses as of 09/23/24 0664    Abdominal pain, unspecified abdominal location       Medical Decision Making    On initial evaluation patient has generalized tenderness throughout her abdomen with guarding no rebound is noted at this time.  She was given a dose of Dilaudid in the ED states that she takes this at home.  Lab work was obtained CBC shows a white count of 12.2 hemoglobin is stable lipase negative lactic negative stable erythremia at 132 she is getting a liter of fluids and Zofran.  I did premedicate her for a CT as well with hydrocortisone.  Troponin of 8 hepatic function shows an elevated alk phos of 424 AST of 59 these are very similar to previous bili is within normal limits.  Urine is pending at this time as well as CT abdomen pelvis.  I was approached by nursing staff stating that patient states that she does not want to wait any longer and wants to eat and to go home.  I did evaluate the patient at this time states that she is diabetic and needs to eat.  We did check her offer to check her blood sugar however she has a continuous glucose monitor and it was 150.  Discussed with the patient that we would like to keep her n.p.o. in case there is any surgical in her abdomen.  Patient states that she does not want any further testing and wants to go home AGAINST MEDICAL ADVICE.  I did offer additional pain medication of Dilaudid and she refused at that point.  I would like a doctor to evaluate the patient for additional testing and input however patient is refusing to wait for evaluation and CT imaging.  Her vital signs have remained stable.  She is alert and oriented x 4.  Patient is aware of the risks associated with leaving AGAINST MEDICAL ADVICE.  I am concerned about intra-abdominal infection due to her abdominal pain and guarding however she is adamant that she needs to leave.  I will place an order for gastroenterology follow-up.  I did urged the patient to return to the emergency department at any time for further evaluation  and management.  She already has pain medication at home that she is able to take therefore additional pain medication was not ordered at this time.     This patient has the appropriate insight and judgement to their condition, is free from distracting injury or cognitive impairment, and in my medical judgment has the capacity to make their own decisions.  The patient vocalized understanding of my concerns.  I believe they should undergo further testing/monitoring. After discussion, the patient is unwilling to undergo ct imaging. They are declining further care at this time, and have elected to sign out against medical advice. I am unable to convince the patient to stay in the hospital. I have asked them to return to the hospital as soon as possible to complete their treatment/evaluation and have informed them that they will be welcome to do so.          Procedure  Procedures      Differential Diagnoses include pancreatitis, diverticulitis, appendicitis, bowel perforation, bowel obstruction, other intra-abdominal pathology, worsening of known lymphoma  This is not an exhaustive list of all the diagnosis and therapeutics are considered during the patient's evaluation for an emergency medical condition.     SISI Velez  09/23/24 1820       SISI Velez  09/23/24 1820

## 2024-09-23 NOTE — PROGRESS NOTES
Pt here for GF injection. Pt states starting Saturday she was feeling lightheaded, had 10/10 abd pain unrelieved by pain medication, and large amounts of normal stool not diarrhea. Pt states pain was not better or worse with BM or eating. Dr Lua notified, Dr Pickering unavailable. Pt instructed to go down to ED for evaluation. Pt tolerated injection well. She is aware of plan of care, will call with further questions or concerns.

## 2024-09-25 LAB
ATRIAL RATE: 99 BPM
P AXIS: 73 DEGREES
PR INTERVAL: 137 MS
Q ONSET: 249 MS
QRS COUNT: 15 BEATS
QRS DURATION: 139 MS
QT INTERVAL: 375 MS
QTC CALCULATION(BAZETT): 482 MS
QTC FREDERICIA: 443 MS
R AXIS: -48 DEGREES
T AXIS: 27 DEGREES
T OFFSET: 437 MS
VENTRICULAR RATE: 99 BPM

## 2024-09-27 ENCOUNTER — PHARMACY VISIT (OUTPATIENT)
Dept: PHARMACY | Facility: CLINIC | Age: 54
End: 2024-09-27
Payer: COMMERCIAL

## 2024-09-27 PROCEDURE — RXMED WILLOW AMBULATORY MEDICATION CHARGE

## 2024-10-03 ENCOUNTER — APPOINTMENT (OUTPATIENT)
Dept: HEMATOLOGY/ONCOLOGY | Facility: CLINIC | Age: 54
End: 2024-10-03
Payer: COMMERCIAL

## 2024-10-04 ENCOUNTER — APPOINTMENT (OUTPATIENT)
Dept: HEMATOLOGY/ONCOLOGY | Facility: CLINIC | Age: 54
End: 2024-10-04
Payer: COMMERCIAL

## 2024-10-07 ENCOUNTER — APPOINTMENT (OUTPATIENT)
Dept: HEMATOLOGY/ONCOLOGY | Facility: CLINIC | Age: 54
End: 2024-10-07
Payer: COMMERCIAL

## 2024-10-08 ENCOUNTER — APPOINTMENT (OUTPATIENT)
Dept: HEMATOLOGY/ONCOLOGY | Facility: CLINIC | Age: 54
End: 2024-10-08
Payer: COMMERCIAL

## 2024-10-09 ENCOUNTER — APPOINTMENT (OUTPATIENT)
Dept: HEMATOLOGY/ONCOLOGY | Facility: CLINIC | Age: 54
End: 2024-10-09
Payer: COMMERCIAL

## 2024-10-09 NOTE — PROGRESS NOTES
SUPPORTIVE AND PALLIATIVE ONCOLOGY FOLLOW UP - OUTPATIENT      SERVICE DATE: 10/11/2024    Referred by:  Dr. 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  She is newly diagnosed and is planned to get bendamustine and rituxan every 28 days. She had been following with Jonathan Worrell with [facility] palliative care, but has since requested to switch to palliative care closer to where she lives. She was recently hospitalized for CHF exacerbation    Information was collected from chart review, discussion with patient/family, and discussion with care team     SUBJECTIVE:  Reports that her pain is in her abdomen and in her lower back. She states that she is having more bad days then good days. We discussed adjusting her pain medications. She states that she would like to go back to her morphine and oxycodone. She felt like her pain relief was better on this combination of medications. Will restart MS CR 30mg Q8 and oxycodone 10mg Q3 PRN         Pain Assessment:  Pain Score:   8  Location: Abdomen (lower back)  Education:        Symptom Assessment:  ROS otherwise negative, pertinent positives documented in the HPI       Information obtained from: chart review and interview of patient  ______________________________________________________________________     Oncology History   Follicular lymphoma grade IIIb   6/7/2022 Initial Diagnosis    Follicular lymphoma grade IIIb (CMS/HCC)     5/2/2024 -  Chemotherapy    Bendamustine + RiTUXimab, 28 Day Cycles         No past medical history on file.  Past Surgical History:   Procedure Laterality Date    OTHER SURGICAL  HISTORY  04/14/2020    Cholecystectomy    OTHER SURGICAL HISTORY  04/14/2020    Exploratory laparoscopy    OTHER SURGICAL HISTORY  04/14/2020    Heart surgery     No family history on file.     SOCIAL HISTORY  Children 4 children and 5 grandchildren    Social History:  reports that she quit smoking about 2 years ago. Her smoking use included cigarettes. She has been exposed to tobacco smoke. She has never used smokeless tobacco. She reports that she does not currently use alcohol. She reports that she does not use drugs.    She has food insecurities and has bed bugs   REVIEW OF SYSTEMS  Review of systems negative unless noted in HPI.       Objective     Palliative Performance Scale % (PPS) 90     Labs:  No results found for this or any previous visit (from the past 96 hour(s)).                Medications:   Current Outpatient Medications   Medication Instructions    albuterol 90 mcg/actuation inhaler 2 puffs, inhalation, Every 6 hours    aspirin 81 mg, oral, Daily    atorvastatin (LIPITOR) 40 mg, oral, Daily    BD Alcohol Swabs pads, medicated USE SIX TIMES DAILY    bisacodyl (DULCOLAX (BISACODYL)) 5 mg, oral, Daily PRN    blood-glucose sensor (FreeStyle Ashley 3 Sensor) device 1 each, miscellaneous, Continuous, Use to check glucose, change every 14 days    blood-glucose sensor (FreeStyle Ashley 3 Sensor) device 1 each, miscellaneous, Continuous, Use to check glucose, change every 15 days *FreeStyle Ashley 3 plus    carvedilol (COREG) 12.5 mg, oral, Every 12 hours    cetirizine (ZYRTEC) 10 mg, oral, Daily    cyclobenzaprine (FLEXERIL) 10 mg, oral, 3 times daily PRN    DULoxetine (CYMBALTA) 60 mg, oral, Daily    DULoxetine (CYMBALTA) 20 mg, oral, Daily    esomeprazole (NEXIUM) 40 mg, oral, Daily PRN    estradiol (ESTRACE) 2 mg, oral, Daily    FreeStyle Ashley 3 [place] misc Use as instructed    hydrocortisone 0.5 % cream Topical, 2 times daily    insulin aspart (NovoLOG) 100 unit/mL injection FOR USE WITH INSULIN PUMP MAX  DAILY DOSE  UNITS    levothyroxine (Synthroid, Levoxyl) 125 mcg tablet TAKE 1 TABLET BY MOUTH IN THE MORNING ON EMPTY STOMACH    lidocaine-prilocaine (Emla) 2.5-2.5 % cream APPLY AT INSERTION SITE 30-45 MINUTES BEFORE YOU ARRIVE FOR BLOOD WORK OR CHEMOTHERAPY    melatonin 3 mg tablet 2 tablets, oral, Nightly    metoprolol succinate XL (TOPROL-XL) 50 mg, oral, Daily    mirtazapine (REMERON) 7.5 mg, oral, Nightly    morphine CR (MS CONTIN) 30 mg, oral, 3 times daily, Do not crush, chew, or split.    naloxone (Narcan) 4 mg/0.1 mL nasal spray Use 1 spray in one nostril as needed for overdose. May repeat every 2 to 3 min in alternating nostrils until medical assistance is available    naratriptan (AMERGE) 2.5 mg, oral, Once as needed    OLANZapine (ZyPREXA) 10 mg tablet oral, 2 times daily PRN    omeprazole (PRILOSEC) 40 mg, oral, Daily    Omnipod Dash Pods, Gen 4, cartridge     ondansetron (ZOFRAN) 8 mg, oral, Every 8 hours PRN    OXcarbazepine (Trileptal) 150 mg tablet 1 tablet, oral, Every 12 hours scheduled (0630,1830)    oxyCODONE (ROXICODONE) 10 mg, oral, Every 3 hours PRN, Take 1 tablet every 4 hours as needed for cancer related pain G89.3    pancrelipase, Lip-Prot-Amyl, (Creon) 36,000-114,000- 180,000 unit capsule,delayed release(DR/EC) capsule 6 capsules, oral, 2 times daily    phenazopyridine (Pyridium) 200 mg tablet 1 tablet, oral, 3 times daily    prochlorperazine (COMPAZINE) 10 mg, oral, Every 6 hours PRN    promethazine (PHENERGAN) 25 mg, oral, Every 6 hours PRN    sennosides-docusate sodium (Senna-S) 8.6-50 mg tablet 1 tablet, oral, 2 times daily    spironolactone (Aldactone) 25 mg tablet oral, Daily RT    torsemide (DEMADEX) 20 mg, oral, Daily       Allergies:   Allergies   Allergen Reactions    Acetaminophen-Codeine Unknown    Adhesive Unknown    Codeine Unknown    Fentanyl Unknown     Reaction from Community: Other(See Desc) Comments: MIGRAINES IF USED FOR PAIN, BUT OK FOR ANESTHESIA    Other  reaction(s): Intolerance    Fetrin Unknown    Ketorolac Itching    Meperidine Unknown     Reaction from Community: Other(See Desc)    Metformin Unknown    Propoxyphene N-Acetaminophen GI Upset    Silver Hives and Other     Tegaderm    Adhesive Tape-Silicones Rash    Iodinated Contrast Media Rash     Does well with 13hour premedication    Latex Rash    Penicillins Rash    Wound Dressings Rash     tagaderm       PHYSICAL EXAMINATION  Vital Signs:   Vital signs reviewed  Vitals:    10/11/24 1408   BP: 140/82   Pulse: 95   Resp: 16   Temp: 36.6 °C (97.9 °F)   SpO2: 98%                 Pain Score:   8         Physical Exam  Constitutional:       Appearance: Normal appearance. She is normal weight.   HENT:      Head: Normocephalic and atraumatic.      Mouth/Throat:      Mouth: Mucous membranes are dry.   Eyes:      Extraocular Movements: Extraocular movements intact.   Cardiovascular:      Comments: No signs of cardiac distress  Pulmonary:      Effort: Pulmonary effort is normal.      Comments: No signs of respiratory distress  Abdominal:      General: Abdomen is flat.      Palpations: Abdomen is soft.   Musculoskeletal:         General: Normal range of motion.   Skin:     General: Skin is warm and dry.   Neurological:      Mental Status: She is alert and oriented to person, place, and time. Mental status is at baseline.   Psychiatric:         Mood and Affect: Mood normal.         Behavior: Behavior normal.         Thought Content: Thought content normal.         Judgment: Judgment normal.       ASSESSMENT/PLAN    Pain  Pain is: cancer related pain and chronic  Type: visceral  Pain control: sub-optimally controlled  Previously tried/intolerances: Did not find oxycodone effective, avoid Tylenol due to liver impairment , Morphine made her too sleepy     Pain Plan:  Stop long acting dilaudid 36mg daily   Recheck Qtc last Qtc was >500  Stop Dilaudid 4mg Q4 PRN   Continue Cymbalta 80mg daily   Continue Flexeril 10mg TID PRN    Continue Lidocaine rectally   Restart MS CR 30mg Q8   Restart Oxycodone 10mg Q3 PRN     Opioid Use  Medication Management:   OARRS report reviewed with no aberrant behavior; consistent with  prescriptions/records and patient history  .  Overdose Risk Score 380.   This has been discussed with patient.   We will continue to closely monitor the patient for signs of prescription misuse including UDS, OARRS review and subjective reports at each visit.  No concurrent benzodiazepine use   I am a provider who is certified in Hospice and Palliative Medicine and have conducted a face-face visit and examination for this patient.  Routine Urine Drug Screen is needed and was completed on 5/10/24 appropriately positive for opioids and negative for illicit substances  Controlled Substance Agreement: is needed. Completed on: 5/10/24  Specifically discussed that controlled substance prescriptions will only be provided by our group as outlined in the completed agreement  Naloxone is needed  Red Flags: None at this time, but UDS pending      Nausea   Intermittent nausea without vomiting related to chemotherapy  Nausea Plan:  Zyprexa 10mg BID PRN   Compazine 10mg PRN     Diarrhea   Related to chemotherapy  Diarrhea Plan:  Imodium prn      Altered Mood  Chronic anxiety and depression related to health concerns   Mood Plan:  Continue Remeron 7.5mg nightly   Continue Cymbalta 60mg daily       Decreased appetite  Related to malignancy and chemotherapy  Anorexia Plan:   Zyprexa 10mg BID PRN     Itching related to bedbug infestation   Continue Atarax 25mg TID PRN    Lower extremity edema  Renewed her prescription for torsemide and instructed to follow up with her cardiologist     Advance Directives  Existence of Advance Directives: Reports that she has completed them   Decision maker: OSMEL is reportedly her sister   Code Status: Full code    Next Follow-Up Visit:  Return to clinic in 4 weeks     Signature and billing  Thank you  for allowing us to participate in the care of this patient. Recommendations will be communicated back to the consulting service by way of shared electronic medical record or face-to-face.    Medical complexity was high level due to due to complexity of problems, extensive data review, and high risk of management/treatment.  Time was spent on the following: Prep Time, Time Directly with Patient/Family/Caregiver, Documentation Time. Total time spent: 45      DATA   Diagnostic tests and information reviewed for today's visit:  Most recent labs, Most recent imaging, Medications           SIGNATURE: RALPH Eugene-CNS    Contact information:  Supportive and Palliative Oncology  Monday-Friday 8 AM-5 PM  Phone:  [phone], press option #5, then option #1.   Or Epic Secure Chat

## 2024-10-10 ENCOUNTER — APPOINTMENT (OUTPATIENT)
Dept: HEMATOLOGY/ONCOLOGY | Facility: CLINIC | Age: 54
End: 2024-10-10
Payer: COMMERCIAL

## 2024-10-11 ENCOUNTER — OFFICE VISIT (OUTPATIENT)
Dept: PALLIATIVE MEDICINE | Facility: CLINIC | Age: 54
End: 2024-10-11
Payer: COMMERCIAL

## 2024-10-11 ENCOUNTER — PHARMACY VISIT (OUTPATIENT)
Dept: PHARMACY | Facility: CLINIC | Age: 54
End: 2024-10-11
Payer: MEDICAID

## 2024-10-11 VITALS
HEIGHT: 62 IN | OXYGEN SATURATION: 98 % | TEMPERATURE: 97.9 F | DIASTOLIC BLOOD PRESSURE: 82 MMHG | SYSTOLIC BLOOD PRESSURE: 140 MMHG | WEIGHT: 118.61 LBS | HEART RATE: 95 BPM | RESPIRATION RATE: 16 BRPM | BODY MASS INDEX: 21.83 KG/M2

## 2024-10-11 DIAGNOSIS — G89.3 NEOPLASM RELATED PAIN: ICD-10-CM

## 2024-10-11 PROCEDURE — RXMED WILLOW AMBULATORY MEDICATION CHARGE

## 2024-10-11 PROCEDURE — 99215 OFFICE O/P EST HI 40 MIN: CPT | Performed by: CLINICAL NURSE SPECIALIST

## 2024-10-11 RX ORDER — OXYCODONE HYDROCHLORIDE 10 MG/1
10 TABLET ORAL
Qty: 240 TABLET | Refills: 0 | Status: SHIPPED | OUTPATIENT
Start: 2024-10-11

## 2024-10-11 RX ORDER — MORPHINE SULFATE 30 MG/1
30 TABLET, FILM COATED, EXTENDED RELEASE ORAL 3 TIMES DAILY
Qty: 90 TABLET | Refills: 0 | Status: SHIPPED | OUTPATIENT
Start: 2024-10-11 | End: 2024-11-10

## 2024-10-11 ASSESSMENT — PAIN SCALES - GENERAL: PAINLEVEL: 8

## 2024-10-15 ENCOUNTER — INFUSION (OUTPATIENT)
Dept: HEMATOLOGY/ONCOLOGY | Facility: CLINIC | Age: 54
End: 2024-10-15
Payer: COMMERCIAL

## 2024-10-15 ENCOUNTER — SOCIAL WORK (OUTPATIENT)
Dept: HEMATOLOGY/ONCOLOGY | Facility: CLINIC | Age: 54
End: 2024-10-15
Payer: COMMERCIAL

## 2024-10-15 VITALS
WEIGHT: 119.8 LBS | HEIGHT: 62 IN | DIASTOLIC BLOOD PRESSURE: 84 MMHG | RESPIRATION RATE: 16 BRPM | HEART RATE: 102 BPM | TEMPERATURE: 98.1 F | BODY MASS INDEX: 22.05 KG/M2 | OXYGEN SATURATION: 99 % | SYSTOLIC BLOOD PRESSURE: 134 MMHG

## 2024-10-15 DIAGNOSIS — K86.1 CHRONIC PANCREATITIS, UNSPECIFIED PANCREATITIS TYPE (MULTI): ICD-10-CM

## 2024-10-15 DIAGNOSIS — C82.41 GRADE 3B FOLLICULAR LYMPHOMA OF LYMPH NODES OF NECK (MULTI): ICD-10-CM

## 2024-10-15 DIAGNOSIS — C82.41 GRADE 3B FOLLICULAR LYMPHOMA OF LYMPH NODES OF NECK (MULTI): Primary | ICD-10-CM

## 2024-10-15 LAB
ALBUMIN SERPL BCP-MCNC: 3 G/DL (ref 3.4–5)
ALP SERPL-CCNC: 461 U/L (ref 33–110)
ALT SERPL W P-5'-P-CCNC: 30 U/L (ref 7–45)
AMYLASE SERPL-CCNC: 31 U/L (ref 29–103)
ANION GAP SERPL CALC-SCNC: 9 MMOL/L (ref 10–20)
AST SERPL W P-5'-P-CCNC: 48 U/L (ref 9–39)
BASOPHILS # BLD AUTO: 0.02 X10*3/UL (ref 0–0.1)
BASOPHILS NFR BLD AUTO: 0.5 %
BILIRUB SERPL-MCNC: 0.3 MG/DL (ref 0–1.2)
BUN SERPL-MCNC: 9 MG/DL (ref 6–23)
CALCIUM SERPL-MCNC: 8.8 MG/DL (ref 8.6–10.3)
CHLORIDE SERPL-SCNC: 105 MMOL/L (ref 98–107)
CO2 SERPL-SCNC: 24 MMOL/L (ref 21–32)
CREAT SERPL-MCNC: 0.65 MG/DL (ref 0.5–1.05)
EGFRCR SERPLBLD CKD-EPI 2021: >90 ML/MIN/1.73M*2
EOSINOPHIL # BLD AUTO: 0.05 X10*3/UL (ref 0–0.7)
EOSINOPHIL NFR BLD AUTO: 1.2 %
ERYTHROCYTE [DISTWIDTH] IN BLOOD BY AUTOMATED COUNT: 18.4 % (ref 11.5–14.5)
GLUCOSE SERPL-MCNC: 231 MG/DL (ref 74–99)
HCT VFR BLD AUTO: 31.1 % (ref 36–46)
HGB BLD-MCNC: 10.8 G/DL (ref 12–16)
IMM GRANULOCYTES # BLD AUTO: 0.02 X10*3/UL (ref 0–0.7)
IMM GRANULOCYTES NFR BLD AUTO: 0.5 % (ref 0–0.9)
LACTATE SERPL-SCNC: 0.7 MMOL/L (ref 0.4–2)
LDH SERPL L TO P-CCNC: 215 U/L (ref 84–246)
LIPASE SERPL-CCNC: 4 U/L (ref 9–82)
LYMPHOCYTES # BLD AUTO: 0.3 X10*3/UL (ref 1.2–4.8)
LYMPHOCYTES NFR BLD AUTO: 7 %
MCH RBC QN AUTO: 30.2 PG (ref 26–34)
MCHC RBC AUTO-ENTMCNC: 34.7 G/DL (ref 32–36)
MCV RBC AUTO: 87 FL (ref 80–100)
MONOCYTES # BLD AUTO: 0.7 X10*3/UL (ref 0.1–1)
MONOCYTES NFR BLD AUTO: 16.4 %
NEUTROPHILS # BLD AUTO: 3.17 X10*3/UL (ref 1.2–7.7)
NEUTROPHILS NFR BLD AUTO: 74.4 %
NRBC BLD-RTO: ABNORMAL /100{WBCS}
OVALOCYTES BLD QL SMEAR: NORMAL
PATH REVIEW-CBC DIFFERENTIAL: NORMAL
PLATELET # BLD AUTO: 44 X10*3/UL (ref 150–450)
POTASSIUM SERPL-SCNC: 3.9 MMOL/L (ref 3.5–5.3)
PROT SERPL-MCNC: 5.7 G/DL (ref 6.4–8.2)
RBC # BLD AUTO: 3.58 X10*6/UL (ref 4–5.2)
RBC MORPH BLD: NORMAL
SODIUM SERPL-SCNC: 134 MMOL/L (ref 136–145)
SPHEROCYTES BLD QL SMEAR: NORMAL
TARGETS BLD QL SMEAR: NORMAL
URATE SERPL-MCNC: 4.6 MG/DL (ref 2.3–6.7)
WBC # BLD AUTO: 4.3 X10*3/UL (ref 4.4–11.3)

## 2024-10-15 PROCEDURE — 2500000004 HC RX 250 GENERAL PHARMACY W/ HCPCS (ALT 636 FOR OP/ED): Mod: SE | Performed by: INTERNAL MEDICINE

## 2024-10-15 PROCEDURE — 82150 ASSAY OF AMYLASE: CPT

## 2024-10-15 PROCEDURE — 83605 ASSAY OF LACTIC ACID: CPT

## 2024-10-15 PROCEDURE — 84550 ASSAY OF BLOOD/URIC ACID: CPT

## 2024-10-15 PROCEDURE — 83690 ASSAY OF LIPASE: CPT

## 2024-10-15 PROCEDURE — 36591 DRAW BLOOD OFF VENOUS DEVICE: CPT

## 2024-10-15 PROCEDURE — 80053 COMPREHEN METABOLIC PANEL: CPT

## 2024-10-15 PROCEDURE — 85025 COMPLETE CBC W/AUTO DIFF WBC: CPT

## 2024-10-15 PROCEDURE — 83615 LACTATE (LD) (LDH) ENZYME: CPT

## 2024-10-15 RX ORDER — HEPARIN 100 UNIT/ML
500 SYRINGE INTRAVENOUS AS NEEDED
Status: DISCONTINUED | OUTPATIENT
Start: 2024-10-15 | End: 2024-10-15 | Stop reason: HOSPADM

## 2024-10-15 RX ORDER — HEPARIN 100 UNIT/ML
500 SYRINGE INTRAVENOUS AS NEEDED
OUTPATIENT
Start: 2024-10-15

## 2024-10-15 RX ORDER — HEPARIN SODIUM,PORCINE/PF 10 UNIT/ML
50 SYRINGE (ML) INTRAVENOUS AS NEEDED
OUTPATIENT
Start: 2024-10-15

## 2024-10-15 ASSESSMENT — PAIN SCALES - GENERAL: PAINLEVEL: 10-WORST PAIN EVER

## 2024-10-15 NOTE — PROGRESS NOTES
April here for chemo, c/o 10/10 abd pain. Dr. Barry stated he wanted amylase, lipase, lactic acid ordered to be done today

## 2024-10-15 NOTE — PROGRESS NOTES
(SW) met with patient today to assess needs and offer support.  Patient was not feeling very well today.  Patient stated that her pancreas was hurting.  Today is patient's final week of treatment.  Patient stated that she was ready.  Patient reported no SW needs.  SW will continue to follow patient.      Louis Velazquez MSW, LSW

## 2024-10-16 ENCOUNTER — APPOINTMENT (OUTPATIENT)
Dept: HEMATOLOGY/ONCOLOGY | Facility: CLINIC | Age: 54
End: 2024-10-16
Payer: COMMERCIAL

## 2024-10-17 ENCOUNTER — OFFICE VISIT (OUTPATIENT)
Dept: HEMATOLOGY/ONCOLOGY | Facility: CLINIC | Age: 54
End: 2024-10-17
Payer: COMMERCIAL

## 2024-10-17 ENCOUNTER — PHARMACY VISIT (OUTPATIENT)
Dept: PHARMACY | Facility: CLINIC | Age: 54
End: 2024-10-17
Payer: MEDICAID

## 2024-10-17 ENCOUNTER — APPOINTMENT (OUTPATIENT)
Dept: HEMATOLOGY/ONCOLOGY | Facility: CLINIC | Age: 54
End: 2024-10-17
Payer: COMMERCIAL

## 2024-10-17 VITALS
WEIGHT: 123.24 LBS | BODY MASS INDEX: 22.68 KG/M2 | HEART RATE: 99 BPM | OXYGEN SATURATION: 98 % | DIASTOLIC BLOOD PRESSURE: 90 MMHG | SYSTOLIC BLOOD PRESSURE: 135 MMHG | RESPIRATION RATE: 16 BRPM | TEMPERATURE: 98.1 F | HEIGHT: 62 IN

## 2024-10-17 DIAGNOSIS — C82.41 GRADE 3B FOLLICULAR LYMPHOMA OF LYMPH NODES OF NECK (MULTI): ICD-10-CM

## 2024-10-17 DIAGNOSIS — K86.1 CHRONIC PANCREATITIS, UNSPECIFIED PANCREATITIS TYPE (MULTI): Primary | ICD-10-CM

## 2024-10-17 PROCEDURE — 99214 OFFICE O/P EST MOD 30 MIN: CPT | Performed by: INTERNAL MEDICINE

## 2024-10-17 PROCEDURE — 3051F HG A1C>EQUAL 7.0%<8.0%: CPT | Performed by: INTERNAL MEDICINE

## 2024-10-17 PROCEDURE — 3048F LDL-C <100 MG/DL: CPT | Performed by: INTERNAL MEDICINE

## 2024-10-17 PROCEDURE — 3061F NEG MICROALBUMINURIA REV: CPT | Performed by: INTERNAL MEDICINE

## 2024-10-17 PROCEDURE — 3008F BODY MASS INDEX DOCD: CPT | Performed by: INTERNAL MEDICINE

## 2024-10-17 PROCEDURE — 3080F DIAST BP >= 90 MM HG: CPT | Performed by: INTERNAL MEDICINE

## 2024-10-17 PROCEDURE — RXMED WILLOW AMBULATORY MEDICATION CHARGE

## 2024-10-17 PROCEDURE — 3075F SYST BP GE 130 - 139MM HG: CPT | Performed by: INTERNAL MEDICINE

## 2024-10-17 RX ORDER — ONDANSETRON 4 MG/1
TABLET, ORALLY DISINTEGRATING ORAL
Qty: 30 TABLET | Refills: 0 | OUTPATIENT
Start: 2024-02-28

## 2024-10-17 ASSESSMENT — PAIN SCALES - GENERAL: PAINLEVEL_OUTOF10: 8

## 2024-10-17 NOTE — PATIENT INSTRUCTIONS
Office visit today with dr. Pickering    April will be scheduled for US abd due to complaints of stomach pain  C#6 treatment scheduled for 10/24 at 0900    Follow up with dr. Pickering in 3 weeks  
Satisfactory

## 2024-10-17 NOTE — PROGRESS NOTES
Patient ID: Nelsy Osullivan is a 54 y.o. female.    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  FISH is negative for Bcl-2, BCL6 and C-MYC.  PET/CT 4/4/24 showed malignant uptake in a left supraclavicular LN only.  Baseline LDH was elevated at 238 but the patient has chronically elevated LDH. She started BR chemotherapy 5/2/24.  PET/CT after 4 cycles was negative.  She has a prior history of lymphoma (question Hodgkin's versus non-Hodgkin's) at age 16 with relapse at age 19.  When she was 16, she was treated with radiation therapy alone.  When she relapsed at age 19, she had disease on both sides of the diaphragm.  Her spleen was removed.  She was treated with several cycles of chemotherapy including Adriamycin and mustard gas underwent a second course of radiation therapy to her neck and chest.  She was treated by Dr. Carbajal and Dr. Acosta at that time.  Mitral valve and aortic valve stenosis secondary to prior radiation therapy.  She underwent animal aortic valve and mitral valve replacements in 2019.  Chronic pancreatitis (question etiology).  She resumed pancreatic enzyme replacement therapy August 2024  18 mm right interpolar thyroid nodule with February 19, 2024 pathology showing benign follicular cells.  DM    The patient presents today for follow-up.  She received her fifth cycle of Bendamustine and Rituxan chemotherapy on September 5 and has not had her 6 cycle due to persistent progressive/severe abdominal pain which did require emergency room evaluation on September 23, 2024.  At that time, a CT scan of the abdomen was recommended but the patient left the emergency room AGAINST MEDICAL ADVICE because she is allergic to contrast dye did not want to wait in the emergency room for several more hours.  She does feel that her abdominal pain is typical for her.  However, her usual pain regimen has not been effective.  She  "continues to be followed by palliative care here at [facility].  On October 11, her short and long-acting Dilaudid were discontinued.  MS Contin 30 mg every 8 hours and oxycodone 10 mg every 3 hours as needed was restarted.  She continues on Cymbalta, Flexeril and as needed lidocaine.  Again, she has been compliant with her medical therapy but has not noticed a significant change in her abdominal symptoms.  She states that the pain is episodic in nature.  She is moving her bowels normally.  She did have a small amount of blood on the toilet tissue last week but this has not been consistent.  She has not been losing weight.  She has been compliant with her pancreatic enzymes.  Her amylase and lipase have actually been normal or low.  He does not have any fevers or chills.  She does not think that her abdominal pain is secondary to urinary tract infection.  Objective    BSA: 1.55 meters squared  Resp 16   Ht 1.586 m (5' 2.44\")   BMI 21.60 kg/m²      Physical Exam  Constitutional:       Appearance: Normal appearance.   HENT:      Mouth/Throat:      Mouth: Mucous membranes are moist.      Pharynx: Oropharynx is clear.   Eyes:      Conjunctiva/sclera: Conjunctivae normal.      Pupils: Pupils are equal, round, and reactive to light.   Cardiovascular:      Rate and Rhythm: Normal rate and regular rhythm.   Pulmonary:      Effort: Pulmonary effort is normal.   Abdominal:      Tenderness: There is abdominal tenderness.   Musculoskeletal:         General: Normal range of motion.      Cervical back: Normal range of motion.   Skin:     General: Skin is warm and dry.   Neurological:      General: No focal deficit present.      Mental Status: She is alert and oriented to person, place, and time.   Psychiatric:         Mood and Affect: Mood normal.         Behavior: Behavior normal.         Performance Status:  ECOG 1      Assessment/Plan   54-year-old female with multiple medical problems including chronic pancreatitis and a " prior history of Hodgkin's lymphoma that was treated with multiagent systemic chemotherapy including Adriamycin and radiation therapy to the chest. She presented in February 2024 with new left cervical adenopathy and was found to have a grade 3B follicular lymphoma with definitive involvement of the left cervical and supraclavicular lymph nodes. There was nonspecific uptake in a right inguinal lymph node.  She is currently receiving Bendamustine and rituximab chemotherapy which overall, she is tolerating reasonably well.  Her PET scan after 4 cycles of chemotherapy was negative for residual disease.  Her sixth cycle of chemotherapy has been delayed due to militating abdominal pain which seems to be a chronic recurring issue for this patient and not clearly cancer or chemotherapy related.  However, she is very compliant with palliative care follow-up and her pain management strategy.  Since she is allergic to IV contrast dye, would recommend an abdominal ultrasound to rule out any obvious significant abdominal pathology.  She appears nontoxic on exam.  If her abdominal ultrasound is unremarkable, we will reassess the patient in 1 week and determine if she is able to proceed with her sixth and final cycle of chemotherapy.  Of note, her platelet count earlier this week was only 44 so would not have been able to receive chemotherapy anyways.  If she is unable to proceed with her sixth cycle of Bendamustine and rituximab chemotherapy next week, we may omit that cycle and continue to follow the patient clinically.  Patient understood and was agreeable.  Plan:  Abdominal ultrasound  Supportive care including pain management  Continue pancreatic enzyme replacement therapy  Reassess in 1 week to determine fitness for sixth and final cycle of Bendamustine and rituximab chemotherapy  Reassess in 3 to 4 weeks.    Cancer Staging   No matching staging information was found for the patient.      Oncology History   Follicular  lymphoma grade IIIb   6/7/2022 Initial Diagnosis    Follicular lymphoma grade IIIb (CMS/HCC)     5/2/2024 -  Chemotherapy    Bendamustine + RiTUXimab, 28 Day Cycles                   Candida Pickering MD

## 2024-10-23 ENCOUNTER — HOSPITAL ENCOUNTER (OUTPATIENT)
Dept: RADIOLOGY | Facility: HOSPITAL | Age: 54
Discharge: HOME | End: 2024-10-23
Payer: COMMERCIAL

## 2024-10-23 DIAGNOSIS — K86.1 CHRONIC PANCREATITIS, UNSPECIFIED PANCREATITIS TYPE (MULTI): ICD-10-CM

## 2024-10-23 PROCEDURE — 76705 ECHO EXAM OF ABDOMEN: CPT | Performed by: RADIOLOGY

## 2024-10-23 PROCEDURE — 76705 ECHO EXAM OF ABDOMEN: CPT

## 2024-10-24 ENCOUNTER — INFUSION (OUTPATIENT)
Dept: HEMATOLOGY/ONCOLOGY | Facility: CLINIC | Age: 54
End: 2024-10-24
Payer: COMMERCIAL

## 2024-10-24 ENCOUNTER — NUTRITION (OUTPATIENT)
Dept: HEMATOLOGY/ONCOLOGY | Facility: CLINIC | Age: 54
End: 2024-10-24
Payer: COMMERCIAL

## 2024-10-24 VITALS — BODY MASS INDEX: 21.73 KG/M2 | WEIGHT: 118.1 LBS | HEIGHT: 62 IN

## 2024-10-24 VITALS
HEIGHT: 62 IN | DIASTOLIC BLOOD PRESSURE: 80 MMHG | SYSTOLIC BLOOD PRESSURE: 131 MMHG | HEART RATE: 109 BPM | WEIGHT: 118.1 LBS | RESPIRATION RATE: 16 BRPM | TEMPERATURE: 97.9 F | BODY MASS INDEX: 21.73 KG/M2 | OXYGEN SATURATION: 98 %

## 2024-10-24 DIAGNOSIS — C82.40 FOLLICULAR LYMPHOMA GRADE IIIB, UNSPECIFIED BODY REGION (MULTI): ICD-10-CM

## 2024-10-24 DIAGNOSIS — C82.41 GRADE 3B FOLLICULAR LYMPHOMA OF LYMPH NODES OF NECK (MULTI): ICD-10-CM

## 2024-10-24 DIAGNOSIS — K86.1 CHRONIC PANCREATITIS, UNSPECIFIED PANCREATITIS TYPE (MULTI): ICD-10-CM

## 2024-10-24 LAB
25(OH)D3 SERPL-MCNC: 31 NG/ML (ref 30–100)
ALBUMIN SERPL BCP-MCNC: 3.2 G/DL (ref 3.4–5)
ALP SERPL-CCNC: 451 U/L (ref 33–110)
ALT SERPL W P-5'-P-CCNC: 31 U/L (ref 7–45)
ANION GAP SERPL CALC-SCNC: 9 MMOL/L (ref 10–20)
AST SERPL W P-5'-P-CCNC: 80 U/L (ref 9–39)
BASOPHILS # BLD AUTO: 0.01 X10*3/UL (ref 0–0.1)
BASOPHILS NFR BLD AUTO: 0.3 %
BILIRUB SERPL-MCNC: 0.4 MG/DL (ref 0–1.2)
BUN SERPL-MCNC: 10 MG/DL (ref 6–23)
CALCIUM SERPL-MCNC: 8.7 MG/DL (ref 8.6–10.3)
CHLORIDE SERPL-SCNC: 102 MMOL/L (ref 98–107)
CO2 SERPL-SCNC: 28 MMOL/L (ref 21–32)
CREAT SERPL-MCNC: 0.65 MG/DL (ref 0.5–1.05)
EGFRCR SERPLBLD CKD-EPI 2021: >90 ML/MIN/1.73M*2
EOSINOPHIL # BLD AUTO: 0.26 X10*3/UL (ref 0–0.7)
EOSINOPHIL NFR BLD AUTO: 6.8 %
ERYTHROCYTE [DISTWIDTH] IN BLOOD BY AUTOMATED COUNT: 18.9 % (ref 11.5–14.5)
GLUCOSE SERPL-MCNC: 178 MG/DL (ref 74–99)
HCT VFR BLD AUTO: 31.1 % (ref 36–46)
HGB BLD-MCNC: 10.7 G/DL (ref 12–16)
IMM GRANULOCYTES # BLD AUTO: 0.01 X10*3/UL (ref 0–0.7)
IMM GRANULOCYTES NFR BLD AUTO: 0.3 % (ref 0–0.9)
LDH SERPL L TO P-CCNC: 283 U/L (ref 84–246)
LYMPHOCYTES # BLD AUTO: 0.47 X10*3/UL (ref 1.2–4.8)
LYMPHOCYTES NFR BLD AUTO: 12.3 %
MCH RBC QN AUTO: 30 PG (ref 26–34)
MCHC RBC AUTO-ENTMCNC: 34.4 G/DL (ref 32–36)
MCV RBC AUTO: 87 FL (ref 80–100)
MONOCYTES # BLD AUTO: 0.68 X10*3/UL (ref 0.1–1)
MONOCYTES NFR BLD AUTO: 17.8 %
NEUTROPHILS # BLD AUTO: 2.38 X10*3/UL (ref 1.2–7.7)
NEUTROPHILS NFR BLD AUTO: 62.5 %
PLATELET # BLD AUTO: 60 X10*3/UL (ref 150–450)
POTASSIUM SERPL-SCNC: 3.5 MMOL/L (ref 3.5–5.3)
PROT SERPL-MCNC: 5.8 G/DL (ref 6.4–8.2)
RBC # BLD AUTO: 3.57 X10*6/UL (ref 4–5.2)
SODIUM SERPL-SCNC: 135 MMOL/L (ref 136–145)
URATE SERPL-MCNC: 4.2 MG/DL (ref 2.3–6.7)
WBC # BLD AUTO: 3.8 X10*3/UL (ref 4.4–11.3)

## 2024-10-24 PROCEDURE — 80053 COMPREHEN METABOLIC PANEL: CPT

## 2024-10-24 PROCEDURE — 82306 VITAMIN D 25 HYDROXY: CPT | Performed by: INTERNAL MEDICINE

## 2024-10-24 PROCEDURE — 83615 LACTATE (LD) (LDH) ENZYME: CPT

## 2024-10-24 PROCEDURE — 36591 DRAW BLOOD OFF VENOUS DEVICE: CPT

## 2024-10-24 PROCEDURE — 2500000004 HC RX 250 GENERAL PHARMACY W/ HCPCS (ALT 636 FOR OP/ED): Mod: SE | Performed by: INTERNAL MEDICINE

## 2024-10-24 PROCEDURE — 85025 COMPLETE CBC W/AUTO DIFF WBC: CPT

## 2024-10-24 PROCEDURE — 84550 ASSAY OF BLOOD/URIC ACID: CPT

## 2024-10-24 RX ORDER — HEPARIN 100 UNIT/ML
500 SYRINGE INTRAVENOUS AS NEEDED
OUTPATIENT
Start: 2024-10-24

## 2024-10-24 RX ORDER — HEPARIN SODIUM,PORCINE/PF 10 UNIT/ML
50 SYRINGE (ML) INTRAVENOUS AS NEEDED
OUTPATIENT
Start: 2024-10-24

## 2024-10-24 RX ORDER — HEPARIN 100 UNIT/ML
500 SYRINGE INTRAVENOUS AS NEEDED
Status: DISCONTINUED | OUTPATIENT
Start: 2024-10-24 | End: 2024-10-24 | Stop reason: HOSPADM

## 2024-10-24 ASSESSMENT — PAIN SCALES - GENERAL: PAINLEVEL_OUTOF10: 0-NO PAIN

## 2024-10-24 NOTE — PROGRESS NOTES
NUTRITION Follow UP NOTE    Nutrition Assessment     Reason for Visit:  Nelsy Osullivan is a 54 y.o. 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   She is status post cervical lymph node excision February 8, 2024.     She is receiving Bendamustine and Rituxan- 28 Day Cycles.     Referred to this service per screen and assessed 5/30 during infusion.  She was seen again for follow up today.  Note pt is unable to get her tx today d/t low platelets.  She is foregoing her last tx therefore.    Note pt was admitted on 7/9 for Hypoglycemia and found to have:  Pneumonitis  Fluid overload leading to pulmonary edema  Severe protein calorie malnutrition with hypoalbuminemia    Note pt with a h/o Chronic pancreatitis (r/t DM) with chronic abdominal pain. Per MD, she was unable to tolerate pancreatic enzyme replacement therapy.     Followed by pal care and initiated on narcotic regimen.  CNP noted pt with food insecurities.  Pt was referred to [facility] and had an appointment on 6/25/24.    Pt reported bedbugs in her apartment for quite some time. On 8/8/24, she reports they are all gone.    PMH noted: DM I, hypothyroidism, CHF, COPD, CAD, Dyslipidemia, Major depressive disorder, UGB, malnutrition, Cholecystectomy, C-dif (5/2023)    Note pt with [facility] for coverage.  Pt [place] Boost [facility]HC [place] [facility] [place] Met[place] Panel                    Component  Ref Range & Units 9 d ago  (10/15/24) 1 mo ago  (9/23/24) 1 mo ago  (9/23/24) 1 mo ago  (9/19/24) 1 mo ago  (9/10/24) 1 mo ago  (9/5/24) 1 mo ago  (8/27/24)   Glucose  74 - 99 mg/dL 231 High   208 High  188 High  268 High  237 High  241 High    Sodium  136 - 145 mmol/L 134 Low   132 Low  131 Low  131 Low  133 Low  133 Low    Potassium  3.5 - 5.3 mmol/L 3.9  4.4 4.1 4.2 4.0 4.0   Chloride  98 - 107 mmol/L 105  100 96 Low  101 102 98   Bicarbonate  21 - 32 mmol/L 24  27 30 25 26 31   Anion Gap  10 - 20  "mmol/L 9 Low   9 Low  9 Low  9 Low  9 Low  8 Low    Urea Nitrogen  6 - 23 mg/dL 9  13 12 9 9 14   Creatinine  0.50 - 1.05 mg/dL 0.65  0.71 0.86 0.76 0.67 0.80   eGFR  >60 mL/min/1.73m*2 >90  >90 CM 79 CM >90 CM >90 CM 87 CM   Comment: Calculations of estimated GFR are performed using the 2021 CKD-EPI Study Refit equation without the race variable for the IDMS-Traceable creatinine methods.  https://jasn.asnjournals.org/content/early/2021/09/22/ASN.6215714813   Calcium  8.6 - 10.3 mg/dL 8.8  8.7 8.5 Low  8.6 9.2 8.8   Albumin  3.4 - 5.0 g/dL 3.0 Low  3.2 Low   3.3 Low  3.1 Low  3.2 Low  3.1 Low    Alkaline Phosphatase  33 - 110 U/L 461 High  424 High   460 High  319 High  312 High  358 High    Total Protein  6.4 - 8.2 g/dL 5.7 Low  6.1 Low   6.3 Low  6.0 Low  6.2 Low  6.1 Low    AST  9 - 39 U/L 48 High  59 High   53 High  31 23 42 High    Bilirubin, Total  0.0 - 1.2 mg/dL 0.3 0.4  0.3 0.3 0.3 0.4   ALT  7 - 45 U/L 30 31 CM  19 CM 13 CM 13 CM 20 CM   Comment: Patients treated with Sulfasalazine may generate falsely decreased results for ALT.   Resulting Agency [facility]             Lipase              Component  Ref Range & Units 9 d ago  (10/15/24) 1 mo ago  (9/23/24) 1 yr ago  (10/17/23) 1 yr ago  (5/20/23) 1 yr ago  (4/5/23) 1 yr ago  (2/2/23) 2 yr ago  (9/29/22)   Lipase  9 - 82 U/L 4 Low  9 3 Low  3 Low  CM 3 Low  CM 5 Low  CM 6 Low  CM   Resulting Agency [facility]              Narrative  Performed by: [facility]    Amylase        Component  Ref Range & Units 9 d ago   Amylase  29 - 103 U/L 31   Resulting Agency [facility]             Specimen Collected: 10/15/24 10:17 Last Resulted: 10/15/24 11:25         Anthropometrics:  Anthropometrics  Height: 158.6 cm (5' 2.44\")  Weight: 53.6 kg (118 lb 1.6 oz)  BMI (Calculated): 21.3  IBW/kg (Dietitian Calculated): 50.9 kg  Weight Change  Weight History " / % Weight Change: Loss of 2.5% in 3 weeks  Significant Weight Loss: No (h/o)    Historically wt has fluctuated greatly with periods of significant losses.  Pt states this trend is normal for her.     Wt Readings    10/24/24 53.6 kg   10/11/24 53.8 kg (118 lb 9.7 oz)   9/19/24 55.2 kg    9/5/24 55.2 kg- Loss of 1.5 kg (2.8%) in 1 month after gain.  Note that LE edema has improved.    8/8/24 55.9 kg- gain of 3.4 kg in 2 weeks    7/25/24 52.5 kg- loss of 3.8 kg (6.7%) in 1 month- significant   6/27/24 56.3 kg   05/30/24 56.8 kg (125 lb 4.8 oz)- question validity of this wt, as it reflects a 3.7 kg gain in 6 days???   05/24/24 53.1 kg (117 lb 1 oz)- loss of 2.3 kg (4.2%) in 3 weeks- SIGNIFICANT   05/10/24 54 kg (119 lb 0.8 oz)   05/03/24 55.4 kg (122 lb 3.2 oz)- gain of 3.2 kg in 1 month   04/30/24 54.7 kg (120 lb 8 oz)   04/22/24 53.6 kg (118 lb 2.7 oz)   04/11/24 53.6 kg (118 lb 2.7 oz)   04/03/24 52.2 kg (115 lb)- loss of 4 kg (7.1%) in 2 weeks- SIGNIFICANT   03/21/24 56.2 kg (123 lb 14.4 oz)- gain of 14 kg in < 12 months   03/14/24 52.2 kg (115 lb 1.3 oz)   02/23/24 54.9 kg (121 lb)   01/26/24 54.4 kg (120 lb)   01/09/24 51.7 kg (114 lb)     12/13/23 49.8 kg (109 lb 12.8 oz)   10/17/23 45.4 kg (100 lb)   09/11/23 47.2 kg (104 lb)   08/30/23 (!) 40.8 kg (90 lb)   04/04/23 (!) 42.2 kg (93 lb)- loss of 6.3 kg (13%) in < 6 months  SIGNIFICANT   10/11/22 48.5 kg (107 lb)- gain of 4 kg in < 5 months   05/20/22 44.5 kg (98 lb)   04/06/22 44 kg (97 lb)            Food And Nutrient Intake:  Food and Nutrient History  Food and Nutrient History: States she is eating well and a lot.  Notes she brought her Creon to tx incase she snacks while here.  Reports she is taking it before she leaves the house if/when she ges out to eat.  Energy Intake: Good > 75 %  Fluid Intake: Adequate per labs  GI Symptoms: diarrhea  Dentition: edentulous   Diarrhea improved on Creon.  Recall prior to PERT, pt C/o diarrhea/oily stools, reporting 7-20  "BM's in 24 hrs.  Stated she was eating but it was coming back out.                                               Micronutrient Intake  Vitamin Intake: Multivitamin (Woman > 50)    Food Supplement Intake  Oral Nutrition Supplements: Boost Very High Calorie (2 per day)  Reports she has not been drinking ONS since she has not had tx.  She wants to have them with decreased appetite and intake post chemo.   Notes she is aware how to reorder.             Nutrition Focused Physical Exam Findings:      Subcutaneous Fat Loss  Orbital Fat Pads: Mild-Moderate (slight dark circles and slight hollowing)  Buccal Fat Pads: Mild-Moderate (flat cheeks, minimal bounce)  Triceps: Defer  Ribs: Defer    Muscle Wasting  Temporalis: Mild-Moderate (slight depression)  Pectoralis (Clavicular Region): Defer  Deltoid/Trapezius: Defer  Interosseous: Defer  Trapezius/Infraspinatus/Supraspinatus (Scapular Region): Defer  Quadriceps: Defer  Gastrocnemius: Defer              Energy Needs  Calculated Energy Needs Using Equations  Height: 158.6 cm (5' 2.44\")  Estimated Energy Needs  Total Energy Estimated Needs (kCal): 1760 kCal  Total Estimated Energy Need per Day (kCal/kg): 31 kCal/kg  Estimated Fluid Needs  Total Fluid Estimated Needs (mL/kg): 30 mL/kg  Estimated Protein Needs  Total Protein Estimated Needs (g): 74 g  Total Protein Estimated Needs (g/kg): 1.3 g/kg        Nutrition Diagnosis   Malnutrition Diagnosis  Patient has Malnutrition Diagnosis: Yes  Diagnosis Status: Ongoing  Malnutrition Diagnosis: Moderate malnutrition related to chronic disease or condition  As Evidenced by: h/o significant wt loss with mild to moderate losses of both adipose and muscle stores per NFPE  Additional Assessment Information: Pt likely with micronutrient deficiencies d/t chronic malabsorption, jose eduardo the fat soluble vitamins A, D, E and K    Nutrition Diagnosis  Patient has Nutrition Diagnosis: Yes  Diagnosis Status (1): Ongoing  Nutrition Diagnosis 1: Altered " GI function  Related to (1): chronic pancreatitis without PERT for decades, side effects from tx  As Evidenced by (1): clinical hx and pt report  Additional Nutrition Diagnosis: Diagnosis 2  Diagnosis Status (2): Resolved  Nutrition Diagnosis 2: Altered nutrition related to laboratory values  Related to (2): Questionable excessive administration of insulin at times and/or inappropriate timing of intake  As Evidenced by (2): BS of 66 on 7/23, 7/9 and 6/27    Nutrition Interventions/Recommendations   Nutrition Prescription  Individualized Nutrition Prescription Provided for : Low-moderate concentrated sugar JOON    Food and Nutrition Delivery  Food and Nutrition Delivery  Meals & Snacks: Carbohydrate-modified diet  Goals: non-fasting BS< 250  Medical Food Supplement: Boost Very High Calorie  Goals: Twice daily to provide 1060 calories and 44 g of protein  Vitamin Supplement Therapy: D  Goals: Replete as needed (Added to draw today)  Other:: PERT  Goals: Consistent use (Encouraged to take Creon prior to her first bite of food or it will be much less effective)    Nutrition Education       Coordination of Care       There are no Patient Instructions on file for this visit.    Nutrition Monitoring and Evaluation   Food/Nutrient Related History Monitoring  Monitoring and Evaluation Plan: Fluid intake, Amount of food, Carbohydrate intake  Fluid Intake: Estimated fluid intake  Criteria: Meet daily hydration needs  Amount of Food: Estimated amout of food  Criteria: Meet energy and protein needs  Estimated carbohydrate intake: Estimated carbohydrate intake  Criteria: improved BS control as above  Body Composition/Growth/Weight History  Monitoring and Evaluation Plan: Weight  Weight: Weight change  Criteria: wt gain and Repletion  Biochemical Data, Medical Tests and Procedures  Monitoring and Evaluation Plan: Electrolyte/renal panel, Glucose/endocrine profile, Vitamin profile  Criteria: WNL  Glucose/Endocrine Profile: Glucose,  casual, Hemoglobin A1c (HgbA1c)  Criteria: < 250; < 7.0 respectively  Vitamin Profile: Vitamin D, 25 hydroxy  Criteria: 30-80  Nutrition Focused Physical Findings  Monitoring and Evaluation Plan: Adipose, Digestive System, Muscles  Adipose: Loss of subcutaneous fat  Criteria: No further loss  Digestive System: Diarrhea  Criteria: Solid stools that sink  Muscles: Muscle atrophy  Criteria: No further loss- repletion          Time Spent  Prep time on day of patient encounter: 5 minutes  Time spent directly with patient, family or caregiver: 20 minutes  Additional Time Spent on Patient Care Activities: 0 minutes  Documentation Time: 15 minutes  Other Time Spent: 0 minutes  Total: 40 minutes

## 2024-10-24 NOTE — PROGRESS NOTES
Pt here for C6D1 td ritux. Plt were 60, notified Dr Pickering, pt will not get treatment today. We will omit this cycle and be done. Pt instructed to come to [facility] to discuss further plan of care. Pt is aware. Will call with further questions or concerns.

## 2024-10-25 ENCOUNTER — INFUSION (OUTPATIENT)
Dept: HEMATOLOGY/ONCOLOGY | Facility: CLINIC | Age: 54
End: 2024-10-25
Payer: COMMERCIAL

## 2024-10-28 ENCOUNTER — APPOINTMENT (OUTPATIENT)
Dept: HEMATOLOGY/ONCOLOGY | Facility: CLINIC | Age: 54
End: 2024-10-28
Payer: COMMERCIAL

## 2024-11-01 ENCOUNTER — SOCIAL WORK (OUTPATIENT)
Dept: HEMATOLOGY/ONCOLOGY | Facility: CLINIC | Age: 54
End: 2024-11-01
Payer: COMMERCIAL

## 2024-11-01 ENCOUNTER — OFFICE VISIT (OUTPATIENT)
Dept: PALLIATIVE MEDICINE | Facility: CLINIC | Age: 54
End: 2024-11-01
Payer: COMMERCIAL

## 2024-11-01 ENCOUNTER — PHARMACY VISIT (OUTPATIENT)
Dept: PHARMACY | Facility: CLINIC | Age: 54
End: 2024-11-01
Payer: MEDICAID

## 2024-11-01 VITALS
BODY MASS INDEX: 21.69 KG/M2 | OXYGEN SATURATION: 100 % | TEMPERATURE: 98.4 F | WEIGHT: 120.26 LBS | HEART RATE: 98 BPM | DIASTOLIC BLOOD PRESSURE: 88 MMHG | SYSTOLIC BLOOD PRESSURE: 134 MMHG

## 2024-11-01 DIAGNOSIS — G89.3 NEOPLASM RELATED PAIN: ICD-10-CM

## 2024-11-01 DIAGNOSIS — Z59.41 FOOD INSECURITY: Primary | ICD-10-CM

## 2024-11-01 PROCEDURE — 99215 OFFICE O/P EST HI 40 MIN: CPT | Performed by: CLINICAL NURSE SPECIALIST

## 2024-11-01 PROCEDURE — RXMED WILLOW AMBULATORY MEDICATION CHARGE

## 2024-11-01 RX ORDER — OXYCODONE HYDROCHLORIDE 15 MG/1
15 TABLET ORAL EVERY 4 HOURS PRN
Qty: 180 TABLET | Refills: 0 | Status: SHIPPED | OUTPATIENT
Start: 2024-11-01

## 2024-11-01 RX ORDER — MORPHINE SULFATE 60 MG/1
60 TABLET, FILM COATED, EXTENDED RELEASE ORAL 3 TIMES DAILY
Qty: 90 TABLET | Refills: 0 | Status: SHIPPED | OUTPATIENT
Start: 2024-11-01

## 2024-11-01 SDOH — ECONOMIC STABILITY - FOOD INSECURITY: FOOD INSECURITY: Z59.41

## 2024-11-01 ASSESSMENT — PAIN SCALES - GENERAL: PAINLEVEL_OUTOF10: 8

## 2024-11-01 NOTE — PROGRESS NOTES
SUPPORTIVE AND PALLIATIVE ONCOLOGY FOLLOW UP - OUTPATIENT      SERVICE DATE: 11/22/2024    Referred by:  Dr. 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  She is newly diagnosed and is planned to get bendamustine and rituxan every 28 days. She had been following with Jonathan Worrell with [facility] palliative care, but has since requested to switch to palliative care closer to where she lives. She was recently hospitalized for CHF exacerbation    Information was collected from chart review, discussion with patient/family, and discussion with care team     SUBJECTIVE:  Patient is present with her daughter. She states she is in pain because she is out of her Oxycodone. She has run out about 2 weeks prematurely. She states she has felt like her pain has been so bad that she has been needing to take her Oxycodone more frequently that every 4hrs. She also states she had a lot of pain from her procedure she had done where they removed 1 liter of fluid. Reviewed medication compliance and contacting supportive oncology if she feels like her symptoms are not being controlled well. Pt is agreeable to this. Pt also endorses nausea that feels like it has worsened.  She states that she is having burning epigastric pain. We discussed a celiac plexus block and that her pain appears to be more chronic pancreatitis pain than it is cancer related pain. She states that she does agree with this assessment and is hesitant with having a block done. We discussed referring her to pain management to be evaluated for this block and she is agreeable with this. We discussed the need  to eventually wean off of the pain medication as she is through her treatment.         Pain Assessment:  Pain Score:   810  Location: Abdomenabdomen  Education:        Symptom Assessment:  ROS otherwise negative, pertinent positives documented in the HPI       Information obtained from: chart review and interview of patient  ______________________________________________________________________     Oncology History   Follicular lymphoma grade IIIb   6/7/2022 Initial Diagnosis    Follicular lymphoma grade IIIb (CMS/HCC)     5/2/2024 -  Chemotherapy    Bendamustine + RiTUXimab, 28 Day Cycles         No past medical history on file.  Past Surgical History:   Procedure Laterality Date    OTHER SURGICAL HISTORY  04/14/2020    Cholecystectomy    OTHER SURGICAL HISTORY  04/14/2020    Exploratory laparoscopy    OTHER SURGICAL HISTORY  04/14/2020    Heart surgery     No family history on file.     SOCIAL HISTORY  Children 4 children and 5 grandchildren    Social History:  reports that she quit smoking about 2 years ago. Her smoking use included cigarettes. She has been exposed to tobacco smoke. She has never used smokeless tobacco. She reports that she does not currently use alcohol. She reports that she does not use drugs.    She has food insecurities and has bed bugs   REVIEW OF SYSTEMS  Review of systems negative unless noted in HPI.       Objective     Palliative Performance Scale % (PPS) 90     Labs:  Results for orders placed or performed during the hospital encounter of 11/19/24 (from the past 96 hours)   Non-gynecologic cytology   Result Value Ref Range    Case Report       Non-gynecologic Cytology                          Case: [id number]                                   Authorizing Provider:  Candida Pickering MD       Collected:           11/19/2024 1103              Ordering Location:     [facility]  Received:            11/19/2024 1122              Pathologist:           Natividad Fisher MD                                                              Specimen:    PLEURAL FLUID RIGHT SIDE                                                                   Final Cytological Interpretation         A. PLEURAL FLUID RIGHT SIDE    No malignant cells identified   Favor reactive mesothelial cells   Many lymphocytes present              Slide(s) initially screened by GUNNAR Sykes [facility] [address] [facility] [id number]  By the signature on this report, the individual or group listed as making the Final Interpretation/Diagnosis certifies that they have reviewed this case.       Clinical History       pleural fluid      Specimen Description       A. PLEURAL FLUID RIGHT SIDE.  Received 200 ml yellow cloudy fluid with particles in sterile tube. A total volume of 200 ml has been sent for cytological analysis to the cytology department at [facility].        Specimen Processing Detail       A1 Slides Only (No Block)   A1-1 Pap Stain NGYN ThinPrep   A2 Cell Block   A2-1 H&E      Sterile Fluid Culture/Smear    Specimen: Pleural; Fluid   Result Value Ref Range    Sterile Fluid Culture/Smear No growth aerobically and anaerobically     Gram Stain (2+) Few Polymorphonuclear leukocytes     Gram Stain No organisms seen    AFB Culture/Smear    Specimen: Pleural; Fluid   Result Value Ref Range    AFB Culture       Culture in progress and will be examined weekly. A result will be issued either when positive or after 8 weeks incubation.    AFB Stain No acid fast bacilli seen    AFB Processed   Result Value Ref Range    Extra Tube Hold for add-ons.      *Note: Due to a large number of results and/or encounters for the requested time period, some results have not been displayed. A complete set of results can be found in Results Review.                   Medications:   Current Outpatient Medications   Medication Instructions    albuterol 90 mcg/actuation inhaler 2 puffs,  Every 6 hours    aspirin 81 mg, Daily    atorvastatin (LIPITOR) 40 mg, Daily    BD Alcohol Swabs pads, medicated USE SIX TIMES DAILY    bisacodyl (DULCOLAX (BISACODYL)) 5 mg, Daily PRN    blood-glucose sensor (FreeStyle Ashley 3 Sensor) device 1 each, miscellaneous, Continuous, Use to check glucose, change every 14 days    blood-glucose sensor (FreeStyle Ashley 3 Sensor) device 1 each, miscellaneous, Continuous, Use to check glucose, change every 15 days *FreeStyle Ashley 3 plus    carvedilol (COREG) 12.5 mg, Every 12 hours    cetirizine (ZYRTEC) 10 mg, Daily    cyclobenzaprine (FLEXERIL) 10 mg, 3 times daily PRN    diphenoxylate-atropine (Lomotil) 2.5-0.025 mg tablet 1 tablet, oral, 4 times daily    DULoxetine (CYMBALTA) 60 mg, Daily    DULoxetine (CYMBALTA) 20 mg, Daily    esomeprazole (NEXIUM) 40 mg, Daily PRN    estradiol (ESTRACE) 2 mg, Daily    FreeStyle Ashley 3 Caldwell misc Use as instructed    hydrocortisone 0.5 % cream Topical, 2 times daily    insulin aspart (NovoLOG) 100 unit/mL injection FOR USE WITH INSULIN PUMP MAX DAILY DOSE  UNITS    levothyroxine (Synthroid, Levoxyl) 125 mcg tablet TAKE 1 TABLET BY MOUTH IN THE MORNING ON EMPTY STOMACH    lidocaine-prilocaine (Emla) 2.5-2.5 % cream APPLY AT INSERTION SITE 30-45 MINUTES BEFORE YOU ARRIVE FOR BLOOD WORK OR CHEMOTHERAPY    melatonin 3 mg tablet 2 tablets, Nightly    metoprolol succinate XL (TOPROL-XL) 50 mg, Daily    mirtazapine (REMERON) 7.5 mg, Nightly    morphine CR (MS CONTIN) 60 mg, oral, 4 times daily, Do not crush, chew, or split. Cancer related pain G89.3    naloxone (Narcan) 4 mg/0.1 mL nasal spray Use 1 spray in one nostril as needed for overdose. May repeat every 2 to 3 min in alternating nostrils until medical assistance is available    naratriptan (AMERGE) 2.5 mg, oral, Once as needed    OLANZapine (ZYPREXA) 5 mg, oral, Nightly    omeprazole (PRILOSEC) 40 mg, Daily    Omnipod Dash Pods, Gen 4, cartridge 1 each, miscellaneous, Every 72  hours    ondansetron (ZOFRAN) 8 mg, oral, Every 8 hours PRN    ondansetron ODT (Zofran-ODT) 4 mg disintegrating tablet Dissolve 1 tablet under the tongue once daily as needed    OXcarbazepine (Trileptal) 150 mg tablet 1 tablet, Every 12 hours scheduled (0630,1830)    oxyCODONE (ROXICODONE) 15 mg, oral, Every 3 hours PRN    pancrelipase, Lip-Prot-Amyl, (Creon) 36,000-114,000- 180,000 unit capsule,delayed release(DR/EC) capsule 6 capsules, oral, 2 times daily    phenazopyridine (Pyridium) 200 mg tablet 1 tablet, 3 times daily    prochlorperazine (COMPAZINE) 10 mg, oral, Every 6 hours PRN    promethazine (PHENERGAN) 25 mg, Every 6 hours PRN    sennosides-docusate sodium (Senna-S) 8.6-50 mg tablet 1 tablet, oral, 2 times daily    spironolactone (Aldactone) 25 mg tablet Daily RT    torsemide (DEMADEX) 20 mg, oral, Daily       Allergies:   Allergies   Allergen Reactions    Acetaminophen-Codeine Unknown    Adhesive Unknown    Codeine Unknown    Fentanyl Unknown     Reaction from Community: Other(See Desc) Comments: MIGRAINES IF USED FOR PAIN, BUT OK FOR ANESTHESIA    Other reaction(s): Intolerance    Fetrin Unknown    Ketorolac Itching    Meperidine Unknown     Reaction from Community: Other(See Desc)    Metformin Unknown    Propoxyphene N-Acetaminophen GI Upset    Silver Hives and Other     Tegaderm    Adhesive Tape-Silicones Rash    Iodinated Contrast Media Rash     Does well with 13hour premedication    Latex Rash    Penicillins Rash    Wound Dressings Rash     tagaderm       PHYSICAL EXAMINATION  Vital Signs:   Vital signs reviewed  Vitals:    11/22/24 1121   BP: 154/89   Pulse: (!) 112   Resp: 16   Temp: 36.2 °C (97.2 °F)   SpO2: 96%                     Pain Score:   8         Physical Exam  Constitutional:       Appearance: Normal appearance. She is normal weight.   HENT:      Head: Normocephalic and atraumatic.      Mouth/Throat:      Mouth: Mucous membranes are dry.   Eyes:      Extraocular Movements: Extraocular  movements intact.   Cardiovascular:      Comments: No signs of cardiac distress  Pulmonary:      Effort: Pulmonary effort is normal.      Comments: No signs of respiratory distress  Abdominal:      General: Abdomen is flat.      Palpations: Abdomen is soft.   Musculoskeletal:         General: Normal range of motion.   Skin:     General: Skin is warm and dry.   Neurological:      Mental Status: She is alert and oriented to person, place, and time. Mental status is at baseline.   Psychiatric:         Mood and Affect: Mood normal.         Behavior: Behavior normal.         Thought Content: Thought content normal.         Judgment: Judgment normal.         ASSESSMENT/PLAN    Pain  Pain is: cancer related pain and chronic  Type: visceral  Pain control: sub-optimally controlled  Previously tried/intolerances: Did not find oxycodone effective, avoid Tylenol due to liver impairment, did not feel dilaudid controlled her pain very well    Pain Plan:  Continue Cymbalta 80mg daily   Continue Flexeril 10mg TID PRN   Continue Lidocaine rectally   Increase MS CR 60mg 4x/day  Increase Oxycodone to 15mg Q3 PRN   Refer to pain management     Opioid Use  Medication Management:   OARRS report reviewed with no aberrant behavior; consistent with  prescriptions/records and patient history  .  Overdose Risk Score 390.   This has been discussed with patient.   We will continue to closely monitor the patient for signs of prescription misuse including UDS, OARRS review and subjective reports at each visit.  No concurrent benzodiazepine use   I am a provider who is certified in Hospice and Palliative Medicine and have conducted a face-face visit and examination for this patient.  Routine Urine Drug Screen is needed and was completed on 5/10/24 appropriately positive for opioids and negative for illicit substances  Controlled Substance Agreement: is needed. Completed on: 5/10/24  Specifically discussed that controlled substance  prescriptions will only be provided by our group as outlined in the completed agreement  Naloxone is needed  Red Flags: Called in early for a refill as she was out 2 weeks early on 11/15     Nausea   Intermittent nausea without vomiting related to chemotherapy  Nausea Plan:  Change Zyprexa to 5mg qhs  Compazine 10mg PRN   Zofran PRN    Diarrhea   Related to chemotherapy  Diarrhea Plan:  Imodium prn      Altered Mood  Chronic anxiety and depression related to health concerns   Mood Plan:  Continue Remeron 7.5mg nightly   Continue Cymbalta 60mg daily       Decreased appetite  Related to malignancy and chemotherapy  Anorexia Plan:   Zyprexa 5mg qhs    Itching related to bedbug infestation   Continue Atarax 25mg TID PRN    Lower extremity edema  Renewed her prescription for torsemide and instructed to follow up with her cardiologist     Food insecurities   Refer to food for life     Advance Directives  Existence of Advance Directives: Reports that she has completed them   Decision maker: OSMEL is reportedly her sister   Code Status: Full code    Next Follow-Up Visit:  Return to clinic in 3 weeks     Signature and billing  Thank you for allowing us to participate in the care of this patient. Recommendations will be communicated back to the consulting service by way of shared electronic medical record or face-to-face.    Medical complexity was high level due to due to complexity of problems, extensive data review, and high risk of management/treatment.  Time was spent on the following: Prep Time, Time Directly with Patient/Family/Caregiver, Documentation Time. Total time spent: 45      DATA   Diagnostic tests and information reviewed for today's visit:  Most recent labs, Most recent imaging, Medications           SIGNATURE: RALPH Eugene-CNS    Contact information:  [facility]ive [facility] Palliative Oncology  Monday-Friday 8 AM-5 PM  Phone:  [phone], press option #5, then option #1.   Or Epic Secure Chat

## 2024-11-04 ENCOUNTER — PHARMACY VISIT (OUTPATIENT)
Dept: PHARMACY | Facility: CLINIC | Age: 54
End: 2024-11-04
Payer: MEDICAID

## 2024-11-04 ENCOUNTER — APPOINTMENT (OUTPATIENT)
Dept: CARDIOLOGY | Facility: HOSPITAL | Age: 54
End: 2024-11-04
Payer: COMMERCIAL

## 2024-11-04 ENCOUNTER — HOSPITAL ENCOUNTER (EMERGENCY)
Facility: HOSPITAL | Age: 54
Discharge: HOME | End: 2024-11-04
Attending: STUDENT IN AN ORGANIZED HEALTH CARE EDUCATION/TRAINING PROGRAM
Payer: COMMERCIAL

## 2024-11-04 VITALS
TEMPERATURE: 97.8 F | WEIGHT: 120 LBS | BODY MASS INDEX: 22.08 KG/M2 | HEART RATE: 80 BPM | SYSTOLIC BLOOD PRESSURE: 131 MMHG | OXYGEN SATURATION: 98 % | HEIGHT: 62 IN | DIASTOLIC BLOOD PRESSURE: 74 MMHG | RESPIRATION RATE: 12 BRPM

## 2024-11-04 DIAGNOSIS — E87.6 HYPOKALEMIA: ICD-10-CM

## 2024-11-04 DIAGNOSIS — R19.7 DIARRHEA, UNSPECIFIED TYPE: Primary | ICD-10-CM

## 2024-11-04 DIAGNOSIS — D61.818 PANCYTOPENIA: ICD-10-CM

## 2024-11-04 LAB
ALBUMIN SERPL BCP-MCNC: 3.2 G/DL (ref 3.4–5)
ALP SERPL-CCNC: 580 U/L (ref 33–110)
ALT SERPL W P-5'-P-CCNC: 30 U/L (ref 7–45)
ANION GAP SERPL CALC-SCNC: 9 MMOL/L (ref 10–20)
AST SERPL W P-5'-P-CCNC: 53 U/L (ref 9–39)
ATRIAL RATE: 94 BPM
BASOPHILS # BLD AUTO: 0.02 X10*3/UL (ref 0–0.1)
BASOPHILS NFR BLD AUTO: 0.8 %
BILIRUB SERPL-MCNC: 0.3 MG/DL (ref 0–1.2)
BUN SERPL-MCNC: 10 MG/DL (ref 6–23)
CALCIUM SERPL-MCNC: 8.7 MG/DL (ref 8.6–10.3)
CHLORIDE SERPL-SCNC: 104 MMOL/L (ref 98–107)
CO2 SERPL-SCNC: 25 MMOL/L (ref 21–32)
CREAT SERPL-MCNC: 0.75 MG/DL (ref 0.5–1.05)
EGFRCR SERPLBLD CKD-EPI 2021: >90 ML/MIN/1.73M*2
EOSINOPHIL # BLD AUTO: 0.15 X10*3/UL (ref 0–0.7)
EOSINOPHIL NFR BLD AUTO: 5.7 %
ERYTHROCYTE [DISTWIDTH] IN BLOOD BY AUTOMATED COUNT: 19.9 % (ref 11.5–14.5)
GLUCOSE SERPL-MCNC: 87 MG/DL (ref 74–99)
HCT VFR BLD AUTO: 29.3 % (ref 36–46)
HGB BLD-MCNC: 10 G/DL (ref 12–16)
IMM GRANULOCYTES # BLD AUTO: 0.01 X10*3/UL (ref 0–0.7)
IMM GRANULOCYTES NFR BLD AUTO: 0.4 % (ref 0–0.9)
LIPASE SERPL-CCNC: 6 U/L (ref 9–82)
LYMPHOCYTES # BLD AUTO: 0.31 X10*3/UL (ref 1.2–4.8)
LYMPHOCYTES NFR BLD AUTO: 11.9 %
MCH RBC QN AUTO: 29.7 PG (ref 26–34)
MCHC RBC AUTO-ENTMCNC: 34.1 G/DL (ref 32–36)
MCV RBC AUTO: 87 FL (ref 80–100)
MONOCYTES # BLD AUTO: 0.6 X10*3/UL (ref 0.1–1)
MONOCYTES NFR BLD AUTO: 23 %
NEUTROPHILS # BLD AUTO: 1.52 X10*3/UL (ref 1.2–7.7)
NEUTROPHILS NFR BLD AUTO: 58.2 %
NRBC BLD-RTO: 0 /100 WBCS (ref 0–0)
P AXIS: 35 DEGREES
PLATELET # BLD AUTO: 111 X10*3/UL (ref 150–450)
POTASSIUM SERPL-SCNC: 2.8 MMOL/L (ref 3.5–5.3)
PR INTERVAL: 161 MS
PROT SERPL-MCNC: 5.9 G/DL (ref 6.4–8.2)
Q ONSET: 251 MS
QRS COUNT: 15 BEATS
QRS DURATION: 143 MS
QT INTERVAL: 405 MS
QTC CALCULATION(BAZETT): 507 MS
QTC FREDERICIA: 470 MS
R AXIS: -48 DEGREES
RBC # BLD AUTO: 3.37 X10*6/UL (ref 4–5.2)
SODIUM SERPL-SCNC: 135 MMOL/L (ref 136–145)
T AXIS: 18 DEGREES
T OFFSET: 453 MS
VENTRICULAR RATE: 94 BPM
WBC # BLD AUTO: 2.6 X10*3/UL (ref 4.4–11.3)

## 2024-11-04 PROCEDURE — 96375 TX/PRO/DX INJ NEW DRUG ADDON: CPT

## 2024-11-04 PROCEDURE — 96365 THER/PROPH/DIAG IV INF INIT: CPT

## 2024-11-04 PROCEDURE — 2500000001 HC RX 250 WO HCPCS SELF ADMINISTERED DRUGS (ALT 637 FOR MEDICARE OP): Performed by: STUDENT IN AN ORGANIZED HEALTH CARE EDUCATION/TRAINING PROGRAM

## 2024-11-04 PROCEDURE — 99284 EMERGENCY DEPT VISIT MOD MDM: CPT | Mod: 25

## 2024-11-04 PROCEDURE — 80053 COMPREHEN METABOLIC PANEL: CPT | Performed by: STUDENT IN AN ORGANIZED HEALTH CARE EDUCATION/TRAINING PROGRAM

## 2024-11-04 PROCEDURE — 2500000004 HC RX 250 GENERAL PHARMACY W/ HCPCS (ALT 636 FOR OP/ED): Performed by: STUDENT IN AN ORGANIZED HEALTH CARE EDUCATION/TRAINING PROGRAM

## 2024-11-04 PROCEDURE — 83690 ASSAY OF LIPASE: CPT | Performed by: STUDENT IN AN ORGANIZED HEALTH CARE EDUCATION/TRAINING PROGRAM

## 2024-11-04 PROCEDURE — RXMED WILLOW AMBULATORY MEDICATION CHARGE

## 2024-11-04 PROCEDURE — 85025 COMPLETE CBC W/AUTO DIFF WBC: CPT | Performed by: STUDENT IN AN ORGANIZED HEALTH CARE EDUCATION/TRAINING PROGRAM

## 2024-11-04 PROCEDURE — 93005 ELECTROCARDIOGRAM TRACING: CPT

## 2024-11-04 RX ORDER — HEPARIN 100 UNIT/ML
SYRINGE INTRAVENOUS
Status: DISCONTINUED
Start: 2024-11-04 | End: 2024-11-04 | Stop reason: HOSPADM

## 2024-11-04 RX ORDER — ONDANSETRON HYDROCHLORIDE 2 MG/ML
4 INJECTION, SOLUTION INTRAVENOUS ONCE
Status: COMPLETED | OUTPATIENT
Start: 2024-11-04 | End: 2024-11-04

## 2024-11-04 RX ORDER — LOPERAMIDE HYDROCHLORIDE 2 MG/1
2 CAPSULE ORAL 4 TIMES DAILY PRN
Qty: 12 CAPSULE | Refills: 0 | Status: SHIPPED | OUTPATIENT
Start: 2024-11-04 | End: 2024-11-07

## 2024-11-04 RX ORDER — MORPHINE SULFATE 4 MG/ML
4 INJECTION INTRAVENOUS ONCE
Status: COMPLETED | OUTPATIENT
Start: 2024-11-04 | End: 2024-11-04

## 2024-11-04 RX ORDER — MAGNESIUM SULFATE HEPTAHYDRATE 40 MG/ML
2 INJECTION, SOLUTION INTRAVENOUS ONCE
Status: COMPLETED | OUTPATIENT
Start: 2024-11-04 | End: 2024-11-04

## 2024-11-04 RX ORDER — OXYCODONE HYDROCHLORIDE 5 MG/1
15 TABLET ORAL ONCE
Status: COMPLETED | OUTPATIENT
Start: 2024-11-04 | End: 2024-11-04

## 2024-11-04 RX ORDER — POTASSIUM CHLORIDE 1.5 G/1.58G
40 POWDER, FOR SOLUTION ORAL ONCE
Status: COMPLETED | OUTPATIENT
Start: 2024-11-04 | End: 2024-11-04

## 2024-11-04 ASSESSMENT — PAIN DESCRIPTION - PAIN TYPE: TYPE: ACUTE PAIN

## 2024-11-04 ASSESSMENT — PAIN DESCRIPTION - LOCATION: LOCATION: ABDOMEN

## 2024-11-04 ASSESSMENT — PAIN - FUNCTIONAL ASSESSMENT: PAIN_FUNCTIONAL_ASSESSMENT: 0-10

## 2024-11-04 ASSESSMENT — PAIN SCALES - GENERAL
PAINLEVEL_OUTOF10: 8

## 2024-11-04 NOTE — PROGRESS NOTES
Emergency Medicine Transition of Care Note.    I received Nelsy Osullivan in signout from Dr. Rouse.  Please see the previous ED provider note for all HPI, PE and MDM up to the time of signout at 0700. This is in addition to the primary record.    In brief Nelsy Osullivan is an 54 y.o. female presenting for   Chief Complaint   Patient presents with    Diarrhea     Pt c/o diarrhea x 1 day.  Pt also states she cannot get her glucose above 57.  Glucose enroute to ED was 82 - via EMS     At the time of signout we were awaiting: re-evaluation    ED Course as of 11/04/24 0913 Mon Nov 04, 2024   0737 WBC(!): 2.6  Decreased from 4.3 two weeks ago and 12.2 one month prior [JG]   0737 Alkaline Phosphatase(!): 580  Baseline 420-460 [JG]   0800 EKG, interpreted by me: Sinus rhythm, rate 94 bpm.  Normal axis.  Right bundle branch block with deep T wave inversions in anteroseptal leads.  No ST elevation or depression.  QTc 507.  No acute STEMI.  Patient will be given 2 g magnesium for prolonged QT with hypokalemia. [JG]      ED Course User Index  [JG] Candida Rouse MD         Diagnoses as of 11/04/24 0913   Diarrhea, unspecified type   Pancytopenia   Hypokalemia       Medical Decision Making  Patient was signed out to me pending replete meant of her electrolytes followed by reevaluation.  On repeat evaluation she is eating and drinking appropriately.  She was given potassium and magnesium supplementation.  Ultimately I feel she is stable for discharge with follow-up to her oncologist Dr. Pickering.  Patient was discharged otherwise stable condition.    Final diagnoses:   [R19.7] Diarrhea, unspecified type   [D61.818] Pancytopenia   [E87.6] Hypokalemia           Procedure  Procedures    Dk Zuniga MD

## 2024-11-04 NOTE — ED PROVIDER NOTES
HPI   Chief Complaint   Patient presents with    Diarrhea     Pt c/o diarrhea x 1 day.  Pt also states she cannot get her glucose above 57.  Glucose enroute to ED was 82 - via EMS       54-year-old female with history of chronic pancreatitis presents with several days of diarrhea.  She states she has had more episodes of diarrhea today than she can count, and that she has had intermittent episodes of hematochezia.  She states she has been generalized abdominal pain worse in the epigastric region which may be slightly worsened from baseline for her.  She also endorses decreased appetite.  She denies chest pain, short of breath, cough, fever, nausea, vomiting, dysuria, hematuria, flank pain, neck or back pain, numbness, tingling, weakness, lightheadedness, dizziness, changes in vision, syncope, sick contacts, recent antibiotic use.        Patient History   History reviewed. No pertinent past medical history.  Past Surgical History:   Procedure Laterality Date    OTHER SURGICAL HISTORY  2020    Cholecystectomy    OTHER SURGICAL HISTORY  2020    Exploratory laparoscopy    OTHER SURGICAL HISTORY  2020    Heart surgery     No family history on file.  Social History     Tobacco Use    Smoking status: Former     Current packs/day: 0.00     Types: Cigarettes     Quit date:      Years since quittin.8     Passive exposure: Past    Smokeless tobacco: Never   Vaping Use    Vaping status: Every Day   Substance Use Topics    Alcohol use: Not Currently    Drug use: Never       Physical Exam   ED Triage Vitals   Temperature Heart Rate Respirations BP   24   36.6 °C (97.8 °F) (!) 113 16 133/76      Pulse Ox Temp Source Heart Rate Source Patient Position   24 -- --   100 % Temporal        BP Location FiO2 (%)     -- --             Physical Exam  Vitals reviewed.   Constitutional:       Comments: Cachectic   HENT:      Mouth/Throat:       Mouth: Mucous membranes are dry.   Eyes:      Extraocular Movements: Extraocular movements intact.      Pupils: Pupils are equal, round, and reactive to light.   Cardiovascular:      Rate and Rhythm: Normal rate and regular rhythm.   Pulmonary:      Effort: Pulmonary effort is normal.      Breath sounds: Normal breath sounds.   Abdominal:      Palpations: Abdomen is soft.      Tenderness: There is no abdominal tenderness. There is no guarding or rebound.   Musculoskeletal:         General: Normal range of motion.      Cervical back: Normal range of motion.   Neurological:      General: No focal deficit present.      Mental Status: She is alert and oriented to person, place, and time.           ED Course & MDM   ED Course as of 11/07/24 0134   Mon Nov 04, 2024   0737 WBC(!): 2.6  Decreased from 4.3 two weeks ago and 12.2 one month prior [JG]   0737 Alkaline Phosphatase(!): 580  Baseline 420-460 [JG]   0800 EKG, interpreted by me: Sinus rhythm, rate 94 bpm.  Normal axis.  Right bundle branch block with deep T wave inversions in anteroseptal leads.  No ST elevation or depression.  QTc 507.  No acute STEMI.  Patient will be given 2 g magnesium for prolonged QT with hypokalemia. [JG]      ED Course User Index  [JG] Candida Rouse MD         Diagnoses as of 11/07/24 0134   Diarrhea, unspecified type   Pancytopenia   Hypokalemia                 No data recorded     Coats Coma Scale Score: 15 (11/04/24 0622 : Isabella Brady RN)                           Medical Decision Making  54-year-old female with history of chronic pancreatitis and diabetes presents with several days of persistent diarrhea.  She endorses intermittent hematochezia but denies melena.  She has generalized abdominal pain worse in the epigastric region which is baseline.  She also endorses decreased appetite and reports that her glucose has been dropping intermittently over the past few days, has been as low as 50 but generally has been running  about 100.  She has a pump that automatically holds her bolus if hypoglycemic.  On evaluation, patient is cachectic and chronically ill-appearing.  Abdomen is soft, mildly diffusely tender without rebound rigidity or guarding.  Vital stable, normotensive.  Mucous membranes dry.  Differentials considered include colitis, bowel obstruction, gastroenteritis, malignancy, electrolyte abnormality, C. difficile versus infectious diarrhea, malabsorption.  Lab workup showing hypokalemia at 2.8.  Glucose 87. Patient was able to show me her pump estefani and appears to be functioning appropriately, holding bolus when glucose drops, average glucose has been 80s with a few dips down to 60s over the past week. Patient is pancytopenic with WBC continually down trending over the past 1 month, 1 month prior was 12.2, 2 weeks ago was 4, today is 2.  Concern for underlying malignancy.  Patient given Zofran and potassium replacement.  Patient signed out to oncoming provider for reevaluation and dispo.        Procedure  Procedures     Candida Rouse MD  11/04/24 0749       Candida Rouse MD  11/04/24 0844       Candida Rouse MD  11/07/24 0134

## 2024-11-04 NOTE — PROGRESS NOTES
Medication Education     Medication education for April D Osullivan was provided to the patient  for the following medication(s):  loperamide      Medication education provided by a Pharmacist:  ADR Counseling Dose, frequency, storage Proper dose, indication, possible ADRs How the medication works and benefits of taking it Benefits of taking the medication     Identified potential barriers to education:  None    Method(s) of Education:  Verbal    An opportunity to ask questions and receive answers was provided.     Assessment of understanding the patient :  2= meets goals/outcomes    Additional Notes (if applicable):     Shad Valdovinos, PharmD

## 2024-11-07 ENCOUNTER — TELEPHONE (OUTPATIENT)
Dept: HEMATOLOGY/ONCOLOGY | Facility: CLINIC | Age: 54
End: 2024-11-07
Payer: COMMERCIAL

## 2024-11-08 ENCOUNTER — TELEPHONE (OUTPATIENT)
Dept: HEMATOLOGY/ONCOLOGY | Facility: CLINIC | Age: 54
End: 2024-11-08
Payer: COMMERCIAL

## 2024-11-08 DIAGNOSIS — R19.7 DIARRHEA, UNSPECIFIED TYPE: Primary | ICD-10-CM

## 2024-11-08 RX ORDER — DIPHENOXYLATE HYDROCHLORIDE AND ATROPINE SULFATE 2.5; .025 MG/1; MG/1
1 TABLET ORAL 4 TIMES DAILY
Qty: 120 TABLET | Refills: 0 | Status: SHIPPED | OUTPATIENT
Start: 2024-11-08 | End: 2024-12-08

## 2024-11-08 NOTE — TELEPHONE ENCOUNTER
"Spoke to April who reports diarrhea for the last week. When asked how many times a day she is having diarrhea she responded \"too many\". She stated she was in ED and they prescribed imodium 4 times a day for 3 days which she completed and did not have improvement. She reports she is taking her pancreatic enzymes as ordered. She denied nausea and vomiting and stated she did have abdominal pain previously, however, no longer is. She reports some blood when wiping, explained this is likely from hemorrhoids and going so frequently. She stated she is drinking lots of water. When asked about diet, pt stated \"I eat whatever I want but not a lot of vegetables\". Reviewed bowel rest and avoiding things that are greasy, spicy and fatty to improve sx. Also explained that she can get OTC imodium and take 1-2 tabs every 4 -6 hours (up to a total of 8 tabs per day). Pt stated she does not want to purchase anything as \"I am on a fixed income and have insurance for a reason\". She also stated that she took imodium and it didn't work. Explained that she was only taking half of what she could take and it is recommended that she try a higher dose. Pt stated she does not want to take anymore imodium and wants something else. Pt stated she previously saw Dr. Riley for chronic pancreatitis but does not have follow up. Explained that her current diarrhea is likely unrelated to her cancer or cancer treatment as it has been over a month since her last treatment and that she should speak with PCP and GI about her symptoms. Pt became very upset stating she couldn't get in with either for months and she can't continue having diarrhea. Explained would speak with covering provider, however, they will likely want her to follow up with PCP and may not be able to help as the cause of diarrhea is unlikely to be cancer/treatment related. Pt agreeable to call back. Encouraged pt to follow dietary recommendations as mentioned previously in the mean time. "

## 2024-11-12 NOTE — PROGRESS NOTES
"Rm   Nelsy Osullivan is a 54 y.o. female who presents for a follow-up evaluation of Type 1 diabetes mellitus. The initial diagnosis of diabetes was made in 2006 .  Diabetes was diagnosed after having recurrent bouts of pancreatitis.    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 She was found to have a third occurrence of lymphoma.  She has completed 6 cycles of chemotherapy.  She is now in remission.      She had an ER visit for hematochezia.  She had diarrhea for 9 days    She is not bolusing via the insulin pump.    Known complications due to diabetes included CAD s/p MI in 2018    Cardiovascular risk factors include diabetes mellitus. The patient is not on an ACE inhibitor or angiotensin II receptor blocker.  The patient has not been previously hospitalized due to diabetic ketoacidosis.     Current symptoms/problems include none. Her clinical course has been stable.     The patient is currently checking the blood glucose multiple times per day.    Patient is using: continuous glucose monitor                                                                                                                                                                                                    Hypoglycemia frequency: 4% (decreased from 10%)  Hypoglycemia awareness: Yes       Review of Systems   Constitutional:  Positive for unexpected weight change (Weight gain).   HENT:          Dry mouth    Respiratory:  Positive for wheezing.    Gastrointestinal:  Positive for abdominal pain.   Musculoskeletal:  Positive for back pain.   Skin:         Dry skin   Neurological:  Positive for dizziness and numbness.   Psychiatric/Behavioral:  The patient is nervous/anxious.         Depression  Memory loss   All other systems reviewed and are negative.    Objective   /78 (BP Location: Left arm, Patient Position: Sitting, BP Cuff Size: Small adult)   Pulse 95   Ht 1.575 m (5' 2\")   Wt 58 kg " (127 lb 12.8 oz)   BMI 23.37 kg/m²   Physical Exam  Vitals and nursing note reviewed.   Constitutional:       General: She is not in acute distress.     Appearance: Normal appearance. She is normal weight.   HENT:      Head: Normocephalic and atraumatic.      Nose: Nose normal.      Mouth/Throat:      Mouth: Mucous membranes are moist.   Eyes:      Extraocular Movements: Extraocular movements intact.   Pulmonary:      Effort: Pulmonary effort is normal.   Musculoskeletal:         General: Normal range of motion.   Neurological:      Mental Status: She is alert and oriented to person, place, and time.   Psychiatric:         Mood and Affect: Mood normal.         Lab Review  Lab Results   Component Value Date    HGBA1C 8.0 (A) 11/13/2024     Lab Results   Component Value Date    GLUCOSE 87 11/04/2024    CALCIUM 8.7 11/04/2024     (L) 11/04/2024    K 2.8 (LL) 11/04/2024    CO2 25 11/04/2024     11/04/2024    BUN 10 11/04/2024    CREATININE 0.75 11/04/2024     Lab Results   Component Value Date    CHOL 87 05/31/2024    CHOL 123 04/04/2023    CHOL 84 08/05/2022     Lab Results   Component Value Date    HDL 45.0 05/31/2024    HDL 66.0 04/04/2023    HDL 44.7 08/05/2022     Lab Results   Component Value Date    LDLCALC 31 05/31/2024     Lab Results   Component Value Date    TRIG 56 05/31/2024    TRIG 100 04/04/2023    TRIG 71 08/05/2022         Health Maintenance:   Foot Exam: July 2021  Eye Exam:  Urine Albumin: July 11, 2024     CGM Interpretation/Plan   14 day CGM download was reviewed in detail as documented above and will be attached to chart.  A minimum of 72 hours of glucose data was used to inform the management plan outlined below.  5% of values is within target range.  The degree of hypoglycemia has decreased.      Assessment/Plan   54-year-old female presents for follow up for type 3c diabetes mellitus. Her blood pressure is at goal today.    Type 1 diabetes mellitus without complication (Multi)  To  change I:C to 20 gram  To change sensitivity factor to 90mg/dL  Please try to bolus to correct a high value so that the time in range would increase  To continue the use of your CGM for the intensive glucose monitoring due to the dynamic nature of insulin requirements, the narrow therapeutic index of insulin and the potentially fatal consequences of treatment  Counseled that the time in range should be >70%  Counseled that the goal A1C should be 7% or less  Counseled glycemic control is warranted to prevent microvascular complications      Insulin pump titration  Adjustments as mentioned above  Suspend the pump for colonoscopy     Follow up in 4 months

## 2024-11-12 NOTE — PATIENT INSTRUCTIONS
Thank you for choosing [facility]  for your health care needs.  If you have any questions, concerns or medical needs, please feel free to contact our office at [phone].    Please ensure you complete your blood work one week before the next scheduled appointment.    To change I:C to 20 gram  To change sensitivity factor to 90mg/dL  Please try to bolus to correct a high value   Suspend the pump for colonoscopy   Follow up in 4 months

## 2024-11-13 ENCOUNTER — APPOINTMENT (OUTPATIENT)
Dept: ENDOCRINOLOGY | Facility: CLINIC | Age: 54
End: 2024-11-13
Payer: COMMERCIAL

## 2024-11-13 VITALS
BODY MASS INDEX: 23.52 KG/M2 | WEIGHT: 127.8 LBS | SYSTOLIC BLOOD PRESSURE: 136 MMHG | DIASTOLIC BLOOD PRESSURE: 78 MMHG | HEART RATE: 95 BPM | HEIGHT: 62 IN

## 2024-11-13 DIAGNOSIS — Z46.81 INSULIN PUMP TITRATION: ICD-10-CM

## 2024-11-13 DIAGNOSIS — E10.9 TYPE 1 DIABETES MELLITUS WITHOUT COMPLICATION: Primary | ICD-10-CM

## 2024-11-13 LAB — POC HEMOGLOBIN A1C: 8 % (ref 4.2–6.5)

## 2024-11-13 PROCEDURE — 99214 OFFICE O/P EST MOD 30 MIN: CPT | Performed by: INTERNAL MEDICINE

## 2024-11-13 PROCEDURE — 3078F DIAST BP <80 MM HG: CPT | Performed by: INTERNAL MEDICINE

## 2024-11-13 PROCEDURE — 3008F BODY MASS INDEX DOCD: CPT | Performed by: INTERNAL MEDICINE

## 2024-11-13 PROCEDURE — 3051F HG A1C>EQUAL 7.0%<8.0%: CPT | Performed by: INTERNAL MEDICINE

## 2024-11-13 PROCEDURE — 95251 CONT GLUC MNTR ANALYSIS I&R: CPT | Performed by: INTERNAL MEDICINE

## 2024-11-13 PROCEDURE — 3048F LDL-C <100 MG/DL: CPT | Performed by: INTERNAL MEDICINE

## 2024-11-13 PROCEDURE — 3061F NEG MICROALBUMINURIA REV: CPT | Performed by: INTERNAL MEDICINE

## 2024-11-13 PROCEDURE — 83036 HEMOGLOBIN GLYCOSYLATED A1C: CPT | Performed by: INTERNAL MEDICINE

## 2024-11-13 PROCEDURE — 3075F SYST BP GE 130 - 139MM HG: CPT | Performed by: INTERNAL MEDICINE

## 2024-11-13 RX ORDER — INSULIN PUMP CONTROLLER
1 EACH MISCELLANEOUS
Qty: 10 EACH | Refills: 11 | Status: SHIPPED | OUTPATIENT
Start: 2024-11-13 | End: 2025-11-13

## 2024-11-13 ASSESSMENT — ENCOUNTER SYMPTOMS
DIZZINESS: 1
UNEXPECTED WEIGHT CHANGE: 1
NERVOUS/ANXIOUS: 1
NUMBNESS: 1
BACK PAIN: 1
ROS SKIN COMMENTS: DRY SKIN
ABDOMINAL PAIN: 1
WHEEZING: 1

## 2024-11-14 ENCOUNTER — INFUSION (OUTPATIENT)
Dept: HEMATOLOGY/ONCOLOGY | Facility: CLINIC | Age: 54
End: 2024-11-14
Payer: COMMERCIAL

## 2024-11-14 ENCOUNTER — OFFICE VISIT (OUTPATIENT)
Dept: HEMATOLOGY/ONCOLOGY | Facility: CLINIC | Age: 54
End: 2024-11-14
Payer: COMMERCIAL

## 2024-11-14 VITALS
DIASTOLIC BLOOD PRESSURE: 87 MMHG | WEIGHT: 126.76 LBS | RESPIRATION RATE: 16 BRPM | HEART RATE: 117 BPM | TEMPERATURE: 98.2 F | SYSTOLIC BLOOD PRESSURE: 150 MMHG | BODY MASS INDEX: 23.33 KG/M2 | HEIGHT: 62 IN | OXYGEN SATURATION: 100 %

## 2024-11-14 DIAGNOSIS — J90 PLEURAL EFFUSION ON RIGHT: ICD-10-CM

## 2024-11-14 DIAGNOSIS — C82.41 GRADE 3B FOLLICULAR LYMPHOMA OF LYMPH NODES OF NECK (MULTI): ICD-10-CM

## 2024-11-14 DIAGNOSIS — K86.1 CHRONIC PANCREATITIS, UNSPECIFIED PANCREATITIS TYPE (MULTI): ICD-10-CM

## 2024-11-14 DIAGNOSIS — J90 PLEURAL EFFUSION ON RIGHT: Primary | ICD-10-CM

## 2024-11-14 LAB
ALBUMIN SERPL BCP-MCNC: 3 G/DL (ref 3.4–5)
ALP SERPL-CCNC: 528 U/L (ref 33–110)
ALT SERPL W P-5'-P-CCNC: 29 U/L (ref 7–45)
ANION GAP SERPL CALC-SCNC: 9 MMOL/L (ref 10–20)
AST SERPL W P-5'-P-CCNC: 38 U/L (ref 9–39)
ATRIAL RATE: 94 BPM
BASOPHILS # BLD AUTO: 0.02 X10*3/UL (ref 0–0.1)
BASOPHILS NFR BLD AUTO: 0.5 %
BILIRUB SERPL-MCNC: 0.4 MG/DL (ref 0–1.2)
BUN SERPL-MCNC: 10 MG/DL (ref 6–23)
CALCIUM SERPL-MCNC: 9 MG/DL (ref 8.6–10.3)
CHLORIDE SERPL-SCNC: 103 MMOL/L (ref 98–107)
CO2 SERPL-SCNC: 28 MMOL/L (ref 21–32)
CREAT SERPL-MCNC: 0.64 MG/DL (ref 0.5–1.05)
EGFRCR SERPLBLD CKD-EPI 2021: >90 ML/MIN/1.73M*2
EOSINOPHIL # BLD AUTO: 0.08 X10*3/UL (ref 0–0.7)
EOSINOPHIL NFR BLD AUTO: 2.1 %
ERYTHROCYTE [DISTWIDTH] IN BLOOD BY AUTOMATED COUNT: 20 % (ref 11.5–14.5)
GLUCOSE SERPL-MCNC: 243 MG/DL (ref 74–99)
HCT VFR BLD AUTO: 29.8 % (ref 36–46)
HGB BLD-MCNC: 10.1 G/DL (ref 12–16)
IMM GRANULOCYTES # BLD AUTO: 0.01 X10*3/UL (ref 0–0.7)
IMM GRANULOCYTES NFR BLD AUTO: 0.3 % (ref 0–0.9)
LYMPHOCYTES # BLD AUTO: 0.37 X10*3/UL (ref 1.2–4.8)
LYMPHOCYTES NFR BLD AUTO: 9.9 %
MCH RBC QN AUTO: 30.1 PG (ref 26–34)
MCHC RBC AUTO-ENTMCNC: 33.9 G/DL (ref 32–36)
MCV RBC AUTO: 89 FL (ref 80–100)
MONOCYTES # BLD AUTO: 0.58 X10*3/UL (ref 0.1–1)
MONOCYTES NFR BLD AUTO: 15.5 %
NEUTROPHILS # BLD AUTO: 2.68 X10*3/UL (ref 1.2–7.7)
NEUTROPHILS NFR BLD AUTO: 71.7 %
P AXIS: 35 DEGREES
PLATELET # BLD AUTO: 119 X10*3/UL (ref 150–450)
POTASSIUM SERPL-SCNC: 3.4 MMOL/L (ref 3.5–5.3)
PR INTERVAL: 161 MS
PROT SERPL-MCNC: 5.7 G/DL (ref 6.4–8.2)
Q ONSET: 251 MS
QRS COUNT: 15 BEATS
QRS DURATION: 143 MS
QT INTERVAL: 405 MS
QTC CALCULATION(BAZETT): 507 MS
QTC FREDERICIA: 470 MS
R AXIS: -48 DEGREES
RBC # BLD AUTO: 3.36 X10*6/UL (ref 4–5.2)
SODIUM SERPL-SCNC: 137 MMOL/L (ref 136–145)
T AXIS: 18 DEGREES
T OFFSET: 453 MS
VENTRICULAR RATE: 94 BPM
WBC # BLD AUTO: 3.7 X10*3/UL (ref 4.4–11.3)

## 2024-11-14 PROCEDURE — 2500000004 HC RX 250 GENERAL PHARMACY W/ HCPCS (ALT 636 FOR OP/ED): Mod: SE | Performed by: INTERNAL MEDICINE

## 2024-11-14 PROCEDURE — 36591 DRAW BLOOD OFF VENOUS DEVICE: CPT

## 2024-11-14 PROCEDURE — 85025 COMPLETE CBC W/AUTO DIFF WBC: CPT | Performed by: INTERNAL MEDICINE

## 2024-11-14 PROCEDURE — 99214 OFFICE O/P EST MOD 30 MIN: CPT | Performed by: INTERNAL MEDICINE

## 2024-11-14 PROCEDURE — 3048F LDL-C <100 MG/DL: CPT | Performed by: INTERNAL MEDICINE

## 2024-11-14 PROCEDURE — 3008F BODY MASS INDEX DOCD: CPT | Performed by: INTERNAL MEDICINE

## 2024-11-14 PROCEDURE — 3079F DIAST BP 80-89 MM HG: CPT | Performed by: INTERNAL MEDICINE

## 2024-11-14 PROCEDURE — 36415 COLL VENOUS BLD VENIPUNCTURE: CPT | Performed by: INTERNAL MEDICINE

## 2024-11-14 PROCEDURE — 3077F SYST BP >= 140 MM HG: CPT | Performed by: INTERNAL MEDICINE

## 2024-11-14 PROCEDURE — 3051F HG A1C>EQUAL 7.0%<8.0%: CPT | Performed by: INTERNAL MEDICINE

## 2024-11-14 PROCEDURE — 3061F NEG MICROALBUMINURIA REV: CPT | Performed by: INTERNAL MEDICINE

## 2024-11-14 PROCEDURE — 80053 COMPREHEN METABOLIC PANEL: CPT | Performed by: INTERNAL MEDICINE

## 2024-11-14 RX ORDER — HEPARIN 100 UNIT/ML
500 SYRINGE INTRAVENOUS AS NEEDED
Status: DISCONTINUED | OUTPATIENT
Start: 2024-11-14 | End: 2024-11-14 | Stop reason: HOSPADM

## 2024-11-14 RX ORDER — HEPARIN SODIUM,PORCINE/PF 10 UNIT/ML
50 SYRINGE (ML) INTRAVENOUS AS NEEDED
OUTPATIENT
Start: 2024-11-14

## 2024-11-14 RX ORDER — HEPARIN 100 UNIT/ML
500 SYRINGE INTRAVENOUS AS NEEDED
OUTPATIENT
Start: 2024-11-14

## 2024-11-14 ASSESSMENT — PAIN SCALES - GENERAL: PAINLEVEL_OUTOF10: 7

## 2024-11-14 NOTE — PATIENT INSTRUCTIONS
Office follow up  with dr. Pickering after completion of treatment  Order entered for you to be seen by IR at [place] for a thoracentesis. Their office will call with with a date and time  Call cardiology dr. Manas Salazar for follow up appt    Follow up in 3months with dr. Barry after CT scans

## 2024-11-14 NOTE — PROGRESS NOTES
Patient ID: Nelsy Osullivan is a 54 y.o. female.    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  FISH is negative for Bcl-2, BCL6 and C-MYC.  PET/CT 4/4/24 showed malignant uptake in a left supraclavicular LN only.  Baseline LDH was elevated at 238 but the patient has chronically elevated LDH. She started BR chemotherapy 5/2/24.  PET/CT after 4 cycles was negative.  She completed 5/6 chemotherapy cycles on  9/19/24 (cycle 6 aborted due to persistent thrombocytopenia/neutropenia).  She has a prior history of lymphoma (question Hodgkin's versus non-Hodgkin's) at age 16 with relapse at age 19.  When she was 16, she was treated with radiation therapy alone.  When she relapsed at age 19, she had disease on both sides of the diaphragm.  Her spleen was removed.  She was treated with several cycles of chemotherapy including Adriamycin and mustard gas underwent a second course of radiation therapy to her neck and chest.  She was treated by Dr. Carbajal and Dr. Acosta at that time.  Mitral valve and aortic valve stenosis secondary to prior radiation therapy.  She underwent animal aortic valve and mitral valve replacements in 2019.  Chronic pancreatitis (question etiology).  She resumed pancreatic enzyme replacement therapy August 2024  18 mm right interpolar thyroid nodule with February 19, 2024 pathology showing benign follicular cells.  DM      The patient presents today for follow-up.  At the time of her last office visit on October 17, she was complaining of a significant increase in her abdominal pain-this is a chronic recurring issue but did not seem similar to her usual Crea Edmar pain.  We did obtain an abdominal ultrasound on October 24 which showed that the pancreas was obscured due to overlying bowel gas.  There was fatty infiltration of the liver as well as a moderate to large right pleural effusion.  A high resolution CT of the  "chest in July 2024 showed interval increase in small right greater than left pleural effusions.  The patient does have some wheezing and dyspnea with exertion but this does not seem new or excessive for her.  She states that she did have a right thoracentesis several years ago at the time of her valve replacements in 2019.  She does have a cardiologist that she sees regularly at the [facility] but has not had a chance to follow-up with him yet this year.  She is taking her pancreatic enzymes and notes improvement of her diarrhea.  Still has chronic abdominal pain and is followed by pain management and palliative care.  She was evaluated last week in the emergency department for diarrhea and hypoglycemia.  Her white blood cell count at that time was 2600 but her platelet count was up to 111,000.  She was also hypokalemic with a potassium of 2.8.  She was treated with Zofran and potassium replacement.  She denies new adenopathy, fever, chills, night sweats.  Objective    BSA: 1.59 meters squared  /87   Pulse (!) 117   Temp 36.8 °C (98.2 °F)   Resp 16   Ht 1.586 m (5' 2.44\")   Wt 57.5 kg (126 lb 12.2 oz)   SpO2 100%   BMI 22.86 kg/m²      Physical Exam  Constitutional:       Comments: cachetic   HENT:      Head: Normocephalic.      Mouth/Throat:      Mouth: Mucous membranes are moist.      Pharynx: Oropharynx is clear.   Eyes:      Conjunctiva/sclera: Conjunctivae normal.      Pupils: Pupils are equal, round, and reactive to light.   Cardiovascular:      Rate and Rhythm: Normal rate and regular rhythm.      Pulses: Normal pulses.   Pulmonary:      Comments: Decreased breath sounds right lung up to midline  Abdominal:      General: Bowel sounds are normal.      Palpations: Abdomen is soft.   Musculoskeletal:         General: Normal range of motion.   Skin:     General: Skin is warm and dry.   Neurological:      General: No focal deficit present.      Mental Status: She is alert.   Psychiatric:         " Mood and Affect: Mood normal.         Behavior: Behavior normal.         Performance Status:  ECOG 1-2      Assessment/Plan   54-year-old female with multiple medical problems including chronic pancreatitis and a prior history of Hodgkin's lymphoma that was treated with multiagent systemic chemotherapy including Adriamycin and radiation therapy to the chest. She presented in February 2024 with new left cervical adenopathy and was found to have a grade 3B follicular lymphoma with definitive involvement of the left cervical and supraclavicular lymph nodes. There was nonspecific uptake in a right inguinal lymph node.  She completed 5 out of 6 planned cycles of Bendamustine and rituximab chemotherapy in September 2024.  PET scan after 4 cycles of chemotherapy was negative for residual disease.  Her 6 cycle of chemotherapy was aborted due to debilitating abdominal pain, persistent thrombocytopenia and neutropenia.  Her platelet count does appear to have recovered.  He does not have any obvious evidence of recurrent lymphoma but would recommend CT scans in 3 months to assess (patient will require premedication as she does have an IV contrast allergy).  As far as the right pleural effusion is concerned, this does appear to be increased from July 2024.  She does have decreased breath sounds on the right and though she does not have significant dyspnea, would recommend referral to interventional radiology for right thoracentesis and fluid evaluation for culture and cytology.  Assuming these are normal/negative, the patient is advised to follow-up with her cardiologist for further evaluation.  The patient understood and was agreeable.  She is aware [place] this coming [place].  Plan:  Right thoracentesis with fluid culture and cytology.  Follow-up with cardiology now.  Continue pancreatic enzyme replacement and pain management  Reassess in 3 months with CT scans of the neck chest abdomen and pelvis  (patient will need premedication prior to IV contrast).    Cancer Staging   No matching staging information was found for the patient.      Oncology History   Follicular lymphoma grade IIIb   6/7/2022 Initial Diagnosis    Follicular lymphoma grade IIIb (CMS/HCC)     5/2/2024 -  Chemotherapy    Bendamustine + RiTUXimab, 28 Day Cycles                   Candida Pickering MD

## 2024-11-15 ENCOUNTER — TELEPHONE (OUTPATIENT)
Dept: ADMISSION | Facility: HOSPITAL | Age: 54
End: 2024-11-15
Payer: COMMERCIAL

## 2024-11-15 NOTE — TELEPHONE ENCOUNTER
Patient called concerned she is having a Thoracentesis on 11/19/24 at  in [place]. Concerned this is a painful procedure and requesting refill Oxycodone 15mg to  [facility] on file.

## 2024-11-15 NOTE — TELEPHONE ENCOUNTER
I also reviewed this call with Ian Carvajal who is agreeable with plan and will not be providing any early refills as April should not be out of medication.

## 2024-11-17 RX ORDER — INSULIN ASPART 100 [IU]/ML
INJECTION, SOLUTION INTRAVENOUS; SUBCUTANEOUS
Qty: 30 ML | Refills: 5 | Status: SHIPPED | OUTPATIENT
Start: 2024-11-17

## 2024-11-17 NOTE — ASSESSMENT & PLAN NOTE
To change I:C to 20 gram  To change sensitivity factor to 90mg/dL  Please try to bolus to correct a high value so that the time in range would increase  To continue the use of your CGM for the intensive glucose monitoring due to the dynamic nature of insulin requirements, the narrow therapeutic index of insulin and the potentially fatal consequences of treatment  Counseled that the time in range should be >70%  Counseled that the goal A1C should be 7% or less  Counseled glycemic control is warranted to prevent microvascular complications

## 2024-11-19 ENCOUNTER — HOSPITAL ENCOUNTER (OUTPATIENT)
Dept: RADIOLOGY | Facility: HOSPITAL | Age: 54
Discharge: HOME | End: 2024-11-19
Payer: COMMERCIAL

## 2024-11-19 DIAGNOSIS — C82.41 GRADE 3B FOLLICULAR LYMPHOMA OF LYMPH NODES OF NECK (MULTI): ICD-10-CM

## 2024-11-19 LAB — HOLD SPECIMEN: NORMAL

## 2024-11-19 PROCEDURE — 87070 CULTURE OTHR SPECIMN AEROBIC: CPT | Mod: PORLAB | Performed by: INTERNAL MEDICINE

## 2024-11-19 PROCEDURE — 32555 ASPIRATE PLEURA W/ IMAGING: CPT | Performed by: STUDENT IN AN ORGANIZED HEALTH CARE EDUCATION/TRAINING PROGRAM

## 2024-11-19 PROCEDURE — 32555 ASPIRATE PLEURA W/ IMAGING: CPT

## 2024-11-19 PROCEDURE — 87015 SPECIMEN INFECT AGNT CONCNTJ: CPT | Mod: PORLAB | Performed by: INTERNAL MEDICINE

## 2024-11-19 NOTE — PRE-PROCEDURE NOTE
Interventional Radiology Preprocedure Note    Indication for procedure: The encounter diagnosis was Grade 3b follicular lymphoma of lymph nodes of neck (Multi).    Relevant review of systems: NA    Relevant Labs:   Lab Results   Component Value Date    CREATININE 0.64 11/14/2024    EGFR >90 11/14/2024    INR 1.0 06/05/2022    PROTIME 11.2 06/05/2022       Planned Sedation/Anesthesia: Minimal    Airway assessment: normal    Directed physical examination:    GENERAL: awake, no acute distress  HEENT: AT/NC  NECK: supple  CV: RRR  PULM: non-labored breathing  ABDOMEN: soft, ND  NEURO: AAOx3  MSK: full ROM  SKIN: no rash    Benefits, risks and alternatives of procedure and planned sedation have been discussed with the patient and/or their representative. All questions answered and they agree to proceed.

## 2024-11-20 LAB
ACID FAST STN SPEC: NORMAL
MYCOBACTERIUM SPEC CULT: NORMAL

## 2024-11-22 ENCOUNTER — OFFICE VISIT (OUTPATIENT)
Dept: PALLIATIVE MEDICINE | Facility: CLINIC | Age: 54
End: 2024-11-22
Payer: COMMERCIAL

## 2024-11-22 ENCOUNTER — PHARMACY VISIT (OUTPATIENT)
Dept: PHARMACY | Facility: CLINIC | Age: 54
End: 2024-11-22
Payer: MEDICAID

## 2024-11-22 VITALS
TEMPERATURE: 97.2 F | HEART RATE: 112 BPM | HEIGHT: 62 IN | SYSTOLIC BLOOD PRESSURE: 154 MMHG | WEIGHT: 112.43 LBS | RESPIRATION RATE: 16 BRPM | OXYGEN SATURATION: 96 % | DIASTOLIC BLOOD PRESSURE: 89 MMHG | BODY MASS INDEX: 20.69 KG/M2

## 2024-11-22 DIAGNOSIS — R63.0 ANOREXIA: Primary | ICD-10-CM

## 2024-11-22 DIAGNOSIS — K86.1 CHRONIC PANCREATITIS, UNSPECIFIED PANCREATITIS TYPE (MULTI): ICD-10-CM

## 2024-11-22 DIAGNOSIS — G89.3 NEOPLASM RELATED PAIN: ICD-10-CM

## 2024-11-22 LAB
AMPHETAMINES UR QL SCN: ABNORMAL
BACTERIA FLD CULT: NORMAL
BARBITURATES UR QL SCN: ABNORMAL
BENZODIAZ UR QL SCN: ABNORMAL
BZE UR QL SCN: ABNORMAL
CANNABINOIDS UR QL SCN: ABNORMAL
FENTANYL+NORFENTANYL UR QL SCN: ABNORMAL
GRAM STN SPEC: NORMAL
GRAM STN SPEC: NORMAL
METHADONE UR QL SCN: ABNORMAL
OPIATES UR QL SCN: ABNORMAL
OXYCODONE+OXYMORPHONE UR QL SCN: ABNORMAL
PCP UR QL SCN: ABNORMAL

## 2024-11-22 PROCEDURE — 3051F HG A1C>EQUAL 7.0%<8.0%: CPT | Performed by: CLINICAL NURSE SPECIALIST

## 2024-11-22 PROCEDURE — 3077F SYST BP >= 140 MM HG: CPT | Performed by: CLINICAL NURSE SPECIALIST

## 2024-11-22 PROCEDURE — 3048F LDL-C <100 MG/DL: CPT | Performed by: CLINICAL NURSE SPECIALIST

## 2024-11-22 PROCEDURE — 3008F BODY MASS INDEX DOCD: CPT | Performed by: CLINICAL NURSE SPECIALIST

## 2024-11-22 PROCEDURE — RXMED WILLOW AMBULATORY MEDICATION CHARGE

## 2024-11-22 PROCEDURE — 99215 OFFICE O/P EST HI 40 MIN: CPT | Performed by: CLINICAL NURSE SPECIALIST

## 2024-11-22 PROCEDURE — 80307 DRUG TEST PRSMV CHEM ANLYZR: CPT | Performed by: CLINICAL NURSE SPECIALIST

## 2024-11-22 PROCEDURE — 3061F NEG MICROALBUMINURIA REV: CPT | Performed by: CLINICAL NURSE SPECIALIST

## 2024-11-22 PROCEDURE — 3079F DIAST BP 80-89 MM HG: CPT | Performed by: CLINICAL NURSE SPECIALIST

## 2024-11-22 RX ORDER — OXYCODONE HYDROCHLORIDE 15 MG/1
15 TABLET ORAL
Qty: 168 TABLET | Refills: 0 | Status: SHIPPED | OUTPATIENT
Start: 2024-11-22 | End: 2024-11-22 | Stop reason: SDUPTHER

## 2024-11-22 RX ORDER — MORPHINE SULFATE 60 MG/1
60 TABLET, FILM COATED, EXTENDED RELEASE ORAL 4 TIMES DAILY
Qty: 85 TABLET | Refills: 0 | Status: SHIPPED | OUTPATIENT
Start: 2024-11-22

## 2024-11-22 RX ORDER — OLANZAPINE 10 MG/1
5 TABLET ORAL NIGHTLY
Qty: 30 TABLET | Refills: 3 | Status: SHIPPED | OUTPATIENT
Start: 2024-11-22 | End: 2024-11-22 | Stop reason: SDUPTHER

## 2024-11-22 RX ORDER — OXYCODONE HYDROCHLORIDE 15 MG/1
15 TABLET ORAL
Qty: 168 TABLET | Refills: 0 | Status: SHIPPED | OUTPATIENT
Start: 2024-11-22

## 2024-11-22 RX ORDER — OLANZAPINE 10 MG/1
5 TABLET ORAL NIGHTLY
Qty: 30 TABLET | Refills: 3 | Status: SHIPPED | OUTPATIENT
Start: 2024-11-22

## 2024-11-22 RX ORDER — MORPHINE SULFATE 60 MG/1
60 TABLET, FILM COATED, EXTENDED RELEASE ORAL 4 TIMES DAILY
Qty: 85 TABLET | Refills: 0 | Status: SHIPPED | OUTPATIENT
Start: 2024-11-22 | End: 2024-11-22 | Stop reason: SDUPTHER

## 2024-11-22 ASSESSMENT — PAIN SCALES - GENERAL: PAINLEVEL_OUTOF10: 8

## 2024-11-26 LAB
ACID FAST STN SPEC: NORMAL
MYCOBACTERIUM SPEC CULT: NORMAL

## 2024-11-27 ENCOUNTER — PHARMACY VISIT (OUTPATIENT)
Dept: PHARMACY | Facility: CLINIC | Age: 54
End: 2024-11-27
Payer: MEDICAID

## 2024-11-27 LAB
6MAM UR CFM-MCNC: <25 NG/ML
CODEINE UR CFM-MCNC: <50 NG/ML
HYDROCODONE CTO UR CFM-MCNC: 62 NG/ML
HYDROMORPHONE UR CFM-MCNC: 93 NG/ML
MORPHINE UR CFM-MCNC: >2500 NG/ML
NORHYDROCODONE UR CFM-MCNC: 375 NG/ML
NOROXYCODONE UR CFM-MCNC: 563 NG/ML
OXYCODONE UR CFM-MCNC: <25 NG/ML
OXYMORPHONE UR CFM-MCNC: 47 NG/ML

## 2024-11-27 PROCEDURE — RXMED WILLOW AMBULATORY MEDICATION CHARGE

## 2024-12-04 LAB
ACID FAST STN SPEC: NORMAL
MYCOBACTERIUM SPEC CULT: NORMAL

## 2024-12-06 ENCOUNTER — CLINICAL SUPPORT (OUTPATIENT)
Dept: NUTRITION | Facility: HOSPITAL | Age: 54
End: 2024-12-06
Payer: COMMERCIAL

## 2024-12-06 NOTE — PROGRESS NOTES
[employer]  Diet Recommendation 1: Calories, High  Diet Recommendation 2: Soft (Chewing Difficulty)  Diet Recommendation 3: Diabetes  Nutrition Goals Stated: low appetite due to cancer treatments; also  Household Size: 1 Family Member  Interventions: Referral Number: 2nd 6 Mo Referral 1 yr (Referrals may not be consecutive)  Interventions: Visit Number: 1 of 6 Visits - Max 6 Visits/Referral Each 6 Mo Period  Education Today: Healthy Recipes  Grains: 0-25% Whole  Fruit: 25-50% Fresh  Vegetables: Fresh - 100%  Proteins: 1-2 Plant-based Items  Dairy: 50-75% Lowfat  Originating Site of Referral Order: [facility]  Initials of RD Assisting Today: MARIA G

## 2024-12-10 ENCOUNTER — APPOINTMENT (OUTPATIENT)
Dept: NUTRITION | Facility: HOSPITAL | Age: 54
End: 2024-12-10
Payer: COMMERCIAL

## 2024-12-11 LAB
ACID FAST STN SPEC: NORMAL
MYCOBACTERIUM SPEC CULT: NORMAL

## 2024-12-11 NOTE — PROGRESS NOTES
Pt here for C5 ritux/bendeka. Rescheduled from last week, delayed due to count. ANC today was 0.56. Per Dr Pickering will give udenyca otday and delay another. Week. Pt is aware of plan of care, will call with further questions or concerns. Will return next week.   No

## 2024-12-12 ENCOUNTER — TELEPHONE (OUTPATIENT)
Dept: HEMATOLOGY/ONCOLOGY | Facility: HOSPITAL | Age: 54
End: 2024-12-12
Payer: COMMERCIAL

## 2024-12-12 ENCOUNTER — APPOINTMENT (OUTPATIENT)
Dept: NUTRITION | Facility: HOSPITAL | Age: 54
End: 2024-12-12
Payer: COMMERCIAL

## 2024-12-12 DIAGNOSIS — G89.3 NEOPLASM RELATED PAIN: ICD-10-CM

## 2024-12-12 RX ORDER — OXYCODONE HYDROCHLORIDE 15 MG/1
15 TABLET ORAL
Qty: 180 TABLET | Refills: 0 | Status: SHIPPED | OUTPATIENT
Start: 2024-12-12

## 2024-12-12 RX ORDER — MORPHINE SULFATE 60 MG/1
60 TABLET, FILM COATED, EXTENDED RELEASE ORAL 4 TIMES DAILY
Qty: 120 TABLET | Refills: 0 | Status: SHIPPED | OUTPATIENT
Start: 2024-12-12

## 2024-12-12 NOTE — TELEPHONE ENCOUNTER
Refill request received for the following medications:    Morphine CR 60mg  Oxycodone 15mg    Preferred pharmacy is [facility].    Message sent to Palliative Care team.

## 2024-12-12 NOTE — TELEPHONE ENCOUNTER
OARRS reviewed and no aberrancy noted. Prescription pended to covering provider to approve. 12/27 FUV with Ian Carvajal.

## 2024-12-16 ENCOUNTER — PHARMACY VISIT (OUTPATIENT)
Dept: PHARMACY | Facility: CLINIC | Age: 54
End: 2024-12-16
Payer: MEDICAID

## 2024-12-16 PROCEDURE — RXMED WILLOW AMBULATORY MEDICATION CHARGE

## 2024-12-18 LAB
ACID FAST STN SPEC: NORMAL
MYCOBACTERIUM SPEC CULT: NORMAL

## 2024-12-24 LAB
ACID FAST STN SPEC: NORMAL
MYCOBACTERIUM SPEC CULT: NORMAL

## 2024-12-27 ENCOUNTER — TELEPHONE (OUTPATIENT)
Dept: HEMATOLOGY/ONCOLOGY | Facility: HOSPITAL | Age: 54
End: 2024-12-27
Payer: COMMERCIAL

## 2024-12-27 DIAGNOSIS — G89.3 NEOPLASM RELATED PAIN: ICD-10-CM

## 2024-12-27 DIAGNOSIS — C82.40 FOLLICULAR LYMPHOMA GRADE IIIB, UNSPECIFIED BODY REGION (MULTI): ICD-10-CM

## 2024-12-27 NOTE — TELEPHONE ENCOUNTER
Call placed to patient to address refills. She just wanted to know when her refills need to be sent to  [facility]. Per [facility], the last fill date of Morphine ER 60 mg was 12/16/24 30 day supply. Next fill date 1/13/24. Last fill date for Oxycodone IR 15 mg 12/16/24, 23 day supply. Next fill date 1/6/24. Reminder placed to follow up on both. Patient acknowledged

## 2024-12-31 LAB
ACID FAST STN SPEC: NORMAL
MYCOBACTERIUM SPEC CULT: NORMAL

## 2025-01-03 DIAGNOSIS — G89.3 NEOPLASM RELATED PAIN: ICD-10-CM

## 2025-01-03 RX ORDER — OXYCODONE HYDROCHLORIDE 15 MG/1
15 TABLET ORAL
Qty: 240 TABLET | Refills: 0 | Status: SHIPPED | OUTPATIENT
Start: 2025-01-03

## 2025-01-03 NOTE — TELEPHONE ENCOUNTER
OARRS report reviewed and reflects  prescription history, no aberrancy noted. Per OAR[facility], patient last filled Oxycodone IR 15 mg 23 day supply. Per last visit with Ian Carvajal 12/12/24 patient to continue medication. Patient with follow up visit scheduled with Ian on 1/10/25. Patient updated that medication will be sent to  [facility]. Refill request routed to provider.

## 2025-01-06 ENCOUNTER — PHARMACY VISIT (OUTPATIENT)
Dept: PHARMACY | Facility: CLINIC | Age: 55
End: 2025-01-06
Payer: MEDICAID

## 2025-01-06 ENCOUNTER — OFFICE VISIT (OUTPATIENT)
Dept: PAIN MEDICINE | Facility: HOSPITAL | Age: 55
End: 2025-01-06
Payer: COMMERCIAL

## 2025-01-06 VITALS
RESPIRATION RATE: 16 BRPM | HEIGHT: 62 IN | HEART RATE: 110 BPM | WEIGHT: 112 LBS | BODY MASS INDEX: 20.61 KG/M2 | SYSTOLIC BLOOD PRESSURE: 113 MMHG | DIASTOLIC BLOOD PRESSURE: 82 MMHG | OXYGEN SATURATION: 97 %

## 2025-01-06 DIAGNOSIS — K86.1 CHRONIC PANCREATITIS, UNSPECIFIED PANCREATITIS TYPE (MULTI): Primary | ICD-10-CM

## 2025-01-06 DIAGNOSIS — R10.84 GENERALIZED ABDOMINAL PAIN: ICD-10-CM

## 2025-01-06 PROCEDURE — RXMED WILLOW AMBULATORY MEDICATION CHARGE

## 2025-01-06 PROCEDURE — 3074F SYST BP LT 130 MM HG: CPT | Performed by: PHYSICAL MEDICINE & REHABILITATION

## 2025-01-06 PROCEDURE — 3008F BODY MASS INDEX DOCD: CPT | Performed by: PHYSICAL MEDICINE & REHABILITATION

## 2025-01-06 PROCEDURE — 3079F DIAST BP 80-89 MM HG: CPT | Performed by: PHYSICAL MEDICINE & REHABILITATION

## 2025-01-06 PROCEDURE — 99204 OFFICE O/P NEW MOD 45 MIN: CPT | Performed by: PHYSICAL MEDICINE & REHABILITATION

## 2025-01-06 PROCEDURE — 99214 OFFICE O/P EST MOD 30 MIN: CPT | Performed by: PHYSICAL MEDICINE & REHABILITATION

## 2025-01-06 ASSESSMENT — ENCOUNTER SYMPTOMS
ACTIVITY CHANGE: 0
NAUSEA: 1
WEAKNESS: 0
BACK PAIN: 1
DEPRESSION: 0
FEVER: 0
OCCASIONAL FEELINGS OF UNSTEADINESS: 0
NUMBNESS: 0
ABDOMINAL PAIN: 1
ARTHRALGIAS: 0
FATIGUE: 0
LOSS OF SENSATION IN FEET: 0
JOINT SWELLING: 0
COLOR CHANGE: 0

## 2025-01-06 ASSESSMENT — PATIENT HEALTH QUESTIONNAIRE - PHQ9
1. LITTLE INTEREST OR PLEASURE IN DOING THINGS: NOT AT ALL
2. FEELING DOWN, DEPRESSED OR HOPELESS: NOT AT ALL
SUM OF ALL RESPONSES TO PHQ9 QUESTIONS 1 AND 2: 0

## 2025-01-06 ASSESSMENT — COLUMBIA-SUICIDE SEVERITY RATING SCALE - C-SSRS
1. IN THE PAST MONTH, HAVE YOU WISHED YOU WERE DEAD OR WISHED YOU COULD GO TO SLEEP AND NOT WAKE UP?: NO
6. HAVE YOU EVER DONE ANYTHING, STARTED TO DO ANYTHING, OR PREPARED TO DO ANYTHING TO END YOUR LIFE?: NO
2. HAVE YOU ACTUALLY HAD ANY THOUGHTS OF KILLING YOURSELF?: NO

## 2025-01-06 NOTE — PROGRESS NOTES
Subjective   Patient ID: Nelsy Osullivan is a 54 y.o. female who presents for lower abdominal pain related to chronic pancreatitis.  HPI    Patient with history of follicular lymphoma presents to office for initial eval of lower abdominal pain related to chrnoic pancreatitis. Patient is following with palliative as well for pain medicine management. Pain in her lower abdomen  feels achy, stabbing, and sharp and is there most of the time. Nothing makes the pain worse and her pain medication regimen of morphine and oxycodone does help some of her abdominal pain. She does have regular bowel movements as well as nausea.     Pain Score: 10/10    Injections/Procedures: referred by palliative for pending celiac plexus block    Neuromodulators/Other Medications: flexeril 10mg, duloxetine 60mg, morphine 60mg BID, oxycodone IR 15mg QID,     Other: DENIES    Review of Systems   Constitutional:  Negative for activity change, fatigue and fever.   Gastrointestinal:  Positive for abdominal pain and nausea.   Musculoskeletal:  Positive for back pain. Negative for arthralgias and joint swelling.   Skin:  Negative for color change and rash.   Neurological:  Negative for weakness and numbness.        Current Outpatient Medications   Medication Instructions    albuterol 90 mcg/actuation inhaler 2 puffs, Every 6 hours    aspirin 81 mg, Daily    atorvastatin (LIPITOR) 40 mg, Daily    BD Alcohol Swabs pads, medicated USE SIX TIMES DAILY    bisacodyl (DULCOLAX (BISACODYL)) 5 mg, Daily PRN    blood-glucose sensor (FreeStyle Ashley 3 Sensor) device 1 each, miscellaneous, Continuous, Use to check glucose, change every 14 days    blood-glucose sensor (FreeStyle Ashley 3 Sensor) device 1 each, miscellaneous, Continuous, Use to check glucose, change every 15 days *FreeStyle Ashley 3 plus    carvedilol (COREG) 12.5 mg, Every 12 hours    cetirizine (ZYRTEC) 10 mg, Daily    cyclobenzaprine (FLEXERIL) 10 mg, 3 times daily PRN    DULoxetine (CYMBALTA) 60  mg, Daily    DULoxetine (CYMBALTA) 20 mg, Daily    esomeprazole (NEXIUM) 40 mg, Daily PRN    estradiol (ESTRACE) 2 mg, Daily    FreeStyle Ashley 3 [place] misc Use as instructed    hydrocortisone 0.5 % cream Topical, 2 times daily    insulin aspart (NovoLOG) 100 unit/mL injection FOR USE WITH INSULIN PUMP MAX DAILY DOSE  UNITS    levothyroxine (Synthroid, Levoxyl) 125 mcg tablet TAKE 1 TABLET BY MOUTH IN THE MORNING ON EMPTY STOMACH    lidocaine-prilocaine (Emla) 2.5-2.5 % cream APPLY AT INSERTION SITE 30-45 MINUTES BEFORE YOU ARRIVE FOR BLOOD WORK OR CHEMOTHERAPY    melatonin 3 mg tablet 2 tablets, Nightly    metoprolol succinate XL (TOPROL-XL) 50 mg, Daily    mirtazapine (REMERON) 7.5 mg, Nightly    morphine CR (MS CONTIN) 60 mg, oral, 4 times daily, Do not crush, chew, or split. Cancer related pain G89.3    naloxone (Narcan) 4 mg/0.1 mL nasal spray Use 1 spray in one nostril as needed for overdose. May repeat every 2 to 3 min in alternating nostrils until medical assistance is available    naratriptan (AMERGE) 2.5 mg, oral, Once as needed    OLANZapine (ZYPREXA) 5 mg, oral, Nightly    omeprazole (PRILOSEC) 40 mg, Daily    Omnipod Dash Pods, Gen 4, cartridge 1 each, miscellaneous, Every 72 hours    ondansetron (ZOFRAN) 8 mg, oral, Every 8 hours PRN    ondansetron ODT (Zofran-ODT) 4 mg disintegrating tablet Dissolve 1 tablet under the tongue once daily as needed    OXcarbazepine (Trileptal) 150 mg tablet 1 tablet, Every 12 hours scheduled (0630,1830)    oxyCODONE (ROXICODONE) 15 mg, oral, Every 3 hours PRN    pancrelipase, Lip-Prot-Amyl, (Creon) 36,000-114,000- 180,000 unit capsule,delayed release(DR/EC) capsule 6 capsules, oral, 2 times daily    phenazopyridine (Pyridium) 200 mg tablet 1 tablet, 3 times daily    prochlorperazine (COMPAZINE) 10 mg, oral, Every 6 hours PRN    promethazine (PHENERGAN) 25 mg, Every 6 hours PRN    sennosides-docusate sodium (Senna-S) 8.6-50 mg tablet 1 tablet, oral, 2 times daily     spironolactone (Aldactone) 25 mg tablet Daily RT    torsemide (DEMADEX) 20 mg, oral, Daily        No past medical history on file.     Past Surgical History:   Procedure Laterality Date    OTHER SURGICAL HISTORY  04/14/2020    Cholecystectomy    OTHER SURGICAL HISTORY  04/14/2020    Exploratory laparoscopy    OTHER SURGICAL HISTORY  04/14/2020    Heart surgery        No family history on file.     Allergies   Allergen Reactions    Acetaminophen-Codeine Unknown    Adhesive Unknown    Codeine Unknown    Fentanyl Unknown     Reaction from Community: Other(See Desc) Comments: MIGRAINES IF USED FOR PAIN, BUT OK FOR ANESTHESIA    Other reaction(s): Intolerance    Fetrin Unknown    Ketorolac Itching    Meperidine Unknown     Reaction from Community: Other(See Desc)    Metformin Unknown    Propoxyphene N-Acetaminophen GI Upset    Silver Hives and Other     Tegaderm    Adhesive Tape-Silicones Rash    Iodinated Contrast Media Rash     Does well with 13hour premedication    Latex Rash    Penicillins Rash    Wound Dressings Rash     tagaderm        No results found for this or any previous visit from the past 1000 days.      Objective     Vitals:    01/06/25 1147   BP: 113/82   Pulse: 110   Resp: 16   SpO2: 97%               Physical Exam    GENERAL EXAM  Vital Signs: Vital signs to include heart rate, respiration rate, blood pressure, and temperature were reviewed.  General Appearance:  Awake, alert, healthy appearing, well developed, No acute distress.  Head: Normocephalic without evidence of head injury.  Neck: The appearance of the neck was normal without swelling with a midline trachea.  Eyes: The eyelids and eyebrows exhibited no abnormalities.  Pupils were not pin-point.  Sclera was without icterus.  Lungs: Respiration rhythm and depth was normal.  Respiratory movements were normal without labored breathing.  Cardiovascular: No peripheral edema was present.    Neurological: Patient was oriented to time, place, and  person.  Speech was normal.  Balance, gait, and stance were unremarkable.    Psychiatric: Appearance was normal with appropriate dress.  Mood was euthymic and affect was normal.  Skin: Affected regions were without ecchymosis or skin lesions.    Physical exam as above except:  Tenderness to palpation in all 4 quadrants of the abdomen but worse in the lower quadrants.  No tenderness to palpation over the ribs or thoracic and upper lumbar paraspinals.      Assessment/Plan   Diagnoses and all orders for this visit:  Chronic pancreatitis, unspecified pancreatitis type (Multi)  -     Referral to Pain Medicine  Generalized abdominal pain    Nelsy Osullivan is a 54 y.o. female who presents for lower abdominal pain related to chronic pancreatitis.  She was sent over to us by palliative for evaluation of celiac plexus block.  Physical exam demonstrated tenderness palpation across the entire abdomen but no symptoms or pain in her lower thoracic or upper lumbar region.  This point we will recommend doing greater splanchnic nerve block bilaterally as this has been shown to help with chronic pancreatitis which could be followed by radiofrequency ablation.  This point endures confluence into the celiac plexus.  We discussed the benefits and risks of the procedure with the patient and she would like to take time to think about it before scheduling.    Plan:  -Discussed benefits and risks of the greater splanchnic nerve block and patient wishes to think about it before scheduling the procedure  -Continue with palliative medicine for pain medicine management, I agree with their assessment that if this is no longer cancer related pain that I would recommend weaning her down off of her opioid medications.  -Follow-up as needed if she would like to schedule the bilateral splanchnic nerve block under fluoroscopic guidance    Hiren Acharya DO, ATC  Physical Medicine & Rehabilitation PGY-4     This note was generated with the aid of  dictation software, there may be typos despite my attempts at proofreading.     I saw and evaluated the patient. I personally obtained the key and critical portions of the history and physical exam or was physically present for key and critical portions performed by the resident/fellow. I reviewed the resident/fellow's documentation and discussed the patient with the resident/fellow. I agree with the resident/fellow's medical decision making as documented in the note.    Kenton Jacobs MD

## 2025-01-08 ENCOUNTER — TELEMEDICINE CLINICAL SUPPORT (OUTPATIENT)
Dept: HEMATOLOGY/ONCOLOGY | Facility: CLINIC | Age: 55
End: 2025-01-08
Payer: COMMERCIAL

## 2025-01-08 ENCOUNTER — NUTRITION (OUTPATIENT)
Dept: HEMATOLOGY/ONCOLOGY | Facility: CLINIC | Age: 55
End: 2025-01-08
Payer: COMMERCIAL

## 2025-01-08 VITALS — HEIGHT: 62 IN | BODY MASS INDEX: 21.22 KG/M2 | WEIGHT: 115.3 LBS

## 2025-01-08 DIAGNOSIS — C82.41 GRADE 3B FOLLICULAR LYMPHOMA OF LYMPH NODES OF NECK (MULTI): ICD-10-CM

## 2025-01-08 DIAGNOSIS — C82.40 FOLLICULAR LYMPHOMA GRADE IIIB, UNSPECIFIED BODY REGION (MULTI): ICD-10-CM

## 2025-01-08 LAB
ACID FAST STN SPEC: NORMAL
MYCOBACTERIUM SPEC CULT: NORMAL

## 2025-01-08 NOTE — PROGRESS NOTES
NUTRITION Follow UP NOTE    Nutrition Assessment     Reason for Visit:  Nelsy Osullivan is a 54 y.o. 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   She is status post cervical lymph node excision February 8, 2024.     She is s/p Bendamustine and Rituxan- 28 Day Cycles- completed 5 out of 6 planned cycles.    Ultrasound on October 24, 2024 showed that the pancreas was obscured due to overlying bowel gas. There was fatty infiltration of the liver as well as a moderate to large right pleural effusion.   Now s/p right thoracentesis.    11/4/2024 ER visit d/t diarrhea and hypoglycemia.  Presented with generalized abdominal pain worse in the epigastric region which may be slightly worsened from baseline for her. She endorsed decreased appetite.     Referred to this service per screen and assessed 5/30/24 during infusion.  She was followed during tx.  Referred again post tx and phoned today for follow up.  Spoke with pt via phone.  I am compliant with HIPAA regulations.      Note pt with a h/o Chronic pancreatitis (r/t DM?) with chronic abdominal pain. Per MD, she was unable to tolerate pancreatic enzyme replacement therapy but was initiated on PERT with success by oncology team.    Followed by pal care and initiated on narcotic regimen.  CNP noted pt with food insecurities.  Pt was referred to [facility] and had a recent appointment on 12/6/24.    PMH noted: DM I, hypothyroidism, CHF, COPD, CAD, Dyslipidemia, Major depressive disorder, UGB, malnutrition, Cholecystectomy, C-dif (5/2023)    Note pt with [facility] for coverage.  Pt receives Boost VHC ONS- twice daily from [facility] [place], DME    Comprehensive Metabolic Panel              Component  Ref Range & Units 1 mo ago  (11/14/24) 2 mo ago  (11/4/24) 2 mo ago  (10/24/24) 2 mo ago  (10/15/24) 3 mo ago  (9/23/24) 3 mo ago  (9/23/24) 3 mo ago  (9/19/24)   Glucose  74 - 99 mg/dL 243 High  87 178 High  231 High   208 High  188 High     Sodium  136 - 145 mmol/L 137 135 Low  135 Low  134 Low   132 Low  131 Low    Potassium  3.5 - 5.3 mmol/L 3.4 Low  2.8 Low Panic  3.5 3.9  4.4 4.1   Chloride  98 - 107 mmol/L 103 104 102 105  100 96 Low    Bicarbonate  21 - 32 mmol/L 28 25 28 24  27 30   Anion Gap  10 - 20 mmol/L 9 Low  9 Low  9 Low  9 Low   9 Low  9 Low    Urea Nitrogen  6 - 23 mg/dL 10 10 10 9  13 12   Creatinine  0.50 - 1.05 mg/dL 0.64 0.75 0.65 0.65  0.71 0.86   eGFR  >60 mL/min/1.73m*2 >90 >90 CM >90 CM >90 CM  >90 CM 79 CM   Comment: Calculations of estimated GFR are performed using the 2021 CKD-EPI Study Refit equation without the race variable for the IDMS-Traceable creatinine methods.  https://jasn.asnjournals.org/content/early/2021/09/22/ASN.7972675382   Calcium  8.6 - 10.3 mg/dL 9.0 8.7 8.7 8.8  8.7 8.5 Low    Albumin  3.4 - 5.0 g/dL 3.0 Low  3.2 Low  3.2 Low  3.0 Low  3.2 Low   3.3 Low    Alkaline Phosphatase  33 - 110 U/L 528 High  580 High  451 High  461 High  424 High   460 High    Total Protein  6.4 - 8.2 g/dL 5.7 Low  5.9 Low  5.8 Low  5.7 Low  6.1 Low   6.3 Low    AST  9 - 39 U/L 38 53 High  80 High  48 High  59 High   53 High    Bilirubin, Total  0.0 - 1.2 mg/dL 0.4 0.3 0.4 0.3 0.4  0.3   ALT  7 - 45 U/L 29 30 CM 31 CM 30 CM 31 CM  19 CM   Comment: Patients treated with Sulfasalazine may generate falsely decreased results for ALT.   Resulting Agency [facility]             Specimen Collected: 11/14/24 10:41 Last Resulted: 11/14/24 13:43     POCT glycosylated hemoglobin (Hb A1C)           Component  Ref Range & Units 1 mo ago 4 mo ago 1 yr ago   POC HEMOGLOBIN A1c  4.2 - 6.5 % 8.0 Abnormal  6.7 Abnormal  8.9 Abnormal         Specimen Collected: 11/13/24 11:40 Last Resulted: 11/13/24 11:40     Lipase              Component  Ref Range & Units 2 mo ago  (11/4/24) 2 mo ago  (10/15/24) 3 mo ago  (9/23/24) 1 yr ago  (10/17/23) 1 yr ago  (5/20/23) 1 yr ago  (4/5/23) 1 yr ago  (2/2/23)   Lipase  9 - 82 U/L 6  "Low  4 Low  9 3 Low  3 Low  CM 3 Low  CM 5 Low  CM   [facility]           Narrative  Performed by: [facility]  Venipuncture immediately after or during the administration of Metamizole may lead to falsely low results. Testing should be performed immediately prior to Metamizole dosing.   Specimen Collected: 11/04/24 06:19 Last Resulted: 11/04/24 06:46     Vitamin D 25-Hydroxy,Total (for eval of Vitamin D levels)          Component  Ref Range & Units 2 mo ago 2 yr ago 4 yr ago   Vitamin D, 25-Hydroxy, Total  30 - 100 ng/mL 31 31 R, CM 16 Abnormal  R, CM   Resulting [facility]           Narrative  Performed by: JARRETT  Deficiency:         < 20   ng/ml  Insufficiency:      20-29  ng/ml  Sufficiency:         ng/ml  This assay accurately quantifies the sum of Vitamin D3, 25-Hydroxy and Vitamin D2,25-Hydroxy.   Specimen Collected: 10/24/24 10:08 Last Resulted: 10/24/24 11:00       Anthropometrics:  Anthropometrics  Height: 158.6 cm (5' 2.44\")  Weight: 52.3 kg (115 lb 4.8 oz)  BMI (Calculated): 20.79  IBW/kg (Dietitian Calculated): 50.9 kg  Percent of IBW: 102.8 %  Weight Change  Weight History / % Weight Change: Loss of 5.2 kg (9.0%) in 9 weeks  Significant Weight Loss: Yes  Interpretation of Weight Loss: >7.5% in 3 months    Historically wt has fluctuated greatly with periods of significant losses.  Pt had stated that this trend was normal for her.   Wt Readings    1/8/24 52.3 kg- per pt on home scale    11/19/24 Thoracentesis- 1 liter removed   11/14/24 57.5 kg   10/24/24 53.6 kg   10/11/24 53.8 kg (118 lb 9.7 oz)   9/19/24 55.2 kg    9/5/24 55.2 kg- Loss of 1.5 kg (2.8%) in 1 month after gain.  Note that LE edema has improved.    8/8/24 55.9 kg- gain of 3.4 kg in 2 weeks    7/25/24 52.5 kg- loss of 3.8 kg (6.7%) in 1 month- significant   6/27/24 56.3 kg   05/30/24 56.8 kg " (125 lb 4.8 oz)- question validity of this wt, as it reflects a 3.7 kg gain in 6 days???   05/24/24 53.1 kg (117 lb 1 oz)- loss of 2.3 kg (4.2%) in 3 weeks- SIGNIFICANT   05/10/24 54 kg (119 lb 0.8 oz)   05/03/24 55.4 kg (122 lb 3.2 oz)- gain of 3.2 kg in 1 month   04/30/24 54.7 kg (120 lb 8 oz)   04/22/24 53.6 kg (118 lb 2.7 oz)   04/11/24 53.6 kg (118 lb 2.7 oz)   04/03/24 52.2 kg (115 lb)- loss of 4 kg (7.1%) in 2 weeks- SIGNIFICANT   03/21/24 56.2 kg (123 lb 14.4 oz)- gain of 14 kg in < 12 months   03/14/24 52.2 kg (115 lb 1.3 oz)   02/23/24 54.9 kg (121 lb)   01/26/24 54.4 kg (120 lb)   01/09/24 51.7 kg (114 lb)     12/13/23 49.8 kg (109 lb 12.8 oz)   10/17/23 45.4 kg (100 lb)   09/11/23 47.2 kg (104 lb)   08/30/23 (!) 40.8 kg (90 lb)   04/04/23 (!) 42.2 kg (93 lb)- loss of 6.3 kg (13%) in < 6 months  SIGNIFICANT   10/11/22 48.5 kg (107 lb)- gain of 4 kg in < 5 months   05/20/22 44.5 kg (98 lb)   04/06/22 44 kg (97 lb)       Food And Nutrient Intake:  Food and Nutrient History  Food and Nutrient History: Reports her appetite has declined.  She continues to eat 3 meals daily and a snack at bedtime.  She is frustrated that her BS has been running higher per her latest A1C.  Does note that her pain is uncontrolled however and that she has been very stressed.  Energy Intake: Fair 50-75 %  Fluid Intake: Adequate  GI Symptoms: abdominal pain, diarrhea (pudding like stools)  Dentition: edentulous       Diarrhea had improved on Creon.  Recall prior to PERT, pt C/o diarrhea/oily stools, reporting 7-20 BM's in 24 hrs.  Stated she was eating but it was coming back out.       Food Intake  Snacks: Chips, Boost VHC, fruit, tator tots or bowl of cereal before bed                                       Micronutrient Intake  Vitamin Intake: Multivitamin    Food Supplement Intake  Oral Nutrition Supplements: Boost Very High Calorie (2 daily as prescribed)  Each provides 530 calories, 26 g of fat and 22 g of  "protein      Medication and Complementary/Alternative Medicine Use  Prescription Medication Use: Taking 6 each Creon daily- 2 each with 3 meals a day.  Reports she does not take any Creon with snacks or her Boost VHC ONS.  Also states there was a time when she was not taking them because she thought they were causing more diarrhea- but she re-initiated it.    Nutrition Focused Physical Exam Findings:  Deferred d/t phone consult                        Energy Needs  Calculated Energy Needs Using Equations  Height: 158.6 cm (5' 2.44\")  Estimated Energy Needs  Total Energy Estimated Needs (kCal): 1760 kCal  Total Estimated Energy Need per Day (kCal/kg): 31 kCal/kg  Estimated Fluid Needs  Total Fluid Estimated Needs (mL): 1705 mL (plus losses)  Total Fluid Estimated Needs (mL/kg): 30 mL/kg  Estimated Protein Needs  Total Protein Estimated Needs (g): 74 g  Total Protein Estimated Needs (g/kg): 1.3 g/kg        Nutrition Diagnosis   Malnutrition Diagnosis  Patient has Malnutrition Diagnosis: Yes  Diagnosis Status: New  Malnutrition Diagnosis: Severe malnutrition related to chronic disease or condition (at minimum- no NFPE to assess)  As Evidenced by: intake of < 75% of  estimated energy  requirement for  > 1 month with significant wt loss as documented  Additional Assessment Information: Pt with serum Vit D at low end of normal 2 months ago- likley needs supplement to maintain normal range over the winter months    Nutrition Diagnosis  Patient has Nutrition Diagnosis: Yes  Diagnosis Status (1): Ongoing  Nutrition Diagnosis 1: Altered GI function  Related to (1): chronic pancreatitis  As Evidenced by (1): clinical hx and pt report of pudding like stools and abd pain  Additional Assessment Information (1): Pt with inadequate use of PERT- needs  1 capsule with snacks daily and 2 each minimum with her Boost VHC, as it contains 26 g of fat per serving (Creon rec's are 4,000 lipase units per gram of fat- equal to 104,000 unitsper " VHC serving)    Nutrition Interventions/Recommendations   Nutrition Prescription  Individualized Nutrition Prescription Provided for : Low concentrated sugar JOON    Food and Nutrition Delivery  Food and Nutrition Delivery  Meals & Snacks: Carbohydrate-modified diet  Goals: non-fasting BS<180  Medical Food Supplement: Boost Very High Calorie  Goals: Twice daily to provide 1060 calories and 44 g of protein  Vitamin Supplement Therapy: D  Goals: Maintenace dose per PCP  Other:: PERT  Goals: 2 each capsules with meals three times daily and 2 each with Boost VHC twice daily; 1 each with HS snack- total of 11 per day (Note that Creon dosing guidelines for pt's current wt are a max of 14 capsules per day.)    Nutrition Education  Nutrition Education  Nutrition Education Content: Content related nutrition education  Goals: Proper use of PERT for effective digestion of macronutrients    Coordination of Care  Coordination of Nutrition Care by a Nutrition Professional  Collaboration and referral of nutrition care: Collaboration by nutrition professional with other providers  Goals: Increase PERT to better meet pt needs    There are no Patient Instructions on file for this visit.    Nutrition Monitoring and Evaluation   Food/Nutrient Related History Monitoring  Monitoring and Evaluation Plan: Fluid intake, Amount of food, Carbohydrate intake  Fluid Intake: Estimated fluid intake  Criteria: Meet daily hydration needs  Amount of Food: Estimated amout of food  Criteria: Meet energy and protein needs  Estimated carbohydrate intake: Estimated carbohydrate intake  Criteria: improved BS control / Hgb A1C  Body Composition/Growth/Weight History  Monitoring and Evaluation Plan: Weight  Weight: Weight change  Criteria: wt gain and Repletion  Biochemical Data, Medical Tests and Procedures  Monitoring and Evaluation Plan: Electrolyte/renal panel, Glucose/endocrine profile, Vitamin profile  Criteria: WNL  Glucose/Endocrine Profile: Glucose,  casual, Hemoglobin A1c (HgbA1c)  Criteria: < 180; < 7.0 respectively  Vitamin Profile: Vitamin D, 25 hydroxy  Criteria: 30-80  Nutrition Focused Physical Findings  Monitoring and Evaluation Plan: Adipose, Digestive System, Muscles  Adipose: Loss of subcutaneous fat  Criteria: No further loss  Digestive System: Diarrhea (abd pain)  Criteria: Solid stools that sink; improved pain  Muscles: Muscle atrophy  Criteria: No further loss- repletion          Time Spent  Prep time on day of patient encounter: 5 minutes  Time spent directly with patient, family or caregiver: 30 minutes  Additional Time Spent on Patient Care Activities: 10 minutes  Documentation Time: 20 minutes  Other Time Spent: 0 minutes  Total: 65 minutes

## 2025-01-09 RX ORDER — PANCRELIPASE 36000; 180000; 114000 [USP'U]/1; [USP'U]/1; [USP'U]/1
CAPSULE, DELAYED RELEASE PELLETS ORAL
Qty: 990 CAPSULE | Refills: 1 | Status: SHIPPED | OUTPATIENT
Start: 2025-01-09

## 2025-01-09 NOTE — PROGRESS NOTES
"SUPPORTIVE AND PALLIATIVE ONCOLOGY FOLLOW UP - OUTPATIENT      SERVICE DATE: 1/10/2025    Referred by:  Dr. 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  She was diagnosed and is planned to get bendamustine and rituxan every 28 days. She had been following with Jonathan Worrlel with [facility] palliative care, but has since requested to switch to palliative care closer to where she lives. She was recently hospitalized for CHF exacerbation. Has completed her treatment for her follicular lymphoma     Information was collected from chart review, discussion with patient/family, and discussion with care team     SUBJECTIVE:  She states that her pain remains about the same and that she is having some epigastric pain. She states that she has good days and bad days. We discussed that she has met with pain management and that she has been offered the nerve block by pain management to help with this pain. She states that she is hesitant to move forward with having the block as she \"does not like to be messed with and is worried about the side effects\". We discussed that since she has completed her treatment for her lymphoma, we are going to start weaning her pain medication at her next visit. We discussed that we would be weaning her morphine down to 60mg TID and her oxycodone to 15mg Q4 PRN. We discussed that the nerve block would help with the pain and allow us to wean her off of her medications. She remains undecided     She states that she continues to have fluctuating bowels. She does not need any adjustments made to her bowel " regimen    She states that she does have some nausea. She states that the zofran works well for her, but that she needs to take it more than once a day. We discussed increasing to Q8 PRN and she is agreeable.          Pain Assessment:  Pain Score:   8  Location: Abdomenabdomen  Education:        Symptom Assessment:  ROS otherwise negative, pertinent positives documented in the HPI       Information obtained from: chart review and interview of patient  ______________________________________________________________________     Oncology History   Follicular lymphoma grade IIIb   6/7/2022 Initial Diagnosis    Follicular lymphoma grade IIIb (CMS/HCC)     5/2/2024 -  Chemotherapy    Bendamustine + RiTUXimab, 28 Day Cycles         No past medical history on file.  Past Surgical History:   Procedure Laterality Date    OTHER SURGICAL HISTORY  04/14/2020    Cholecystectomy    OTHER SURGICAL HISTORY  04/14/2020    Exploratory laparoscopy    OTHER SURGICAL HISTORY  04/14/2020    Heart surgery     No family history on file.     SOCIAL HISTORY  Children 4 children and 5 grandchildren    Social History:  reports that she quit smoking about 3 years ago. Her smoking use included cigarettes. She has been exposed to tobacco smoke. She has never used smokeless tobacco. She reports that she does not currently use alcohol. She reports that she does not use drugs.    She has food insecurities and has bed bugs   REVIEW OF SYSTEMS  Review of systems negative unless noted in HPI.       Objective     Palliative Performance Scale % (PPS) 90     Labs:  No results found. However, due to the size of the patient record, not all encounters were searched. Please check Results Review for a complete set of results.                  Medications:   Current Outpatient Medications   Medication Instructions    albuterol 90 mcg/actuation inhaler 2 puffs, Every 6 hours    aspirin 81 mg, Daily    atorvastatin (LIPITOR) 40 mg, Daily    BD Alcohol Swabs pads,  medicated USE SIX TIMES DAILY    bisacodyl (DULCOLAX (BISACODYL)) 5 mg, Daily PRN    blood-glucose sensor (FreeStyle Ashley 3 Sensor) device 1 each, miscellaneous, Continuous, Use to check glucose, change every 14 days    blood-glucose sensor (FreeStyle Ashley 3 Sensor) device 1 each, miscellaneous, Continuous, Use to check glucose, change every 15 days *FreeStyle Ashley 3 plus    carvedilol (COREG) 12.5 mg, Every 12 hours    cetirizine (ZYRTEC) 10 mg, Daily    cyclobenzaprine (FLEXERIL) 10 mg, 3 times daily PRN    DULoxetine (CYMBALTA) 60 mg, Daily    DULoxetine (CYMBALTA) 20 mg, Daily    esomeprazole (NEXIUM) 40 mg, Daily PRN    estradiol (ESTRACE) 2 mg, Daily    FreeStyle Ashley 3 [place] misc Use as instructed    hydrocortisone 0.5 % cream Topical, 2 times daily    insulin aspart (NovoLOG) 100 unit/mL injection FOR USE WITH INSULIN PUMP MAX DAILY DOSE  UNITS    levothyroxine (Synthroid, Levoxyl) 125 mcg tablet TAKE 1 TABLET BY MOUTH IN THE MORNING ON EMPTY STOMACH    lidocaine-prilocaine (Emla) 2.5-2.5 % cream APPLY AT INSERTION SITE 30-45 MINUTES BEFORE YOU ARRIVE FOR BLOOD WORK OR CHEMOTHERAPY    melatonin 3 mg tablet 2 tablets, Nightly    metoprolol succinate XL (TOPROL-XL) 50 mg, Daily    mirtazapine (REMERON) 7.5 mg, Nightly    morphine CR (MS CONTIN) 60 mg, oral, 4 times daily, Do not crush, chew, or split. Cancer related pain G89.3    naloxone (Narcan) 4 mg/0.1 mL nasal spray Use 1 spray in one nostril as needed for overdose. May repeat every 2 to 3 min in alternating nostrils until medical assistance is available    naratriptan (AMERGE) 2.5 mg, oral, Once as needed    OLANZapine (ZYPREXA) 5 mg, oral, Nightly    omeprazole (PRILOSEC) 40 mg, Daily    Omnipod Dash Pods, Gen 4, cartridge 1 each, miscellaneous, Every 72 hours    ondansetron (ZOFRAN) 8 mg, oral, Every 8 hours PRN    ondansetron ODT (ZOFRAN-ODT) 8 mg, oral, Every 8 hours PRN    OXcarbazepine (Trileptal) 150 mg tablet 1 tablet, Every 12 hours  scheduled (0630,1830)    oxyCODONE (ROXICODONE) 15 mg, oral, Every 3 hours PRN    pancrelipase, Lip-Prot-Amyl, (Creon) 36,000-114,000- 180,000 unit capsule,delayed release(DR/EC) capsule 2 capsules with each meal TID, 2 capsules with supplement drink BID and 1 capsule with snack    phenazopyridine (Pyridium) 200 mg tablet 1 tablet, 3 times daily    prochlorperazine (COMPAZINE) 10 mg, oral, Every 6 hours PRN    promethazine (PHENERGAN) 25 mg, Every 6 hours PRN    sennosides-docusate sodium (Senna-S) 8.6-50 mg tablet 1 tablet, oral, 2 times daily    spironolactone (Aldactone) 25 mg tablet Daily RT    torsemide (DEMADEX) 20 mg, oral, Daily       Allergies:   Allergies   Allergen Reactions    Acetaminophen-Codeine Unknown    Adhesive Unknown    Codeine Unknown    Fentanyl Unknown     Reaction from Community: Other(See Desc) Comments: MIGRAINES IF USED FOR PAIN, BUT OK FOR ANESTHESIA    Other reaction(s): Intolerance    Fetrin Unknown    Ketorolac Itching    Meperidine Unknown     Reaction from Community: Other(See Desc)    Metformin Unknown    Propoxyphene N-Acetaminophen GI Upset    Silver Hives and Other     Tegaderm    Adhesive Tape-Silicones Rash    Iodinated Contrast Media Rash     Does well with 13hour premedication    Latex Rash    Penicillins Rash    Wound Dressings Rash     tagaderm       PHYSICAL EXAMINATION  Vital Signs:   Vital signs reviewed  Vitals:    01/10/25 1202   BP: 136/87   Pulse: 99   Resp: 16   Temp: 37.3 °C (99.1 °F)   SpO2: 97%                       Pain Score:   8         Physical Exam  Constitutional:       Appearance: Normal appearance. She is normal weight.   HENT:      Head: Normocephalic and atraumatic.      Mouth/Throat:      Mouth: Mucous membranes are dry.   Eyes:      Extraocular Movements: Extraocular movements intact.   Cardiovascular:      Comments: No signs of cardiac distress  Pulmonary:      Effort: Pulmonary effort is normal.      Comments: No signs of respiratory  distress  Abdominal:      General: Abdomen is flat.      Palpations: Abdomen is soft.   Musculoskeletal:         General: Normal range of motion.   Skin:     General: Skin is warm and dry.   Neurological:      Mental Status: She is alert and oriented to person, place, and time. Mental status is at baseline.   Psychiatric:         Mood and Affect: Mood normal.         Behavior: Behavior normal.         Thought Content: Thought content normal.         Judgment: Judgment normal.         ASSESSMENT/PLAN    Pain  Pain is: cancer related pain and chronic  Type: visceral  Pain control: sub-optimally controlled  Previously tried/intolerances: Did not find oxycodone effective, avoid Tylenol due to liver impairment, did not feel dilaudid controlled her pain very well    Pain Plan:  Continue Cymbalta 80mg daily   Continue Flexeril 10mg TID PRN   Continue Lidocaine rectally   Continue MS CR 60mg 4x/day  With next refill in Feb will decrease to TID   Continue Oxycodone to 15mg Q3 PRN   With next refill with decrease to Q4 Prn   Appreciate input from pain management, strongly encourage her to consider nerve block as we are going to start weaning pain medications.     Opioid Use  Medication Management:   OARRS report reviewed with no aberrant behavior; consistent with  prescriptions/records and patient history  .  Overdose Risk Score 400.   This has been discussed with patient.   We will continue to closely monitor the patient for signs of prescription misuse including UDS, OARRS review and subjective reports at each visit.  No concurrent benzodiazepine use   I am a provider who is certified in Hospice and Palliative Medicine and have conducted a face-face visit and examination for this patient.  Routine Urine Drug Screen is needed and was completed on 5/10/24 appropriately positive for opioids and negative for illicit substances  Controlled Substance Agreement: is needed. Completed on: 5/10/24  Specifically discussed  that controlled substance prescriptions will only be provided by our group as outlined in the completed agreement  Naloxone is needed  Red Flags: Called in early for a refill as she was out 2 weeks early on 11/15     Nausea   Intermittent nausea without vomiting related to chemotherapy  Nausea Plan:  Change Zyprexa to 5mg qhs  Compazine 10mg PRN   Increase Zofran to ODT 8mg Q8 PRN    Diarrhea   Related to chemotherapy  Diarrhea Plan:  Imodium prn      Altered Mood  Chronic anxiety and depression related to health concerns   Mood Plan:  Continue Remeron 7.5mg nightly   Continue Cymbalta 60mg daily       Decreased appetite  Related to malignancy and chemotherapy  Anorexia Plan:   Zyprexa 5mg qhs    Itching related to bedbug infestation   Continue Atarax 25mg TID PRN    Lower extremity edema  Renewed her prescription for torsemide and instructed to follow up with her cardiologist     Food insecurities   Refer to food for life     Advance Directives  Existence of Advance Directives: Reports that she has completed them   Decision maker: OSMEL is reportedly her sister   Code Status: Full code    Next Follow-Up Visit:  Return to clinic in 5 weeks     Signature and billing  Thank you for allowing us to participate in the care of this patient. Recommendations will be communicated back to the consulting service by way of shared electronic medical record or face-to-face.    Medical complexity was high level due to due to complexity of problems, extensive data review, and high risk of management/treatment.  Time was spent on the following: Prep Time, Time Directly with Patient/Family/Caregiver, Documentation Time. Total time spent: 45      DATA   Diagnostic tests and information reviewed for today's visit:  Most recent labs, Most recent imaging, Medications           SIGNATURE: RALPH Eugene-CNS    Contact information:  [facility]  Monday-Friday 8 AM-5 PM  Phone:  [phone], press option #5,  then option #1.   Or Epic Secure Chat

## 2025-01-10 ENCOUNTER — OFFICE VISIT (OUTPATIENT)
Dept: PALLIATIVE MEDICINE | Facility: CLINIC | Age: 55
End: 2025-01-10
Payer: COMMERCIAL

## 2025-01-10 VITALS
SYSTOLIC BLOOD PRESSURE: 136 MMHG | BODY MASS INDEX: 21.91 KG/M2 | WEIGHT: 119.05 LBS | HEART RATE: 99 BPM | HEIGHT: 62 IN | OXYGEN SATURATION: 97 % | RESPIRATION RATE: 16 BRPM | TEMPERATURE: 99.1 F | DIASTOLIC BLOOD PRESSURE: 87 MMHG

## 2025-01-10 DIAGNOSIS — G89.3 NEOPLASM RELATED PAIN: ICD-10-CM

## 2025-01-10 DIAGNOSIS — T45.1X5A CHEMOTHERAPY INDUCED NAUSEA AND VOMITING: Primary | ICD-10-CM

## 2025-01-10 DIAGNOSIS — R11.2 CHEMOTHERAPY INDUCED NAUSEA AND VOMITING: Primary | ICD-10-CM

## 2025-01-10 PROCEDURE — 99215 OFFICE O/P EST HI 40 MIN: CPT | Performed by: CLINICAL NURSE SPECIALIST

## 2025-01-10 PROCEDURE — 80307 DRUG TEST PRSMV CHEM ANLYZR: CPT | Performed by: CLINICAL NURSE SPECIALIST

## 2025-01-10 RX ORDER — ONDANSETRON 8 MG/1
8 TABLET, ORALLY DISINTEGRATING ORAL EVERY 8 HOURS PRN
Qty: 90 TABLET | Refills: 11 | Status: SHIPPED | OUTPATIENT
Start: 2025-01-10

## 2025-01-10 ASSESSMENT — PAIN SCALES - GENERAL: PAINLEVEL_OUTOF10: 8

## 2025-01-13 ENCOUNTER — TELEPHONE (OUTPATIENT)
Dept: ADMISSION | Facility: HOSPITAL | Age: 55
End: 2025-01-13
Payer: COMMERCIAL

## 2025-01-13 DIAGNOSIS — G89.3 NEOPLASM RELATED PAIN: ICD-10-CM

## 2025-01-13 RX ORDER — MORPHINE SULFATE 60 MG/1
60 TABLET, FILM COATED, EXTENDED RELEASE ORAL 4 TIMES DAILY
Qty: 120 TABLET | Refills: 0 | Status: SHIPPED | OUTPATIENT
Start: 2025-01-14 | End: 2025-02-13

## 2025-01-13 NOTE — TELEPHONE ENCOUNTER
Patient last seen by RALPH Carvajal on 1/10 with plan to continue MSER 60mg 4x/day with plan to decrease to TID in February. Follow up visit is scheduled for 2/14. OARRS reviewed and no aberrancy noted. Patient last filled MSER 60mg #120/30 days on 12/16. Prescription pended to provider to approve.

## 2025-01-14 ENCOUNTER — PHARMACY VISIT (OUTPATIENT)
Dept: PHARMACY | Facility: CLINIC | Age: 55
End: 2025-01-14
Payer: MEDICAID

## 2025-01-14 PROCEDURE — RXMED WILLOW AMBULATORY MEDICATION CHARGE

## 2025-01-15 LAB
6MAM UR CFM-MCNC: <25 NG/ML
CODEINE UR CFM-MCNC: <50 NG/ML
HYDROCODONE CTO UR CFM-MCNC: 184 NG/ML
HYDROMORPHONE UR CFM-MCNC: 330 NG/ML
MORPHINE UR CFM-MCNC: >2500 NG/ML
NORHYDROCODONE UR CFM-MCNC: 962 NG/ML
NOROXYCODONE UR CFM-MCNC: >1000 NG/ML
OXYCODONE UR CFM-MCNC: 260 NG/ML
OXYMORPHONE UR CFM-MCNC: 596 NG/ML

## 2025-01-30 ENCOUNTER — APPOINTMENT (OUTPATIENT)
Dept: HEMATOLOGY/ONCOLOGY | Facility: CLINIC | Age: 55
End: 2025-01-30
Payer: COMMERCIAL

## 2025-01-30 ENCOUNTER — NUTRITION (OUTPATIENT)
Dept: HEMATOLOGY/ONCOLOGY | Facility: CLINIC | Age: 55
End: 2025-01-30
Payer: COMMERCIAL

## 2025-01-30 VITALS — WEIGHT: 110.23 LBS | HEIGHT: 62 IN | BODY MASS INDEX: 20.28 KG/M2

## 2025-01-30 NOTE — PROGRESS NOTES
NUTRITION Follow UP NOTE    Nutrition Assessment     Reason for Visit:  Nelsy Osullivan is a 54 y.o. 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   She is status post cervical lymph node excision February 8, 2024.     She is s/p Bendamustine and Rituxan- 28 Day Cycles- completed 5 out of 6 planned cycles.    Ultrasound on October 24, 2024 showed that the pancreas was obscured due to overlying bowel gas. There was fatty infiltration of the liver as well as a moderate to large right pleural effusion.   s/p right thoracentesis.    11/4/2024 ER visit d/t diarrhea and hypoglycemia.  Presented with generalized abdominal pain worse in the epigastric region which may be slightly worsened from baseline for her. She endorsed decreased appetite.     Referred to this service per screen and assessed 5/30/24 during infusion.  She was followed during tx.  Referred again post tx and phoned again today for follow up.    Spoke with pt via phone.  I am compliant with HIPAA regulations.      Note pt with a h/o Chronic pancreatitis (r/t DM?) with chronic abdominal pain. Per MD, she was unable to tolerate pancreatic enzyme replacement therapy but was initiated on PERT with success by oncology team.    Followed by pal care and initiated on narcotic regimen.  CNP noted pt with food insecurities.  Pt was referred to [facility] and had a noted appointment on 12/6/24.  She has no more pending appointments however.     PMH noted: DM I, hypothyroidism, CHF, COPD, CAD, Dyslipidemia, Major depressive disorder, UGB, malnutrition, Cholecystectomy, C-dif (5/2023)    Note pt with [facility] for coverage.  Pt receives Boost VHC ONS- twice daily from [facility] [place]    No new labs  Comprehensive Metabolic Panel              Component  Ref Range & Units 1 mo ago  (11/14/24) 2 mo ago  (11/4/24) 2 mo ago  (10/24/24) 2 mo ago  (10/15/24) 3 mo ago  (9/23/24) 3 mo ago  (9/23/24) 3 mo ago  (9/19/24)   Glucose  74 - 99  mg/dL 243 High  87 178 High  231 High   208 High  188 High    Sodium  136 - 145 mmol/L 137 135 Low  135 Low  134 Low   132 Low  131 Low    Potassium  3.5 - 5.3 mmol/L 3.4 Low  2.8 Low Panic  3.5 3.9  4.4 4.1   Chloride  98 - 107 mmol/L 103 104 102 105  100 96 Low    Bicarbonate  21 - 32 mmol/L 28 25 28 24  27 30   Anion Gap  10 - 20 mmol/L 9 Low  9 Low  9 Low  9 Low   9 Low  9 Low    Urea Nitrogen  6 - 23 mg/dL 10 10 10 9  13 12   Creatinine  0.50 - 1.05 mg/dL 0.64 0.75 0.65 0.65  0.71 0.86   eGFR  >60 mL/min/1.73m*2 >90 >90 CM >90 CM >90 CM  >90 CM 79 CM   Comment: Calculations of estimated GFR are performed using the 2021 CKD-EPI Study Refit equation without the race variable for the IDMS-Traceable creatinine methods.  https://jasn.asnjournals.org/content/early/2021/09/22/ASN.7109531730   Calcium  8.6 - 10.3 mg/dL 9.0 8.7 8.7 8.8  8.7 8.5 Low    Albumin  3.4 - 5.0 g/dL 3.0 Low  3.2 Low  3.2 Low  3.0 Low  3.2 Low   3.3 Low    Alkaline Phosphatase  33 - 110 U/L 528 High  580 High  451 High  461 High  424 High   460 High    Total Protein  6.4 - 8.2 g/dL 5.7 Low  5.9 Low  5.8 Low  5.7 Low  6.1 Low   6.3 Low    AST  9 - 39 U/L 38 53 High  80 High  48 High  59 High   53 High    Bilirubin, Total  0.0 - 1.2 mg/dL 0.4 0.3 0.4 0.3 0.4  0.3   ALT  7 - 45 U/L 29 30 CM 31 CM 30 CM 31 CM  19 CM   Comment: Patients treated with Sulfasalazine may generate falsely decreased results for ALT.   Resulting Agency [facility]        Specimen Collected: 11/14/24 10:41 Last Resulted: 11/14/24 13:43     POCT glycosylated hemoglobin (Hb A1C)           Component  Ref Range & Units 1 mo ago 4 mo ago 1 yr ago   POC HEMOGLOBIN A1c  4.2 - 6.5 % 8.0 Abnormal  6.7 Abnormal  8.9 Abnormal         Specimen Collected: 11/13/24 11:40 Last Resulted: 11/13/24 11:40     Lipase              Component  Ref Range & Units 2 mo ago  (11/4/24) 2 mo ago  (10/15/24) 3 mo ago  (9/23/24) 1 yr ago  (10/17/23) 1 yr ago  (5/20/23) 1 yr  "ago  (4/5/23) 1 yr ago  (2/2/23)   Lipase  9 - 82 U/L 6 Low  4 Low  9 3 Low  3 Low  CM 3 Low  CM 5 Low  CM   Resulting [facility]        Narrative  Performed by: PORT[facility]  Venipuncture immediately after or during the administration of Metamizole may lead to falsely low results. Testing should be performed immediately prior to Metamizole dosing.   Specimen Collected: 11/04/24 06:19 Last Resulted: 11/04/24 06:46     Vitamin D 25-Hydroxy,Total (for eval of Vitamin D levels)          Component  Ref Range & Units 3 mo ago 2 yr ago 4 yr ago   Vitamin D, 25-Hydroxy, Total  30 - 100 ng/mL 31 31 R, CM 16 Abnormal  R, CM   Resulting [facility]      Narrative  Performed by: JARRETT  Deficiency:         < 20   ng/ml  Insufficiency:      20-29  ng/ml  Sufficiency:         ng/ml  This assay accurately quantifies the sum of Vitamin D3, 25-Hydroxy and Vitamin D2,25-Hydroxy.   Specimen Collected: 10/24/24 10:08 Last Resulted: 10/24/24     She has not met with PCP for recommendations on a Vit D 3 supplement for the winter months.      Anthropometrics:  Anthropometrics  Height: 158.6 cm (5' 2.44\")  Weight: 50 kg (110 lb 3.7 oz)  BMI (Calculated): 19.88  IBW/kg (Dietitian Calculated): 50.9 kg  Percent of IBW: 98.2 %  Weight Change  Weight History / % Weight Change: Likely valid loss since 9/19/24 of 5.2 kg (9.4%) in < 5 months  Significant Weight Loss: Yes  Interpretation of Weight Loss: 10% in 6 months    Historically wt has fluctuated greatly with periods of significant losses.  Pt had stated that this trend was normal for her.   Wt Readings    1/30/25 50 kg   1/8/24 52.3 kg- per pt on home scale   Loss of 5.2 kg (9.0%) in 9 weeks (after thoracentesis)   11/19/24 Thoracentesis- 1 liter removed   11/14/24 57.5 kg   10/24/24 53.6 kg   10/11/24 53.8 kg (118 lb 9.7 oz)   9/19/24 55.2 kg    9/5/24 55.2 " kg- Loss of 1.5 kg (2.8%) in 1 month after gain.  Note that LE edema has improved.    8/8/24 55.9 kg- gain of 3.4 kg in 2 weeks    7/25/24 52.5 kg- loss of 3.8 kg (6.7%) in 1 month- significant   6/27/24 56.3 kg   05/30/24 56.8 kg (125 lb 4.8 oz)- question validity of this wt, as it reflects a 3.7 kg gain in 6 days???   05/24/24 53.1 kg (117 lb 1 oz)- loss of 2.3 kg (4.2%) in 3 weeks- SIGNIFICANT   05/10/24 54 kg (119 lb 0.8 oz)   05/03/24 55.4 kg (122 lb 3.2 oz)- gain of 3.2 kg in 1 month   04/30/24 54.7 kg (120 lb 8 oz)   04/22/24 53.6 kg (118 lb 2.7 oz)   04/11/24 53.6 kg (118 lb 2.7 oz)   04/03/24 52.2 kg (115 lb)- loss of 4 kg (7.1%) in 2 weeks- SIGNIFICANT   03/21/24 56.2 kg (123 lb 14.4 oz)- gain of 14 kg in < 12 months   03/14/24 52.2 kg (115 lb 1.3 oz)   02/23/24 54.9 kg (121 lb)   01/26/24 54.4 kg (120 lb)   01/09/24 51.7 kg (114 lb)     12/13/23 49.8 kg (109 lb 12.8 oz)   10/17/23 45.4 kg (100 lb)   09/11/23 47.2 kg (104 lb)   08/30/23 (!) 40.8 kg (90 lb)   04/04/23 (!) 42.2 kg (93 lb)- loss of 6.3 kg (13%) in < 6 months  SIGNIFICANT   10/11/22 48.5 kg (107 lb)- gain of 4 kg in < 5 months   05/20/22 44.5 kg (98 lb)   04/06/22 44 kg (97 lb)       Food And Nutrient Intake:  Food and Nutrient History  Food and Nutrient History: She states she is eating normally.  Does report, however, that sometimes she is not hungry.  Energy Intake: Fair 50-75 %            Food Intake  Amount of Food: 24 hr recall- pt notes she was busy running errands yesterday  Meal 1: oatmeal pkt mixed with water and mangoes  Meal 2: [employer]- rice, chicken, sour cream, red sauce, cheese, and guac bowl  Meal 3: Boost VHC (2 each prior to drinking)                                            Food Supplement Intake  Oral Nutrition Supplements: Boost Very High Calorie  Frequency: 1-2 times daily  Protein: 22  Calories: 530  Each provides 530 calories, 26 g of fat and 22 g of protein      Medication and Complementary/Alternative Medicine  "Use  Prescription Medication Use: She is taking 2 each Creon with meals as directed and 1 with snacks. 2 each with VHC in additon  Recall at last call, she reported she did not take any Creon with snacks or her Boost VHC ONS.  Also states there was a time when she was not taking them because she thought they were causing more diarrhea- but she re-initiated it.    Nutrition Focused Physical Exam Findings:  Deferred d/t phone consult                   Physical Findings  Digestive System Findings: Diarrhea, Nausea  Normally has a BM daily.  Notes her stools have been more solid than prior.  She continues on pain meds     Recall Diarrhea had previously improved on Creon.  Recall prior to PERT, pt C/o diarrhea/oily stools, reporting 7-20 BM's in 24 hrs.  Stated she was eating but it was coming back out.    Per NP, she states she does have some nausea. She states that the zofran works well for her, but that she needs to take it more than once a day.  CNP discussed increasing to Q8 PRN.    Energy Needs  Calculated Energy Needs Using Equations  Height: 158.6 cm (5' 2.44\")  Estimated Energy Needs  Total Energy Estimated Needs in 24 hours (kCal): 1760 kCal  Energy Estimated Needs per kg Body Weight in 24 hours (kCal/kg): 31 kCal/kg  Estimated Fluid Needs  Total Fluid Estimated Needs in 24 Hours (mL): 1705 mL  Total Fluid Estimated Needs in 24 hours (mL/kg): 30 mL/kg  Estimated Protein Needs  Total Protein Estimated Needs in 24 Hours (g): 74 g  Protein Estimated Needs per kg Body Weight in 24 Hours (g/kg): 1.3 g/kg        Nutrition Diagnosis   Malnutrition Diagnosis  Patient has Malnutrition Diagnosis: Yes  Diagnosis Status: Active  Malnutrition Diagnosis: Severe malnutrition related to chronic disease or condition  Related to: long h/o chronic pancreatitis with uncontrolled malabsoprtion, h/o COPD and recent treatment of cancer  As Evidenced by: intake of < 75% of  estimated energy  requirement for  > 1 month with significant " wt loss as documented  Additional Assessment Information: Pt would benefit from maintenance supplementation of Vit D over the winter    Nutrition Diagnosis  Patient has Nutrition Diagnosis: Yes  Diagnosis Status (1): Active  Nutrition Diagnosis 1: Altered GI function  Related to (1): chronic pancreatitis  As Evidenced by (1): clinical hx and historical report of symptoms, including abd pain    Nutrition Interventions/Recommendations   Nutrition Prescription  Individualized Nutrition Prescription Provided for : Low concentrated sugar JOON    Food and Nutrition Delivery  Food and Nutrition Delivery  Meals & Snacks: Carbohydrate-modified diet, Modification of schedule of oral intake  Goals: 5-6 small meals and snacks daily to include VHC twice daily  Medical Food Supplement: Commercial beverage medical food supplement therapy (Boost VHC- covered under medicaid benefit)  Goals: Twice daily to provide 1060 calories and 44 g of protein  Vitamin Supplement Therapy: Vitamin D supplement therapy  Goals: Maintenace dose per PCP (Pt encoureaged to make an appointment)  Other:: Continue PERT as directed  Goals: 2 each capsules with meals three times daily and 2 each with Boost VHC twice daily; 1 each with HS snack- total of 11 per day (Note that Creon dosing guidelines for pt's current wt are a max of 14 capsules per day.)    Nutrition Education       Coordination of Care  Coordination of Nutrition Care by a Nutrition Professional  Collaboration and referral of nutrition care: Referral by nutrition professional to another nutrition professional with different expertise  Goals: Re-schedule with [facility], as pt still has 5 visits pending (pt to call)    There are no Patient Instructions on file for this visit.    Nutrition Monitoring and Evaluation   Food and Nutrient Intake  Monitoring and Evaluation Plan: Fluid intake, Amount of food, Carbohydrate intake  Fluid Intake: Estimated fluid intake  Criteria: Meet daily hydration  needs  Amount of Food: Estimated amout of food  Criteria: Meet energy and protein needs  Meal/Snack Pattern: Estimated meal and snack pattern  Criteria: as above  Estimated carbohydrate intake: Estimated carbohydrate intake  Criteria: improved BS control / Hgb A1C  Additional Plan: Appropriate use and tolerance of PERT  Anthropometric measurements  Monitoring and Evaluation Plan: Weight change  Body Weight: Body weight  Criteria: wt gain and Repletion  Biochemical Data, Medical Tests and Procedures  Monitoring and Evaluation Plan: Electrolyte/renal panel, Glucose/endocrine profile, Vitamin profile  Criteria: WNL  Glucose/Endocrine Profile: Glucose, casual, Hemoglobin A1c (HgbA1c)  Criteria: < 180; < 7.0 respectively  Vitamin Profile: Vitamin D, 25 hydroxy  Criteria: 30-80  Nutrition Focused Physical Findings  Monitoring and Evaluation Plan: Adipose, Digestive System, Muscle  Adipose Finding: Loss of subcutaneous fat  Criteria: No further loss  Digestive System Finding: Diarrhea  Criteria: Solid stools that sink on PERT  Muscle Finding: Muscle atrophy  Criteria: No further loss- repletion          Time Spent  Prep time on day of patient encounter: 5 minutes  Time spent directly with patient, family or caregiver: 20 minutes  Additional Time Spent on Patient Care Activities: 0 minutes  Documentation Time: 10 minutes  Other Time Spent: 0 minutes  Total: 35 minutes

## 2025-01-31 ENCOUNTER — TELEPHONE (OUTPATIENT)
Dept: HEMATOLOGY/ONCOLOGY | Facility: CLINIC | Age: 55
End: 2025-01-31

## 2025-01-31 ENCOUNTER — APPOINTMENT (OUTPATIENT)
Dept: OBSTETRICS AND GYNECOLOGY | Facility: CLINIC | Age: 55
End: 2025-01-31
Payer: COMMERCIAL

## 2025-01-31 VITALS
WEIGHT: 113 LBS | BODY MASS INDEX: 18.83 KG/M2 | HEIGHT: 65 IN | DIASTOLIC BLOOD PRESSURE: 90 MMHG | SYSTOLIC BLOOD PRESSURE: 140 MMHG

## 2025-01-31 DIAGNOSIS — Z12.4 SCREENING FOR MALIGNANT NEOPLASM OF CERVIX: ICD-10-CM

## 2025-01-31 DIAGNOSIS — Z12.31 ENCOUNTER FOR SCREENING MAMMOGRAM FOR MALIGNANT NEOPLASM OF BREAST: ICD-10-CM

## 2025-01-31 DIAGNOSIS — Z85.44 HISTORY OF CANCER OF VULVA: ICD-10-CM

## 2025-01-31 DIAGNOSIS — N76.3 CHRONIC VULVITIS: Primary | ICD-10-CM

## 2025-01-31 DIAGNOSIS — Z85.41 HISTORY OF CERVICAL CANCER: ICD-10-CM

## 2025-01-31 PROCEDURE — 3077F SYST BP >= 140 MM HG: CPT | Performed by: OBSTETRICS & GYNECOLOGY

## 2025-01-31 PROCEDURE — 3080F DIAST BP >= 90 MM HG: CPT | Performed by: OBSTETRICS & GYNECOLOGY

## 2025-01-31 PROCEDURE — 3008F BODY MASS INDEX DOCD: CPT | Performed by: OBSTETRICS & GYNECOLOGY

## 2025-01-31 PROCEDURE — 99385 PREV VISIT NEW AGE 18-39: CPT | Performed by: OBSTETRICS & GYNECOLOGY

## 2025-01-31 PROCEDURE — 87624 HPV HI-RISK TYP POOLED RSLT: CPT

## 2025-01-31 PROCEDURE — 1036F TOBACCO NON-USER: CPT | Performed by: OBSTETRICS & GYNECOLOGY

## 2025-01-31 RX ORDER — CLOBETASOL PROPIONATE 0.5 MG/G
OINTMENT TOPICAL 2 TIMES DAILY
Qty: 60 G | Refills: 1 | Status: SHIPPED | OUTPATIENT
Start: 2025-01-31

## 2025-01-31 ASSESSMENT — PATIENT HEALTH QUESTIONNAIRE - PHQ9
1. LITTLE INTEREST OR PLEASURE IN DOING THINGS: NOT AT ALL
2. FEELING DOWN, DEPRESSED OR HOPELESS: NOT AT ALL
SUM OF ALL RESPONSES TO PHQ9 QUESTIONS 1 & 2: 0

## 2025-01-31 NOTE — PROGRESS NOTES
4 weeSUPPORTIVE AND PALLIATIVE ONCOLOGY FOLLOW UP - OUTPATIENT      SERVICE DATE: 2/14/2025    Referred by:  Dr. 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  She was diagnosed and is planned to get bendamustine and rituxan every 28 days. She had been following with Jonathan Worrell with [facility] palliative care, but has since requested to switch to palliative care closer to where she lives. She was recently hospitalized for CHF exacerbation. Has completed her treatment for her follicular lymphoma     Information was collected from chart review, discussion with patient/family, and discussion with care team     SUBJECTIVE:  Patient states that she is still having pain. She states that she has been giving it some thought and has decided that she is going to go through with the plexus block. We discussed that this will hopefully provide her with pain relief in the long run. She is hopeful as well. We discussed that with her next fill of her medication that we will go down to morphine CR 60mg Q12 and oxycodone 15mg Q4 Prn and she is agreeable with this.     She reports that she does not have an appetite. She states that she has been taking the zyprexa at bedtime. We discussed increasing this to 15mg and she is agreeable with the plan.     Pain Assessment:  Pain Score: 0-No pain  Location:    Education:        Symptom Assessment:  ROS otherwise negative, pertinent positives documented in the HPI       Information obtained from: chart review and interview of  patient  ______________________________________________________________________     Oncology History   Follicular lymphoma grade IIIb   6/7/2022 Initial Diagnosis    Follicular lymphoma grade IIIb (CMS/HCC)     5/2/2024 -  Chemotherapy    Bendamustine + RiTUXimab, 28 Day Cycles         Past Medical History:   Diagnosis Date    Non-Hodgkin lymphoma     Smoker     Vulvar cancer (Multi)      Past Surgical History:   Procedure Laterality Date    HYSTERECTOMY      OTHER SURGICAL HISTORY  04/14/2020    Cholecystectomy    OTHER SURGICAL HISTORY  04/14/2020    Exploratory laparoscopy    OTHER SURGICAL HISTORY  04/14/2020    Heart surgery     Family History   Problem Relation Name Age of Onset    Heart disease Mother      Heart disease Sister      Heart disease Brother          SOCIAL HISTORY  Children 4 children and 5 grandchildren    Social History:  reports that she quit smoking about 3 years ago. Her smoking use included cigarettes. She has been exposed to tobacco smoke. She has never used smokeless tobacco. She reports that she does not currently use alcohol. She reports that she does not use drugs.    She has food insecurities and has bed bugs   REVIEW OF SYSTEMS  Review of systems negative unless noted in HPI.       Objective     Palliative Performance Scale % (PPS) 90     Labs:  Results for orders placed or performed in visit on 02/14/25 (from the past 96 hours)   Drug Screen, Urine With Reflex to Confirmation   Result Value Ref Range    Amphetamine Screen, Urine Presumptive Negative Presumptive Negative    Barbiturate Screen, Urine Presumptive Negative Presumptive Negative    Benzodiazepines Screen, Urine Presumptive Negative Presumptive Negative    Cannabinoid Screen, Urine Presumptive Negative Presumptive Negative    Cocaine Metabolite Screen, Urine Presumptive Negative Presumptive Negative    Fentanyl Screen, Urine Presumptive Negative Presumptive Negative    Opiate Screen, Urine Presumptive Positive (A)  Presumptive Negative    Oxycodone Screen, Urine Presumptive Positive (A) Presumptive Negative    PCP Screen, Urine Presumptive Negative Presumptive Negative    Methadone Screen, Urine Presumptive Negative Presumptive Negative     *Note: Due to a large number of results and/or encounters for the requested time period, some results have not been displayed. A complete set of results can be found in Results Review.                     Medications:   Current Outpatient Medications   Medication Instructions    albuterol 90 mcg/actuation inhaler 2 puffs, Every 6 hours    aspirin 81 mg, Daily    atorvastatin (LIPITOR) 40 mg, Daily    BD Alcohol Swabs pads, medicated USE SIX TIMES DAILY    bisacodyl (DULCOLAX (BISACODYL)) 5 mg, Daily PRN    blood-glucose sensor (FreeStyle Ashley 3 Sensor) device 1 each, miscellaneous, Continuous, Use to check glucose, change every 14 days    blood-glucose sensor (FreeStyle Ashley 3 Sensor) device 1 each, miscellaneous, Continuous, Use to check glucose, change every 15 days *FreeStyle Ashley 3 plus    carvedilol (COREG) 12.5 mg, Every 12 hours    cetirizine (ZYRTEC) 10 mg, Daily    clobetasol (Temovate) 0.05 % ointment Topical, 2 times daily, Apply to vulva as needed 2-3 times a week.    cyclobenzaprine (FLEXERIL) 10 mg, 3 times daily PRN    DULoxetine (CYMBALTA) 60 mg, Daily    DULoxetine (CYMBALTA) 20 mg, Daily    esomeprazole (NEXIUM) 40 mg, Daily PRN    estradiol (ESTRACE) 2 mg, Daily    FreeStyle Ashley 3 [place] misc Use as instructed    hydrocortisone 0.5 % cream Topical, 2 times daily    insulin aspart (NovoLOG) 100 unit/mL injection FOR USE WITH INSULIN PUMP MAX DAILY DOSE  UNITS    levothyroxine (Synthroid, Levoxyl) 125 mcg tablet TAKE 1 TABLET BY MOUTH IN THE MORNING ON EMPTY STOMACH    lidocaine-prilocaine (Emla) 2.5-2.5 % cream APPLY AT INSERTION SITE 30-45 MINUTES BEFORE YOU ARRIVE FOR BLOOD WORK OR CHEMOTHERAPY    melatonin 3 mg tablet 2 tablets, Nightly    metoprolol  succinate XL (TOPROL-XL) 50 mg, Daily    metroNIDAZOLE (FLAGYL) 500 mg, oral, 2 times daily    mirtazapine (REMERON) 7.5 mg, Nightly    [START ON 3/13/2025] morphine CR (MS CONTIN) 60 mg, oral, 2 times daily, Do not crush, chew, or split.    naloxone (Narcan) 4 mg/0.1 mL nasal spray Use 1 spray in one nostril as needed for overdose. May repeat every 2 to 3 min in alternating nostrils until medical assistance is available    naratriptan (AMERGE) 2.5 mg, oral, Once as needed    OLANZapine (ZYPREXA) 15 mg, oral, Nightly    omeprazole (PRILOSEC) 40 mg, Daily    Omnipod Dash Pods, Gen 4, cartridge 1 each, miscellaneous, Every 72 hours    ondansetron (ZOFRAN) 8 mg, oral, Every 8 hours PRN    ondansetron ODT (ZOFRAN-ODT) 8 mg, oral, Every 8 hours PRN    OXcarbazepine (Trileptal) 150 mg tablet 1 tablet, Every 12 hours scheduled (0630,1830)    [START ON 3/3/2025] oxyCODONE (ROXICODONE) 15 mg, oral, Every 4 hours PRN    pancrelipase, Lip-Prot-Amyl, (Creon) 36,000-114,000- 180,000 unit capsule,delayed release(DR/EC) capsule 2 capsules with each meal TID, 2 capsules with supplement drink BID and 1 capsule with snack    phenazopyridine (Pyridium) 200 mg tablet 1 tablet, 3 times daily    prochlorperazine (COMPAZINE) 10 mg, oral, Every 6 hours PRN    promethazine (PHENERGAN) 25 mg, Every 6 hours PRN    sennosides-docusate sodium (Senna-S) 8.6-50 mg tablet 1 tablet, oral, 2 times daily    spironolactone (Aldactone) 25 mg tablet Daily RT    torsemide (DEMADEX) 20 mg, oral, Daily       Allergies:   Allergies   Allergen Reactions    Acetaminophen-Codeine Unknown    Adhesive Unknown    Codeine Unknown    Fentanyl Unknown     Reaction from Community: Other(See Desc) Comments: MIGRAINES IF USED FOR PAIN, BUT OK FOR ANESTHESIA    Other reaction(s): Intolerance    Fetrin Unknown    Ketorolac Itching    Meperidine Unknown     Reaction from Community: Other(See Desc)    Metformin Unknown    Propoxyphene N-Acetaminophen GI Upset    Silver  Hives and Other     Tegaderm    Adhesive Tape-Silicones Rash    Iodinated Contrast Media Rash     Does well with 13hour premedication    Latex Rash    Penicillins Rash    Wound Dressings Rash     tagaderm     Pain Management Panel  More data exists         Latest Ref Rng & Units 2/14/2025 1/10/2025   Pain Management Panel   Amphetamine Screen, Urine Presumptive Negative Presumptive Negative  Presumptive Negative    Barbiturate Screen, Urine Presumptive Negative Presumptive Negative  Presumptive Negative    Benzodiazepines Screen, Urine Presumptive Negative Presumptive Negative  Presumptive Negative    Codeine IgE <50 ng/mL - <50    Fentanyl Screen, Urine Presumptive Negative Presumptive Negative  Presumptive Negative    Hydromorphone Urine <25 ng/mL - 330    Methadone Screen, Urine Presumptive Negative Presumptive Negative  Presumptive Negative    Morphine  <50 ng/mL - >2,500            PHYSICAL EXAMINATION  Vital Signs:   Vital signs reviewed  Vitals:    02/14/25 1254   BP: 177/85   Pulse:    Resp:    Temp:    SpO2:                          Pain Score: 0-No pain         Physical Exam  Constitutional:       Appearance: Normal appearance. She is normal weight.   HENT:      Head: Normocephalic and atraumatic.      Mouth/Throat:      Mouth: Mucous membranes are dry.   Eyes:      Extraocular Movements: Extraocular movements intact.   Cardiovascular:      Comments: No signs of cardiac distress  Pulmonary:      Effort: Pulmonary effort is normal.      Comments: No signs of respiratory distress  Abdominal:      General: Abdomen is flat.      Palpations: Abdomen is soft.   Musculoskeletal:         General: Normal range of motion.   Skin:     General: Skin is warm and dry.   Neurological:      Mental Status: She is alert and oriented to person, place, and time. Mental status is at baseline.   Psychiatric:         Mood and Affect: Mood normal.         Behavior: Behavior normal.         Thought Content: Thought content normal.          Judgment: Judgment normal.         ASSESSMENT/PLAN    Pain  Pain is: cancer related pain and chronic  Type: visceral  Pain control: sub-optimally controlled  Previously tried/intolerances: Did not find oxycodone effective, avoid Tylenol due to liver impairment, did not feel dilaudid controlled her pain very well    Pain Plan:  Continue Cymbalta 80mg daily   Continue Flexeril 10mg TID PRN   Continue Lidocaine rectally   Decrease MS CR to 60mg BID  Weaning  Decrease Oxycodone to 15mg Q4 PRN   Weaning  Appreciate input from pain management, strongly encourage her to consider nerve block as we are going to start weaning pain medications.     Opioid Use  Medication Management:   OARRS report reviewed with no aberrant behavior; consistent with  prescriptions/records and patient history  .  Overdose Risk Score 380.   This has been discussed with patient.   We will continue to closely monitor the patient for signs of prescription misuse including UDS, OARRS review and subjective reports at each visit.  No concurrent benzodiazepine use   I am a provider who is certified in Hospice and Palliative Medicine and have conducted a face-face visit and examination for this patient.  Routine Urine Drug Screen is needed and was completed on 5/10/24 appropriately positive for opioids and negative for illicit substances  Controlled Substance Agreement: is needed. Completed on: 5/10/24  Specifically discussed that controlled substance prescriptions will only be provided by our group as outlined in the completed agreement  Naloxone is needed  Red Flags: Called in early for a refill as she was out 2 weeks early on 11/15     Nausea   Intermittent nausea without vomiting related to chemotherapy  Nausea Plan:  Change Zyprexa to 5mg qhs  Compazine 10mg PRN   Continue Zofran to ODT 8mg Q8 PRN    Diarrhea   Related to chemotherapy  Diarrhea Plan:  Imodium prn      Altered Mood  Chronic anxiety and depression related to health  concerns   Mood Plan:  Continue Remeron 7.5mg nightly   Continue Cymbalta 60mg daily       Decreased appetite  Related to malignancy and chemotherapy  Anorexia Plan:   Increase Zyprexa to 15mg qhs    Itching related to bedbug infestation   Continue Atarax 25mg TID PRN    Lower extremity edema  Renewed her prescription for torsemide and instructed to follow up with her cardiologist     Food insecurities   Refer to food for life     Advance Directives  Existence of Advance Directives: Reports that she has completed them   Decision maker: OSMEL is reportedly her sister   Code Status: Full code    Next Follow-Up Visit:  Return to clinic in 4 weeks     Signature and billing  Thank you for allowing us to participate in the care of this patient. Recommendations will be communicated back to the consulting service by way of shared electronic medical record or face-to-face.    Medical complexity was high level due to due to complexity of problems, extensive data review, and high risk of management/treatment.  Time was spent on the following: Prep Time, Time Directly with Patient/Family/Caregiver, Documentation Time. Total time spent: 45      DATA   Diagnostic tests and information reviewed for today's visit:  Most recent labs, Most recent imaging, Medications           SIGNATURE: RALPH Eugene-CNS    Contact information:  [facility]  Monday-Friday 8 AM-5 PM  Phone:  [phone], press option #5, then option #1.   Or Epic Secure Chat
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
## 2025-01-31 NOTE — TELEPHONE ENCOUNTER
Patient had a phone visit with eleazar Ash.  Patient asked if SW could call her.  SW called patient and patient asked if SW could call back in 10 minutes.       (SW) called patient back.  Patient did not answer and a voicemail box was not set up.  SW will continue to try to reach her.      Louis Velazquez MSW, LSW

## 2025-02-03 ENCOUNTER — TELEPHONE (OUTPATIENT)
Dept: ADMISSION | Facility: HOSPITAL | Age: 55
End: 2025-02-03
Payer: COMMERCIAL

## 2025-02-03 DIAGNOSIS — G89.3 NEOPLASM RELATED PAIN: ICD-10-CM

## 2025-02-03 RX ORDER — OXYCODONE HYDROCHLORIDE 15 MG/1
15 TABLET ORAL
Qty: 240 TABLET | Refills: 0 | Status: SHIPPED | OUTPATIENT
Start: 2025-02-03 | End: 2025-02-03 | Stop reason: SDUPTHER

## 2025-02-03 RX ORDER — OXYCODONE HYDROCHLORIDE 15 MG/1
15 TABLET ORAL
Qty: 240 TABLET | Refills: 0 | Status: SHIPPED | OUTPATIENT
Start: 2025-02-03

## 2025-02-03 NOTE — TELEPHONE ENCOUNTER
Medication Refill-  oxycodone    [facility]-  [facility] #[id number]

## 2025-02-03 NOTE — TELEPHONE ENCOUNTER
Patient accidentally told us the wrong pharmacy and is requesting it be sent to [facility] pharmacy in [place].

## 2025-02-03 NOTE — TELEPHONE ENCOUNTER
Refill pended to provider for oxycodone IR 15 mg every 3 hours 240/30.  OARRS reviewed.  Due for refill on 2/4.  Patient to follow up with Ian Carvajal on 2/14.

## 2025-02-04 ENCOUNTER — PHARMACY VISIT (OUTPATIENT)
Dept: PHARMACY | Facility: CLINIC | Age: 55
End: 2025-02-04
Payer: MEDICAID

## 2025-02-04 PROCEDURE — RXMED WILLOW AMBULATORY MEDICATION CHARGE

## 2025-02-11 ENCOUNTER — TELEPHONE (OUTPATIENT)
Dept: ADMISSION | Facility: HOSPITAL | Age: 55
End: 2025-02-11
Payer: COMMERCIAL

## 2025-02-11 DIAGNOSIS — G89.3 CANCER RELATED PAIN: Primary | ICD-10-CM

## 2025-02-11 RX ORDER — MORPHINE SULFATE 60 MG/1
60 TABLET, FILM COATED, EXTENDED RELEASE ORAL 3 TIMES DAILY
COMMUNITY
End: 2025-02-11 | Stop reason: SDUPTHER

## 2025-02-11 RX ORDER — MORPHINE SULFATE 60 MG/1
60 TABLET, FILM COATED, EXTENDED RELEASE ORAL 3 TIMES DAILY
Qty: 90 TABLET | Refills: 0 | Status: SHIPPED | OUTPATIENT
Start: 2025-02-11 | End: 2025-02-14 | Stop reason: SDUPTHER

## 2025-02-11 NOTE — TELEPHONE ENCOUNTER
Refill Request  Morphine (MS contin) 60mg 4 times daily    Preferred Pharmacy   [facility]    Next FUV is 2/14 with Ian Vee

## 2025-02-11 NOTE — TELEPHONE ENCOUNTER
Refill pended to provider for MS Contin 60 mg TID 90/30.  OARRS reviewed.  Due for refill 2/12.  Dosage decrease per last provider note.  Patient to follow up with Ian Carvajal on 2/14.

## 2025-02-12 ENCOUNTER — PHARMACY VISIT (OUTPATIENT)
Dept: PHARMACY | Facility: CLINIC | Age: 55
End: 2025-02-12
Payer: MEDICAID

## 2025-02-12 LAB
CYTOLOGY CMNT CVX/VAG CYTO-IMP: NORMAL
HPV HR 12 DNA GENITAL QL NAA+PROBE: NEGATIVE
HPV HR GENOTYPES PNL CVX NAA+PROBE: NEGATIVE
HPV16 DNA SPEC QL NAA+PROBE: NEGATIVE
HPV18 DNA SPEC QL NAA+PROBE: NEGATIVE
LAB AP HPV GENOTYPE QUESTION: YES
LAB AP HPV HR: NORMAL
LABORATORY COMMENT REPORT: NORMAL
PATH REPORT.TOTAL CANCER: NORMAL

## 2025-02-12 PROCEDURE — RXMED WILLOW AMBULATORY MEDICATION CHARGE

## 2025-02-13 DIAGNOSIS — A59.01 TRICHOMONAL VAGINITIS: Primary | ICD-10-CM

## 2025-02-13 RX ORDER — METRONIDAZOLE 500 MG/1
500 TABLET ORAL 2 TIMES DAILY
Qty: 14 TABLET | Refills: 0 | Status: SHIPPED | OUTPATIENT
Start: 2025-02-13 | End: 2025-02-20

## 2025-02-14 ENCOUNTER — APPOINTMENT (OUTPATIENT)
Dept: RADIOLOGY | Facility: HOSPITAL | Age: 55
End: 2025-02-14
Payer: COMMERCIAL

## 2025-02-14 ENCOUNTER — SOCIAL WORK (OUTPATIENT)
Dept: HEMATOLOGY/ONCOLOGY | Facility: CLINIC | Age: 55
End: 2025-02-14
Payer: COMMERCIAL

## 2025-02-14 ENCOUNTER — OFFICE VISIT (OUTPATIENT)
Dept: PALLIATIVE MEDICINE | Facility: CLINIC | Age: 55
End: 2025-02-14
Payer: COMMERCIAL

## 2025-02-14 VITALS
DIASTOLIC BLOOD PRESSURE: 85 MMHG | BODY MASS INDEX: 18.51 KG/M2 | RESPIRATION RATE: 16 BRPM | TEMPERATURE: 98.1 F | OXYGEN SATURATION: 99 % | HEART RATE: 89 BPM | SYSTOLIC BLOOD PRESSURE: 177 MMHG | WEIGHT: 110.56 LBS

## 2025-02-14 DIAGNOSIS — G89.3 NEOPLASM RELATED PAIN: ICD-10-CM

## 2025-02-14 DIAGNOSIS — R63.0 ANOREXIA: ICD-10-CM

## 2025-02-14 DIAGNOSIS — G89.3 CANCER RELATED PAIN: ICD-10-CM

## 2025-02-14 PROCEDURE — 99215 OFFICE O/P EST HI 40 MIN: CPT | Performed by: CLINICAL NURSE SPECIALIST

## 2025-02-14 PROCEDURE — 3077F SYST BP >= 140 MM HG: CPT | Performed by: CLINICAL NURSE SPECIALIST

## 2025-02-14 PROCEDURE — 80307 DRUG TEST PRSMV CHEM ANLYZR: CPT | Performed by: CLINICAL NURSE SPECIALIST

## 2025-02-14 PROCEDURE — 3079F DIAST BP 80-89 MM HG: CPT | Performed by: CLINICAL NURSE SPECIALIST

## 2025-02-14 PROCEDURE — 1036F TOBACCO NON-USER: CPT | Performed by: CLINICAL NURSE SPECIALIST

## 2025-02-14 RX ORDER — MORPHINE SULFATE 60 MG/1
60 TABLET, FILM COATED, EXTENDED RELEASE ORAL 2 TIMES DAILY
Qty: 60 TABLET | Refills: 0 | Status: SHIPPED | OUTPATIENT
Start: 2025-03-13 | End: 2025-04-12

## 2025-02-14 RX ORDER — OLANZAPINE 15 MG/1
15 TABLET ORAL NIGHTLY
Qty: 30 TABLET | Refills: 3 | Status: SHIPPED | OUTPATIENT
Start: 2025-02-14

## 2025-02-14 RX ORDER — OXYCODONE HYDROCHLORIDE 15 MG/1
15 TABLET ORAL EVERY 4 HOURS PRN
Qty: 180 TABLET | Refills: 0 | Status: SHIPPED | OUTPATIENT
Start: 2025-03-03

## 2025-02-14 ASSESSMENT — PAIN SCALES - GENERAL: PAINLEVEL_OUTOF10: 0-NO PAIN
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
## 2025-02-17 ENCOUNTER — TELEPHONE (OUTPATIENT)
Dept: HEMATOLOGY/ONCOLOGY | Facility: CLINIC | Age: 55
End: 2025-02-17
Payer: COMMERCIAL

## 2025-02-17 LAB
6MAM UR CFM-MCNC: <25 NG/ML
CODEINE UR CFM-MCNC: <50 NG/ML
HYDROCODONE CTO UR CFM-MCNC: <25 NG/ML
HYDROMORPHONE UR CFM-MCNC: 237 NG/ML
MORPHINE UR CFM-MCNC: >2500 NG/ML
NORHYDROCODONE UR CFM-MCNC: <25 NG/ML
NOROXYCODONE UR CFM-MCNC: >1000 NG/ML
OXYCODONE UR CFM-MCNC: 510 NG/ML
OXYMORPHONE UR CFM-MCNC: 221 NG/ML

## 2025-02-17 NOTE — TELEPHONE ENCOUNTER
April called and said that she is scheduled for a CT scan on 03/14. She said she is allergic to the dye and needs the 2 medications that she gets before a scan. She gets them at [facility] in [place].

## 2025-02-18 DIAGNOSIS — T78.40XD ALLERGIC REACTION, SUBSEQUENT ENCOUNTER: Primary | ICD-10-CM

## 2025-02-18 RX ORDER — PREDNISONE 50 MG/1
50 TABLET ORAL 2 TIMES DAILY
Qty: 4 TABLET | Refills: 1 | Status: SHIPPED | OUTPATIENT
Start: 2025-02-18

## 2025-02-19 NOTE — TELEPHONE ENCOUNTER
LVM on identifying VM explaining that medications were sent to pharmacy and with dosing instructions:: prednisone 50 mg Take 13 hours, 7 hours and 1 hour before contrast medium administration and benadryl 50 mg - take 1 hour prior to contrast medium administration. Left office call back number for questions.

## 2025-02-25 ENCOUNTER — APPOINTMENT (OUTPATIENT)
Dept: HEMATOLOGY/ONCOLOGY | Facility: CLINIC | Age: 55
End: 2025-02-25
Payer: COMMERCIAL

## 2025-02-28 ENCOUNTER — TELEPHONE (OUTPATIENT)
Dept: ADMISSION | Facility: HOSPITAL | Age: 55
End: 2025-02-28
Payer: COMMERCIAL

## 2025-02-28 NOTE — TELEPHONE ENCOUNTER
Pt requesting refill   Oxycodone 15mg. 1 tablet every 4 hrs prn  Pharmacy: [facility] in [place]  Last FUV 2/14, next FUV 3/14.

## 2025-03-03 ENCOUNTER — PHARMACY VISIT (OUTPATIENT)
Dept: PHARMACY | Facility: CLINIC | Age: 55
End: 2025-03-03
Payer: MEDICAID

## 2025-03-03 PROCEDURE — RXMED WILLOW AMBULATORY MEDICATION CHARGE

## 2025-03-05 ENCOUNTER — APPOINTMENT (OUTPATIENT)
Dept: HEMATOLOGY/ONCOLOGY | Facility: CLINIC | Age: 55
End: 2025-03-05
Payer: COMMERCIAL

## 2025-03-09 NOTE — PROGRESS NOTES
SUPPORTIVE AND PALLIATIVE ONCOLOGY FOLLOW UP - OUTPATIENT      SERVICE DATE: 3/14/2025    Referred by:  Dr. Pickering   Medical Oncologist: MD Kathleen Kulkarni MD Saif U Rehman, MD Lalitha Nayak V, MD   Radiation Oncologist: No care team member to display  Primary Physician: Colleen De Jesus  [phone]    REASON FOR CONSULT/CHIEF CONSULT COMPLAINT: pain management    Subjective   HISTORY OF PRESENT ILLNESS: 54-year-old female with stage II grade 3B follicular lymphoma of a left cervical and left supraclavicular lymph node.  She was diagnosed and is planned to get bendamustine and rituxan every 28 days. She had been following with Jonathan Worrell with [facility] palliative care, but has since requested to switch to palliative care closer to where she lives. She was recently hospitalized for CHF exacerbation. Has completed her treatment for her follicular lymphoma     Information was collected from chart review, discussion with patient/family, and discussion with care team     SUBJECTIVE:  Patient states that she continues to have pain in her abdomen. She states that it is burning in nature. She states that she feels that her pain meds actually need to be increased. We discussed that we will continue to wean down her pain medication and that she could decide to have her MS contin decreased or her oxycodone decrease. She elected to have her long acting morphine decreased. She states that she has decided to have the celiac plexus block done and needs the contact number for the pain clinic.     Pain Assessment:  Pain Score:   8  Location: Abdomen  Education:        Symptom Assessment:  ROS otherwise negative, pertinent positives documented in the HPI       Information obtained from: chart review and interview of patient  ______________________________________________________________________     Oncology History   Follicular lymphoma grade IIIb   6/7/2022 Initial Diagnosis    Follicular lymphoma grade  IIIb (CMS/[facility])     5/2/2024 -  Chemotherapy    Bendamustine + RiTUXimab, 28 Day Cycles         Past Medical History:   Diagnosis Date    Non-Hodgkin lymphoma     Smoker     Vulvar cancer (Multi)      Past Surgical History:   Procedure Laterality Date    HYSTERECTOMY      OTHER SURGICAL HISTORY  04/14/2020    Cholecystectomy    OTHER SURGICAL HISTORY  04/14/2020    Exploratory laparoscopy    OTHER SURGICAL HISTORY  04/14/2020    Heart surgery     Family History   Problem Relation Name Age of Onset    Heart disease Mother      Heart disease Sister      Heart disease Brother          SOCIAL HISTORY  Children 4 children and 5 grandchildren    Social History:  reports that she quit smoking about 3 years ago. Her smoking use included cigarettes. She has been exposed to tobacco smoke. She has never used smokeless tobacco. She reports that she does not currently use alcohol. She reports that she does not use drugs.    She has food insecurities and has bed bugs   REVIEW OF SYSTEMS  Review of systems negative unless noted in HPI.       Objective     Palliative Performance Scale % (PPS) 90     Labs:  Results for orders placed or performed in visit on 03/14/25 (from the past 96 hours)   Drug Screen, Urine With Reflex to Confirmation   Result Value Ref Range    Amphetamine Screen, Urine Presumptive Negative Presumptive Negative    Barbiturate Screen, Urine Presumptive Negative Presumptive Negative    Benzodiazepines Screen, Urine Presumptive Negative Presumptive Negative    Cannabinoid Screen, Urine Presumptive Negative Presumptive Negative    Cocaine Metabolite Screen, Urine Presumptive Negative Presumptive Negative    Fentanyl Screen, Urine Presumptive Negative Presumptive Negative    Opiate Screen, Urine Presumptive Positive (A) Presumptive Negative    Oxycodone Screen, Urine Presumptive Positive (A) Presumptive Negative    PCP Screen, Urine Presumptive Negative Presumptive Negative    Methadone Screen, Urine Presumptive  Negative Presumptive Negative     *Note: Due to a large number of results and/or encounters for the requested time period, some results have not been displayed. A complete set of results can be found in Results Review.                       Medications:   Current Outpatient Medications   Medication Instructions    albuterol 90 mcg/actuation inhaler 2 puffs, Every 6 hours    aspirin 81 mg, Daily    atorvastatin (LIPITOR) 40 mg, Daily    BD Alcohol Swabs pads, medicated USE SIX TIMES DAILY    bisacodyl (DULCOLAX (BISACODYL)) 5 mg, Daily PRN    blood-glucose sensor (FreeStyle Ashley 3 Sensor) device 1 each, miscellaneous, Continuous, Use to check glucose, change every 14 days    blood-glucose sensor (FreeStyle Ashley 3 Sensor) device 1 each, miscellaneous, Continuous, Use to check glucose, change every 15 days *FreeStyle Ashley 3 plus    carvedilol (COREG) 12.5 mg, Every 12 hours    cetirizine (ZYRTEC) 10 mg, Daily    clobetasol (Temovate) 0.05 % ointment Topical, 2 times daily, Apply to vulva as needed 2-3 times a week.    cyclobenzaprine (FLEXERIL) 10 mg, 3 times daily PRN    diphenhydrAMINE (BENADRYL ALLERGY) 50 mg, oral, Nightly PRN    DULoxetine (CYMBALTA) 60 mg, Daily    DULoxetine (CYMBALTA) 20 mg, Daily    esomeprazole (NEXIUM) 40 mg, Daily PRN    estradiol (ESTRACE) 2 mg, Daily    FreeStyle Ashley 3 [place] misc Use as instructed    hydrocortisone 0.5 % cream Topical, 2 times daily    insulin aspart (NovoLOG) 100 unit/mL injection FOR USE WITH INSULIN PUMP MAX DAILY DOSE  UNITS    levothyroxine (Synthroid, Levoxyl) 125 mcg tablet TAKE 1 TABLET BY MOUTH IN THE MORNING ON EMPTY STOMACH    lidocaine-prilocaine (Emla) 2.5-2.5 % cream APPLY AT INSERTION SITE 30-45 MINUTES BEFORE YOU ARRIVE FOR BLOOD WORK OR CHEMOTHERAPY    melatonin 3 mg tablet 2 tablets, Nightly    metoprolol succinate XL (TOPROL-XL) 50 mg, Daily    mirtazapine (REMERON) 7.5 mg, Nightly    morphine CR (MS Contin) 60 mg 12 hr tablet Take 1 tablet  (60 mg) by mouth 2 times a day. Do not crush, chew, or split. Do not fill before March 13, 2025.    [START ON 4/13/2025] morphine CR (MS CONTIN) 15 mg, oral, 2 times daily, Do not crush, chew, or split. Take in combination with the 30mg for 45mg    [START ON 4/13/2025] morphine CR (MS CONTIN) 30 mg, oral, 2 times daily, Do not crush, chew, or split. Take in combination with the 15mg for 45mg    naloxone (Narcan) 4 mg/0.1 mL nasal spray Use 1 spray in one nostril as needed for overdose. May repeat every 2 to 3 min in alternating nostrils until medical assistance is available    naratriptan (AMERGE) 2.5 mg, oral, Once as needed    OLANZapine (ZYPREXA) 15 mg, oral, Nightly    omeprazole (PRILOSEC) 40 mg, Daily    Omnipod Dash Pods, Gen 4, cartridge 1 each, miscellaneous, Every 72 hours    ondansetron (ZOFRAN) 8 mg, oral, Every 8 hours PRN    ondansetron ODT (ZOFRAN-ODT) 8 mg, oral, Every 8 hours PRN    OXcarbazepine (Trileptal) 150 mg tablet 1 tablet, Every 12 hours scheduled (0630,1830)    [START ON 4/2/2025] oxyCODONE (Roxicodone) 15 mg immediate release tablet Take 1 tablet (15 mg) by mouth every 4 hours if needed (severe pain). Do not fill before March 3, 2025.    pancrelipase, Lip-Prot-Amyl, (Creon) 36,000-114,000- 180,000 unit capsule,delayed release(DR/EC) capsule 2 capsules with each meal TID, 2 capsules with supplement drink BID and 1 capsule with snack    phenazopyridine (Pyridium) 200 mg tablet 1 tablet, 3 times daily    predniSONE (DELTASONE) 50 mg, oral, 2 times daily    prochlorperazine (COMPAZINE) 10 mg, oral, Every 6 hours PRN    promethazine (PHENERGAN) 25 mg, Every 6 hours PRN    sennosides-docusate sodium (Senna-S) 8.6-50 mg tablet 1 tablet, oral, 2 times daily    spironolactone (Aldactone) 25 mg tablet Daily RT    torsemide (DEMADEX) 20 mg, oral, Daily       Allergies:   Allergies   Allergen Reactions    Acetaminophen-Codeine Unknown    Adhesive Unknown    Codeine Unknown    Fentanyl Unknown      Reaction from Community: Other(See Desc) Comments: MIGRAINES IF USED FOR PAIN, BUT OK FOR ANESTHESIA    Other reaction(s): Intolerance    Fetrin Unknown    Ketorolac Itching    Meperidine Unknown     Reaction from Community: Other(See Desc)    Metformin Unknown    Propoxyphene N-Acetaminophen GI Upset    Silver Hives and Other     Tegaderm    Adhesive Tape-Silicones Rash    Iodinated Contrast Media Rash     Does well with 13hour premedication    Latex Rash    Penicillins Rash    Wound Dressings Rash     tagaderm     Pain Management Panel  More data exists         Latest Ref Rng & Units 3/14/2025 2/14/2025   Pain Management Panel   Amphetamine Screen, Urine Presumptive Negative Presumptive Negative  Presumptive Negative    Barbiturate Screen, Urine Presumptive Negative Presumptive Negative  Presumptive Negative    Benzodiazepines Screen, Urine Presumptive Negative Presumptive Negative  Presumptive Negative    Codeine IgE <50 ng/mL - <50    Fentanyl Screen, Urine Presumptive Negative Presumptive Negative  Presumptive Negative    Hydromorphone Urine <25 ng/mL - 237    Methadone Screen, Urine Presumptive Negative Presumptive Negative  Presumptive Negative    Morphine  <50 ng/mL - >2,500            PHYSICAL EXAMINATION  Vital Signs:   Vital signs reviewed  Vitals:    03/14/25 1030   BP: 137/83   Pulse: 104   Resp: 16   Temp: 36.3 °C (97.3 °F)   SpO2: 100%                           Pain Score:   8         Physical Exam  Constitutional:       Appearance: Normal appearance. She is normal weight.   HENT:      Head: Normocephalic and atraumatic.      Mouth/Throat:      Mouth: Mucous membranes are dry.   Eyes:      Extraocular Movements: Extraocular movements intact.   Cardiovascular:      Comments: No signs of cardiac distress  Pulmonary:      Effort: Pulmonary effort is normal.      Comments: No signs of respiratory distress  Abdominal:      General: Abdomen is flat.      Palpations: Abdomen is soft.   Musculoskeletal:          General: Normal range of motion.   Skin:     General: Skin is warm and dry.   Neurological:      Mental Status: She is alert and oriented to person, place, and time. Mental status is at baseline.   Psychiatric:         Mood and Affect: Mood normal.         Behavior: Behavior normal.         Thought Content: Thought content normal.         Judgment: Judgment normal.         ASSESSMENT/PLAN    Pain  Pain is: cancer related pain and chronic  Type: visceral  Pain control: sub-optimally controlled  Previously tried/intolerances: Did not find oxycodone effective, avoid Tylenol due to liver impairment, did not feel dilaudid controlled her pain very well    Pain Plan:  Continue Cymbalta 80mg daily   Continue Flexeril 10mg TID PRN   Continue Lidocaine rectally   Decrease MS CR to 45mg BID from 60mg BID  Weaning  Continue Oxycodone 15mg Q4 PRN   Weaning  Appreciate input from pain management, she is planning on calling and scheduling the block    Opioid Use  Medication Management:   OARRS report reviewed with no aberrant behavior; consistent with  prescriptions/records and patient history  .  Overdose Risk Score 390.   This has been discussed with patient.   We will continue to closely monitor the patient for signs of prescription misuse including UDS, OARRS review and subjective reports at each visit.  No concurrent benzodiazepine use   I am a provider who is certified in Hospice and Palliative Medicine and have conducted a face-face visit and examination for this patient.  Routine Urine Drug Screen is needed and was completed on 5/10/24 appropriately positive for opioids and negative for illicit substances  Controlled Substance Agreement: is needed. Completed on: 5/10/24  Specifically discussed that controlled substance prescriptions will only be provided by our group as outlined in the completed agreement  Naloxone is needed  Red Flags: Called in early for a refill as she was out 2 weeks early on 11/15      Nausea   Intermittent nausea without vomiting related to chemotherapy  Nausea Plan:  Continue Zyprexa to 15mg qhs  Compazine 10mg PRN   Continue Zofran to ODT 8mg Q8 PRN    Diarrhea   Related to chemotherapy  Diarrhea Plan:  Imodium prn      Altered Mood  Chronic anxiety and depression related to health concerns   Mood Plan:  Continue Remeron 7.5mg nightly   Continue Cymbalta 60mg daily       Decreased appetite  Related to malignancy and chemotherapy  Anorexia Plan:   Continue Zyprexa to 15mg qhs    Itching related to bedbug infestation   Continue Atarax 25mg TID PRN    Lower extremity edema  Renewed her prescription for torsemide and instructed to follow up with her cardiologist     Food insecurities   Refer to food for life     Advance Directives  Existence of Advance Directives: Reports that she has completed them   Decision maker: OSMEL is reportedly her sister   Code Status: Full code    Next Follow-Up Visit:  Return to clinic in 4 weeks     Signature and billing  Thank you for allowing us to participate in the care of this patient. Recommendations will be communicated back to the consulting service by way of shared electronic medical record or face-to-face.    Medical complexity was high level due to due to complexity of problems, extensive data review, and high risk of management/treatment.  Time was spent on the following: Prep Time, Time Directly with Patient/Family/Caregiver, Documentation Time. Total time spent: 45      DATA   Diagnostic tests and information reviewed for today's visit:  Most recent labs, Most recent imaging, Medications           SIGNATURE: RALPH Eugene-CNS    Contact information:  Supportive and Palliative Oncology  Monday-Friday 8 AM-5 PM  Phone:  [phone], press option #5, then option #1.   Or Epic Secure Chat

## 2025-03-12 NOTE — PATIENT INSTRUCTIONS
Thank you for choosing [facility]  for your health care needs.  If you have any questions, concerns or medical needs, please feel free to contact our office at [phone].    Please ensure you complete your blood work one week before the next scheduled appointment.    To change I:C to 20 gram  To change sensitivity factor to 90mg/dL  Please try to bolus to correct a high value   Suspend the pump for colonoscopy   Follow up in 4 months

## 2025-03-12 NOTE — PROGRESS NOTES
Subjective   Nelsy Osullivan is a 54 y.o. female who presents for a follow-up evaluation of Type 1 diabetes mellitus. The initial diagnosis of diabetes was made in 2006 .  Diabetes was diagnosed after having recurrent bouts of pancreatitis.    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 She was found to have a third occurrence of lymphoma.  She has completed 6 cycles of chemotherapy.  She is now in remission.      She had an ER visit for hematochezia.  She had diarrhea for 9 days    She is not bolusing via the insulin pump.    Known complications due to diabetes included CAD s/p MI in 2018    Cardiovascular risk factors include diabetes mellitus. The patient is not on an ACE inhibitor or angiotensin II receptor blocker.  The patient has not been previously hospitalized due to diabetic ketoacidosis.     Current symptoms/problems include none. Her clinical course has been stable.     The patient is currently checking the blood glucose multiple times per day.    Patient is using: continuous glucose monitor      Hypoglycemia frequency: 4% (decreased from 10%)  Hypoglycemia awareness: Yes       Review of Systems  Objective   There were no vitals taken for this visit.  Physical Exam  Vitals and nursing note reviewed.   Constitutional:       General: She is not in acute distress.     Appearance: Normal appearance. She is normal weight.   HENT:      Head: Normocephalic and atraumatic.      Nose: Nose normal.      Mouth/Throat:      Mouth: Mucous membranes are moist.   Eyes:      Extraocular Movements: Extraocular movements intact.   Pulmonary:      Effort: Pulmonary effort is normal.   Musculoskeletal:         General: Normal range of motion.   Neurological:      Mental Status: She is alert and oriented to person, place, and time.   Psychiatric:         Mood and Affect: Mood normal.         Lab Review  Lab Results   Component Value Date    HGBA1C 8.0 (A) 11/13/2024     Lab Results   Component  Value Date    GLUCOSE 243 (H) 11/14/2024    CALCIUM 9.0 11/14/2024     11/14/2024    K 3.4 (L) 11/14/2024    CO2 28 11/14/2024     11/14/2024    BUN 10 11/14/2024    CREATININE 0.64 11/14/2024     Lab Results   Component Value Date    CHOL 87 05/31/2024    CHOL 123 04/04/2023    CHOL 84 08/05/2022     Lab Results   Component Value Date    HDL 45.0 05/31/2024    HDL 66.0 04/04/2023    HDL 44.7 08/05/2022     Lab Results   Component Value Date    LDLCALC 31 05/31/2024     Lab Results   Component Value Date    TRIG 56 05/31/2024    TRIG 100 04/04/2023    TRIG 71 08/05/2022         Health Maintenance:   Foot Exam: July 2021  Eye Exam:  Urine Albumin: July 11, 2024     CGM Interpretation/Plan   14 day CGM download was reviewed in detail as documented above and will be attached to chart.  A minimum of 72 hours of glucose data was used to inform the management plan outlined below.  5% of values is within target range.  The degree of hypoglycemia has decreased.      Assessment/Plan   54-year-old female presents for follow up for type 3c diabetes mellitus. Her blood pressure is at goal today.    No problem-specific Assessment & Plan notes found for this encounter.

## 2025-03-13 ENCOUNTER — PHARMACY VISIT (OUTPATIENT)
Dept: PHARMACY | Facility: CLINIC | Age: 55
End: 2025-03-13
Payer: MEDICAID

## 2025-03-13 PROCEDURE — RXMED WILLOW AMBULATORY MEDICATION CHARGE

## 2025-03-14 ENCOUNTER — HOSPITAL ENCOUNTER (OUTPATIENT)
Dept: RADIOLOGY | Facility: HOSPITAL | Age: 55
Discharge: HOME | End: 2025-03-14
Payer: COMMERCIAL

## 2025-03-14 ENCOUNTER — TELEPHONE (OUTPATIENT)
Dept: ENDOCRINOLOGY | Facility: CLINIC | Age: 55
End: 2025-03-14

## 2025-03-14 ENCOUNTER — OFFICE VISIT (OUTPATIENT)
Dept: PALLIATIVE MEDICINE | Facility: CLINIC | Age: 55
End: 2025-03-14
Payer: COMMERCIAL

## 2025-03-14 ENCOUNTER — APPOINTMENT (OUTPATIENT)
Dept: ENDOCRINOLOGY | Facility: CLINIC | Age: 55
End: 2025-03-14
Payer: COMMERCIAL

## 2025-03-14 VITALS
HEIGHT: 62 IN | WEIGHT: 105.82 LBS | RESPIRATION RATE: 16 BRPM | DIASTOLIC BLOOD PRESSURE: 83 MMHG | SYSTOLIC BLOOD PRESSURE: 137 MMHG | HEART RATE: 104 BPM | TEMPERATURE: 97.3 F | OXYGEN SATURATION: 100 % | BODY MASS INDEX: 19.47 KG/M2

## 2025-03-14 DIAGNOSIS — E10.9 TYPE 1 DIABETES MELLITUS WITHOUT COMPLICATION: Primary | ICD-10-CM

## 2025-03-14 DIAGNOSIS — C82.41 GRADE 3B FOLLICULAR LYMPHOMA OF LYMPH NODES OF NECK (MULTI): ICD-10-CM

## 2025-03-14 DIAGNOSIS — G89.3 NEOPLASM RELATED PAIN: ICD-10-CM

## 2025-03-14 PROCEDURE — 99215 OFFICE O/P EST HI 40 MIN: CPT | Performed by: CLINICAL NURSE SPECIALIST

## 2025-03-14 PROCEDURE — 70491 CT SOFT TISSUE NECK W/DYE: CPT

## 2025-03-14 PROCEDURE — 3008F BODY MASS INDEX DOCD: CPT | Performed by: CLINICAL NURSE SPECIALIST

## 2025-03-14 PROCEDURE — 74177 CT ABD & PELVIS W/CONTRAST: CPT

## 2025-03-14 PROCEDURE — 3075F SYST BP GE 130 - 139MM HG: CPT | Performed by: CLINICAL NURSE SPECIALIST

## 2025-03-14 PROCEDURE — 2550000001 HC RX 255 CONTRASTS

## 2025-03-14 PROCEDURE — 80307 DRUG TEST PRSMV CHEM ANLYZR: CPT | Performed by: CLINICAL NURSE SPECIALIST

## 2025-03-14 PROCEDURE — 3079F DIAST BP 80-89 MM HG: CPT | Performed by: CLINICAL NURSE SPECIALIST

## 2025-03-14 RX ORDER — HEPARIN 100 UNIT/ML
5 SYRINGE INTRAVENOUS ONCE
Status: DISCONTINUED | OUTPATIENT
Start: 2025-03-14 | End: 2025-03-15 | Stop reason: HOSPADM

## 2025-03-14 RX ORDER — MORPHINE SULFATE 15 MG/1
15 TABLET, FILM COATED, EXTENDED RELEASE ORAL 2 TIMES DAILY
Qty: 60 TABLET | Refills: 0 | Status: SHIPPED | OUTPATIENT
Start: 2025-04-13 | End: 2025-05-13

## 2025-03-14 RX ORDER — MORPHINE SULFATE 30 MG/1
30 TABLET, FILM COATED, EXTENDED RELEASE ORAL 2 TIMES DAILY
Qty: 60 TABLET | Refills: 0 | Status: SHIPPED | OUTPATIENT
Start: 2025-04-13 | End: 2025-05-13

## 2025-03-14 RX ORDER — OXYCODONE HYDROCHLORIDE 15 MG/1
15 TABLET ORAL EVERY 4 HOURS PRN
Qty: 180 TABLET | Refills: 0 | Status: SHIPPED | OUTPATIENT
Start: 2025-04-02

## 2025-03-14 RX ADMIN — IOHEXOL 75 ML: 350 INJECTION, SOLUTION INTRAVENOUS at 09:36

## 2025-03-14 ASSESSMENT — PAIN SCALES - GENERAL: PAINLEVEL_OUTOF10: 8

## 2025-03-14 NOTE — TELEPHONE ENCOUNTER
April left a message with the answering service to cancel her appointment for today 3/14/25 but say's that her A1c is 12%. She would like to know what medications need to be adjusted.    Next appointment is 8/15/25.

## 2025-03-17 ENCOUNTER — OFFICE VISIT (OUTPATIENT)
Dept: HEMATOLOGY/ONCOLOGY | Facility: CLINIC | Age: 55
End: 2025-03-17
Payer: COMMERCIAL

## 2025-03-17 ENCOUNTER — INFUSION (OUTPATIENT)
Dept: HEMATOLOGY/ONCOLOGY | Facility: CLINIC | Age: 55
End: 2025-03-17
Payer: COMMERCIAL

## 2025-03-17 ENCOUNTER — SOCIAL WORK (OUTPATIENT)
Dept: HEMATOLOGY/ONCOLOGY | Facility: CLINIC | Age: 55
End: 2025-03-17

## 2025-03-17 VITALS
SYSTOLIC BLOOD PRESSURE: 105 MMHG | RESPIRATION RATE: 16 BRPM | HEART RATE: 109 BPM | DIASTOLIC BLOOD PRESSURE: 64 MMHG | WEIGHT: 107.47 LBS | OXYGEN SATURATION: 92 % | TEMPERATURE: 98.2 F | BODY MASS INDEX: 19.38 KG/M2

## 2025-03-17 DIAGNOSIS — K86.1 CHRONIC PANCREATITIS, UNSPECIFIED PANCREATITIS TYPE (MULTI): ICD-10-CM

## 2025-03-17 DIAGNOSIS — C82.41 GRADE 3B FOLLICULAR LYMPHOMA OF LYMPH NODES OF NECK (MULTI): ICD-10-CM

## 2025-03-17 DIAGNOSIS — J90 PLEURAL EFFUSION ON RIGHT: ICD-10-CM

## 2025-03-17 PROCEDURE — 99215 OFFICE O/P EST HI 40 MIN: CPT | Performed by: INTERNAL MEDICINE

## 2025-03-17 PROCEDURE — 96523 IRRIG DRUG DELIVERY DEVICE: CPT | Mod: 59

## 2025-03-17 PROCEDURE — 3078F DIAST BP <80 MM HG: CPT | Performed by: INTERNAL MEDICINE

## 2025-03-17 PROCEDURE — 3074F SYST BP LT 130 MM HG: CPT | Performed by: INTERNAL MEDICINE

## 2025-03-17 RX ORDER — HEPARIN 100 UNIT/ML
500 SYRINGE INTRAVENOUS AS NEEDED
OUTPATIENT
Start: 2025-03-17

## 2025-03-17 RX ORDER — HEPARIN SODIUM,PORCINE/PF 10 UNIT/ML
50 SYRINGE (ML) INTRAVENOUS AS NEEDED
OUTPATIENT
Start: 2025-03-17

## 2025-03-17 ASSESSMENT — PAIN SCALES - GENERAL: PAINLEVEL_OUTOF10: 0-NO PAIN

## 2025-03-17 NOTE — PATIENT INSTRUCTIONS
Follow up visit for history of lymphoma.     CT scans look good, no lymphoma noted.     Follow up with cardiology    Follow up with Dr. Lua in 4 months    Port flushes every 3 months

## 2025-03-17 NOTE — PROGRESS NOTES
Pt here for add on port flush. Tolerated well. She is aware of plan of care, will call with further questions or concerns.

## 2025-03-17 NOTE — PROGRESS NOTES
Patient ID: Nelsy [place] is a 54 y.o. female.  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  FISH is negative for Bcl-2, BCL6 and C-MYC.  PET/CT 4/4/24 showed malignant uptake in a left supraclavicular LN only.  Baseline LDH was elevated at 238 but the patient has chronically elevated LDH. She started BR chemotherapy 5/2/24.  PET/CT after 4 cycles was negative.  She completed 5/6 chemotherapy cycles on  9/19/24 (cycle 6 aborted due to persistent thrombocytopenia/neutropenia).  She has a prior history of lymphoma (question Hodgkin's versus non-Hodgkin's) at age 16 with relapse at age 19.  When she was 16, she was treated with radiation therapy alone.  When she relapsed at age 19, she had disease on both sides of the diaphragm.  Her spleen was removed.  She was treated with several cycles of chemotherapy including Adriamycin and mustard gas underwent a second course of radiation therapy to her neck and chest.  She was treated by Dr. Carbajal and Dr. Acosta at that time.    3/14/25 , CT neck, chest abdominal pelvis, no obvious lymphadenopathy, right pleural effusion    Subjective  /interval history    Patient has history of non-Hodgkin lymphoma status postchemotherapy comes for follow-up visit.  CAT scan result discussed with patient no evidence of recurrent disease.  Main issue is malnutrition, chronic pain syndrome due to chronic pancreatitis.  Appetite is good but still have 2-3 bowel movement every day with no B symptom.  Patient history of right pleural effusion, aortic and mitral valve replacement followed by cardiology closely.  HPI      1 . 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   FISH is negative for Bcl-2, BCL6 and C-MYC.  PET/CT 4/4/24 showed malignant uptake in a left supraclavicular LN only.  Baseline LDH was elevated at 238 but the patient has chronically elevated LDH. She started BR chemotherapy 5/2/24.  PET/CT after 4 cycles was negative.  She completed 5/6 chemotherapy cycles on  9/19/24 (cycle 6 aborted due to persistent thrombocytopenia/neutropenia).  She has a prior history of lymphoma (question Hodgkin's versus non-Hodgkin's) at age 16 with relapse at age 19.  When she was 16, she was treated with radiation therapy alone.  When she relapsed at age 19, she had disease on both sides of the diaphragm.  Her spleen was removed.  She was treated with several cycles of chemotherapy including Adriamycin and mustard gas underwent a second course of radiation therapy to her neck and chest.  She was treated by Dr. Carbajal and Dr. Acosta at that time.  Mitral valve and aortic valve stenosis secondary to prior radiation therapy.  She underwent animal aortic valve and mitral valve replacements in 2019.  Chronic pancreatitis (question etiology).  She resumed pancreatic enzyme replacement therapy August 2024  18 mm right interpolar thyroid nodule with February 19, 2024 pathology showing benign follicular cells.  DM  Past medical history  Non-Hodgkin lymphoma as mentioned above, mitral valve, aortic valve stenosis, aortic valve and mitral valve replacement, chronic pancreatitis, malnutrition, diabetes mellitus, chronic pain syndrome  Family History   Problem Relation Name Age of Onset    Heart disease Mother      Heart disease Sister      Heart disease Brother       Oncology History   Follicular lymphoma grade IIIb   6/7/2022 Initial Diagnosis    Follicular lymphoma grade IIIb (CMS/HCC)     5/2/2024 -  Chemotherapy    Bendamustine + RiTUXimab, 28 Day Cycles         April D Abran  reports that she quit smoking about 3 years ago. Her smoking use included cigarettes. She has been exposed to  tobacco smoke. She has never used smokeless tobacco.  She  reports that she does not currently use alcohol.  She  reports no history of drug use.    Review of Systems - Oncology   Review of system is positive for weakness, chronic pain syndrome, nausea, 2-3 bowel movement every day, no B symptom, not very active ECOG 2  Objective   BSA: 1.47 meters squared  /64   Pulse 109   Temp 36.8 °C (98.2 °F)   Resp 16   Wt 48.8 kg (107 lb 7.6 oz)   SpO2 92%   BMI 19.38 kg/m²   Physical Exam   Comments: cachetic   HENT:      Head: Normocephalic.      Mouth/Throat:      Mouth: Mucous membranes are moist.      Pharynx: Oropharynx is clear.   Eyes:      Conjunctiva/sclera: Conjunctivae normal.      Pupils: Pupils are equal, round, and reactive to light.   Cardiovascular:      Rate and Rhythm: Normal rate and regular rhythm.      Pulses: Normal pulses.   Pulmonary:      Comments: Decreased breath sounds right lung up to midline  Abdominal:      General: Bowel sounds are normal.      Palpations: Abdomen is soft.   Musculoskeletal:         General: Normal range of motion.   Skin:     General: Skin is warm and dry.   Neurological:      General: No focal deficit present.      Mental Status: She is alert.   Psychiatric:         Mood and Affect: Mood normal.          Infected no peripheral lymphadenopathy        Performance Status:  ECOG 1-2  Performance Status: ECOG 1-2  Labs    Radiology note  CT chest abdominal,CHEST:  1.  Interval improvement previously noted pleural effusions.  2. New ground-glass opacities and tree-in-bud nodules lingula and  left lower lobe, compatible with an infectious/inflammatory process.  3. No suspicious thoracic adenopathy.      ABDOMEN-PELVIS:  1.  No evidence of metastatic disease in the abdomen and pelvis.  2. Sequela of chronic pancreatitis.  3. Other chronic findings as described above.    CT neck, no cervical lymphadenopathy  Pathology  Assessment/Plan      Stage II grade 3B follicular  large cell lymphoma status post cervical lymph node excision February 8, 2024.  FISH is negative for Bcl-2, BCL6 and C-MYC.  PET/CT 4/4/24 showed malignant uptake in a left supraclavicular LN only.  Baseline LDH was elevated at 238 but the patient has chronically elevated LDH. She started BR chemotherapy 5/2/24.  PET/CT after 4 cycles was negative.  She completed 5/6 chemotherapy cycles on  9/19/24 (cycle 6 aborted due to persistent thrombocytopenia/neutropenia).  She has a prior history of lymphoma (question Hodgkin's versus non-Hodgkin's) at age 16 with relapse at age 19.  When she was 16, she was treated with radiation therapy alone.  When she relapsed at age 19, she had disease on both sides of the diaphragm.  Her spleen was removed.  She was treated with several cycles of chemotherapy including Adriamycin and mustard gas underwent a second course of radiation therapy to her neck and chest.  She was treated by Dr. Carbajal and Dr. Acosta at that time.      2.  Chronic pancreatitis      3.  Chronic pain syndrome      4.  Aortic valve and mitral valve replacement, coronary artery disease      5.  Right pleural effusion      6.  Malnutrition    Plan, CT result discussed with patient no evidence of recurrent non-Hodgkin lymphoma I will follow-up after 6 months.  Patient is also suggested to have regular appointment with palliative oncology for pain control and patient will be followed with cardiology GI for other medical problem.    Patient has a history of recurrent thoracentesis.  Cytology did show some lymphocyte,  I will also send flow cytometry to rule out underlying lymphoproliferative disorder.    Discussed with patient    Time spent 40 minutes             Hannah Lua MD

## 2025-03-17 NOTE — PROGRESS NOTES
Patient had a follow up appointment with Dr. Lua and requested to speak to  (SW).   (SW) met with patient today to assess needs and offer support.  Patient was A&Ox3 with appropriate and congruent mood and affect.  SW asked patient how she was doing.  Patient stated that she continues to be tired and sleeps a lot.  Patient stated that she did see her psychiatrist.  Patient stated that she was really not interested in counseling right now.  Overall patient stated that she was doing well and just wanted to say hi.  SW will continue to follow patient.      Louis Velazquez MSW, LSW

## 2025-03-18 LAB
6MAM UR CFM-MCNC: <25 NG/ML
CODEINE UR CFM-MCNC: <50 NG/ML
HYDROCODONE CTO UR CFM-MCNC: <25 NG/ML
HYDROMORPHONE UR CFM-MCNC: 285 NG/ML
MORPHINE UR CFM-MCNC: >2500 NG/ML
NORHYDROCODONE UR CFM-MCNC: <25 NG/ML
NOROXYCODONE UR CFM-MCNC: >1000 NG/ML
OXYCODONE UR CFM-MCNC: 1128 NG/ML
OXYMORPHONE UR CFM-MCNC: 840 NG/ML

## 2025-03-19 ENCOUNTER — PREP FOR PROCEDURE (OUTPATIENT)
Dept: OPERATING ROOM | Facility: CLINIC | Age: 55
End: 2025-03-19
Payer: COMMERCIAL

## 2025-03-19 ENCOUNTER — TELEPHONE (OUTPATIENT)
Dept: PAIN MEDICINE | Facility: HOSPITAL | Age: 55
End: 2025-03-19
Payer: COMMERCIAL

## 2025-03-19 DIAGNOSIS — K86.1 CHRONIC PANCREATITIS, UNSPECIFIED PANCREATITIS TYPE (MULTI): Primary | ICD-10-CM

## 2025-03-19 RX ORDER — DIAZEPAM 5 MG/1
5 TABLET ORAL ONCE AS NEEDED
OUTPATIENT
Start: 2025-03-19

## 2025-03-19 NOTE — TELEPHONE ENCOUNTER
Patient calling and would like to go forward with greater splanchnic nerve block that was discussed at last appointment on 01/06/25.. please place order for injection to schedule

## 2025-03-31 ENCOUNTER — APPOINTMENT (OUTPATIENT)
Dept: OBSTETRICS AND GYNECOLOGY | Facility: CLINIC | Age: 55
End: 2025-03-31
Payer: COMMERCIAL

## 2025-03-31 VITALS — SYSTOLIC BLOOD PRESSURE: 122 MMHG | WEIGHT: 102 LBS | DIASTOLIC BLOOD PRESSURE: 72 MMHG | BODY MASS INDEX: 18.39 KG/M2

## 2025-03-31 DIAGNOSIS — B37.31 YEAST VAGINITIS: Primary | ICD-10-CM

## 2025-03-31 DIAGNOSIS — Z85.41 HISTORY OF CERVICAL CANCER: ICD-10-CM

## 2025-03-31 PROCEDURE — 99213 OFFICE O/P EST LOW 20 MIN: CPT | Performed by: OBSTETRICS & GYNECOLOGY

## 2025-03-31 RX ORDER — FLUCONAZOLE 150 MG/1
150 TABLET ORAL ONCE
Qty: 1 TABLET | Refills: 1 | Status: SHIPPED | OUTPATIENT
Start: 2025-03-31 | End: 2025-03-31

## 2025-03-31 RX ORDER — ESTRADIOL 10 UG/1
10 TABLET, FILM COATED VAGINAL NIGHTLY
Qty: 18 TABLET | Refills: 3 | Status: SHIPPED | OUTPATIENT
Start: 2025-03-31

## 2025-03-31 NOTE — PROGRESS NOTES
Subjective   Patient ID: Nelsy [place] is a 55 y.o. female who presents for No chief complaint on file..  HPI 55-year-old G5, P4 with prior complete hysterectomy history of cervical cancer, vulvar cancer and non-Hodgkin's lymphoma who is here for a repeat Pap smear.  Her last Pap smear came back as inadequate for evaluation but HPV high risk was negative.  She has chief complaint of significant vaginal discharge she says that she has to wear pads and changes them multiple times otherwise she will ruin her underwear.      Review of Systems   Genitourinary:  Positive for vaginal discharge.       Objective   Physical Exam  Constitutional:       Appearance: She is ill-appearing.   Pulmonary:      Effort: Pulmonary effort is normal.   Abdominal:      General: Abdomen is flat.   Genitourinary:     Labia:         Right: Rash present.         Left: Rash present.       Comments: Large amount of green-yellowish discharge seen consistent with yeast infection.  Exam was very uncomfortable for her.   She says that after her last Pap smear she was in a lot of pain and had bleeding.  Neurological:      Mental Status: She is alert.         Assessment/Plan   Problem List Items Addressed This Visit    None  Visit Diagnoses         Codes    Yeast vaginitis    -  Primary B37.31    Relevant Medications    fluconazole (Diflucan) 150 mg tablet    History of cervical cancer     Z85.41    Relevant Medications    estradiol (Vagifem) 10 mcg tablet vaginal tablet        I have recommended 2 doses of Diflucan and subsequently she may start using some Vagifem as directed prior to repeating her next Pap smear.  I have recommended she returns in 2 to 3 months.         Troy Plaza MD 03/31/25 5:23 PM

## 2025-04-02 ENCOUNTER — PHARMACY VISIT (OUTPATIENT)
Dept: PHARMACY | Facility: CLINIC | Age: 55
End: 2025-04-02
Payer: MEDICAID

## 2025-04-02 PROCEDURE — RXMED WILLOW AMBULATORY MEDICATION CHARGE

## 2025-04-04 ENCOUNTER — APPOINTMENT (OUTPATIENT)
Dept: OTOLARYNGOLOGY | Facility: CLINIC | Age: 55
End: 2025-04-04
Payer: COMMERCIAL

## 2025-04-07 ENCOUNTER — APPOINTMENT (OUTPATIENT)
Dept: ENDOCRINOLOGY | Facility: CLINIC | Age: 55
End: 2025-04-07
Payer: COMMERCIAL

## 2025-04-07 ENCOUNTER — TELEPHONE (OUTPATIENT)
Dept: ENDOCRINOLOGY | Facility: CLINIC | Age: 55
End: 2025-04-07

## 2025-04-07 VITALS
HEIGHT: 62 IN | DIASTOLIC BLOOD PRESSURE: 80 MMHG | BODY MASS INDEX: 18.88 KG/M2 | SYSTOLIC BLOOD PRESSURE: 128 MMHG | WEIGHT: 102.6 LBS | HEART RATE: 108 BPM

## 2025-04-07 DIAGNOSIS — Z85.41 HISTORY OF CERVICAL CANCER: ICD-10-CM

## 2025-04-07 DIAGNOSIS — E10.9 TYPE 1 DIABETES MELLITUS WITHOUT COMPLICATION: ICD-10-CM

## 2025-04-07 PROCEDURE — 3008F BODY MASS INDEX DOCD: CPT | Performed by: INTERNAL MEDICINE

## 2025-04-07 PROCEDURE — 3079F DIAST BP 80-89 MM HG: CPT | Performed by: INTERNAL MEDICINE

## 2025-04-07 PROCEDURE — 3074F SYST BP LT 130 MM HG: CPT | Performed by: INTERNAL MEDICINE

## 2025-04-07 PROCEDURE — 1036F TOBACCO NON-USER: CPT | Performed by: INTERNAL MEDICINE

## 2025-04-07 PROCEDURE — 99213 OFFICE O/P EST LOW 20 MIN: CPT | Performed by: INTERNAL MEDICINE

## 2025-04-07 RX ORDER — INSULIN ASPART 100 [IU]/ML
INJECTION, SOLUTION INTRAVENOUS; SUBCUTANEOUS
Qty: 30 ML | Refills: 5 | Status: SHIPPED | OUTPATIENT
Start: 2025-04-07

## 2025-04-07 RX ORDER — ESTRADIOL 0.1 MG/G
2 CREAM VAGINAL NIGHTLY
Qty: 34 G | Refills: 3 | Status: SHIPPED | OUTPATIENT
Start: 2025-04-07 | End: 2026-04-07

## 2025-04-07 ASSESSMENT — ENCOUNTER SYMPTOMS
ROS SKIN COMMENTS: DRY SKIN
APPETITE CHANGE: 1

## 2025-04-07 NOTE — PROGRESS NOTES
"Subjective   Nelsy [place] is a 55 y.o. female who presents for a follow-up evaluation of Type 1 diabetes mellitus. The initial diagnosis of diabetes was made in 2006 .  Diabetes was diagnosed after having recurrent bouts of pancreatitis.    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 She was found to have a third occurrence of lymphoma.  She has completed 6 cycles of chemotherapy.  She is now in remission.      She had an ER visit for hematochezia.  She had diarrhea for 9 days    She has not brought any devices for download today - insulin pump or CGM.    She will need to get steroid before her CT scans as she has an allergy to the dye.      Known complications due to diabetes included CAD s/p MI in 2018    Cardiovascular risk factors include diabetes mellitus. The patient is not on an ACE inhibitor or angiotensin II receptor blocker.  The patient has not been previously hospitalized due to diabetic ketoacidosis.     Current symptoms/problems include none. Her clinical course has been stable.     The patient is currently checking the blood glucose multiple times per day.    Patient is using: continuous glucose monitor      Hypoglycemia frequency:  Hypoglycemia awareness: Yes     MEALS: edentulous   Breakfast - oatmeal, toast  Dinner - sweet and sour chiccken, rice, mashed potaotes       Review of Systems   Constitutional:  Positive for appetite change (Decreased).   Eyes:  Positive for visual disturbance (seein spots x 1 month).   Skin:         Dry skin    All other systems reviewed and are negative.    Objective   /80   Pulse 108   Ht 1.575 m (5' 2\")   Wt 46.5 kg (102 lb 9.6 oz)   BMI 18.77 kg/m²   Physical Exam  Vitals and nursing note reviewed.   Constitutional:       General: She is not in acute distress.     Appearance: Normal appearance. She is normal weight.   HENT:      Head: Normocephalic and atraumatic.      Nose: Nose normal.      Mouth/Throat:      Mouth: " Mucous membranes are moist.   Eyes:      Extraocular Movements: Extraocular movements intact.   Pulmonary:      Effort: Pulmonary effort is normal.   Musculoskeletal:         General: Normal range of motion.   Neurological:      Mental Status: She is alert and oriented to person, place, and time.   Psychiatric:         Mood and Affect: Mood normal.       Lab Review  Lab Results   Component Value Date    HGBA1C 8.0 (A) 11/13/2024     Lab Results   Component Value Date    GLUCOSE 243 (H) 11/14/2024    CALCIUM 9.0 11/14/2024     11/14/2024    K 3.4 (L) 11/14/2024    CO2 28 11/14/2024     11/14/2024    BUN 10 11/14/2024    CREATININE 0.64 11/14/2024     Lab Results   Component Value Date    CHOL 87 05/31/2024    CHOL 123 04/04/2023    CHOL 84 08/05/2022     Lab Results   Component Value Date    HDL 45.0 05/31/2024    HDL 66.0 04/04/2023    HDL 44.7 08/05/2022     Lab Results   Component Value Date    LDLCALC 31 05/31/2024     Lab Results   Component Value Date    TRIG 56 05/31/2024    TRIG 100 04/04/2023    TRIG 71 08/05/2022         Health Maintenance:   Foot Exam: July 2021  Eye Exam:  Urine Albumin: July 11, 2024       Assessment/Plan   55-year-old female presents for follow up for type 3c diabetes mellitus. Her blood pressure is at goal today.    Type 1 diabetes mellitus without complication (Multi)  To continue the use of the insulin pump  To continue the use of your CGM for the intensive glucose monitoring due to the dynamic nature of insulin requirements, the narrow therapeutic index of insulin and the potentially fatal consequences of treatment  For a diabetic dilated eye examination  To get blood tests from [facility]  Please bring all devices to clinic appointments for download   Follow up in 4 months

## 2025-04-07 NOTE — PATIENT INSTRUCTIONS
Thank you for choosing [facility]  for your health care needs.  If you have any questions, concerns or medical needs, please feel free to contact our office at [phone].    Please ensure you complete your blood work one week before the next scheduled appointment.    To continue the use of the insulin pump  To continue the use of your CGM for the intensive glucose monitoring due to the dynamic nature of insulin requirements, the narrow therapeutic index of insulin and the potentially fatal consequences of treatment  For a diabetic dilated eye examination  To get blood tests from [facility]  Follow up in 4 months

## 2025-04-11 ENCOUNTER — HOSPITAL ENCOUNTER (OUTPATIENT)
Dept: GASTROENTEROLOGY | Facility: HOSPITAL | Age: 55
Discharge: HOME | End: 2025-04-11
Payer: COMMERCIAL

## 2025-04-11 ENCOUNTER — APPOINTMENT (OUTPATIENT)
Dept: OTOLARYNGOLOGY | Facility: CLINIC | Age: 55
End: 2025-04-11
Payer: COMMERCIAL

## 2025-04-11 VITALS
HEART RATE: 90 BPM | SYSTOLIC BLOOD PRESSURE: 167 MMHG | BODY MASS INDEX: 16.9 KG/M2 | TEMPERATURE: 99.3 F | RESPIRATION RATE: 16 BRPM | WEIGHT: 99 LBS | HEIGHT: 64 IN | DIASTOLIC BLOOD PRESSURE: 96 MMHG | OXYGEN SATURATION: 98 %

## 2025-04-11 DIAGNOSIS — K86.1 CHRONIC PANCREATITIS, UNSPECIFIED PANCREATITIS TYPE (MULTI): ICD-10-CM

## 2025-04-11 PROCEDURE — 64530 N BLOCK INJ CELIAC PELUS: CPT | Performed by: PHYSICAL MEDICINE & REHABILITATION

## 2025-04-11 PROCEDURE — 2500000004 HC RX 250 GENERAL PHARMACY W/ HCPCS (ALT 636 FOR OP/ED): Performed by: PHYSICAL MEDICINE & REHABILITATION

## 2025-04-11 RX ORDER — ROPIVACAINE HYDROCHLORIDE 5 MG/ML
INJECTION, SOLUTION EPIDURAL; INFILTRATION; PERINEURAL AS NEEDED
Status: COMPLETED | OUTPATIENT
Start: 2025-04-11 | End: 2025-04-11

## 2025-04-11 RX ORDER — LIDOCAINE HYDROCHLORIDE 5 MG/ML
INJECTION, SOLUTION INFILTRATION; INTRAVENOUS AS NEEDED
Status: COMPLETED | OUTPATIENT
Start: 2025-04-11 | End: 2025-04-11

## 2025-04-11 RX ADMIN — LIDOCAINE HYDROCHLORIDE 15 ML: 5 INJECTION, SOLUTION INFILTRATION at 10:50

## 2025-04-11 RX ADMIN — ROPIVACAINE HYDROCHLORIDE 10 ML: 5 INJECTION, SOLUTION EPIDURAL; INFILTRATION; PERINEURAL at 10:55

## 2025-04-11 ASSESSMENT — PAIN SCALES - GENERAL
PAINLEVEL_OUTOF10: 10 - WORST POSSIBLE PAIN
PAINLEVEL_OUTOF10: 7

## 2025-04-11 ASSESSMENT — PAIN - FUNCTIONAL ASSESSMENT
PAIN_FUNCTIONAL_ASSESSMENT: 0-10
PAIN_FUNCTIONAL_ASSESSMENT: 0-10

## 2025-04-11 NOTE — ADDENDUM NOTE
Encounter addended by: Kassy Rocha RN on: 4/11/2025 11:23 AM   Actions taken: Flowsheet accepted, Check Out activity completed

## 2025-04-11 NOTE — ASSESSMENT & PLAN NOTE
To continue the use of the insulin pump  To continue the use of your CGM for the intensive glucose monitoring due to the dynamic nature of insulin requirements, the narrow therapeutic index of insulin and the potentially fatal consequences of treatment  For a diabetic dilated eye examination  To get blood tests from [facility]  Please bring all devices to clinic appointments for download   Follow up in 4 months

## 2025-04-11 NOTE — DISCHARGE INSTRUCTIONS
DISCHARGE INSTRUCTIONS FOR INJECTIONS     After most injections, it is recommended that you relax and limit your activity for the remainder of the day unless you have been told otherwise by your pain physician.  You should not drive a car, operate machinery, or make important legal decisions unless otherwise directed by your pain physician.  You may resume your normal activity, including exercise, tomorrow.      Keep a written pain diary of how much pain relief you experienced following the injection procedure and the length of time of pain relief you experienced pain relief. Following diagnostic injections like medial branch nerve blocks, genicular nerve blocks, sacroiliac joint blocks, stellate ganglion injections and other blocks, it is very important you record the specific amount of pain relief you experienced immediately after the injection and how long it lasted. If you have been given Questionnaire paperwork to fill out out after your diagnostic block please call [phone] and leave a detailed voicemail with the answers to the questions you were given. This information is vital to getting approval for next steps.    Observe the needle site for excessive bleeding (slow general oozing that completely soaks the dressing or fresh bright red bleeding).  In either case, apply pressure to the area, elevate it if possible and call your doctor at once.      For all injections, please keep the injection site dry and inspect the site for a couple of days. You may remove the Band-Aid the day of the injection at any time.     Keep the needle site clean & dry for 24 hours.   no soaking baths, hot tubs, whirlpools or swimming pools for 2-3 days.     Some discomfort, bruising or slight swelling may occur at the injection site. This is not abnormal if it occurs.  If needed you may:    -Take over the counter medication such as Tylenol or Motrin.   -Apply an ice pack for 30 minutes, 2 to 3 times a day for the first 24  hours.     If you are given steroids in your injection, your pain may not be gone immediately after this procedure. It generally takes 3-5 days for the steroid to work but it can take up to 2 weeks. may You may notice a worsening of your symptoms for 1-2 days after the injection. This is not abnormal.  You may use acetaminophen, ibuprofen, or prescription medication that your doctor may have prescribed for you if you need to do so.     A few common side effects of steroids include facial flushing, sweating, restlessness, irritability,difficulty sleeping, increase in blood sugar, and increased blood pressure. If you have diabetes, please monitor your blood sugar at least once a day for at least 5 days. If you have poorly controlled high blood pressure, monitoryour blood pressure for at least 2 days and contact your primary care physician if these numbers are unusually high for you.        Call  Pain Management at [phone] between 8am-4pm Monday - Friday if you are experiencing the following:    If you received an epidural or spinal injection:    -Headache that does not go away with medicine, is worse when sitting or standing up, and is greatly relieved upon lying down.   -Severe pain worse than or different than your baseline pain.   -Chills or fever (101º F or greater).   -Drainage or signs of infection at the injection site     Go directly to the Emergency Department if you are experiencing the following and received an epidural or spinal injection:   -Abrupt weakness or progressive weakness in your legs that starts after you leave the clinic.   -Abrupt severe or worsening numbness in your legs.   -Inability to urinate after the injection or loss of bowel or bladder control without the urge to defecate or urinate.     If you have a clinical question that cannot wait until your next appointment, please call [phone] between 8am-4pm Monday - Friday. We do our best to return all non-emergency messages within  24-48 hours, Monday - Friday. A nurse or physician will return your message. You may also try calling and they will do their best to answer your question(s):    Samina Jacobs's nurse [phone]  [facility] Pain Management nurse [phone]  After hours pain management [phone]    If you need to cancel an appointment, please call the scheduling staff at [phone] during normal business hours or leave a message at least 24 hours in advance.

## 2025-04-11 NOTE — H&P
Pain Management H&P    History Of Present Illness  Nelsy Osullivan is a 55 y.o. female presents for procedure state below. Endorses no changes in past medical history or medical health since last seen in clinic.      Past Medical History  She has a past medical history of Non-Hodgkin lymphoma, Smoker, and Vulvar cancer (Multi).    Surgical History  She has a past surgical history that includes Other surgical history (04/14/2020); Other surgical history (04/14/2020); Other surgical history (04/14/2020); and Hysterectomy.     Social History  She reports that she quit smoking about 3 years ago. Her smoking use included cigarettes. She has been exposed to tobacco smoke. She has never used smokeless tobacco. She reports that she does not currently use alcohol. She reports that she does not use drugs.    Family History  Family History   Problem Relation Name Age of Onset    Heart disease Mother      Heart disease Sister      Heart disease Brother          Allergies  Acetaminophen-codeine, Adhesive, Codeine, Fentanyl, Fetrin, Ketorolac, Meperidine, Metformin, Propoxyphene n-acetaminophen, Silver, Adhesive tape-silicones, Iodinated contrast media, Latex, Penicillins, and Wound dressings    Review of Symptoms:   Constitutional: Negative for chills, diaphoresis or fever  HENT: Negative for neck swelling  Eyes:.  Negative for eye pain  Respiratory:.  Negative for cough, shortness of breath or wheezing    Cardiovascular:.  Negative for chest pain or palpitations  Gastrointestinal:.  Negative for abdominal pain, nausea and vomiting  Genitourinary:.  Negative for urgency  Musculoskeletal:  Positive for back pain. Positive for joint pain. Denies falls within the past 3 months.  Skin: Negative for wounds or itching   Neurological: Negative for dizziness, seizures, loss of consciousness and weakness  Endo/Heme/Allergies: Does not bruise/bleed easily  Psychiatric/Behavioral: Negative for depression. The patient does not appear anxious.        PHYSICAL EXAM  Vitals signs reviewed  Constitutional:       General: Not in acute distress     Appearance: Normal appearance. Not ill-appearing.  HENT:     Head: Normocephalic and atraumatic  Eyes:     Conjunctiva/sclera: Conjunctivae normal  Cardiovascular:     Rate and Rhythm: Normal rate and regular rhythm  Pulmonary:     Effort: No respiratory distress  Abdominal:     Palpations: Abdomen is soft  Musculoskeletal: TARIQ  Skin:     General: Skin is warm and dry  Neurological:     General: No focal deficit present  Psychiatric:         Mood and Affect: Mood normal         Behavior: Behavior normal     Last Recorded Vitals  There were no vitals taken for this visit.    Relevant Results  Current Outpatient Medications   Medication Instructions    albuterol 90 mcg/actuation inhaler 2 puffs, Every 6 hours    aspirin 81 mg, Daily    atorvastatin (LIPITOR) 40 mg, Daily    BD Alcohol Swabs pads, medicated USE SIX TIMES DAILY    bisacodyl (DULCOLAX (BISACODYL)) 5 mg, Daily PRN    blood-glucose sensor (FreeStyle Ashley 3 Sensor) device 1 each, miscellaneous, Continuous, Use to check glucose, change every 15 days *FreeStyle Ashley 3 plus    carvedilol (COREG) 12.5 mg, Every 12 hours    cetirizine (ZYRTEC) 10 mg, Daily    clobetasol (Temovate) 0.05 % ointment Topical, 2 times daily, Apply to vulva as needed 2-3 times a week.    cyclobenzaprine (FLEXERIL) 10 mg, 3 times daily PRN    diphenhydrAMINE (BENADRYL ALLERGY) 50 mg, oral, Nightly PRN    DULoxetine (CYMBALTA) 60 mg, Daily    DULoxetine (CYMBALTA) 20 mg, Daily    esomeprazole (NEXIUM) 40 mg, Daily PRN    estradiol (ESTRACE) 2 mg, Daily    estradiol (ESTRACE) 2 g, vaginal, Nightly, At bedtime for 2 weeks, then at bedtime twice a week.    FreeStyle Ashley 3 [place] misc Use as instructed    hydrocortisone 0.5 % cream Topical, 2 times daily    insulin aspart (NovoLOG) 100 unit/mL injection FOR USE WITH INSULIN PUMP MAX DAILY DOSE  UNITS    levothyroxine (Synthroid,  Levoxyl) 125 mcg tablet TAKE 1 TABLET BY MOUTH IN THE MORNING ON EMPTY STOMACH    lidocaine-prilocaine (Emla) 2.5-2.5 % cream APPLY AT INSERTION SITE 30-45 MINUTES BEFORE YOU ARRIVE FOR BLOOD WORK OR CHEMOTHERAPY    melatonin 3 mg tablet 2 tablets, Nightly    metoprolol succinate XL (TOPROL-XL) 50 mg, Daily    mirtazapine (REMERON) 7.5 mg, Nightly    morphine CR (MS Contin) 60 mg 12 hr tablet Take 1 tablet (60 mg) by mouth 2 times a day. Do not crush, chew, or split. Do not fill before March 13, 2025.    [START ON 4/13/2025] morphine CR (MS CONTIN) 15 mg, oral, 2 times daily, Do not crush, chew, or split. Take in combination with the 30mg for 45mg    [START ON 4/13/2025] morphine CR (MS CONTIN) 30 mg, oral, 2 times daily, Do not crush, chew, or split. Take in combination with the 15mg for 45mg    naloxone (Narcan) 4 mg/0.1 mL nasal spray Use 1 spray in one nostril as needed for overdose. May repeat every 2 to 3 min in alternating nostrils until medical assistance is available    naratriptan (AMERGE) 2.5 mg, oral, Once as needed    OLANZapine (ZYPREXA) 15 mg, oral, Nightly    omeprazole (PRILOSEC) 40 mg, Daily    Omnipod Dash Pods, Gen 4, cartridge 1 each, miscellaneous, Every 72 hours    ondansetron (ZOFRAN) 8 mg, oral, Every 8 hours PRN    ondansetron ODT (ZOFRAN-ODT) 8 mg, oral, Every 8 hours PRN    OXcarbazepine (Trileptal) 150 mg tablet 1 tablet, Every 12 hours scheduled (0630,1830)    oxyCODONE (ROXICODONE) 15 mg, oral, Every 4 hours PRN    pancrelipase, Lip-Prot-Amyl, (Creon) 36,000-114,000- 180,000 unit capsule,delayed release(DR/EC) capsule 2 capsules with each meal TID, 2 capsules with supplement drink BID and 1 capsule with snack    phenazopyridine (Pyridium) 200 mg tablet 1 tablet, 3 times daily    predniSONE (DELTASONE) 50 mg, oral, 2 times daily    prochlorperazine (COMPAZINE) 10 mg, oral, Every 6 hours PRN    promethazine (PHENERGAN) 25 mg, Every 6 hours PRN    sennosides-docusate sodium (Senna-S)  8.6-50 mg tablet 1 tablet, oral, 2 times daily    spironolactone (Aldactone) 25 mg tablet Daily RT    torsemide (DEMADEX) 20 mg, oral, Daily       No results found for this or any previous visit from the past 1000 days.     No image results found.       1. Chronic pancreatitis, unspecified pancreatitis type (Multi)  FL pain management    FL pain management    Sympathetic Block    Sympathetic Block           ASSESSMENT/PLAN  Nelsy Osullivan is a 55 y.o. female presenting for bilateral splanchnic nerve block     Patient denies any recent antibiotic use or infections, denies any blood thinner use, and denies contrast or local anesthetic allergies     Risks, benefits, alternatives discussed. All questions answered to the best of my ability. Patient agrees to proceed.      Our plan is as follows:  - Proceed with aforementioned procedure          Rey Hemphill DO   Pain fellow

## 2025-04-13 ENCOUNTER — PHARMACY VISIT (OUTPATIENT)
Dept: PHARMACY | Facility: CLINIC | Age: 55
End: 2025-04-13
Payer: MEDICAID

## 2025-04-13 PROCEDURE — RXMED WILLOW AMBULATORY MEDICATION CHARGE

## 2025-04-20 NOTE — PROGRESS NOTES
SUPPORTIVE AND PALLIATIVE ONCOLOGY FOLLOW UP - OUTPATIENT      SERVICE DATE: 4/19/2025    Referred by:  Dr. 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  She was diagnosed and is planned to get bendamustine and rituxan every 28 days. She had been following with Jonathan Worrell with [facility], but has since requested to switch to palliative care closer to where she lives. She was recently hospitalized for CHF exacerbation. Has completed her treatment for her follicular lymphoma. Patient is s/p nerve block on 4/11/25    Information was collected from chart review, discussion with patient/family, and discussion with care team     SUBJECTIVE:  ***    Pain Assessment:  Pain Score:    Location:    Education:        Symptom Assessment:  ROS otherwise negative, pertinent positives documented in the HPI       Information obtained from: chart review and interview of patient  ______________________________________________________________________     Oncology History   Follicular lymphoma grade IIIb   6/7/2022 Initial Diagnosis    Follicular lymphoma grade IIIb (CMS/HCC)     5/2/2024 -  Chemotherapy    Bendamustine + RiTUXimab, 28 Day Cycles         Past Medical History:   Diagnosis Date    Non-Hodgkin lymphoma     Smoker     Vulvar cancer (Multi)      Past Surgical History:   Procedure Laterality Date    HYSTERECTOMY      OTHER SURGICAL HISTORY  04/14/2020    Cholecystectomy    OTHER SURGICAL HISTORY  04/14/2020    Exploratory laparoscopy    OTHER SURGICAL HISTORY  04/14/2020    Heart surgery     Family History   Problem Relation  Name Age of Onset    Heart disease Mother      Heart disease Sister      Heart disease Brother          SOCIAL HISTORY  Children 4 children and 5 grandchildren    Social History:  reports that she quit smoking about 3 years ago. Her smoking use included cigarettes. She has been exposed to tobacco smoke. She has never used smokeless tobacco. She reports that she does not currently use alcohol. She reports that she does not use drugs.    She has food insecurities and has bed bugs   REVIEW OF SYSTEMS  Review of systems negative unless noted in HPI.       Objective     Palliative Performance Scale % (PPS) 90     Labs:  No results found. However, due to the size of the patient record, not all encounters were searched. Please check Results Review for a complete set of results.                      Medications:   Current Outpatient Medications   Medication Instructions    albuterol 90 mcg/actuation inhaler 2 puffs, Every 6 hours    aspirin 81 mg, Daily    atorvastatin (LIPITOR) 40 mg, Daily    BD Alcohol Swabs pads, medicated USE SIX TIMES DAILY    bisacodyl (DULCOLAX (BISACODYL)) 5 mg, Daily PRN    blood-glucose sensor (FreeStyle Ashley 3 Sensor) device 1 each, miscellaneous, Continuous, Use to check glucose, change every 15 days *FreeStyle Ashley 3 plus    carvedilol (COREG) 12.5 mg, Every 12 hours    cetirizine (ZYRTEC) 10 mg, Daily    clobetasol (Temovate) 0.05 % ointment Topical, 2 times daily, Apply to vulva as needed 2-3 times a week.    cyclobenzaprine (FLEXERIL) 10 mg, 3 times daily PRN    diphenhydrAMINE (BENADRYL ALLERGY) 50 mg, oral, Nightly PRN    DULoxetine (CYMBALTA) 60 mg, Daily    DULoxetine (CYMBALTA) 20 mg, Daily    esomeprazole (NEXIUM) 40 mg, Daily PRN    estradiol (ESTRACE) 2 mg, Daily    estradiol (ESTRACE) 2 g, vaginal, Nightly, At bedtime for 2 weeks, then at bedtime twice a week.    FreeStyle Ashley 3 [place] misc Use as instructed    hydrocortisone 0.5 % cream Topical, 2 times daily    insulin aspart  (NovoLOG) 100 unit/mL injection FOR USE WITH INSULIN PUMP MAX DAILY DOSE  UNITS    levothyroxine (Synthroid, Levoxyl) 125 mcg tablet TAKE 1 TABLET BY MOUTH IN THE MORNING ON EMPTY STOMACH    lidocaine-prilocaine (Emla) 2.5-2.5 % cream APPLY AT INSERTION SITE 30-45 MINUTES BEFORE YOU ARRIVE FOR BLOOD WORK OR CHEMOTHERAPY    melatonin 3 mg tablet 2 tablets, Nightly    metoprolol succinate XL (TOPROL-XL) 50 mg, Daily    mirtazapine (REMERON) 7.5 mg, Nightly    morphine CR (MS CONTIN) 15 mg, oral, 2 times daily, Do not crush, chew, or split. Take in combination with the 30mg for 45mg. Do not fill before April 13, 2025.    morphine CR (MS CONTIN) 30 mg, oral, 2 times daily, Do not crush, chew, or split. Take in combination with the 15mg for 45mg. Do not fill before April 13, 2025.    naloxone (Narcan) 4 mg/0.1 mL nasal spray Use 1 spray in one nostril as needed for overdose. May repeat every 2 to 3 min in alternating nostrils until medical assistance is available    naratriptan (AMERGE) 2.5 mg, oral, Once as needed    OLANZapine (ZYPREXA) 15 mg, oral, Nightly    omeprazole (PRILOSEC) 40 mg, Daily    Omnipod Dash Pods, Gen 4, cartridge 1 each, miscellaneous, Every 72 hours    ondansetron (ZOFRAN) 8 mg, oral, Every 8 hours PRN    ondansetron ODT (ZOFRAN-ODT) 8 mg, oral, Every 8 hours PRN    OXcarbazepine (Trileptal) 150 mg tablet 1 tablet, Every 12 hours scheduled (0630,1830)    oxyCODONE (ROXICODONE) 15 mg, oral, Every 4 hours PRN    pancrelipase, Lip-Prot-Amyl, (Creon) 36,000-114,000- 180,000 unit capsule,delayed release(DR/EC) capsule 2 capsules with each meal TID, 2 capsules with supplement drink BID and 1 capsule with snack    phenazopyridine (Pyridium) 200 mg tablet 1 tablet, 3 times daily    predniSONE (DELTASONE) 50 mg, oral, 2 times daily    prochlorperazine (COMPAZINE) 10 mg, oral, Every 6 hours PRN    promethazine (PHENERGAN) 25 mg, Every 6 hours PRN    sennosides-docusate sodium (Senna-S) 8.6-50 mg tablet  1 tablet, oral, 2 times daily    spironolactone (Aldactone) 25 mg tablet Daily RT    torsemide (DEMADEX) 20 mg, oral, Daily       Allergies:   Allergies   Allergen Reactions    Acetaminophen-Codeine Unknown    Adhesive Unknown    Codeine Unknown    Fentanyl Unknown     Reaction from Community: Other(See Desc) Comments: MIGRAINES IF USED FOR PAIN, BUT OK FOR ANESTHESIA    Other reaction(s): Intolerance    Fetrin Unknown    Ketorolac Itching    Meperidine Unknown     Reaction from Community: Other(See Desc)    Metformin Unknown    Propoxyphene N-Acetaminophen GI Upset    Silver Hives and Other     Tegaderm    Adhesive Tape-Silicones Rash    Iodinated Contrast Media Rash     Does well with 13hour premedication    Latex Rash    Penicillins Rash    Wound Dressings Rash     tagaderm     Pain Management Panel  More data exists         Latest Ref Rng & Units 3/14/2025 2/14/2025   Pain Management Panel   Amphetamine Screen, Urine Presumptive Negative Presumptive Negative  Presumptive Negative    Barbiturate Screen, Urine Presumptive Negative Presumptive Negative  Presumptive Negative    Benzodiazepines Screen, Urine Presumptive Negative Presumptive Negative  Presumptive Negative    Codeine IgE <50 ng/mL <50  <50    Fentanyl Screen, Urine Presumptive Negative Presumptive Negative  Presumptive Negative    Hydromorphone Urine <25 ng/mL 285  237    Methadone Screen, Urine Presumptive Negative Presumptive Negative  Presumptive Negative    Morphine  <50 ng/mL >2,500  >2,500            PHYSICAL EXAMINATION  Vital Signs:   Vital signs reviewed  There were no vitals filed for this visit.                          Pain Score:           Physical Exam  Constitutional:       Appearance: Normal appearance. She is normal weight.   HENT:      Head: Normocephalic and atraumatic.      Mouth/Throat:      Mouth: Mucous membranes are dry.   Eyes:      Extraocular Movements: Extraocular movements intact.   Cardiovascular:      Comments: No signs  of cardiac distress  Pulmonary:      Effort: Pulmonary effort is normal.      Comments: No signs of respiratory distress  Abdominal:      General: Abdomen is flat.      Palpations: Abdomen is soft.   Musculoskeletal:         General: Normal range of motion.   Skin:     General: Skin is warm and dry.   Neurological:      Mental Status: She is alert and oriented to person, place, and time. Mental status is at baseline.   Psychiatric:         Mood and Affect: Mood normal.         Behavior: Behavior normal.         Thought Content: Thought content normal.         Judgment: Judgment normal.         ASSESSMENT/PLAN    Pain  Pain is: cancer related pain and chronic  Type: visceral  Pain control: sub-optimally controlled  Previously tried/intolerances: Did not find oxycodone effective, avoid Tylenol due to liver impairment, did not feel dilaudid controlled her pain very well    Pain Plan:  Continue Cymbalta 80mg daily   Continue Flexeril 10mg TID PRN   Continue Lidocaine rectally   Decrease MS CR to 45mg BID from 60mg BID  Weaning  Continue Oxycodone 15mg Q4 PRN   Weaning  Appreciate input from pain management, and is s/p nerve block on 4/11    Opioid Use  Medication Management:   OARRS report reviewed with no aberrant behavior; consistent with  prescriptions/records and patient history  .  Overdose Risk Score 380.   This has been discussed with patient.   We will continue to closely monitor the patient for signs of prescription misuse including UDS, OARRS review and subjective reports at each visit.  No concurrent benzodiazepine use   I am a provider who is certified in Hospice and Palliative Medicine and have conducted a face-face visit and examination for this patient.  Routine Urine Drug Screen is needed and was completed on 5/10/24 appropriately positive for opioids and negative for illicit substances  Controlled Substance Agreement: is needed. Completed on: 5/10/24  Specifically discussed that controlled  substance prescriptions will only be provided by our group as outlined in the completed agreement  Naloxone is needed  Red Flags: Called in early for a refill as she was out 2 weeks early on 11/15     Nausea   Intermittent nausea without vomiting related to chemotherapy  Nausea Plan:  Continue Zyprexa to 15mg qhs  Compazine 10mg PRN   Continue Zofran to ODT 8mg Q8 PRN    Diarrhea   Related to chemotherapy  Diarrhea Plan:  Imodium prn      Altered Mood  Chronic anxiety and depression related to health concerns   Mood Plan:  Continue Remeron 7.5mg nightly   Continue Cymbalta 60mg daily       Decreased appetite  Related to malignancy and chemotherapy  Anorexia Plan:   Continue Zyprexa to 15mg qhs    Itching related to bedbug infestation   Continue Atarax 25mg TID PRN    Lower extremity edema  Renewed her prescription for torsemide and instructed to follow up with her cardiologist     Food insecurities   Refer to food for life     Advance Directives  Existence of Advance Directives: Reports that she has completed them   Decision maker: OSMEL is reportedly her sister   Code Status: Full code    Next Follow-Up Visit:  Return to clinic in 4 weeks     Signature and billing  Thank you for allowing us to participate in the care of this patient. Recommendations will be communicated back to the consulting service by way of shared electronic medical record or face-to-face.    Medical complexity was high level due to due to complexity of problems, extensive data review, and high risk of management/treatment.  Time was spent on the following: Prep Time, Time Directly with Patient/Family/Caregiver, Documentation Time. Total time spent: 45      DATA   Diagnostic tests and information reviewed for today's visit:  Most recent labs, Most recent imaging, Medications           SIGNATURE: RALPH Eugene-CNS    Contact information:  [facility]  Monday-Friday 8 AM-5 PM  Phone:  [phone], press option #5,  then option #1.   Or Epic Secure Chat

## 2025-04-25 ENCOUNTER — OFFICE VISIT (OUTPATIENT)
Dept: PALLIATIVE MEDICINE | Facility: CLINIC | Age: 55
End: 2025-04-25
Payer: COMMERCIAL

## 2025-04-25 VITALS
DIASTOLIC BLOOD PRESSURE: 68 MMHG | HEART RATE: 98 BPM | SYSTOLIC BLOOD PRESSURE: 105 MMHG | TEMPERATURE: 98.1 F | WEIGHT: 101.41 LBS | RESPIRATION RATE: 16 BRPM | OXYGEN SATURATION: 100 % | BODY MASS INDEX: 17.41 KG/M2

## 2025-04-25 DIAGNOSIS — R63.0 ANOREXIA: Primary | ICD-10-CM

## 2025-04-25 DIAGNOSIS — G89.3 NEOPLASM RELATED PAIN: ICD-10-CM

## 2025-04-25 PROCEDURE — 99215 OFFICE O/P EST HI 40 MIN: CPT | Performed by: CLINICAL NURSE SPECIALIST

## 2025-04-25 PROCEDURE — 3078F DIAST BP <80 MM HG: CPT | Performed by: CLINICAL NURSE SPECIALIST

## 2025-04-25 PROCEDURE — 3074F SYST BP LT 130 MM HG: CPT | Performed by: CLINICAL NURSE SPECIALIST

## 2025-04-25 PROCEDURE — 80307 DRUG TEST PRSMV CHEM ANLYZR: CPT | Performed by: CLINICAL NURSE SPECIALIST

## 2025-04-25 RX ORDER — MORPHINE SULFATE 30 MG/1
30 TABLET, FILM COATED, EXTENDED RELEASE ORAL 3 TIMES DAILY
Qty: 90 TABLET | Refills: 0 | Status: SHIPPED | OUTPATIENT
Start: 2025-04-25 | End: 2025-05-25

## 2025-04-25 RX ORDER — OLANZAPINE 5 MG/1
5 TABLET, ORALLY DISINTEGRATING ORAL DAILY
Qty: 30 TABLET | Refills: 0 | Status: SHIPPED | OUTPATIENT
Start: 2025-04-25 | End: 2025-05-25

## 2025-04-25 ASSESSMENT — PAIN SCALES - GENERAL: PAINLEVEL_OUTOF10: 7

## 2025-04-25 NOTE — PROGRESS NOTES
"SUPPORTIVE AND PALLIATIVE ONCOLOGY FOLLOW UP - OUTPATIENT      SERVICE DATE: 4/25/2025    Referred by:  Dr. 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  She was diagnosed and is planned to get bendamustine and rituxan every 28 days. She had been following with Jonathan Worrell with [facility] palliative care, but has since requested to switch to palliative care closer to where she lives. She was recently hospitalized for CHF exacerbation. Has completed her treatment for her follicular lymphoma. Patient is s/p nerve block on 4/11/25.    Information was collected from chart review, discussion with patient/family, and discussion with care team     SUBJECTIVE:  Received plexus block 4/11/2025, states she didn't like it at all, back still sore from it and will not get it done again, and states it didn't help her pain. Still taking the morphine CR 45mg TID (ordered BID) because its what she \"is used to,\" and the oxycodone 15mg q4hr PRN. States she may occasionally miss a day where she takes the morphine three times. States her pain is controlled with this. Occasional nausea controlled well with ondansetron, and takes the prochlorperazine at night. Started taking a new vitamin called Alive that has pro and pre-biotics and seems to have helped keep her bowel movements regular and then takes her multivitamin when she doesn't take Alive. Educated patient that the Alive has the multivitamin properties in it and she could just take that vitamin instead of switching. Went five days last week with no bowel movements, but didn't feel constipated or " have abdominal pain. Still taking flexeril roughly BID, doesn't usually take a third dose. Complains of no appetite, states she mainly eats in the morning but not much else throughout the day. Still takes her olanzapine 15mg nightly. Educated patient on adhering to prescribed pain regimen to know if its controlling pain and being tolerated. Patient acknowledged education. Endorses recent life stressors with a death in the family and her sister starting chemotherapy.     Pain Assessment:  Pain Score:   7  Location: Abdomen (also lower back)  Education:      Symptom Assessment:  ROS otherwise negative, pertinent positives documented in the HPI     Information obtained from: chart review and interview of patient  ______________________________________________________________________     Oncology History   Follicular lymphoma grade IIIb   6/7/2022 Initial Diagnosis    Follicular lymphoma grade IIIb (CMS/HCC)     5/2/2024 -  Chemotherapy    Bendamustine + RiTUXimab, 28 Day Cycles       Past Medical History:   Diagnosis Date    Non-Hodgkin lymphoma     Smoker     Vulvar cancer (Multi)      Past Surgical History:   Procedure Laterality Date    HYSTERECTOMY      OTHER SURGICAL HISTORY  04/14/2020    Cholecystectomy    OTHER SURGICAL HISTORY  04/14/2020    Exploratory laparoscopy    OTHER SURGICAL HISTORY  04/14/2020    Heart surgery     Family History   Problem Relation Name Age of Onset    Heart disease Mother      Heart disease Sister      Heart disease Brother        SOCIAL HISTORY  Children 4 children and 5 grandchildren    Social History:  reports that she quit smoking about 3 years ago. Her smoking use included cigarettes. She has been exposed to tobacco smoke. She has never used smokeless tobacco. She reports that she does not currently use alcohol. She reports that she does not use drugs.    She has food insecurities and has bed bugs   REVIEW OF SYSTEMS  Review of systems negative unless noted in HPI.     Objective      Palliative Performance Scale % (PPS) 90     Labs:  No results found. However, due to the size of the patient record, not all encounters were searched. Please check Results Review for a complete set of results.    Medications:   Current Outpatient Medications   Medication Instructions    albuterol 90 mcg/actuation inhaler 2 puffs, Every 6 hours    aspirin 81 mg, Daily    atorvastatin (LIPITOR) 40 mg, Daily    BD Alcohol Swabs pads, medicated USE SIX TIMES DAILY    bisacodyl (DULCOLAX (BISACODYL)) 5 mg, Daily PRN    blood-glucose sensor (FreeStyle Ashley 3 Sensor) device 1 each, miscellaneous, Continuous, Use to check glucose, change every 15 days *FreeStyle Ashley 3 plus    carvedilol (COREG) 12.5 mg, Every 12 hours    cetirizine (ZYRTEC) 10 mg, Daily    clobetasol (Temovate) 0.05 % ointment Topical, 2 times daily, Apply to vulva as needed 2-3 times a week.    cyclobenzaprine (FLEXERIL) 10 mg, 3 times daily PRN    diphenhydrAMINE (BENADRYL ALLERGY) 50 mg, oral, Nightly PRN    DULoxetine (CYMBALTA) 60 mg, Daily    DULoxetine (CYMBALTA) 20 mg, Daily    esomeprazole (NEXIUM) 40 mg, Daily PRN    estradiol (ESTRACE) 2 mg, Daily    estradiol (ESTRACE) 2 g, vaginal, Nightly, At bedtime for 2 weeks, then at bedtime twice a week.    FreeStyle Ashley 3 [place] misc Use as instructed    hydrocortisone 0.5 % cream Topical, 2 times daily    insulin aspart (NovoLOG) 100 unit/mL injection FOR USE WITH INSULIN PUMP MAX DAILY DOSE  UNITS    levothyroxine (Synthroid, Levoxyl) 125 mcg tablet TAKE 1 TABLET BY MOUTH IN THE MORNING ON EMPTY STOMACH    lidocaine-prilocaine (Emla) 2.5-2.5 % cream APPLY AT INSERTION SITE 30-45 MINUTES BEFORE YOU ARRIVE FOR BLOOD WORK OR CHEMOTHERAPY    melatonin 3 mg tablet 2 tablets, Nightly    metoprolol succinate XL (TOPROL-XL) 50 mg, Daily    mirtazapine (REMERON) 7.5 mg, Nightly    morphine CR (MS CONTIN) 15 mg, oral, 2 times daily, Do not crush, chew, or split. Take in combination with the 30mg  for 45mg. Do not fill before April 13, 2025.    morphine CR (MS CONTIN) 30 mg, oral, 2 times daily, Do not crush, chew, or split. Take in combination with the 15mg for 45mg. Do not fill before April 13, 2025.    naloxone (Narcan) 4 mg/0.1 mL nasal spray Use 1 spray in one nostril as needed for overdose. May repeat every 2 to 3 min in alternating nostrils until medical assistance is available    naratriptan (AMERGE) 2.5 mg, oral, Once as needed    OLANZapine (ZYPREXA) 15 mg, oral, Nightly    omeprazole (PRILOSEC) 40 mg, Daily    Omnipod Dash Pods, Gen 4, cartridge 1 each, miscellaneous, Every 72 hours    ondansetron (ZOFRAN) 8 mg, oral, Every 8 hours PRN    ondansetron ODT (ZOFRAN-ODT) 8 mg, oral, Every 8 hours PRN    OXcarbazepine (Trileptal) 150 mg tablet 1 tablet, Every 12 hours scheduled (0630,1830)    oxyCODONE (ROXICODONE) 15 mg, oral, Every 4 hours PRN    pancrelipase, Lip-Prot-Amyl, (Creon) 36,000-114,000- 180,000 unit capsule,delayed release(DR/EC) capsule 2 capsules with each meal TID, 2 capsules with supplement drink BID and 1 capsule with snack    phenazopyridine (Pyridium) 200 mg tablet 1 tablet, 3 times daily    predniSONE (DELTASONE) 50 mg, oral, 2 times daily    prochlorperazine (COMPAZINE) 10 mg, oral, Every 6 hours PRN    promethazine (PHENERGAN) 25 mg, Every 6 hours PRN    sennosides-docusate sodium (Senna-S) 8.6-50 mg tablet 1 tablet, oral, 2 times daily    spironolactone (Aldactone) 25 mg tablet Daily RT    torsemide (DEMADEX) 20 mg, oral, Daily     Allergies:   Allergies   Allergen Reactions    Acetaminophen-Codeine Unknown    Adhesive Unknown    Codeine Unknown    Fentanyl Unknown     Reaction from Community: Other(See Desc) Comments: MIGRAINES IF USED FOR PAIN, BUT OK FOR ANESTHESIA    Other reaction(s): Intolerance    Fetrin Unknown    Ketorolac Itching    Meperidine Unknown     Reaction from Community: Other(See Desc)    Metformin Unknown    Propoxyphene N-Acetaminophen GI Upset    Silver  Hives and Other     Tegaderm    Adhesive Tape-Silicones Rash    Iodinated Contrast Media Rash     Does well with 13hour premedication    Latex Rash    Penicillins Rash    Wound Dressings Rash     tagaderm     Pain Management Panel  More data exists         Latest Ref Rng & Units 3/14/2025 2/14/2025   Pain Management Panel   Amphetamine Screen, Urine Presumptive Negative Presumptive Negative  Presumptive Negative    Barbiturate Screen, Urine Presumptive Negative Presumptive Negative  Presumptive Negative    Benzodiazepines Screen, Urine Presumptive Negative Presumptive Negative  Presumptive Negative    Codeine <50 ng/mL <50  <50    Fentanyl Screen, Urine Presumptive Negative Presumptive Negative  Presumptive Negative    Hydromorphone Urine <25 ng/mL 285  237    Methadone Screen, Urine Presumptive Negative Presumptive Negative  Presumptive Negative    Morphine  <50 ng/mL >2,500  >2,500      PHYSICAL EXAMINATION  Vital Signs:   Vital signs reviewed  Vitals:    04/25/25 1014   BP: 105/68   Pulse: 98   Resp: 16   Temp: 36.7 °C (98.1 °F)   SpO2: 100%     Pain Score:   7      Physical Exam  Vitals reviewed.   Constitutional:       General: She is awake.      Appearance: Normal appearance. She is normal weight.   HENT:      Head: Normocephalic and atraumatic.      Mouth/Throat:      Mouth: Mucous membranes are moist.   Eyes:      Extraocular Movements: Extraocular movements intact.   Cardiovascular:      Rate and Rhythm: Normal rate and regular rhythm.      Pulses: Normal pulses.      Heart sounds: Normal heart sounds.      Comments: No signs of cardiac distress.  Pulmonary:      Effort: Pulmonary effort is normal.      Comments: No signs of respiratory distress.  Abdominal:      General: Abdomen is flat.      Palpations: Abdomen is soft.   Musculoskeletal:         General: Normal range of motion.      Cervical back: Normal range of motion.      Right lower leg: No edema.      Left lower leg: No edema.   Skin:     General:  Skin is warm and dry.   Neurological:      General: No focal deficit present.      Mental Status: She is alert and oriented to person, place, and time. Mental status is at baseline.      Sensory: Sensation is intact.      Motor: Motor function is intact.      Coordination: Coordination is intact.      Gait: Gait is intact.   Psychiatric:         Attention and Perception: Attention normal.         Mood and Affect: Mood normal.         Speech: Speech normal.         Behavior: Behavior normal. Behavior is cooperative.         Thought Content: Thought content normal.         Judgment: Judgment normal.     ASSESSMENT/PLAN    Pain  Pain is: cancer related pain and chronic  Type: visceral  Pain control: sub-optimally controlled  Previously tried/intolerances: Did not find oxycodone effective, avoid Tylenol due to liver impairment, did not feel dilaudid controlled her pain very well    Pain Plan:  Continue Cymbalta 80mg daily   Continue Flexeril 10mg TID PRN   Continue Lidocaine rectally   Change MS Contin to 30mg TID from 45mg BID.   Weaning  Continue Oxycodone 15mg Q4 PRN   Weaning  Appreciate input from pain management, and is s/p nerve block on 4/11    Opioid Use  Medication Management:   OARRS report reviewed with no aberrant behavior; consistent with  prescriptions/records and patient history  .  Overdose Risk Score 380.   This has been discussed with patient.   We will continue to closely monitor the patient for signs of prescription misuse including UDS, OARRS review and subjective reports at each visit.  No concurrent benzodiazepine use   I am a provider who is certified in Hospice and Palliative Medicine and have conducted a face-face visit and examination for this patient.  Routine Urine Drug Screen is needed and was completed on 5/10/24 appropriately positive for opioids and negative for illicit substances  Controlled Substance Agreement: is needed. Completed on: 5/10/24  Specifically discussed that  controlled substance prescriptions will only be provided by our group as outlined in the completed agreement  Naloxone is needed  Red Flags: Called in early for a refill as she was out 2 weeks early on 11/15    Nausea   Intermittent nausea without vomiting related to chemotherapy  Nausea Plan:  Continue Zyprexa to 15mg at bedtime  Compazine 10mg PRN   Continue Zofran to ODT 8mg Q8 PRN    Diarrhea   Related to chemotherapy  Diarrhea Plan:  Imodium prn      Altered Mood  Chronic anxiety and depression related to health concerns   Mood Plan:  Continue Remeron 7.5mg nightly   Continue Cymbalta 60mg daily     Decreased appetite  Related to malignancy and chemotherapy  Anorexia Plan:   Continue Zyprexa to 15mg at bedtime  Add Zyprexa 5mg PO Daily in morning    Itching related to bedbug infestation   Continue Atarax 25mg TID PRN    Lower extremity edema: improved   Renewed her prescription for torsemide and instructed to follow up with her cardiologist     Food insecurities   Refer to food for life     Advance Directives  Existence of Advance Directives: Reports that she has completed them   Decision maker: OSMEL is reportedly her sister   Code Status: Full code    Next Follow-Up Visit:  Return to clinic in 4 weeks     Signature and billing  Thank you for allowing us to participate in the care of this patient. Recommendations will be communicated back to the consulting service by way of shared electronic medical record or face-to-face.    Medical complexity was high level due to due to complexity of problems, extensive data review, and high risk of management/treatment.  Time was spent on the following: Prep Time, Time Directly with Patient/Family/Caregiver, Documentation Time. Total time spent: 45    DATA   Diagnostic tests and information reviewed for today's visit:  Most recent labs, Most recent imaging, Medications       SIGNATURE: PITER STONE    Contact information:  Supportive and Palliative Oncology  Monday-Friday  8 AM-5 PM  Phone:  [phone], press option #5, then option #1.   Or [employer] Secure Chat     I was present with the APRN student who participated in the documentation of this note. I have personally seen and re-examined the patient and performed the medical decision-making components (assessment and plan of care). I have reviewed the APRN student documentation and verified the findings in the note as written with additions or exceptions as stated in the body of this note.    Ian Carvajal APRN-CNS ACHPN AOCNS  Supportive Oncology

## 2025-04-28 LAB
6MAM UR CFM-MCNC: <25 NG/ML
CODEINE UR CFM-MCNC: <50 NG/ML
HYDROCODONE CTO UR CFM-MCNC: <25 NG/ML
HYDROMORPHONE UR CFM-MCNC: 69 NG/ML
MORPHINE UR CFM-MCNC: >2500 NG/ML
NORHYDROCODONE UR CFM-MCNC: <25 NG/ML
NOROXYCODONE UR CFM-MCNC: >1000 NG/ML
OXYCODONE UR CFM-MCNC: 975 NG/ML
OXYMORPHONE UR CFM-MCNC: 440 NG/ML

## 2025-05-02 DIAGNOSIS — G89.3 NEOPLASM RELATED PAIN: ICD-10-CM

## 2025-05-02 PROCEDURE — RXMED WILLOW AMBULATORY MEDICATION CHARGE

## 2025-05-02 RX ORDER — OXYCODONE HYDROCHLORIDE 15 MG/1
15 TABLET ORAL EVERY 4 HOURS PRN
Qty: 180 TABLET | Refills: 0 | Status: SHIPPED | OUTPATIENT
Start: 2025-05-02 | End: 2025-06-02

## 2025-05-02 NOTE — TELEPHONE ENCOUNTER
Reason for Conversation  Med Refill    Background   Oxycodone 15mg. 1 tablet every 4 hrs prn  Pharmacy : [facility]   Last FUV 4/25.

## 2025-05-02 NOTE — TELEPHONE ENCOUNTER
OARRS report reviewed and reflects  prescription history, no aberrancy noted. Per OARRS, patient last filled Oxycodone 15mg #180/30 on 4/2/25. Per last visit with Ian Carvajal CNP on 4/25/25 patient to continue Oxycodone 15mg Q4H PRN (weaning). Patient has been prescribed this dose of Oxycodone since 3/3/25 - will defer to provider to determine next weaning steps/dates. Patient with no follow up visit scheduled but last note states FUV in 4 weeks. Patient states at visit last Friday, Ian told her FUV in 6 weeks. I will clarify with provider and call patient next week to schedule FUV. Patient updated that medication will be sent to  [facility]. Refill request routed to covering provider.

## 2025-05-03 ENCOUNTER — PHARMACY VISIT (OUTPATIENT)
Dept: PHARMACY | Facility: CLINIC | Age: 55
End: 2025-05-03
Payer: MEDICAID

## 2025-05-12 ENCOUNTER — TELEPHONE (OUTPATIENT)
Dept: ADMISSION | Facility: HOSPITAL | Age: 55
End: 2025-05-12
Payer: COMMERCIAL

## 2025-05-12 DIAGNOSIS — G89.3 NEOPLASM RELATED PAIN: ICD-10-CM

## 2025-05-12 RX ORDER — MORPHINE SULFATE 30 MG/1
30 TABLET, FILM COATED, EXTENDED RELEASE ORAL 3 TIMES DAILY
Qty: 90 TABLET | Refills: 0 | Status: SHIPPED | OUTPATIENT
Start: 2025-05-12 | End: 2025-05-13 | Stop reason: SDUPTHER

## 2025-05-12 NOTE — TELEPHONE ENCOUNTER
Patient last seen by RALPH Carvajal on 4/25 with plan to change MSER to 30mg TID. Follow up visit is scheduled for 5/30. OARRS reviewed and no aberrancy noted. Patient last filled MSER 30mg #60/30 days on 4/13. Prescription pended to provider to approve.

## 2025-05-13 RX ORDER — MORPHINE SULFATE 30 MG/1
30 TABLET, FILM COATED, EXTENDED RELEASE ORAL 3 TIMES DAILY
Qty: 90 TABLET | Refills: 0 | Status: SHIPPED | OUTPATIENT
Start: 2025-05-13 | End: 2025-06-12

## 2025-05-14 ENCOUNTER — PHARMACY VISIT (OUTPATIENT)
Dept: PHARMACY | Facility: CLINIC | Age: 55
End: 2025-05-14
Payer: MEDICAID

## 2025-05-14 ENCOUNTER — DOCUMENTATION (OUTPATIENT)
Dept: PALLIATIVE MEDICINE | Facility: HOSPITAL | Age: 55
End: 2025-05-14
Payer: COMMERCIAL

## 2025-05-14 DIAGNOSIS — G89.3 NEOPLASM RELATED PAIN: ICD-10-CM

## 2025-05-14 PROCEDURE — RXMED WILLOW AMBULATORY MEDICATION CHARGE

## 2025-05-14 NOTE — PROGRESS NOTES
Per LEONARD Staley has been obtained for the MSER 30 MG from 5/13/25- 11/8/25. Script was sent to  [facility]  on 5/13/25.

## 2025-06-03 ENCOUNTER — PHARMACY VISIT (OUTPATIENT)
Dept: PHARMACY | Facility: CLINIC | Age: 55
End: 2025-06-03
Payer: MEDICAID

## 2025-06-03 ENCOUNTER — TELEPHONE (OUTPATIENT)
Dept: ADMISSION | Facility: HOSPITAL | Age: 55
End: 2025-06-03
Payer: COMMERCIAL

## 2025-06-03 DIAGNOSIS — G89.3 NEOPLASM RELATED PAIN: ICD-10-CM

## 2025-06-03 PROCEDURE — RXMED WILLOW AMBULATORY MEDICATION CHARGE

## 2025-06-03 RX ORDER — OXYCODONE HYDROCHLORIDE 15 MG/1
15 TABLET ORAL EVERY 4 HOURS PRN
Qty: 180 TABLET | Refills: 0 | Status: SHIPPED | OUTPATIENT
Start: 2025-06-03 | End: 2025-07-03

## 2025-06-03 NOTE — TELEPHONE ENCOUNTER
Pt is requesting a refill of oxycodone 15mg q4 prn, #180.  Preferred pharmacy is  [facility].     Ketoconazole Pregnancy And Lactation Text: This medication is Pregnancy Category C and it isn't know if it is safe during pregnancy. It is also excreted in breast milk and breast feeding isn't recommended.

## 2025-06-03 NOTE — TELEPHONE ENCOUNTER
Refill pended to provider for oxycodone IR 15 mg every 4 hours 180/30.  OARRS reviewed.  Due for refill.

## 2025-06-13 ENCOUNTER — TELEPHONE (OUTPATIENT)
Dept: HEMATOLOGY/ONCOLOGY | Facility: HOSPITAL | Age: 55
End: 2025-06-13
Payer: COMMERCIAL

## 2025-06-13 DIAGNOSIS — G89.3 NEOPLASM RELATED PAIN: ICD-10-CM

## 2025-06-13 RX ORDER — MORPHINE SULFATE 30 MG/1
30 TABLET, FILM COATED, EXTENDED RELEASE ORAL 3 TIMES DAILY
Qty: 90 TABLET | Refills: 0 | Status: CANCELLED | OUTPATIENT
Start: 2025-06-13 | End: 2025-07-13

## 2025-06-13 NOTE — TELEPHONE ENCOUNTER
OARRS report reviewed and reflects  prescription history, no aberrancy noted. Per OARRS, patient last filled 14 day supply, 40 tabs on 5/14. Per last visit with Ian Carvajal on 4/25 patient to continue MSER 30mg TID. Patient with follow up visit scheduled to be scheduled Attempted to call pt to schedule but went to  and her  box is full.     Per Provider Ian Carvajal she has been weaning patient down on MSER and she missed her last FUV. Sent [employer] message since unable to leave  for patient.

## 2025-06-13 NOTE — TELEPHONE ENCOUNTER
Refill request received for Morphine CR 30mg.  Preferred pharmacy is [facility].  Message sent to Palliative Care team.

## 2025-06-15 PROCEDURE — RXMED WILLOW AMBULATORY MEDICATION CHARGE

## 2025-06-15 RX ORDER — OXYCODONE HYDROCHLORIDE 10 MG/1
10 TABLET ORAL EVERY 4 HOURS PRN
Qty: 180 TABLET | Refills: 0 | Status: SHIPPED | OUTPATIENT
Start: 2025-06-15 | End: 2025-06-15 | Stop reason: SDUPTHER

## 2025-06-15 RX ORDER — MORPHINE SULFATE 30 MG/1
30 TABLET, FILM COATED, EXTENDED RELEASE ORAL 3 TIMES DAILY
Qty: 90 TABLET | Refills: 0 | Status: SHIPPED | OUTPATIENT
Start: 2025-06-15 | End: 2025-07-15

## 2025-06-15 RX ORDER — OXYCODONE HYDROCHLORIDE 10 MG/1
10 TABLET ORAL EVERY 4 HOURS PRN
Qty: 180 TABLET | Refills: 0 | Status: SHIPPED | OUTPATIENT
Start: 2025-07-03 | End: 2025-08-02

## 2025-06-17 ENCOUNTER — PHARMACY VISIT (OUTPATIENT)
Dept: PHARMACY | Facility: CLINIC | Age: 55
End: 2025-06-17
Payer: MEDICAID

## 2025-07-03 ENCOUNTER — PHARMACY VISIT (OUTPATIENT)
Dept: PHARMACY | Facility: CLINIC | Age: 55
End: 2025-07-03
Payer: MEDICAID

## 2025-07-03 PROCEDURE — RXMED WILLOW AMBULATORY MEDICATION CHARGE

## 2025-07-07 ENCOUNTER — APPOINTMENT (OUTPATIENT)
Dept: OBSTETRICS AND GYNECOLOGY | Facility: CLINIC | Age: 55
End: 2025-07-07
Payer: COMMERCIAL

## 2025-07-07 NOTE — PROGRESS NOTES
"Referred by  RALPH Sosa (palliative care) for No chief complaint on file.     History Of Present Illness:    Nelsy Osulilvan is a 55 y.o. female presenting to re-establish care.   H/o chronic combined systolic and diastolic CHF, CAD with MI in 2018 (no stents, found to have have nonobstructive calcified plaque), s/p aortic and mitral valve replacements 2/2020, HTN, DL, DM1, hypothyroidism. Also h/o stage II grade 3B follicular lymphoma of a left cervical and left supraclavicular lymph node. Has completed her treatment (chemo, no radiation) for her follicular lymphoma. Seen by Dr Viera in the hospital in 7/2024.     She was referred here to establish care with a cardiologist since she reported to palliative care that she had not seen a cardiologist in 3 years.    Formerly followed in [facility] prior to being admitted to [facility] in 7/2024 with dyspnea and treated for acute on chronic diastolic HF.  Her TTE at that time demonstrated preserved LV fcn EF 64%, moderately reduced RVSF, bioprosthetic AV with mean gradient 10mmHg and DI 0.47, prosthetic mitral valve with mean gradient 8 mmHg, and tricuspid annuloplasty ring with mild TR, normal RVSP.  (Mean mitral valve gradient in 2022 was 6 mmHg).    Today patient is telling me she has not been taking her CV meds including atorvastatin, torsemide, carvedilol - seems to have ran out of refills and was lost to follow up. Tells me her lymphoma is in remission. She continues to follow with palliative care for pain management. She c/o \"lung tightness\" with breathing in - she is a current tobacco smoker. Denies cough, fever. She has not been using her inhalers, stating she has not needed them.    Review Of Systems:  The remainder of the review of systems was obtained, and was negative as pertains to the chief complaint.    CV testing and labs reviewed:  EKG today personally reviewed and demonstrated sinus tachycardia, RBBB, cannot rule out inferior infarct age undetermined     Labs " 11/2024:  K 3.4  BUN 10  Cr 0.64  ALT 29  AST 38  H&H 10.1 29.8   Lipid labs 5/2024:  TC 87  HDL 45  LDL 31  TG 56    TTE 7/2024:   1. The left ventricular systolic function is normal, with a Saha's biplane calculated ejection fraction of 64%.   2. Left ventricular diastolic filling was indeterminate.   3. There is moderately reduced right ventricular systolic function.   4. Bioprosthetic mitral valve replacement with a mean mitral valve gradient of 8.0 mmHg.   5. RVSP within normal limits.   6. Bioprosthetic aortic valve with a mean aortic valve gradient of 10.0 mmHg and a dimensionless index of 0.47.   7. There is a bioprosthetic aortic valve.    Vascular US Lower Extremity Venous Duplex Bilateral 3/2022: no DVT    LHC 2/2020:  Impression:Right dominant circulation with a trifurcating left main coronary.   Non-obstructive coronary artery disease with mild luminal changes. No adverse   reactions to contrast following steroid prep for her reported allergy.     Past Medical History:  She has a past medical history of Non-Hodgkin lymphoma, Smoker, and Vulvar cancer (Multi).    Past Surgical History:  She has a past surgical history that includes Other surgical history (04/14/2020); Other surgical history (04/14/2020); Other surgical history (04/14/2020); and Hysterectomy.      Social History:  She reports that she quit smoking about 3 years ago. Her smoking use included cigarettes. She has been exposed to tobacco smoke. She has never used smokeless tobacco. She reports that she does not currently use alcohol. She reports that she does not use drugs.    Family History:  Family History[1]     Allergies:  Acetaminophen-codeine, Adhesive, Codeine, Fentanyl, Fetrin, Ketorolac, Meperidine, Metformin, Propoxyphene n-acetaminophen, Silver, Adhesive tape-silicones, Iodinated contrast media, Latex, Penicillins, and Wound dressings    Outpatient Medications:  Current Outpatient Medications   Medication Instructions     albuterol 90 mcg/actuation inhaler 2 puffs, Every 6 hours    aspirin 81 mg, Daily    atorvastatin (LIPITOR) 40 mg, Daily    BD Alcohol Swabs pads, medicated USE SIX TIMES DAILY    bisacodyl (DULCOLAX (BISACODYL)) 5 mg, Daily PRN    blood-glucose sensor (FreeStyle Ashley 3 Sensor) device 1 each, miscellaneous, Continuous, Use to check glucose, change every 15 days *FreeStyle Ashley 3 plus    carvedilol (COREG) 12.5 mg, Every 12 hours    cetirizine (ZYRTEC) 10 mg, Daily    clobetasol (Temovate) 0.05 % ointment Topical, 2 times daily, Apply to vulva as needed 2-3 times a week.    cyclobenzaprine (FLEXERIL) 10 mg, 3 times daily PRN    diphenhydrAMINE (BENADRYL ALLERGY) 50 mg, oral, Nightly PRN    DULoxetine (CYMBALTA) 60 mg, Daily    DULoxetine (CYMBALTA) 20 mg, Daily    esomeprazole (NEXIUM) 40 mg, Daily PRN    estradiol (ESTRACE) 2 mg, Daily    estradiol (ESTRACE) 2 g, vaginal, Nightly, At bedtime for 2 weeks, then at bedtime twice a week.    FreeStyle Ashley 3 [place] misc Use as instructed    hydrocortisone 0.5 % cream Topical, 2 times daily    insulin aspart (NovoLOG) 100 unit/mL injection FOR USE WITH INSULIN PUMP MAX DAILY DOSE  UNITS    levothyroxine (Synthroid, Levoxyl) 125 mcg tablet TAKE 1 TABLET BY MOUTH IN THE MORNING ON EMPTY STOMACH    lidocaine-prilocaine (Emla) 2.5-2.5 % cream APPLY AT INSERTION SITE 30-45 MINUTES BEFORE YOU ARRIVE FOR BLOOD WORK OR CHEMOTHERAPY    melatonin 3 mg tablet 2 tablets, Nightly    metoprolol succinate XL (TOPROL-XL) 50 mg, Daily    mirtazapine (REMERON) 7.5 mg, Nightly    morphine CR (MS CONTIN) 30 mg, oral, 3 times daily, Do not crush, chew, or split.    naloxone (Narcan) 4 mg/0.1 mL nasal spray Use 1 spray in one nostril as needed for overdose. May repeat every 2 to 3 min in alternating nostrils until medical assistance is available    naratriptan (AMERGE) 2.5 mg, oral, Once as needed    OLANZapine (ZYPREXA) 15 mg, oral, Nightly    OLANZapine zydis (ZYPREXA ZYDIS) 5 mg,  oral, Daily    omeprazole (PRILOSEC) 40 mg, Daily    Omnipod Dash Pods, Gen 4, cartridge 1 each, miscellaneous, Every 72 hours    ondansetron (ZOFRAN) 8 mg, oral, Every 8 hours PRN    ondansetron ODT (ZOFRAN-ODT) 8 mg, oral, Every 8 hours PRN    OXcarbazepine (Trileptal) 150 mg tablet 1 tablet, Every 12 hours scheduled (0630,1830)    oxyCODONE (ROXICODONE) 10 mg, oral, Every 4 hours PRN    pancrelipase, Lip-Prot-Amyl, (Creon) 36,000-114,000- 180,000 unit capsule,delayed release(DR/EC) capsule 2 capsules with each meal TID, 2 capsules with supplement drink BID and 1 capsule with snack    phenazopyridine (Pyridium) 200 mg tablet 1 tablet, 3 times daily    predniSONE (DELTASONE) 50 mg, oral, 2 times daily    prochlorperazine (COMPAZINE) 10 mg, oral, Every 6 hours PRN    promethazine (PHENERGAN) 25 mg, Every 6 hours PRN    sennosides-docusate sodium (Senna-S) 8.6-50 mg tablet 1 tablet, oral, 2 times daily    spironolactone (Aldactone) 25 mg tablet Daily RT    torsemide (DEMADEX) 20 mg, oral, Daily        Last Recorded Vitals:  There were no vitals filed for this visit.    Physical Exam:  Physical Exam  HENT:      Head: Normocephalic.      Nose: Nose normal.      Mouth/Throat:      Mouth: Mucous membranes are moist.   Cardiovascular:      Comments: 2/6 systolic murmur heard at LSB, and apex  Pulmonary:      Effort: Pulmonary effort is normal.   Abdominal:      Palpations: Abdomen is soft.   Musculoskeletal:         General: Normal range of motion.      Cervical back: Normal range of motion.   Skin:     General: Skin is warm and dry.   Neurological:      Mental Status: She is alert.   Psychiatric:         Mood and Affect: Mood normal.        Last Labs:  CBC -  Lab Results   Component Value Date    WBC 3.7 (L) 11/14/2024    HGB 10.1 (L) 11/14/2024    HCT 29.8 (L) 11/14/2024    MCV 89 11/14/2024     (L) 11/14/2024       CMP -  Lab Results   Component Value Date    CALCIUM 9.0 11/14/2024    PHOS 3.4 07/09/2024    PROT  "5.7 (L) 11/14/2024    ALBUMIN 3.0 (L) 11/14/2024    AST 38 11/14/2024    ALT 29 11/14/2024    ALKPHOS 528 (H) 11/14/2024    BILITOT 0.4 11/14/2024       LIPID PANEL -   Lab Results   Component Value Date    CHOL 87 05/31/2024    TRIG 56 05/31/2024    HDL 45.0 05/31/2024    CHHDL 1.9 05/31/2024    LDLF 37 04/04/2023    VLDL 11 05/31/2024    NHDL 42 05/31/2024       RENAL FUNCTION PANEL -   Lab Results   Component Value Date    GLUCOSE 243 (H) 11/14/2024     11/14/2024    K 3.4 (L) 11/14/2024     11/14/2024    CO2 28 11/14/2024    ANIONGAP 9 (L) 11/14/2024    BUN 10 11/14/2024    CREATININE 0.64 11/14/2024    CALCIUM 9.0 11/14/2024    PHOS 3.4 07/09/2024    ALBUMIN 3.0 (L) 11/14/2024        Lab Results   Component Value Date     (H) 07/09/2024    HGBA1C 8.0 (A) 11/13/2024     Assessment/Plan   Chronic combined systolic and diastolic CHF:  Euvolemic on exam today, however c/o \"lung tightness,\" with possibly decreased BS at left base.  -check TTE  -she has a battery of labs ordered already, advised to get labs drawn when fasting - if Cr/K WNL, consider starting torsemide as needed  -BP on lower side today, hold off on carvedilol, consider adding if BP improved next visit    Valvular disease:  S/p aortic and mitral valve replacements 2/20  TTE 7/24: bioprosthetic mitral valve replacement with a mean mitral valve gradient of 8.0 mmHg. Bioprosthetic aortic valve with a mean aortic valve gradient of 10.0 mmHg and DI of 0.47.  -repeat TTE ordered  -serial monitoring    CAD s/p MI in 2018, no prior stents  -asa 81  -atorvastatin 20 mg daily  -check FLP  -metoprolol succinate 50 mg daily    HLD:  FLP 5/2024:   LDL 31  TG 56 -  at goal  -decreased atorvastatin from 40 to 20 mg daily  -reminded Pt to get FLP drawn    HTN:  Today's /60 - well-controlled.  -continue antihypertensives    T1DM:  -follows with endocrinology    H/o cervical CA and lymphoma:  -follows with oncology    Scribe Attestation  By " signing my name below, I, Lorraine Chang, Scribpeña   attest that this documentation has been prepared under the direction and in the presence of Jason Mars MD.         [1]   Family History  Problem Relation Name Age of Onset    Heart disease Mother      Heart disease Sister      Heart disease Brother

## 2025-07-08 ENCOUNTER — OFFICE VISIT (OUTPATIENT)
Dept: CARDIOLOGY | Facility: HOSPITAL | Age: 55
End: 2025-07-08
Payer: COMMERCIAL

## 2025-07-08 VITALS
WEIGHT: 101.8 LBS | HEART RATE: 109 BPM | BODY MASS INDEX: 17.38 KG/M2 | DIASTOLIC BLOOD PRESSURE: 60 MMHG | HEIGHT: 64 IN | SYSTOLIC BLOOD PRESSURE: 110 MMHG

## 2025-07-08 DIAGNOSIS — I25.84 CORONARY ARTERY DISEASE DUE TO CALCIFIED CORONARY LESION: ICD-10-CM

## 2025-07-08 DIAGNOSIS — I25.10 CORONARY ARTERY DISEASE DUE TO CALCIFIED CORONARY LESION: ICD-10-CM

## 2025-07-08 DIAGNOSIS — I50.42 CHRONIC COMBINED SYSTOLIC AND DIASTOLIC CONGESTIVE HEART FAILURE: ICD-10-CM

## 2025-07-08 DIAGNOSIS — I10 ESSENTIAL (PRIMARY) HYPERTENSION: ICD-10-CM

## 2025-07-08 DIAGNOSIS — E78.5 DYSLIPIDEMIA: ICD-10-CM

## 2025-07-08 DIAGNOSIS — I38 HEART VALVE DISEASE: ICD-10-CM

## 2025-07-08 DIAGNOSIS — R07.89 CHEST WALL PAIN: Primary | ICD-10-CM

## 2025-07-08 LAB
ATRIAL RATE: 109 BPM
P AXIS: 83 DEGREES
P OFFSET: 188 MS
P ONSET: 144 MS
PR INTERVAL: 144 MS
Q ONSET: 216 MS
QRS COUNT: 18 BEATS
QRS DURATION: 128 MS
QT INTERVAL: 376 MS
QTC CALCULATION(BAZETT): 506 MS
QTC FREDERICIA: 458 MS
R AXIS: -62 DEGREES
T AXIS: 40 DEGREES
T OFFSET: 404 MS
VENTRICULAR RATE: 109 BPM

## 2025-07-08 PROCEDURE — 93010 ELECTROCARDIOGRAM REPORT: CPT | Performed by: INTERNAL MEDICINE

## 2025-07-08 PROCEDURE — 3078F DIAST BP <80 MM HG: CPT | Performed by: INTERNAL MEDICINE

## 2025-07-08 PROCEDURE — 99213 OFFICE O/P EST LOW 20 MIN: CPT | Mod: 25 | Performed by: INTERNAL MEDICINE

## 2025-07-08 PROCEDURE — 3074F SYST BP LT 130 MM HG: CPT | Performed by: INTERNAL MEDICINE

## 2025-07-08 PROCEDURE — 99215 OFFICE O/P EST HI 40 MIN: CPT | Performed by: INTERNAL MEDICINE

## 2025-07-08 PROCEDURE — 93005 ELECTROCARDIOGRAM TRACING: CPT | Performed by: INTERNAL MEDICINE

## 2025-07-08 PROCEDURE — 3008F BODY MASS INDEX DOCD: CPT | Performed by: INTERNAL MEDICINE

## 2025-07-08 RX ORDER — ATORVASTATIN CALCIUM 20 MG/1
20 TABLET, FILM COATED ORAL DAILY
Qty: 30 TABLET | Refills: 11 | Status: SHIPPED | OUTPATIENT
Start: 2025-07-08 | End: 2026-07-08

## 2025-07-08 NOTE — PATIENT INSTRUCTIONS
Thanks for following up in office today.    1)  Please get your blood work done that was ordered by your other doctors. I am interested in your cholesterol levels and kidney function. You will need to be fasted for this blood work. We can review the results next visit and reassess your medications.    2)  We will schedule you for an echocardiogram to check your heart function and look for structural heart causes of your symptoms.    3)  In the meantime, you can try to use your inhaler to see if this helps your breathing.     4)  Please continue your cardiac medications as prescribed. I refilled your atorvastatin today.      Follow up with Dr Mars after testing  If you have any questions, please call us at [phone]

## 2025-07-09 DIAGNOSIS — Z51.5 PALLIATIVE CARE ENCOUNTER: ICD-10-CM

## 2025-07-09 DIAGNOSIS — G89.3 NEOPLASM RELATED PAIN: ICD-10-CM

## 2025-07-09 RX ORDER — MORPHINE SULFATE 30 MG/1
30 TABLET, FILM COATED, EXTENDED RELEASE ORAL 3 TIMES DAILY
Qty: 90 TABLET | Refills: 0 | Status: SHIPPED | OUTPATIENT
Start: 2025-07-16 | End: 2025-08-15

## 2025-07-09 NOTE — TELEPHONE ENCOUNTER
Refill request received for Morphine CR 30mg.  Preferred pharmacy is [facility].  Message sent to Palliative Care team.

## 2025-07-09 NOTE — TELEPHONE ENCOUNTER
Patient last seen by RALPH Carvajal on 4/25 with plan to change MSER to 30mg TID. No fuv scheduled. I will place an order for this. OARRS reviewed and no aberrancy noted. Patient last filled MSER 30mg #90/30 days on 6/17. Prescription pended to provider to approve.

## 2025-07-16 ENCOUNTER — PHARMACY VISIT (OUTPATIENT)
Dept: PHARMACY | Facility: CLINIC | Age: 55
End: 2025-07-16
Payer: MEDICAID

## 2025-07-16 PROCEDURE — RXMED WILLOW AMBULATORY MEDICATION CHARGE

## 2025-07-21 ENCOUNTER — LAB (OUTPATIENT)
Dept: LAB | Facility: HOSPITAL | Age: 55
End: 2025-07-21
Payer: COMMERCIAL

## 2025-07-21 ENCOUNTER — TELEPHONE (OUTPATIENT)
Dept: CARDIOLOGY | Facility: HOSPITAL | Age: 55
End: 2025-07-21
Payer: COMMERCIAL

## 2025-07-21 ENCOUNTER — APPOINTMENT (OUTPATIENT)
Dept: CARDIOLOGY | Facility: HOSPITAL | Age: 55
End: 2025-07-21
Payer: COMMERCIAL

## 2025-07-21 DIAGNOSIS — C82.41: Primary | ICD-10-CM

## 2025-07-21 DIAGNOSIS — C82.41 GRADE 3B FOLLICULAR LYMPHOMA OF LYMPH NODES OF NECK (MULTI): ICD-10-CM

## 2025-07-21 LAB
ALBUMIN SERPL BCP-MCNC: 3.4 G/DL (ref 3.4–5)
ALP SERPL-CCNC: 624 U/L (ref 33–110)
ALT SERPL W P-5'-P-CCNC: 22 U/L (ref 7–45)
ANION GAP SERPL CALC-SCNC: 13 MMOL/L (ref 10–20)
AST SERPL W P-5'-P-CCNC: 23 U/L (ref 9–39)
BILIRUB SERPL-MCNC: 0.3 MG/DL (ref 0–1.2)
BUN SERPL-MCNC: 12 MG/DL (ref 6–23)
CALCIUM SERPL-MCNC: 9.4 MG/DL (ref 8.6–10.3)
CHLORIDE SERPL-SCNC: 97 MMOL/L (ref 98–107)
CO2 SERPL-SCNC: 26 MMOL/L (ref 21–32)
CREAT SERPL-MCNC: 0.67 MG/DL (ref 0.5–1.05)
EGFRCR SERPLBLD CKD-EPI 2021: >90 ML/MIN/1.73M*2
GLUCOSE SERPL-MCNC: 307 MG/DL (ref 74–99)
POTASSIUM SERPL-SCNC: 4.5 MMOL/L (ref 3.5–5.3)
PROT SERPL-MCNC: 6.3 G/DL (ref 6.4–8.2)
SODIUM SERPL-SCNC: 131 MMOL/L (ref 136–145)

## 2025-07-21 PROCEDURE — 36415 COLL VENOUS BLD VENIPUNCTURE: CPT

## 2025-07-21 PROCEDURE — 80053 COMPREHEN METABOLIC PANEL: CPT

## 2025-07-21 NOTE — TELEPHONE ENCOUNTER
Patient called in with questions and concerns about her ECHO and blood work. Couldn't make ECHO because of transportation.     Got her rescheduled and patient states that she will get hetr blood work that Dr. Lua and Dr. De Jesus ordered. And patient hung up.    Thank you!  Sumanth STYLES

## 2025-07-22 LAB
ALBUMIN SERPL-MCNC: 3.3 G/DL (ref 3.6–5.1)
ALP SERPL-CCNC: 650 U/L (ref 37–153)
ALT SERPL-CCNC: 20 U/L (ref 6–29)
ANION GAP SERPL CALCULATED.4IONS-SCNC: 12 MMOL/L (CALC) (ref 7–17)
AST SERPL-CCNC: 22 U/L (ref 10–35)
BILIRUB SERPL-MCNC: 0.3 MG/DL (ref 0.2–1.2)
BUN SERPL-MCNC: 13 MG/DL (ref 7–25)
CALCIUM SERPL-MCNC: 9.5 MG/DL (ref 8.6–10.4)
CHLORIDE SERPL-SCNC: 97 MMOL/L (ref 98–110)
CHOLEST SERPL-MCNC: 121 MG/DL
CHOLEST/HDLC SERPL: 2.3 (CALC)
CO2 SERPL-SCNC: 22 MMOL/L (ref 20–32)
CREAT SERPL-MCNC: 0.78 MG/DL (ref 0.5–1.03)
EGFRCR SERPLBLD CKD-EPI 2021: 89 ML/MIN/1.73M2
EST. AVERAGE GLUCOSE BLD GHB EST-MCNC: 355 MG/DL
EST. AVERAGE GLUCOSE BLD GHB EST-SCNC: 19.7 MMOL/L
GLUCOSE SERPL-MCNC: 306 MG/DL (ref 65–139)
HBA1C MFR BLD: 14 %
HDLC SERPL-MCNC: 53 MG/DL
LDLC SERPL CALC-MCNC: 54 MG/DL (CALC)
NONHDLC SERPL-MCNC: 68 MG/DL (CALC)
POTASSIUM SERPL-SCNC: 4.4 MMOL/L (ref 3.5–5.3)
PROT SERPL-MCNC: 6.6 G/DL (ref 6.1–8.1)
SODIUM SERPL-SCNC: 131 MMOL/L (ref 135–146)
TRIGL SERPL-MCNC: 63 MG/DL

## 2025-07-24 NOTE — PROGRESS NOTES
SUPPORTIVE AND PALLIATIVE ONCOLOGY FOLLOW UP - OUTPATIENT      SERVICE DATE: 8/1/2025    Referred by:  Dr. 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  She was diagnosed and is planned to get bendamustine and rituxan every 28 days. She had been following with Jonathan Worrell with [facility] palliative care, but has since requested to switch to palliative care closer to where she lives. She was recently hospitalized for CHF exacerbation. Has completed her treatment for her follicular lymphoma. Patient is s/p nerve block on 4/11/25.    Information was collected from chart review, discussion with patient/family, and discussion with care team     SUBJECTIVE:  Patient states that she has been having terrible pain and she has been out of her oxycodone for some time. She states that she has been having terrible abdominal pain. She is requesting to go back on both her morphine to the 120mg a day and the oxycodone. Discussed that we are not going to increase the morphine but can go up on the oxycodone to the 15mg.     She states that she has been having difficulty with her bowels and is now having constipation. We discussed starting senna 1 tab BID and that this can be adjusted for her bowels     Pain Assessment:  Pain Score:   7  Location: Back  Education:      Symptom Assessment:  ROS otherwise negative, pertinent positives documented in the HPI     Information obtained from: chart review and interview of patient  ______________________________________________________________________     Oncology History   Follicular lymphoma grade IIIb    6/7/2022 Initial Diagnosis    Follicular lymphoma grade IIIb (CMS/HCC)     5/2/2024 -  Chemotherapy    Bendamustine + RiTUXimab, 28 Day Cycles       Past Medical History:   Diagnosis Date    Non-Hodgkin lymphoma     Smoker     Vulvar cancer (Multi)      Past Surgical History:   Procedure Laterality Date    HYSTERECTOMY      OTHER SURGICAL HISTORY  04/14/2020    Cholecystectomy    OTHER SURGICAL HISTORY  04/14/2020    Exploratory laparoscopy    OTHER SURGICAL HISTORY  04/14/2020    Heart surgery     Family History   Problem Relation Name Age of Onset    Heart disease Mother      Heart disease Sister      Heart disease Brother        SOCIAL HISTORY  Children 4 children and 5 grandchildren    Social History:  reports that she has been smoking cigarettes. She has been exposed to tobacco smoke. She has never used smokeless tobacco. She reports that she does not currently use alcohol. She reports that she does not use drugs.    She has food insecurities and has bed bugs   REVIEW OF SYSTEMS  Review of systems negative unless noted in HPI.     Objective     Palliative Performance Scale % (PPS) 90     Labs:  Results for orders placed or performed during the hospital encounter of 07/31/25 (from the past 96 hours)   Transthoracic echo (TTE) complete   Result Value Ref Range    LVOT diam 1.98 cm    LV Biplane EF 71 %    Tricuspid annular plane systolic excursion 1.3 cm    AV mn grad 5 mmHg    LA vol index A/L 29.8 ml/m2    AV pk palmira 1.54 m/s    LV EF 71 %    RV free wall pk S' 5.81 cm/s    RVSP 28 mmHg    LVIDd 3.39 cm    Aortic Valve Area by Continuity of VTI 1.68 cm2    Aortic Valve Area by Continuity of Peak Velocity 1.98 cm2    AV pk grad 10 mmHg    LV A4C EF 74.8      *Note: Due to a large number of results and/or encounters for the requested time period, some results have not been displayed. A complete set of results can be found in Results Review.       Medications:   Current Outpatient Medications   Medication  Instructions    albuterol 90 mcg/actuation inhaler 2 puffs, Every 6 hours    aspirin 81 mg, Daily    atorvastatin (LIPITOR) 20 mg, oral, Daily    BD Alcohol Swabs pads, medicated USE SIX TIMES DAILY    bisacodyl (DULCOLAX (BISACODYL)) 5 mg, Daily PRN    blood-glucose sensor (FreeStyle Ashley 3 Sensor) device 1 each, miscellaneous, Continuous, Use to check glucose, change every 15 days *FreeStyle Ashley 3 plus    carvedilol (COREG) 12.5 mg, Every 12 hours    cetirizine (ZYRTEC) 10 mg, Daily    clobetasol (Temovate) 0.05 % ointment Topical, 2 times daily, Apply to vulva as needed 2-3 times a week.    cyclobenzaprine (FLEXERIL) 10 mg, 3 times daily PRN    diphenhydrAMINE (BENADRYL ALLERGY) 50 mg, oral, Nightly PRN    DULoxetine (CYMBALTA) 60 mg, Daily    DULoxetine (CYMBALTA) 20 mg, Daily    esomeprazole (NEXIUM) 40 mg, Daily PRN    estradiol (ESTRACE) 2 mg, Daily    estradiol (ESTRACE) 2 g, vaginal, Nightly, At bedtime for 2 weeks, then at bedtime twice a week.    FreeStyle Ashley 3 [place] misc Use as instructed    hydrocortisone 0.5 % cream Topical, 2 times daily    insulin aspart (NovoLOG) 100 unit/mL injection FOR USE WITH INSULIN PUMP MAX DAILY DOSE  UNITS    levothyroxine (Synthroid, Levoxyl) 125 mcg tablet TAKE 1 TABLET BY MOUTH IN THE MORNING ON EMPTY STOMACH    lidocaine-prilocaine (Emla) 2.5-2.5 % cream APPLY AT INSERTION SITE 30-45 MINUTES BEFORE YOU ARRIVE FOR BLOOD WORK OR CHEMOTHERAPY    melatonin 3 mg tablet 2 tablets, Nightly    metoprolol succinate XL (TOPROL-XL) 50 mg, Daily    mirtazapine (REMERON) 7.5 mg, Nightly    [START ON 8/15/2025] morphine CR (MS CONTIN) 30 mg, oral, 3 times daily, Do not crush, chew, or split.    naloxone (Narcan) 4 mg/0.1 mL nasal spray Use 1 spray in one nostril as needed for overdose. May repeat every 2 to 3 min in alternating nostrils until medical assistance is available    naratriptan (AMERGE) 2.5 mg, oral, Once as needed    OLANZapine (ZYPREXA) 15 mg, oral, Nightly     OLANZapine zydis (ZYPREXA ZYDIS) 5 mg, oral, Daily    omeprazole (PRILOSEC) 40 mg, Daily    Omnipod Dash Pods, Gen 4, cartridge 1 each, miscellaneous, Every 72 hours    ondansetron (ZOFRAN) 8 mg, oral, Every 8 hours PRN    ondansetron ODT (ZOFRAN-ODT) 8 mg, oral, Every 8 hours PRN    OXcarbazepine (Trileptal) 150 mg tablet 1 tablet, Every 12 hours scheduled (0630,1830)    oxyCODONE (ROXICODONE) 10 mg, oral, Every 4 hours PRN    oxyCODONE (ROXICODONE) 15 mg, oral, Every 4 hours PRN    pancrelipase, Lip-Prot-Amyl, (Creon) 36,000-114,000- 180,000 unit capsule,delayed release(DR/EC) capsule 2 capsules with each meal TID, 2 capsules with supplement drink BID and 1 capsule with snack    phenazopyridine (Pyridium) 200 mg tablet 1 tablet, 3 times daily    predniSONE (DELTASONE) 50 mg, oral, 2 times daily    prochlorperazine (COMPAZINE) 10 mg, oral, Every 6 hours PRN    promethazine (PHENERGAN) 25 mg, Every 6 hours PRN    sennosides-docusate sodium (Senna-S) 8.6-50 mg tablet 1 tablet, oral, 2 times daily    sennosides-docusate sodium (Senna-S) 8.6-50 mg tablet 1 tablet, oral, 2 times daily    spironolactone (Aldactone) 25 mg tablet Daily RT    torsemide (DEMADEX) 20 mg, oral, Daily     Allergies:   Allergies   Allergen Reactions    Acetaminophen-Codeine Unknown    Adhesive Unknown    Codeine Unknown    Fentanyl Unknown     Reaction from Community: Other(See Desc) Comments: MIGRAINES IF USED FOR PAIN, BUT OK FOR ANESTHESIA    Other reaction(s): Intolerance    Fetrin Unknown    Ketorolac Itching    Meperidine Unknown     Reaction from Community: Other(See Desc)    Metformin Unknown    Propoxyphene N-Acetaminophen GI Upset    Silver Hives and Other     Tegaderm    Adhesive Tape-Silicones Rash    Iodinated Contrast Media Rash     Does well with 13hour premedication    Latex Rash    Penicillins Rash    Wound Dressings Rash     tagaderm     Pain Management Panel  More data exists         Latest Ref Rng & Units 4/25/2025  3/14/2025   Pain Management Panel   Amphetamine Screen, Urine Presumptive Negative Presumptive Negative  Presumptive Negative    Barbiturate Screen, Urine Presumptive Negative Presumptive Negative  Presumptive Negative    Benzodiazepines Screen, Urine Presumptive Negative Presumptive Negative  Presumptive Negative    Codeine <50 ng/mL <50  <50    Fentanyl Screen, Urine Presumptive Negative Presumptive Negative  Presumptive Negative    Hydromorphone Urine <25 ng/mL 69  285    Methadone Screen, Urine Presumptive Negative Presumptive Negative  Presumptive Negative    Morphine  <50 ng/mL >2,500  >2,500      PHYSICAL EXAMINATION  Vital Signs:   Vital signs reviewed  Vitals:    08/01/25 1529   BP: 111/64   Pulse: 93   Resp: 16   Temp: 36.4 °C (97.5 °F)   SpO2: 98%       Pain Score:   7      Physical Exam  Vitals reviewed.   Constitutional:       General: She is awake.      Appearance: Normal appearance. She is normal weight.   HENT:      Head: Normocephalic and atraumatic.      Mouth/Throat:      Mouth: Mucous membranes are moist.     Eyes:      Extraocular Movements: Extraocular movements intact.       Cardiovascular:      Rate and Rhythm: Normal rate and regular rhythm.      Pulses: Normal pulses.      Heart sounds: Normal heart sounds.      Comments: No signs of cardiac distress.  Pulmonary:      Effort: Pulmonary effort is normal.      Comments: No signs of respiratory distress.  Abdominal:      General: Abdomen is flat.      Palpations: Abdomen is soft.     Musculoskeletal:         General: Normal range of motion.      Cervical back: Normal range of motion.      Right lower leg: No edema.      Left lower leg: No edema.     Skin:     General: Skin is warm and dry.     Neurological:      General: No focal deficit present.      Mental Status: She is alert and oriented to person, place, and time. Mental status is at baseline.      Sensory: Sensation is intact.      Motor: Motor function is intact.      Coordination:  Coordination is intact.      Gait: Gait is intact.     Psychiatric:         Attention and Perception: Attention normal.         Mood and Affect: Mood normal.         Speech: Speech normal.         Behavior: Behavior normal. Behavior is cooperative.         Thought Content: Thought content normal.         Judgment: Judgment normal.       ASSESSMENT/PLAN    Pain  Pain is: cancer related pain and chronic  Type: visceral  Pain control: sub-optimally controlled  Previously tried/intolerances: Did not find oxycodone effective, avoid Tylenol due to liver impairment, did not feel dilaudid controlled her pain very well    Pain Plan:  Continue Cymbalta 80mg daily   Continue Flexeril 10mg TID PRN   Continue Lidocaine rectally   Change MS Contin to 30mg TID.   Weaning  Increase Oxycodone 15mg Q4 PRN   Will decrease at next visit   Appreciate input from pain management, and is s/p nerve block on 4/11    Opioid Use  Medication Management:   OARRS report reviewed with no aberrant behavior; consistent with  prescriptions/records and patient history  .  Overdose Risk Score 330.   This has been discussed with patient.   We will continue to closely monitor the patient for signs of prescription misuse including UDS, OARRS review and subjective reports at each visit.  No concurrent benzodiazepine use   I am a provider who is certified in Hospice and Palliative Medicine and have conducted a face-face visit and examination for this patient.  Routine Urine Drug Screen is needed and was completed on 4/25/25 appropriately positive for opioids and negative for illicit substances  Controlled Substance Agreement: is needed. Completed on: 8/1/2025  Specifically discussed that controlled substance prescriptions will only be provided by our group as outlined in the completed agreement  Naloxone is needed  Red Flags: Called in early for a refill as she was out 2 weeks early on 11/15    Nausea   Intermittent nausea without vomiting related  to chemotherapy  Nausea Plan:  Continue Zyprexa to 15mg at bedtime  Compazine 10mg PRN   Continue Zofran to ODT 8mg Q8 PRN    Opioid induced constipation   Start Senna S BID     Altered Mood  Chronic anxiety and depression related to health concerns   Mood Plan:  Continue Remeron 7.5mg nightly   Continue Cymbalta 60mg daily     Decreased appetite  Related to malignancy and chemotherapy  Anorexia Plan:   Continue Zyprexa to 15mg at bedtime  Zyprexa 5mg PO Daily in morning    Itching related to bedbug infestation   Continue Atarax 25mg TID PRN    Lower extremity edema: improved   Renewed her prescription for torsemide and instructed to follow up with her cardiologist     Food insecurities   Refer to food for life     Advance Directives  Existence of Advance Directives: Reports that she has completed them   Decision maker: OSMEL is reportedly her sister   Code Status: Full code    Next Follow-Up Visit:  Return to clinic in 6 weeks     Signature and billing  Thank you for allowing us to participate in the care of this patient. Recommendations will be communicated back to the consulting service by way of shared electronic medical record or face-to-face.    Medical complexity was high level due to due to complexity of problems, extensive data review, and high risk of management/treatment.  Time was spent on the following: Prep Time, Time Directly with Patient/Family/Caregiver, Documentation Time. Total time spent: 45    DATA   Diagnostic tests and information reviewed for today's visit:  Most recent labs, Most recent imaging, Medications       SIGNATURE: RALPH Eugene-CNS    Contact information:  [facility]ive [facility] Palliative Oncology  Monday-Friday 8 AM-5 PM  Phone:  [phone], press option #5, then option #1.   Or [employer] Secure Chat     I was present with the RALPH student who participated in the documentation of this note. I have personally seen and re-examined the patient and performed the medical decision-making  components (assessment and plan of care). I have reviewed the APRN student documentation and verified the findings in the note as written with additions or exceptions as stated in the body of this note.    Ian Carvajal APRN-CNS ACHPN AOCNS  Supportive Oncology

## 2025-07-26 ENCOUNTER — RESULTS FOLLOW-UP (OUTPATIENT)
Dept: ENDOCRINOLOGY | Facility: CLINIC | Age: 55
End: 2025-07-26
Payer: COMMERCIAL

## 2025-07-26 DIAGNOSIS — E10.9 TYPE 1 DIABETES MELLITUS WITHOUT COMPLICATION: Primary | ICD-10-CM

## 2025-07-27 NOTE — RESULT ENCOUNTER NOTE
Labs have been reviewed.  The glucose value was 306mg/dL.  The kidney function is normal.  The A1C has worsened to 14%.  The cholesterol values are normal.  I will put in a referral for clinical pharmacy as a bridge to our next scheduled appointment.

## 2025-07-28 NOTE — TELEPHONE ENCOUNTER
Patient has been verbally notified of lab results on 7/28/2025, and scheduled with [facility] pharmacist.

## 2025-07-28 NOTE — TELEPHONE ENCOUNTER
----- Message from Colleen De Jesus sent at 7/26/2025  9:59 PM EDT -----  Labs have been reviewed.  The glucose value was 306mg/dL.  The kidney function is normal.  The A1C has worsened to 14%.  The cholesterol values are normal.  I will put in a referral for clinical   pharmacy as a bridge to our next scheduled appointment.    ----- Message -----  From: [employer] Results In  Sent: 7/22/2025   5:41 AM EDT  To: Colleen De Jesus MD

## 2025-07-31 ENCOUNTER — HOSPITAL ENCOUNTER (OUTPATIENT)
Dept: CARDIOLOGY | Facility: HOSPITAL | Age: 55
Discharge: HOME | End: 2025-07-31
Payer: COMMERCIAL

## 2025-07-31 DIAGNOSIS — I25.10 CORONARY ARTERY DISEASE DUE TO CALCIFIED CORONARY LESION: ICD-10-CM

## 2025-07-31 DIAGNOSIS — I10 ESSENTIAL (PRIMARY) HYPERTENSION: ICD-10-CM

## 2025-07-31 DIAGNOSIS — Z95.2 PRESENCE OF PROSTHETIC HEART VALVE: ICD-10-CM

## 2025-07-31 DIAGNOSIS — E78.5 DYSLIPIDEMIA: ICD-10-CM

## 2025-07-31 DIAGNOSIS — I50.42 CHRONIC COMBINED SYSTOLIC AND DIASTOLIC CONGESTIVE HEART FAILURE: ICD-10-CM

## 2025-07-31 DIAGNOSIS — I25.84 CORONARY ARTERY DISEASE DUE TO CALCIFIED CORONARY LESION: ICD-10-CM

## 2025-07-31 LAB
AORTIC VALVE MEAN GRADIENT: 5 MMHG
AORTIC VALVE PEAK VELOCITY: 1.54 M/S
AV PEAK GRADIENT: 10 MMHG
AVA (PEAK VEL): 1.98 CM2
AVA (VTI): 1.68 CM2
EJECTION FRACTION APICAL 4 CHAMBER: 74.8
EJECTION FRACTION: 71 %
LEFT ATRIUM VOLUME AREA LENGTH INDEX BSA: 29.8 ML/M2
LEFT VENTRICLE INTERNAL DIMENSION DIASTOLE: 3.39 CM (ref 3.5–6)
LEFT VENTRICULAR OUTFLOW TRACT DIAMETER: 1.98 CM
LV EJECTION FRACTION BIPLANE: 71 %
RIGHT VENTRICLE FREE WALL PEAK S': 5.81 CM/S
RIGHT VENTRICLE PEAK SYSTOLIC PRESSURE: 28 MMHG
TRICUSPID ANNULAR PLANE SYSTOLIC EXCURSION: 1.3 CM

## 2025-07-31 PROCEDURE — 93306 TTE W/DOPPLER COMPLETE: CPT

## 2025-07-31 PROCEDURE — 93306 TTE W/DOPPLER COMPLETE: CPT | Performed by: INTERNAL MEDICINE

## 2025-08-01 ENCOUNTER — OFFICE VISIT (OUTPATIENT)
Dept: PALLIATIVE MEDICINE | Facility: CLINIC | Age: 55
End: 2025-08-01
Payer: COMMERCIAL

## 2025-08-01 ENCOUNTER — SOCIAL WORK (OUTPATIENT)
Dept: HEMATOLOGY/ONCOLOGY | Facility: CLINIC | Age: 55
End: 2025-08-01

## 2025-08-01 ENCOUNTER — PHARMACY VISIT (OUTPATIENT)
Dept: PHARMACY | Facility: CLINIC | Age: 55
End: 2025-08-01
Payer: MEDICAID

## 2025-08-01 VITALS
SYSTOLIC BLOOD PRESSURE: 111 MMHG | RESPIRATION RATE: 16 BRPM | TEMPERATURE: 97.5 F | HEART RATE: 93 BPM | OXYGEN SATURATION: 98 % | WEIGHT: 102.95 LBS | BODY MASS INDEX: 17.67 KG/M2 | DIASTOLIC BLOOD PRESSURE: 64 MMHG

## 2025-08-01 DIAGNOSIS — K59.03 OPIOID-INDUCED CONSTIPATION: ICD-10-CM

## 2025-08-01 DIAGNOSIS — T40.2X5A OPIOID-INDUCED CONSTIPATION: ICD-10-CM

## 2025-08-01 DIAGNOSIS — Z51.81 THERAPEUTIC DRUG MONITORING: ICD-10-CM

## 2025-08-01 DIAGNOSIS — Z51.5 PALLIATIVE CARE ENCOUNTER: ICD-10-CM

## 2025-08-01 DIAGNOSIS — G89.3 NEOPLASM RELATED PAIN: Primary | ICD-10-CM

## 2025-08-01 PROCEDURE — 3078F DIAST BP <80 MM HG: CPT | Performed by: CLINICAL NURSE SPECIALIST

## 2025-08-01 PROCEDURE — 99215 OFFICE O/P EST HI 40 MIN: CPT | Performed by: CLINICAL NURSE SPECIALIST

## 2025-08-01 PROCEDURE — RXMED WILLOW AMBULATORY MEDICATION CHARGE

## 2025-08-01 PROCEDURE — 80307 DRUG TEST PRSMV CHEM ANLYZR: CPT | Performed by: CLINICAL NURSE SPECIALIST

## 2025-08-01 PROCEDURE — 3074F SYST BP LT 130 MM HG: CPT | Performed by: CLINICAL NURSE SPECIALIST

## 2025-08-01 RX ORDER — OXYCODONE HYDROCHLORIDE 15 MG/1
15 TABLET ORAL EVERY 4 HOURS PRN
Qty: 180 TABLET | Refills: 0 | Status: SHIPPED | OUTPATIENT
Start: 2025-08-01 | End: 2025-08-31

## 2025-08-01 RX ORDER — AMOXICILLIN 250 MG
1 CAPSULE ORAL 2 TIMES DAILY
Qty: 60 TABLET | Refills: 6 | Status: SHIPPED | OUTPATIENT
Start: 2025-08-01

## 2025-08-01 RX ORDER — MORPHINE SULFATE 30 MG/1
30 TABLET, FILM COATED, EXTENDED RELEASE ORAL 3 TIMES DAILY
Qty: 90 TABLET | Refills: 0 | Status: SHIPPED | OUTPATIENT
Start: 2025-08-15 | End: 2025-09-14

## 2025-08-01 ASSESSMENT — PAIN SCALES - GENERAL: PAINLEVEL_OUTOF10: 7

## 2025-08-01 NOTE — PROGRESS NOTES
Patient had her follow up with Palliative Care and requested to speak with  (SW).   (SW) met with patient today to assess needs and offer support.  Patient was A&Ox3 with appropriate and congruent mood and affect. SW asked how she was doing.  Patient stated that she was doing really good.  Patient stated that she did not need anything and wanted to show SW her new shoes.  Patient reported no SW needs.  SW will continue to follow patient.      Louis Velazquez MSW, LSW

## 2025-08-04 ENCOUNTER — APPOINTMENT (OUTPATIENT)
Dept: OBSTETRICS AND GYNECOLOGY | Facility: CLINIC | Age: 55
End: 2025-08-04
Payer: COMMERCIAL

## 2025-08-05 LAB
6MAM UR CFM-MCNC: <25 NG/ML
CODEINE UR CFM-MCNC: <50 NG/ML
HYDROCODONE CTO UR CFM-MCNC: <25 NG/ML
HYDROMORPHONE UR CFM-MCNC: 172 NG/ML
MORPHINE UR CFM-MCNC: >2500 NG/ML
NORHYDROCODONE UR CFM-MCNC: <25 NG/ML
NOROXYCODONE UR CFM-MCNC: >1000 NG/ML
OXYCODONE UR CFM-MCNC: 1458 NG/ML
OXYMORPHONE UR CFM-MCNC: 371 NG/ML

## 2025-08-08 ENCOUNTER — APPOINTMENT (OUTPATIENT)
Dept: ENDOCRINOLOGY | Facility: CLINIC | Age: 55
End: 2025-08-08
Payer: COMMERCIAL

## 2025-08-08 DIAGNOSIS — E10.9 TYPE 1 DIABETES MELLITUS WITHOUT COMPLICATION: Primary | ICD-10-CM

## 2025-08-08 PROCEDURE — 99211 OFF/OP EST MAY X REQ PHY/QHP: CPT

## 2025-08-08 PROCEDURE — RXMED WILLOW AMBULATORY MEDICATION CHARGE

## 2025-08-08 RX ORDER — BLOOD-GLUCOSE SENSOR
EACH MISCELLANEOUS
Qty: 3 EACH | Refills: 11 | Status: SHIPPED | OUTPATIENT
Start: 2025-08-08

## 2025-08-08 RX ORDER — INSULIN PMP CART,AUT,G6/7,CNTR
1 EACH SUBCUTANEOUS
Qty: 1 EACH | Refills: 0 | Status: SHIPPED | OUTPATIENT
Start: 2025-08-08 | End: 2025-09-07

## 2025-08-08 RX ORDER — INSULIN PMP CART,AUT,G6/7,CNTR
1 EACH SUBCUTANEOUS
Qty: 10 EACH | Refills: 11 | Status: SHIPPED | OUTPATIENT
Start: 2025-08-08

## 2025-08-08 NOTE — PROGRESS NOTES
Patient is sent at the request of Albin Hall* for my opinion regarding diabetes.  My recommendations below will be communicated back to the requesting provider by way of shared medical record.    Recommendations:   Upgrade to Omnipod 5 with Dexcom G7   from  [place] and bring back next week for set up  Begin entering bolus doses before each meal  Demonstrated how to use smart bolus calculator  Changed basal rate from 0.35 unit/hr to 0.45 unit/hr  ________________________________________________________________________    Subjective   Past Medical History:  Problem List[1]      HPI:  Nelsy Osullivan is a 55 y.o. female with a PMH significant for T1DM, CAD, calorie-deficient malnutrition  Pt presents today for new patient visit with endo PharmD for Type 1 Diabetes Mellitus  Last seen by Dr. Yoanna De Jesus MD on 4/7/25 where patient was instructed to continue her Omnipod DASH  Labs in July showed A1c worsened to 14%. Referred to clinical pharmacy to bridge to next endo appointment    Today:   Has been using Omnipod DASH for as long as she can remember  Never enters carbs or corrections into her pump  Using FSL3+ but has not had one on for weeks  Was told she needs to eat more, but every time she eats, sugar spikes      Diabetes Pharmacotherapy:    Omnipod DASH with Novolog U100    Adherence:     Previously trialed meds:       Social:  Current diet: not asked  Breakfast -   Lunch -   Dinner -   Snack -   Fluids -   Current exercise: not asked      Allergies:  Acetaminophen-codeine, Adhesive, Codeine, Fentanyl, Fetrin, Ketorolac, Meperidine, Metformin, Propoxyphene n-acetaminophen, Silver, Adhesive tape-silicones, Iodinated contrast media, Latex, Penicillins, and Wound dressings    Medication list:  Current Outpatient Medications   Medication Instructions    albuterol 90 mcg/actuation inhaler 2 puffs, Every 6 hours    aspirin 81 mg, Daily    atorvastatin (LIPITOR) 20 mg, oral, Daily    BD  Alcohol Swabs pads, medicated USE SIX TIMES DAILY    bisacodyl (DULCOLAX (BISACODYL)) 5 mg, Daily PRN    blood-glucose sensor (FreeStyle Ashley 3 Sensor) device 1 each, miscellaneous, Continuous, Use to check glucose, change every 15 days *FreeStyle Ashley 3 plus    carvedilol (COREG) 12.5 mg, Every 12 hours    cetirizine (ZYRTEC) 10 mg, Daily    clobetasol (Temovate) 0.05 % ointment Topical, 2 times daily, Apply to vulva as needed 2-3 times a week.    cyclobenzaprine (FLEXERIL) 10 mg, 3 times daily PRN    Dexcom G7 Sensor device Place sensor every 10 days to monitor blood sugar. To use with Omnipod 5.    diphenhydrAMINE (BENADRYL ALLERGY) 50 mg, oral, Nightly PRN    DULoxetine (CYMBALTA) 60 mg, Daily    DULoxetine (CYMBALTA) 20 mg, Daily    esomeprazole (NEXIUM) 40 mg, Daily PRN    estradiol (ESTRACE) 2 mg, Daily    estradiol (ESTRACE) 2 g, vaginal, Nightly, At bedtime for 2 weeks, then at bedtime twice a week.    FreeStyle Ashley 3 [place] misc Use as instructed    hydrocortisone 0.5 % cream Topical, 2 times daily    insulin aspart (NovoLOG) 100 unit/mL injection FOR USE WITH INSULIN PUMP MAX DAILY DOSE  UNITS    insulin  cart,aut,G6/7,cntr (Omnipod 5 G6-G7 Intro Kt,Gen5,) cartridge 1 each, subcutaneous, Every 3 days    insulin pump cart,auto,BT,G6/7 (Omnipod 5 G6-G7 Pods, Gen 5,) cartridge Use 1 every 3 days.    levothyroxine (Synthroid, Levoxyl) 125 mcg tablet TAKE 1 TABLET BY MOUTH IN THE MORNING ON EMPTY STOMACH    lidocaine-prilocaine (Emla) 2.5-2.5 % cream APPLY AT INSERTION SITE 30-45 MINUTES BEFORE YOU ARRIVE FOR BLOOD WORK OR CHEMOTHERAPY    melatonin 3 mg tablet 2 tablets, Nightly    metoprolol succinate XL (TOPROL-XL) 50 mg, Daily    mirtazapine (REMERON) 7.5 mg, Nightly    [START ON 8/15/2025] morphine CR (MS CONTIN) 30 mg, oral, 3 times daily, Do not crush, chew, or split.    naloxone (Narcan) 4 mg/0.1 mL nasal spray Use 1 spray in one nostril as needed for overdose. May repeat every 2 to 3 min  in alternating nostrils until medical assistance is available    naratriptan (AMERGE) 2.5 mg, oral, Once as needed    OLANZapine (ZYPREXA) 15 mg, oral, Nightly    OLANZapine zydis (ZYPREXA ZYDIS) 5 mg, oral, Daily    omeprazole (PRILOSEC) 40 mg, Daily    Omnipod Dash Pods, Gen 4, cartridge 1 each, miscellaneous, Every 72 hours    ondansetron (ZOFRAN) 8 mg, oral, Every 8 hours PRN    ondansetron ODT (ZOFRAN-ODT) 8 mg, oral, Every 8 hours PRN    OXcarbazepine (Trileptal) 150 mg tablet 1 tablet, Every 12 hours scheduled (0630,1830)    oxyCODONE (ROXICODONE) 15 mg, oral, Every 4 hours PRN    pancrelipase, Lip-Prot-Amyl, (Creon) 36,000-114,000- 180,000 unit capsule,delayed release(DR/EC) capsule 2 capsules with each meal TID, 2 capsules with supplement drink BID and 1 capsule with snack    phenazopyridine (Pyridium) 200 mg tablet 1 tablet, 3 times daily    predniSONE (DELTASONE) 50 mg, oral, 2 times daily    prochlorperazine (COMPAZINE) 10 mg, oral, Every 6 hours PRN    promethazine (PHENERGAN) 25 mg, Every 6 hours PRN    sennosides-docusate sodium (Senna-S) 8.6-50 mg tablet 1 tablet, oral, 2 times daily    sennosides-docusate sodium (Senna-S) 8.6-50 mg tablet 1 tablet, oral, 2 times daily    spironolactone (Aldactone) 25 mg tablet Daily RT    torsemide (DEMADEX) 20 mg, oral, Daily        Objective   Last Recorded Vitals:  BP Readings from Last 3 Encounters:   08/01/25 111/64   07/08/25 110/60   04/25/25 105/68     Wt Readings from Last 3 Encounters:   08/01/25 46.7 kg (102 lb 15.3 oz)   07/08/25 46.2 kg (101 lb 12.8 oz)   04/25/25 46 kg (101 lb 6.6 oz)     BMI Readings from Last 1 Encounters:   08/01/25 17.67 kg/m²      Labs  A1C  Lab Results   Component Value Date    HGBA1C 14.0 (H) 07/21/2025    HGBA1C 8.0 (A) 11/13/2024    HGBA1C 6.7 (A) 08/13/2024     BMP/LFTs  Lab Results   Component Value Date    CREATININE 0.67 07/21/2025    CREATININE 0.78 07/21/2025    CREATININE 0.64 11/14/2024    EGFR >90 07/21/2025    EGFR  "89 07/21/2025    EGFR >90 11/14/2024    GLUCOSE 307 (H) 07/21/2025     (L) 07/21/2025    K 4.5 07/21/2025    CL 97 (L) 07/21/2025    CALCIUM 9.4 07/21/2025    CO2 26 07/21/2025    BUN 12 07/21/2025    ALT 22 07/21/2025    AST 23 07/21/2025    ALKPHOS 624 (H) 07/21/2025    BILITOT 0.3 07/21/2025     Lipids  Lab Results   Component Value Date    TRIG 63 07/21/2025    CHOL 121 07/21/2025    LDLF 37 04/04/2023    LDLCALC 54 07/21/2025    HDL 53 07/21/2025     Urine Albumin Creatinine Ratio  Lab Results   Component Value Date    MICROALBCREA 50.4 (H) 05/31/2024    MICROALBCREA SEE COMMENT 04/04/2023    MICROALBCREA SEE COMMENT 08/05/2022     ASCVD risk  The ASCVD Risk score (Renée ELLER, et al., 2019) failed to calculate for the following reasons:    Risk score cannot be calculated because patient has a medical history suggesting prior/existing ASCVD    Additional labs:  No results found for: \"FRUCTOSAMINE\", \"CPEPTIDE\", \"QJZ30TF\", \"NTIB\", \"ZNT8A\", \"INSAB\"    Home glucose monitoring:  Hypoglycemia: denies readings <70 mg/dL or s/sx of hypoglycemia                                  Assessment/Plan   Type 1 Diabetes Mellitus  Goal A1C <7%  Uncontrolled T1DM on Omnipod DASH pump. Patient is not currently giving herself bolus doses prior to meals. Increased basal rate and re-educated about entering carbs into pump before each meal. Patient would benefit from auto-adjusting basal rate of Omnipod 5- will order today and train in 1 week.  Plan:  Start Omnipod 5 system  Home glucose monitoring:   Transition to Dexcom G7 for Omnipod 5 compatibility    Able to download estefani today  A minimum of 72 hours of CGM data was reviewed and used to make therapy changes.   Education Provided to Patient:   Importance of bolus doses in T1DM  Differences between Omnipod 5 and Omnipod DASH   Secondary prevention:   Therapy: Moderate intensity statin   LDL result meets goal (7/2025)  Renal:  CKD: stage 1 - normal function  ACR:  " (2024)  Renal protective agents: none  DM medications are dosed appropriately for renal function  Labs: up to date   PharmD follow-up: 1 week  Endo follow-up: 8/22/25    Patient agreeable to plan as above, contact information provided for any future questions or concerns.    Elo Roman PharmD    Type of encounter: in person  Provider on site: Dr. Yoanna De Jesus MD    Continue all meds under the continuation of care with the referring provider and clinical pharmacy team.           [1]   Patient Active Problem List  Diagnosis    Type 1 diabetes mellitus without complication    Insulin pump titration    Abdominal pain    Pelvic pain    Abnormal liver enzymes    Abnormal vaginal Pap smear    Hypothyroidism    Acute post-operative pain    Chronic combined systolic and diastolic congestive heart failure    Chronic neck pain    Chronic constipation    CMC arthritis, thumb, degenerative    COPD (chronic obstructive pulmonary disease) (Multi)    Coronary artery disease due to calcified coronary lesion    Diabetes mellitus associated with pancreatic disease    Dyslipidemia    Dysuria    Encounter for chronic pain management    Hematuria    History of one induced termination of pregnancy    Intractable chronic migraine without aura and without status migrainosus    Junctional rhythm    Lumbar pain    Major depressive disorder    Migraines    Aortic regurgitation    Mitral regurgitation    Tricuspid insufficiency    Pericardial effusion (HHS-HCC)    Pleural effusion    Pneumobilia    S/P left pulmonary artery pressure sensor implant placement    Secondary pancreatic insufficiency (HHS-HCC)    Severe protein-calorie malnutrition (Multi)    Shortness of breath    Suprapubic pain    Trichomoniasis    Upper GI bleeding    UTI (urinary tract infection)    Vaginal bleeding    Valvular cardiomyopathy (Multi)    Vulvar dysplasia    Vulvar pain    Age-related physical debility    Cachexia (Multi)    Chest wall pain     Colitis due to Clostridioides difficile    Contact with and (suspected) exposure to covid-19    Cough, unspecified    Disease due to severe acute respiratory syndrome coronavirus 2 (SARS-CoV-2)    Dizziness    Essential (primary) hypertension    Heart valve disease    Hypomagnesemia    Localized enlarged lymph nodes    Nonspecific colitis    Personal history of COVID-19    Pneumonia    Syncope    History of lymphoma    Non-Hodgkin's lymphoma    Supraventricular tachycardia, unspecified    Follicular lymphoma grade IIIb    Laceration of lower extremity    Poor venous access    Hypoglycemia associated with diabetes    Chronic pancreatitis (Multi)

## 2025-08-12 ENCOUNTER — PHARMACY VISIT (OUTPATIENT)
Dept: PHARMACY | Facility: CLINIC | Age: 55
End: 2025-08-12
Payer: MEDICAID

## 2025-08-15 ENCOUNTER — APPOINTMENT (OUTPATIENT)
Dept: ENDOCRINOLOGY | Facility: CLINIC | Age: 55
End: 2025-08-15
Payer: COMMERCIAL

## 2025-08-15 ENCOUNTER — PHARMACY VISIT (OUTPATIENT)
Dept: PHARMACY | Facility: CLINIC | Age: 55
End: 2025-08-15
Payer: MEDICAID

## 2025-08-15 ENCOUNTER — CLINICAL SUPPORT (OUTPATIENT)
Dept: ENDOCRINOLOGY | Facility: CLINIC | Age: 55
End: 2025-08-15
Payer: COMMERCIAL

## 2025-08-15 DIAGNOSIS — E10.9 TYPE 1 DIABETES MELLITUS WITHOUT COMPLICATION: ICD-10-CM

## 2025-08-15 PROCEDURE — RXMED WILLOW AMBULATORY MEDICATION CHARGE

## 2025-08-15 PROCEDURE — 95249 CONT GLUC MNTR PT PROV EQP: CPT

## 2025-08-22 ENCOUNTER — APPOINTMENT (OUTPATIENT)
Dept: ENDOCRINOLOGY | Facility: CLINIC | Age: 55
End: 2025-08-22
Payer: COMMERCIAL

## 2025-08-22 VITALS
BODY MASS INDEX: 17.48 KG/M2 | HEIGHT: 64 IN | HEART RATE: 104 BPM | SYSTOLIC BLOOD PRESSURE: 120 MMHG | DIASTOLIC BLOOD PRESSURE: 76 MMHG | WEIGHT: 102.4 LBS

## 2025-08-22 DIAGNOSIS — K86.9 DIABETES MELLITUS ASSOCIATED WITH PANCREATIC DISEASE: Primary | ICD-10-CM

## 2025-08-22 DIAGNOSIS — Z96.41 INSULIN PUMP IN PLACE: ICD-10-CM

## 2025-08-22 DIAGNOSIS — E11.69 DIABETES MELLITUS ASSOCIATED WITH PANCREATIC DISEASE: Primary | ICD-10-CM

## 2025-08-22 PROCEDURE — 99214 OFFICE O/P EST MOD 30 MIN: CPT | Performed by: INTERNAL MEDICINE

## 2025-08-22 PROCEDURE — 95251 CONT GLUC MNTR ANALYSIS I&R: CPT | Performed by: INTERNAL MEDICINE

## 2025-08-22 PROCEDURE — 3078F DIAST BP <80 MM HG: CPT | Performed by: INTERNAL MEDICINE

## 2025-08-22 PROCEDURE — 3008F BODY MASS INDEX DOCD: CPT | Performed by: INTERNAL MEDICINE

## 2025-08-22 PROCEDURE — 3074F SYST BP LT 130 MM HG: CPT | Performed by: INTERNAL MEDICINE

## 2025-08-24 ENCOUNTER — PHARMACY VISIT (OUTPATIENT)
Dept: PHARMACY | Facility: CLINIC | Age: 55
End: 2025-08-24
Payer: MEDICAID

## 2025-08-24 PROCEDURE — RXMED WILLOW AMBULATORY MEDICATION CHARGE

## 2025-08-28 ENCOUNTER — TELEPHONE (OUTPATIENT)
Dept: ADMISSION | Facility: HOSPITAL | Age: 55
End: 2025-08-28
Payer: COMMERCIAL

## 2025-08-28 DIAGNOSIS — G89.3 NEOPLASM RELATED PAIN: ICD-10-CM

## 2025-08-28 PROCEDURE — RXMED WILLOW AMBULATORY MEDICATION CHARGE

## 2025-08-28 RX ORDER — OXYCODONE HYDROCHLORIDE 15 MG/1
15 TABLET ORAL EVERY 4 HOURS PRN
Qty: 180 TABLET | Refills: 0 | Status: SHIPPED | OUTPATIENT
Start: 2025-08-28 | End: 2025-09-28

## 2025-08-28 RX ORDER — OXYCODONE HYDROCHLORIDE 15 MG/1
15 TABLET ORAL EVERY 4 HOURS PRN
Qty: 180 TABLET | Refills: 0 | Status: SHIPPED | OUTPATIENT
Start: 2025-08-28 | End: 2025-08-28 | Stop reason: SDUPTHER

## 2025-08-29 ENCOUNTER — PHARMACY VISIT (OUTPATIENT)
Dept: PHARMACY | Facility: CLINIC | Age: 55
End: 2025-08-29
Payer: MEDICAID

## 2025-08-29 ENCOUNTER — APPOINTMENT (OUTPATIENT)
Dept: ENDOCRINOLOGY | Facility: CLINIC | Age: 55
End: 2025-08-29
Payer: COMMERCIAL

## 2025-09-03 PROCEDURE — RXMED WILLOW AMBULATORY MEDICATION CHARGE

## 2025-09-05 ENCOUNTER — PHARMACY VISIT (OUTPATIENT)
Dept: PHARMACY | Facility: CLINIC | Age: 55
End: 2025-09-05
Payer: MEDICAID

## 2025-12-03 ENCOUNTER — APPOINTMENT (OUTPATIENT)
Dept: ENDOCRINOLOGY | Facility: CLINIC | Age: 55
End: 2025-12-03
Payer: COMMERCIAL